# Patient Record
Sex: FEMALE | Race: WHITE | NOT HISPANIC OR LATINO | Employment: OTHER | URBAN - METROPOLITAN AREA
[De-identification: names, ages, dates, MRNs, and addresses within clinical notes are randomized per-mention and may not be internally consistent; named-entity substitution may affect disease eponyms.]

---

## 2018-05-14 ENCOUNTER — HOSPITAL ENCOUNTER (OUTPATIENT)
Dept: RADIOLOGY | Facility: MEDICAL CENTER | Age: 58
End: 2018-05-14

## 2018-05-14 ENCOUNTER — HOSPITAL ENCOUNTER (INPATIENT)
Facility: MEDICAL CENTER | Age: 58
LOS: 3 days | DRG: 308 | End: 2018-05-17
Attending: EMERGENCY MEDICINE | Admitting: HOSPITALIST
Payer: MEDICARE

## 2018-05-14 DIAGNOSIS — J96.01 ACUTE RESPIRATORY FAILURE WITH HYPOXIA (HCC): ICD-10-CM

## 2018-05-14 DIAGNOSIS — B36.9 FUNGAL RASH OF TORSO: ICD-10-CM

## 2018-05-14 DIAGNOSIS — E11.8 TYPE 2 DIABETES MELLITUS WITH COMPLICATION, WITHOUT LONG-TERM CURRENT USE OF INSULIN (HCC): ICD-10-CM

## 2018-05-14 DIAGNOSIS — I27.20 MODERATE TO SEVERE PULMONARY HYPERTENSION (HCC): ICD-10-CM

## 2018-05-14 DIAGNOSIS — I51.7 CARDIOMEGALY: ICD-10-CM

## 2018-05-14 DIAGNOSIS — J44.9 CHRONIC OBSTRUCTIVE PULMONARY DISEASE, UNSPECIFIED COPD TYPE (HCC): ICD-10-CM

## 2018-05-14 DIAGNOSIS — I48.91 ATRIAL FIBRILLATION WITH RAPID VENTRICULAR RESPONSE (HCC): ICD-10-CM

## 2018-05-14 DIAGNOSIS — T78.40XA ACUTE ALLERGIC REACTION, INITIAL ENCOUNTER: ICD-10-CM

## 2018-05-14 DIAGNOSIS — I10 ESSENTIAL HYPERTENSION: ICD-10-CM

## 2018-05-14 DIAGNOSIS — I48.91 NEW ONSET ATRIAL FIBRILLATION (HCC): ICD-10-CM

## 2018-05-14 LAB
ALBUMIN SERPL BCP-MCNC: 3.8 G/DL (ref 3.2–4.9)
ALBUMIN/GLOB SERPL: 1.4 G/DL
ALP SERPL-CCNC: 69 U/L (ref 30–99)
ALT SERPL-CCNC: 67 U/L (ref 2–50)
ANION GAP SERPL CALC-SCNC: 7 MMOL/L (ref 0–11.9)
AST SERPL-CCNC: 33 U/L (ref 12–45)
BASOPHILS # BLD AUTO: 0.5 % (ref 0–1.8)
BASOPHILS # BLD: 0.06 K/UL (ref 0–0.12)
BILIRUB SERPL-MCNC: 0.8 MG/DL (ref 0.1–1.5)
BNP SERPL-MCNC: 131 PG/ML (ref 0–100)
BUN SERPL-MCNC: 19 MG/DL (ref 8–22)
CALCIUM SERPL-MCNC: 9.6 MG/DL (ref 8.5–10.5)
CHLORIDE SERPL-SCNC: 101 MMOL/L (ref 96–112)
CO2 SERPL-SCNC: 31 MMOL/L (ref 20–33)
CREAT SERPL-MCNC: 0.95 MG/DL (ref 0.5–1.4)
EKG IMPRESSION: NORMAL
EOSINOPHIL # BLD AUTO: 0.13 K/UL (ref 0–0.51)
EOSINOPHIL NFR BLD: 1.2 % (ref 0–6.9)
ERYTHROCYTE [DISTWIDTH] IN BLOOD BY AUTOMATED COUNT: 58.7 FL (ref 35.9–50)
GLOBULIN SER CALC-MCNC: 2.8 G/DL (ref 1.9–3.5)
GLUCOSE SERPL-MCNC: 112 MG/DL (ref 65–99)
HCT VFR BLD AUTO: 46 % (ref 37–47)
HGB BLD-MCNC: 14.9 G/DL (ref 12–16)
IMM GRANULOCYTES # BLD AUTO: 0.05 K/UL (ref 0–0.11)
IMM GRANULOCYTES NFR BLD AUTO: 0.5 % (ref 0–0.9)
LYMPHOCYTES # BLD AUTO: 3.22 K/UL (ref 1–4.8)
LYMPHOCYTES NFR BLD: 29.2 % (ref 22–41)
MCH RBC QN AUTO: 32.6 PG (ref 27–33)
MCHC RBC AUTO-ENTMCNC: 32.4 G/DL (ref 33.6–35)
MCV RBC AUTO: 100.7 FL (ref 81.4–97.8)
MONOCYTES # BLD AUTO: 0.71 K/UL (ref 0–0.85)
MONOCYTES NFR BLD AUTO: 6.4 % (ref 0–13.4)
NEUTROPHILS # BLD AUTO: 6.87 K/UL (ref 2–7.15)
NEUTROPHILS NFR BLD: 62.2 % (ref 44–72)
NRBC # BLD AUTO: 0.07 K/UL
NRBC BLD-RTO: 0.6 /100 WBC
PLATELET # BLD AUTO: 259 K/UL (ref 164–446)
PMV BLD AUTO: 10.2 FL (ref 9–12.9)
POTASSIUM SERPL-SCNC: 4.3 MMOL/L (ref 3.6–5.5)
PROT SERPL-MCNC: 6.6 G/DL (ref 6–8.2)
RBC # BLD AUTO: 4.57 M/UL (ref 4.2–5.4)
SODIUM SERPL-SCNC: 139 MMOL/L (ref 135–145)
TROPONIN I SERPL-MCNC: 0.04 NG/ML (ref 0–0.04)
TSH SERPL DL<=0.005 MIU/L-ACNC: 3.12 UIU/ML (ref 0.38–5.33)
WBC # BLD AUTO: 11 K/UL (ref 4.8–10.8)

## 2018-05-14 PROCEDURE — 36415 COLL VENOUS BLD VENIPUNCTURE: CPT

## 2018-05-14 PROCEDURE — 93306 TTE W/DOPPLER COMPLETE: CPT | Mod: 26 | Performed by: INTERNAL MEDICINE

## 2018-05-14 PROCEDURE — 80053 COMPREHEN METABOLIC PANEL: CPT

## 2018-05-14 PROCEDURE — 93005 ELECTROCARDIOGRAM TRACING: CPT

## 2018-05-14 PROCEDURE — 770006 HCHG ROOM/CARE - MED/SURG/GYN SEMI*

## 2018-05-14 PROCEDURE — 770020 HCHG ROOM/CARE - TELE (206)

## 2018-05-14 PROCEDURE — 83880 ASSAY OF NATRIURETIC PEPTIDE: CPT

## 2018-05-14 PROCEDURE — 84443 ASSAY THYROID STIM HORMONE: CPT

## 2018-05-14 PROCEDURE — 700101 HCHG RX REV CODE 250: Performed by: EMERGENCY MEDICINE

## 2018-05-14 PROCEDURE — 84484 ASSAY OF TROPONIN QUANT: CPT

## 2018-05-14 PROCEDURE — 96374 THER/PROPH/DIAG INJ IV PUSH: CPT

## 2018-05-14 PROCEDURE — 85025 COMPLETE CBC W/AUTO DIFF WBC: CPT

## 2018-05-14 PROCEDURE — 99223 1ST HOSP IP/OBS HIGH 75: CPT | Performed by: HOSPITALIST

## 2018-05-14 PROCEDURE — 99285 EMERGENCY DEPT VISIT HI MDM: CPT

## 2018-05-14 RX ORDER — POLYETHYLENE GLYCOL 3350 17 G/17G
1 POWDER, FOR SOLUTION ORAL
Status: DISCONTINUED | OUTPATIENT
Start: 2018-05-14 | End: 2018-05-17 | Stop reason: HOSPADM

## 2018-05-14 RX ORDER — DULOXETIN HYDROCHLORIDE 30 MG/1
30 CAPSULE, DELAYED RELEASE ORAL DAILY
Status: ON HOLD | COMMUNITY

## 2018-05-14 RX ORDER — LOSARTAN POTASSIUM AND HYDROCHLOROTHIAZIDE 25; 100 MG/1; MG/1
1 TABLET ORAL DAILY
Status: ON HOLD | COMMUNITY
End: 2018-05-17

## 2018-05-14 RX ORDER — NORGESTIMATE AND ETHINYL ESTRADIOL 0.25-0.035
1 KIT ORAL DAILY
COMMUNITY
End: 2018-05-14

## 2018-05-14 RX ORDER — POTASSIUM CHLORIDE 20 MEQ/1
20 TABLET, EXTENDED RELEASE ORAL DAILY
Status: DISCONTINUED | OUTPATIENT
Start: 2018-05-15 | End: 2018-05-17 | Stop reason: HOSPADM

## 2018-05-14 RX ORDER — DEXTROSE MONOHYDRATE 25 G/50ML
25 INJECTION, SOLUTION INTRAVENOUS
Status: DISCONTINUED | OUTPATIENT
Start: 2018-05-14 | End: 2018-05-17 | Stop reason: HOSPADM

## 2018-05-14 RX ORDER — FUROSEMIDE 10 MG/ML
40 INJECTION INTRAMUSCULAR; INTRAVENOUS
Status: DISCONTINUED | OUTPATIENT
Start: 2018-05-15 | End: 2018-05-15

## 2018-05-14 RX ORDER — BISACODYL 10 MG
10 SUPPOSITORY, RECTAL RECTAL
Status: DISCONTINUED | OUTPATIENT
Start: 2018-05-14 | End: 2018-05-17 | Stop reason: HOSPADM

## 2018-05-14 RX ORDER — TOPIRAMATE 25 MG/1
25 TABLET ORAL 2 TIMES DAILY
Status: ON HOLD | COMMUNITY

## 2018-05-14 RX ORDER — AMOXICILLIN 250 MG
2 CAPSULE ORAL 2 TIMES DAILY
Status: DISCONTINUED | OUTPATIENT
Start: 2018-05-14 | End: 2018-05-17 | Stop reason: HOSPADM

## 2018-05-14 RX ORDER — DILTIAZEM HYDROCHLORIDE 5 MG/ML
20 INJECTION INTRAVENOUS ONCE
Status: DISCONTINUED | OUTPATIENT
Start: 2018-05-14 | End: 2018-05-14

## 2018-05-14 RX ORDER — CYCLOBENZAPRINE HCL 10 MG
10 TABLET ORAL 3 TIMES DAILY PRN
Status: ON HOLD | COMMUNITY

## 2018-05-14 RX ORDER — METFORMIN HYDROCHLORIDE 500 MG/1
500 TABLET, EXTENDED RELEASE ORAL DAILY
Status: ON HOLD | COMMUNITY
End: 2018-05-17

## 2018-05-14 RX ORDER — METOPROLOL TARTRATE 1 MG/ML
5 INJECTION, SOLUTION INTRAVENOUS ONCE
Status: COMPLETED | OUTPATIENT
Start: 2018-05-14 | End: 2018-05-14

## 2018-05-14 RX ADMIN — METOPROLOL TARTRATE 5 MG: 1 INJECTION, SOLUTION INTRAVENOUS at 22:17

## 2018-05-14 ASSESSMENT — ENCOUNTER SYMPTOMS
SHORTNESS OF BREATH: 1
ABDOMINAL PAIN: 0
COUGH: 1
FEVER: 0

## 2018-05-14 ASSESSMENT — PAIN SCALES - GENERAL: PAINLEVEL_OUTOF10: 0

## 2018-05-15 ENCOUNTER — APPOINTMENT (OUTPATIENT)
Dept: RADIOLOGY | Facility: MEDICAL CENTER | Age: 58
DRG: 308 | End: 2018-05-15
Attending: EMERGENCY MEDICINE
Payer: MEDICARE

## 2018-05-15 PROBLEM — J96.01 ACUTE RESPIRATORY FAILURE WITH HYPOXIA (HCC): Status: ACTIVE | Noted: 2018-05-15

## 2018-05-15 PROBLEM — T78.40XA ACUTE ALLERGIC REACTION: Status: ACTIVE | Noted: 2018-05-15

## 2018-05-15 PROBLEM — I10 HTN (HYPERTENSION): Status: ACTIVE | Noted: 2018-05-15

## 2018-05-15 PROBLEM — B36.9 FUNGAL RASH OF TORSO: Status: ACTIVE | Noted: 2018-05-15

## 2018-05-15 PROBLEM — I48.91 NEW ONSET ATRIAL FIBRILLATION (HCC): Status: ACTIVE | Noted: 2018-05-15

## 2018-05-15 PROBLEM — E11.9 T2DM (TYPE 2 DIABETES MELLITUS) (HCC): Status: ACTIVE | Noted: 2018-05-15

## 2018-05-15 PROBLEM — I27.20 MODERATE TO SEVERE PULMONARY HYPERTENSION (HCC): Status: ACTIVE | Noted: 2018-05-15

## 2018-05-15 PROBLEM — I51.7 CARDIOMEGALY: Status: ACTIVE | Noted: 2018-05-15

## 2018-05-15 LAB
ANION GAP SERPL CALC-SCNC: 6 MMOL/L (ref 0–11.9)
APTT PPP: 27.2 SEC (ref 24.7–36)
APTT PPP: 50.7 SEC (ref 24.7–36)
APTT PPP: 74.9 SEC (ref 24.7–36)
APTT PPP: 83.4 SEC (ref 24.7–36)
BUN SERPL-MCNC: 19 MG/DL (ref 8–22)
CALCIUM SERPL-MCNC: 8.8 MG/DL (ref 8.5–10.5)
CHLORIDE SERPL-SCNC: 100 MMOL/L (ref 96–112)
CHOLEST SERPL-MCNC: 132 MG/DL (ref 100–199)
CO2 SERPL-SCNC: 34 MMOL/L (ref 20–33)
CREAT SERPL-MCNC: 1.1 MG/DL (ref 0.5–1.4)
ERYTHROCYTE [DISTWIDTH] IN BLOOD BY AUTOMATED COUNT: 62 FL (ref 35.9–50)
EST. AVERAGE GLUCOSE BLD GHB EST-MCNC: 146 MG/DL
GLUCOSE BLD-MCNC: 123 MG/DL (ref 65–99)
GLUCOSE BLD-MCNC: 124 MG/DL (ref 65–99)
GLUCOSE BLD-MCNC: 135 MG/DL (ref 65–99)
GLUCOSE BLD-MCNC: 137 MG/DL (ref 65–99)
GLUCOSE SERPL-MCNC: 114 MG/DL (ref 65–99)
HBA1C MFR BLD: 6.7 % (ref 0–5.6)
HCT VFR BLD AUTO: 44.9 % (ref 37–47)
HDLC SERPL-MCNC: 32 MG/DL
HGB BLD-MCNC: 14.1 G/DL (ref 12–16)
LDLC SERPL CALC-MCNC: 49 MG/DL
LV EJECT FRACT  99904: 55
LV EJECT FRACT MOD 2C 99903: 47.77
LV EJECT FRACT MOD 4C 99902: 48.23
LV EJECT FRACT MOD BP 99901: 44.72
MCH RBC QN AUTO: 32.9 PG (ref 27–33)
MCHC RBC AUTO-ENTMCNC: 31.4 G/DL (ref 33.6–35)
MCV RBC AUTO: 104.7 FL (ref 81.4–97.8)
PLATELET # BLD AUTO: 212 K/UL (ref 164–446)
PMV BLD AUTO: 10 FL (ref 9–12.9)
POTASSIUM SERPL-SCNC: 4.2 MMOL/L (ref 3.6–5.5)
RBC # BLD AUTO: 4.29 M/UL (ref 4.2–5.4)
SODIUM SERPL-SCNC: 140 MMOL/L (ref 135–145)
TRIGL SERPL-MCNC: 255 MG/DL (ref 0–149)
WBC # BLD AUTO: 6.9 K/UL (ref 4.8–10.8)

## 2018-05-15 PROCEDURE — 83036 HEMOGLOBIN GLYCOSYLATED A1C: CPT

## 2018-05-15 PROCEDURE — 700111 HCHG RX REV CODE 636 W/ 250 OVERRIDE (IP): Performed by: HOSPITALIST

## 2018-05-15 PROCEDURE — 82962 GLUCOSE BLOOD TEST: CPT

## 2018-05-15 PROCEDURE — 99233 SBSQ HOSP IP/OBS HIGH 50: CPT | Performed by: HOSPITALIST

## 2018-05-15 PROCEDURE — 700117 HCHG RX CONTRAST REV CODE 255: Performed by: HOSPITALIST

## 2018-05-15 PROCEDURE — 93306 TTE W/DOPPLER COMPLETE: CPT

## 2018-05-15 PROCEDURE — 700102 HCHG RX REV CODE 250 W/ 637 OVERRIDE(OP): Performed by: HOSPITALIST

## 2018-05-15 PROCEDURE — 80061 LIPID PANEL: CPT

## 2018-05-15 PROCEDURE — 71275 CT ANGIOGRAPHY CHEST: CPT

## 2018-05-15 PROCEDURE — A9270 NON-COVERED ITEM OR SERVICE: HCPCS | Performed by: HOSPITALIST

## 2018-05-15 PROCEDURE — 770020 HCHG ROOM/CARE - TELE (206)

## 2018-05-15 PROCEDURE — 302146: Performed by: HOSPITALIST

## 2018-05-15 PROCEDURE — 36415 COLL VENOUS BLD VENIPUNCTURE: CPT

## 2018-05-15 PROCEDURE — 85027 COMPLETE CBC AUTOMATED: CPT

## 2018-05-15 PROCEDURE — 85730 THROMBOPLASTIN TIME PARTIAL: CPT

## 2018-05-15 PROCEDURE — 700117 HCHG RX CONTRAST REV CODE 255: Performed by: EMERGENCY MEDICINE

## 2018-05-15 PROCEDURE — 80048 BASIC METABOLIC PNL TOTAL CA: CPT

## 2018-05-15 RX ORDER — ASCORBIC ACID 500 MG
500 TABLET ORAL DAILY
Status: ON HOLD | COMMUNITY

## 2018-05-15 RX ORDER — NYSTATIN 100000 [USP'U]/G
POWDER TOPICAL 2 TIMES DAILY
Status: DISCONTINUED | OUTPATIENT
Start: 2018-05-15 | End: 2018-05-17 | Stop reason: HOSPADM

## 2018-05-15 RX ORDER — HEPARIN SODIUM 1000 [USP'U]/ML
9000 INJECTION, SOLUTION INTRAVENOUS; SUBCUTANEOUS ONCE
Status: COMPLETED | OUTPATIENT
Start: 2018-05-15 | End: 2018-05-15

## 2018-05-15 RX ORDER — HEPARIN SODIUM 1000 [USP'U]/ML
5000 INJECTION, SOLUTION INTRAVENOUS; SUBCUTANEOUS PRN
Status: DISCONTINUED | OUTPATIENT
Start: 2018-05-14 | End: 2018-05-17

## 2018-05-15 RX ORDER — DIGOXIN 125 MCG
250 TABLET ORAL EVERY 6 HOURS
Status: COMPLETED | OUTPATIENT
Start: 2018-05-15 | End: 2018-05-16

## 2018-05-15 RX ORDER — DIGOXIN 125 MCG
125 TABLET ORAL DAILY
Status: DISCONTINUED | OUTPATIENT
Start: 2018-05-16 | End: 2018-05-17 | Stop reason: HOSPADM

## 2018-05-15 RX ORDER — ATORVASTATIN CALCIUM 10 MG/1
10 TABLET, FILM COATED ORAL
Status: DISCONTINUED | OUTPATIENT
Start: 2018-05-15 | End: 2018-05-17 | Stop reason: HOSPADM

## 2018-05-15 RX ORDER — POTASSIUM CHLORIDE 20 MEQ/1
20 TABLET, EXTENDED RELEASE ORAL DAILY
Status: DISCONTINUED | OUTPATIENT
Start: 2018-05-16 | End: 2018-05-15

## 2018-05-15 RX ORDER — FUROSEMIDE 40 MG/1
40 TABLET ORAL
Status: DISCONTINUED | OUTPATIENT
Start: 2018-05-16 | End: 2018-05-17 | Stop reason: HOSPADM

## 2018-05-15 RX ADMIN — DILTIAZEM HYDROCHLORIDE 60 MG: 30 TABLET, FILM COATED ORAL at 15:02

## 2018-05-15 RX ADMIN — DIGOXIN 250 MCG: 125 TABLET ORAL at 15:03

## 2018-05-15 RX ADMIN — IOHEXOL 95 ML: 350 INJECTION, SOLUTION INTRAVENOUS at 09:21

## 2018-05-15 RX ADMIN — STANDARDIZED SENNA CONCENTRATE AND DOCUSATE SODIUM 2 TABLET: 8.6; 5 TABLET, FILM COATED ORAL at 17:02

## 2018-05-15 RX ADMIN — NYSTATIN: 100000 POWDER TOPICAL at 20:31

## 2018-05-15 RX ADMIN — METOPROLOL TARTRATE 25 MG: 25 TABLET ORAL at 06:19

## 2018-05-15 RX ADMIN — MAGNESIUM GLUCONATE 500 MG ORAL TABLET 400 MG: 500 TABLET ORAL at 17:03

## 2018-05-15 RX ADMIN — HEPARIN SODIUM 9000 UNITS: 1000 INJECTION, SOLUTION INTRAVENOUS; SUBCUTANEOUS at 02:57

## 2018-05-15 RX ADMIN — FUROSEMIDE 40 MG: 10 INJECTION, SOLUTION INTRAMUSCULAR; INTRAVENOUS at 06:17

## 2018-05-15 RX ADMIN — HEPARIN SODIUM 5000 UNITS: 1000 INJECTION, SOLUTION INTRAVENOUS; SUBCUTANEOUS at 16:26

## 2018-05-15 RX ADMIN — HEPARIN SODIUM 1950 UNITS/HR: 5000 INJECTION, SOLUTION INTRAVENOUS at 15:57

## 2018-05-15 RX ADMIN — HEPARIN SODIUM 1950 UNITS/HR: 5000 INJECTION, SOLUTION INTRAVENOUS at 02:58

## 2018-05-15 RX ADMIN — ATORVASTATIN CALCIUM 10 MG: 10 TABLET, FILM COATED ORAL at 17:02

## 2018-05-15 RX ADMIN — IOHEXOL 80 ML: 350 INJECTION, SOLUTION INTRAVENOUS at 01:56

## 2018-05-15 RX ADMIN — DIGOXIN 250 MCG: 125 TABLET ORAL at 20:31

## 2018-05-15 RX ADMIN — MAGNESIUM GLUCONATE 500 MG ORAL TABLET 400 MG: 500 TABLET ORAL at 06:18

## 2018-05-15 RX ADMIN — POTASSIUM CHLORIDE 20 MEQ: 1500 TABLET, EXTENDED RELEASE ORAL at 06:18

## 2018-05-15 RX ADMIN — ASPIRIN 81 MG: 81 TABLET, COATED ORAL at 06:17

## 2018-05-15 RX ADMIN — DILTIAZEM HYDROCHLORIDE 90 MG: 30 TABLET, FILM COATED ORAL at 20:31

## 2018-05-15 RX ADMIN — MAGNESIUM GLUCONATE 500 MG ORAL TABLET 400 MG: 500 TABLET ORAL at 00:57

## 2018-05-15 ASSESSMENT — COPD QUESTIONNAIRES
COPD SCREENING SCORE: 6
COPD SCREENING SCORE: 4
DO YOU EVER COUGH UP ANY MUCUS OR PHLEGM?: NO/ONLY WITH OCCASIONAL COLDS OR INFECTIONS
IN THE PAST 12 MONTHS DO YOU DO LESS THAN YOU USED TO BECAUSE OF YOUR BREATHING PROBLEMS: AGREE
DURING THE PAST 4 WEEKS HOW MUCH DID YOU FEEL SHORT OF BREATH: NONE/LITTLE OF THE TIME
DURING THE PAST 4 WEEKS HOW MUCH DID YOU FEEL SHORT OF BREATH: MOST  OR ALL OF THE TIME
HAVE YOU SMOKED AT LEAST 100 CIGARETTES IN YOUR ENTIRE LIFE: YES
DO YOU EVER COUGH UP ANY MUCUS OR PHLEGM?: NO/ONLY WITH OCCASIONAL COLDS OR INFECTIONS
HAVE YOU SMOKED AT LEAST 100 CIGARETTES IN YOUR ENTIRE LIFE: YES

## 2018-05-15 ASSESSMENT — PAIN SCALES - GENERAL
PAINLEVEL_OUTOF10: 0
PAINLEVEL_OUTOF10: 5
PAINLEVEL_OUTOF10: 0

## 2018-05-15 ASSESSMENT — LIFESTYLE VARIABLES
EVER_SMOKED: YES
EVER_SMOKED: YES
ALCOHOL_USE: NO
SUBSTANCE_ABUSE: 0

## 2018-05-15 ASSESSMENT — ENCOUNTER SYMPTOMS
FEVER: 0
DIAPHORESIS: 0
BACK PAIN: 0
ORTHOPNEA: 1
ABDOMINAL PAIN: 0
HEADACHES: 0
SENSORY CHANGE: 0
NECK PAIN: 0
COUGH: 0
EYE PAIN: 0
WEAKNESS: 0
VOMITING: 0
CLAUDICATION: 0
DIARRHEA: 0
SPEECH CHANGE: 0
SORE THROAT: 0
DEPRESSION: 0
EYE DISCHARGE: 0
FOCAL WEAKNESS: 0
CONSTIPATION: 0
PALPITATIONS: 0
WHEEZING: 0
HEMOPTYSIS: 0
MYALGIAS: 0
BRUISES/BLEEDS EASILY: 0
SPUTUM PRODUCTION: 0
CHILLS: 0
SHORTNESS OF BREATH: 1
NAUSEA: 0
LOSS OF CONSCIOUSNESS: 0
DIZZINESS: 0
TINGLING: 0
PHOTOPHOBIA: 0

## 2018-05-15 ASSESSMENT — PATIENT HEALTH QUESTIONNAIRE - PHQ9
2. FEELING DOWN, DEPRESSED, IRRITABLE, OR HOPELESS: NOT AT ALL
SUM OF ALL RESPONSES TO PHQ9 QUESTIONS 1 AND 2: 0
1. LITTLE INTEREST OR PLEASURE IN DOING THINGS: NOT AT ALL

## 2018-05-15 NOTE — ASSESSMENT & PLAN NOTE
As noted above, Isuspect the patient has underlying lung disease with possible pickwickian syndrome/obesity hypoventilation and most likely obstructive sleep apnea. She apparently had been on oxygen 2 years ago but discontinued use because of financial difficulties. Fortunately she is not a smoker. She will be started on respiratory therapy protocol for new diagnosed atrial fibrillation. She would benefit from an outpatient PFT once medically stabilized. Chest x-ray at the outlying facility showed no evidence of infiltrates or consolidations.   A CT PE with no PE seen, ascending aorta 4.1cm.

## 2018-05-15 NOTE — ASSESSMENT & PLAN NOTE
"Patient states that she was diagnosed with a \"enlarged heart\" several years ago but has not followed up with any specialist. Given her clinical symptoms of volume overload including crackles heard on the lungs and lower extremity edema.  concerning for pickwickian syndrome/obesity hypoventilation in addition to obstructive sleep apnea. Her current BNP is mildly elevated and she has noted cardiomegaly on chest x-ray.  echocardiogram  EF 55%, RVP elevated 55-60.  control her heart rate, and start her on Lasix with strict intake and output measuring.   dc'd lasix IV 40 daily since lungs CTA b/l.  Continue lasix 40mg po daily with K.  "

## 2018-05-15 NOTE — PROGRESS NOTES
Patient alert during report. Just got up to bedside commode. Patient is to go to CT at 0800. Apparently she went down last night but wasn't able to get CT due to Iv being lost. Appears comfortable, clean and covered. Will continue to monitor.

## 2018-05-15 NOTE — PROGRESS NOTES
Unable to place an IV on upper arms. Did obtain IV access in mid forearm. Unable to obtain any higher. CT notified. CT to call nurse back if this IV is okay or not.

## 2018-05-15 NOTE — ASSESSMENT & PLAN NOTE
Patient denies any history of known cardiac dysrhythmias. However, she is in poor medical condition at baseline and does not apparently seek frequent medical help therefore she may have had undiagnosed disease for quite some time. Her heart rates have not improved with initiation of beta blockade.   5/15:  dc oral metoprolol for better rate control with cardizem po q 6 hours since HR still in 130 to 160 range afib on monitor. Start digoxin loading since would like to avoid amiodarone given her current hypoxia and acute respiratory failure on 4.5 LPM NC.   Echocardiogram EF 55%, RVP 55-60  heparin drip for anticoagulation in light of her super morbid obesity. We will further discuss the need for oral anticoagulation therapy.  Her chads score is 3. She will need outpatient cardiology follow-up unless we are unable to control her heart rate in the inpatient setting.  5/16:  Improved rate control with digoxin loading and cardizem 360mg po daily.  Check EKG to ensure not aflutter.  Remains irreg irreg on monitor suggestive of afib.  Diuresis with lasix.  CXR in a.m.  Checking on xarelto pricing and home O2.

## 2018-05-15 NOTE — PROGRESS NOTES
Received call from Selin in CT. Per CT tech forearm IV ale. CT to call nurse when ready for patient to go for CTa.

## 2018-05-15 NOTE — PROGRESS NOTES
Received call from Selin in CT stating patient will be placed for transport at 0800. Will update day shift.

## 2018-05-15 NOTE — ASSESSMENT & PLAN NOTE
Patient has not been on any medication for blood pressure.  Tolerating cardizem po for rate control.

## 2018-05-15 NOTE — PROGRESS NOTES
Patients HR increased up to 160. Went to check on her and she was independently out of bed and had urinated on the floor. She stated she couldn't hold it. Oxygen was off and she was saturating at 68% on RA. Placed on oxygen, had her sit down on toilet while bed was clean and bed alarm placed on bed. Patient does call appropriately but clearly needs oxygen with ambulating. Reminded of importance of oxygen and calling prior to getting up. Patient verbalized understanding. Bed alarm on and functioning. No injuries, HR is decreasing. Sitting at bedside eating breakfast.

## 2018-05-15 NOTE — PROGRESS NOTES
2 RN skin check completed. Patient has some redness under abdominal folds, and pink blanching sacrum. Generalized edema present. No open skin issues.

## 2018-05-15 NOTE — ED TRIAGE NOTES
"Chief Complaint   Patient presents with   • Shortness of Breath     PT RA O2 is 71 %. Satting 98% on 10L oxymask.   • Facial Swelling     PT went to [facility] for suspected allergic rxn d/t facial swelling.   • Hand Swelling       BP (!) 98/64   Pulse (!) 114   Temp 36.5 °C (97.7 °F)   Resp 20   Ht 1.702 m (5' 7\")   Wt (!) 204 kg (449 lb 11.8 oz)   SpO2 98%   BMI 70.44 kg/m²       Pt BIB [facility]. Pt presented to [facility] urgent care for suspected allergic rxn. Pt had facial and hand swelling, SOB. [facility] urgent care sent pt to [facility] ED where they diagnosed new onset a-fib, hypoxia, new onset diabetes and CHF. Pt received lasix (unknown dose), floyd was placed for transport.    PT denies SOB, reports swelling has decreased at this time.  "

## 2018-05-15 NOTE — CARE PLAN
Problem: Safety  Goal: Will remain free from injury  Outcome: PROGRESSING AS EXPECTED  Impulsive.     Problem: Infection  Goal: Will remain free from infection  Outcome: PROGRESSING AS EXPECTED

## 2018-05-15 NOTE — ASSESSMENT & PLAN NOTE
Patient states that she was recently diagnosed with diabetes mellitus however she is unsure of the name of her medications. continue insulin sliding scale and Accu-Cheks.   HbA1c 6.7%.  Allergic to metformin with rash to face.  5/16:  Started glyburide 5mg daily.

## 2018-05-15 NOTE — PROGRESS NOTES
Picked up patient from ED. Patient A+Ox4, 5 L O2 via facemask, Afib on monitor rate 110s-130s. Patient has floyd, per ED RN unsure if there are orders to keep floyd. Will contact hospitalist in regards to catheter.  Plan of care discussed. PO rate control orders in place. Echo pending. Patient able to ambulate from gurney to bariatric bed in the room . Patient oriented to surroundings and unit routine. Call bell and personal belongings within reach. Questions answered.

## 2018-05-15 NOTE — ED PROVIDER NOTES
ED Provider Note    Scribed for Gaetano Sahni M.D. by Fidencio Larry. 5/14/2018  9:52 PM    Means of arrival: EMS  History obtained by: Patient  Limitations: None      CHIEF COMPLAINT  Chief Complaint   Patient presents with   • Shortness of Breath     PT RA O2 is 71 %. Satting 98% on 10L oxymask.   • Facial Swelling     PT went to [facility] for suspected allergic rxn d/t facial swelling.   • Hand Swelling       HPI  Miroslava Matta is a 57 y.o. female who presents with shortness of breath onset two weeks ago worsening in severity the past few days. The patient visited Urgent Care as she thought this might be an allergic reaction to metformin. She reports associated facial, leg, and hand swelling that has progressively worsened over the past few weeks. Urgent Care sent the patient to the [place] ED where she was diagnosed with Atrial Fibrillation, Hypoxia, Diabetes, and CHF. The patient states walking exacerbates her shortness of breath, she also reports orthopnea. Upon exam, she reports an associated cough. She denies chest pain, fever, and abdominal pain.  Patient was transferred from outside hospital for concern of possible pulmonary embolus however patient's body habitus was too large further CT scanner     The patient states a couple years ago she was told she had an enlarged heart. She confirms it has improved since the diagnosis.     REVIEW OF SYSTEMS  Review of Systems   Constitutional: Negative for fever.   Respiratory: Positive for cough and shortness of breath.    Cardiovascular: Negative for chest pain.   Gastrointestinal: Negative for abdominal pain.   All other systems reviewed and are negative.    See HPI for further details. C.    PAST MEDICAL HISTORY       SOCIAL HISTORY  Social History     Social History Main Topics   • Smoking status: Not on file   • Smokeless tobacco: Not on file   • Alcohol use Not on file   • Drug use: Unknown   • Sexual activity: Not on file       SURGICAL  "HISTORY  patient denies any surgical history    CURRENT MEDICATIONS  Home Medications    **Home medications have not yet been reviewed for this encounter**         ALLERGIES  Allergies   Allergen Reactions   • Codeine Nausea       PHYSICAL EXAM  BP (!) 98/64   Pulse (!) 114   Temp 36.5 °C (97.7 °F)   Resp 20   Ht 1.702 m (5' 7\")   Wt (!) 204 kg (449 lb 11.8 oz)   SpO2 98%   BMI 70.44 kg/m²   Constitutional: Well developed, Well nourished, No acute distress, Non-toxic appearance.   HENT: Normocephalic, Atraumatic,  Eyes: PERRL, EOM intact  Neck: Supple, no meningismus  Lymphatic: No lymphadenopathy noted.   Cardiovascular: Tachycardic, irregular rhythm  Lungs: Bilateral crackles.   Abdomen: Bowel sounds normal, Soft, No tenderness  Skin: Warm, Dry, no rash  Back: No tenderness, No CVA tenderness.   Extremities: No edema to lower extremities  Neurologic: Alert and oriented, appropriate, follows commands, moving all extremities, normal speech   Psychiatric: Affect normal        DIAGNOSTIC STUDIES / PROCEDURES    EKG  12 Lead EKG interpreted by me to show:  Atrial Fibrillation with IVR  Rate 137  Axis: Normal  Intervals: Normal  T wave inversion in lead III  Normal ST segments  My impression of this EKG: Does not indicate ischemia or arrythmia at this time.      LABS  Results for orders placed or performed during the hospital encounter of 05/14/18   CBC WITH DIFFERENTIAL   Result Value Ref Range    WBC 11.0 (H) 4.8 - 10.8 K/uL    RBC 4.57 4.20 - 5.40 M/uL    Hemoglobin 14.9 12.0 - 16.0 g/dL    Hematocrit 46.0 37.0 - 47.0 %    .7 (H) 81.4 - 97.8 fL    MCH 32.6 27.0 - 33.0 pg    MCHC 32.4 (L) 33.6 - 35.0 g/dL    RDW 58.7 (H) 35.9 - 50.0 fL    Platelet Count 259 164 - 446 K/uL    MPV 10.2 9.0 - 12.9 fL    Neutrophils-Polys 62.20 44.00 - 72.00 %    Lymphocytes 29.20 22.00 - 41.00 %    Monocytes 6.40 0.00 - 13.40 %    Eosinophils 1.20 0.00 - 6.90 %    Basophils 0.50 0.00 - 1.80 %    Immature Granulocytes 0.50 " 0.00 - 0.90 %    Nucleated RBC 0.60 /100 WBC    Neutrophils (Absolute) 6.87 2.00 - 7.15 K/uL    Lymphs (Absolute) 3.22 1.00 - 4.80 K/uL    Monos (Absolute) 0.71 0.00 - 0.85 K/uL    Eos (Absolute) 0.13 0.00 - 0.51 K/uL    Baso (Absolute) 0.06 0.00 - 0.12 K/uL    Immature Granulocytes (abs) 0.05 0.00 - 0.11 K/uL    NRBC (Absolute) 0.07 K/uL   CMP   Result Value Ref Range    Sodium 139 135 - 145 mmol/L    Potassium 4.3 3.6 - 5.5 mmol/L    Chloride 101 96 - 112 mmol/L    Co2 31 20 - 33 mmol/L    Anion Gap 7.0 0.0 - 11.9    Glucose 112 (H) 65 - 99 mg/dL    Bun 19 8 - 22 mg/dL    Creatinine 0.95 0.50 - 1.40 mg/dL    Calcium 9.6 8.5 - 10.5 mg/dL    AST(SGOT) 33 12 - 45 U/L    ALT(SGPT) 67 (H) 2 - 50 U/L    Alkaline Phosphatase 69 30 - 99 U/L    Total Bilirubin 0.8 0.1 - 1.5 mg/dL    Albumin 3.8 3.2 - 4.9 g/dL    Total Protein 6.6 6.0 - 8.2 g/dL    Globulin 2.8 1.9 - 3.5 g/dL    A-G Ratio 1.4 g/dL   TROPONIN   Result Value Ref Range    Troponin I 0.04 0.00 - 0.04 ng/mL   BNP   Result Value Ref Range    B Natriuretic Peptide 131 (H) 0 - 100 pg/mL   ESTIMATED GFR   Result Value Ref Range    GFR If African American >60 >60 mL/min/1.73 m 2    GFR If Non African American >60 >60 mL/min/1.73 m 2   EKG (NOW)   Result Value Ref Range    Report       [facility] Emergency Dept.    Test Date:  2018  Pt Name:    SHAQUILLE CANDELARIA                 Department: ER  MRN:        [id number]                      Room:       [facility]  Gender:     Female                       Technician: [id number]  :        1960                   Requested By:ER TRIAGE PROTOCOL  Order #:    [id number]                    Reading MD:    Measurements  Intervals                                Axis  Rate:       137                          P:  WV:                                      QRS:        93  QRSD:       80                           T:          -14  QT:         316  QTc:        477    Interpretive Statements  ATRIAL FIBRILLATION, V-RATE  113-165  BORDERLINE RIGHT AXIS DEVIATION  EARLY PRECORDIAL R/S TRANSITION  BORDERLINE T ABNORMALITIES, DIFFUSE LEADS  BORDERLINE PROLONGED QT INTERVAL  No previous ECG available for comparison           RADIOLOGY  OUTSIDE IMAGES-DX CHEST   Final Result            COURSE & MEDICAL DECISION MAKING  Pertinent Labs & Imaging studies reviewed. (See chart for details)    Review from [facility]: Chest x-ray showed CHF. The patient received Lasix for her IVR which brought her heart rate down to the low 100's. There was a suspected PE but they were unable to get a CT angiogram secondary to patient's angiogram.     9:58 PM Patient seen and examined at bedside. The patient presents with history consistent with A. fib with RVR causing congestive heart failure.  I believe it is unlikely that this is a compensatory response to pulmonary embolus or sepsis but will check a CT scan given the prior physicians concerns.  Checking urinalysis TSH.  Patient without any fever, blood cultures are very low utility.  Ordered for CT-CTA Chest Pulmonary Arterty with Recons, Troponin, BNP, CBC with Differential, CMP, EKG, POC UA, TSH to evaluate. Patient will be treated with Metoprolol Tartrate for her symptoms.     Patient will be given IV metoprolol for her atrial fibrillation with rapid ventricular response.    Following IV metoprolol patient's heart rate has corrected to around a level of 100, she remains in A. fib.  She reports her symptoms are improved.  Her blood pressure remains adequate.    10:24 PM I discussed the patient's case and the above findings with Dr. Davis ([facility]) who agrees to see the patient.    I would consider my care for Ms. Matta critical care.  This included management of atrial fibrillation with RVR, blood pressure management.  Around 34 minutes were spent with this patient.    DISPOSITION:  Patient will be admitted to Dr. Davis in guarded condition.      FINAL IMPRESSION  1.  Atrial fibrillation with RVR,  acute heart failure, shortness of breath      I, Fidencio Larry (Scribe), am scribing for, and in the presence of, Gaetano Sahni M.D..    Electronically signed by: Fidencio Larry (Scribe), 5/14/2018    IGaetano M.D. personally performed the services described in this documentation, as scribed by Fidencio Larry in my presence, and it is both accurate and complete.    The note accurately reflects work and decisions made by me.  Gaetano Sanhi  5/14/2018  11:17 PM

## 2018-05-15 NOTE — DIETARY
NUTRITION SERVICES: BMI - Pt with BMI >40 (=67.23). Weight loss counseling not appropriate in acute care setting. RECOMMEND - Referral to outpatient nutrition services for weight management after D/C.

## 2018-05-15 NOTE — PROGRESS NOTES
No return page from Dr. Rondon. Notified Hospitalist RN of patients tachycardia. Message send to Dr. Rondon.

## 2018-05-15 NOTE — PROGRESS NOTES
Patient back from CT . Per CT and patient IV blew during contrast injections so new IV is needed. Will place new IV and inform CT.

## 2018-05-15 NOTE — H&P
Hospital Medicine History and Physical    Date of Service  5/14/2018    Chief Complaint  Chief Complaint   Patient presents with   • Shortness of Breath     PT RA O2 is 71 %. Satting 98% on 10L oxymask.   • Facial Swelling     PT went to [place] for suspected allergic rxn d/t facial swelling.   • Hand Swelling       History of Presenting Illness  57 y.o. female who presented on 5/14/2018 entrance or from an outside hospital for new onset atrial fibrillation with rapid ventricular response and acute hypoxic respiratory failure. The patient presented to the outside hospital today with 2 weeks of worsening lower extremity swelling, facial swelling, shortness of breath, dyspnea on exertion, and orthopnea. She states that she was recently diagnosed with diabetes mellitus approximately one month ago and thought that her symptoms were related to her new prescription medications. Upon assessment at the outside hospital, the patient was noted to be hypoxic at 70% on room air requiring up to 8 L nonrebreather mask initially and to have new onset A. fib with rapid ventricular response. Patient reports that she had been on oxygen 2 years ago because she had pneumonia but then stopped using it because she ran out of money to pay for her tanks. She also states that she was diagnosed with a enlarged heart around that time but had never had further follow-up with a cardiologist. She denies any current chest pain, fevers, chills, nausea, vomiting, headache, abdominal pain, diarrhea or dysuria. Studies completed at the outside hospital showed a chest x-ray which was consistent with volume overload and CHF with cardiomegaly. , troponin 0.02, TSH 1.09, daily after meals 11.8, hematocrit 45, hemoglobin 14.5, platelets 253, sodium 145, potassium 4.4, chloride 101, CO2 28, BUN 19, creatinine 0.9, blood glucose 105. She is transferred to our hospital for higher level of care and possible specialist consultation if needed.         Primary Care Physician  No primary care provider on file.    Consultants  None    Code Status  Full    Review of Systems  Review of Systems   Constitutional: Negative for chills and fever.   HENT: Negative for congestion and sore throat.    Eyes: Negative for photophobia.   Respiratory: Positive for shortness of breath. Negative for cough and wheezing.    Cardiovascular: Positive for orthopnea and leg swelling. Negative for chest pain and palpitations.   Gastrointestinal: Negative for abdominal pain, diarrhea, nausea and vomiting.   Genitourinary: Negative for dysuria.   Musculoskeletal: Negative for myalgias.   Skin: Negative.    Neurological: Negative for dizziness, tingling, focal weakness and headaches.   Psychiatric/Behavioral: Negative for depression and suicidal ideas.        Past Medical History  Diabetes mellitus, hypertension    Surgical History  Bilateral knee surgeries    Medications  No current facility-administered medications on file prior to encounter.      No current outpatient prescriptions on file prior to encounter.       Family History  Father  from a heart attack at 77, mother  from a heart attack at 82    Social History  Patient denies history of tobacco, alcohol, or illicit drug use    Allergies  Allergies   Allergen Reactions   • Codeine Nausea        Physical Exam  Laboratory   Hemodynamics  Temp (24hrs), Av.5 °C (97.7 °F), Min:36.5 °C (97.7 °F), Max:36.5 °C (97.7 °F)   Temperature: 36.5 °C (97.7 °F)  Pulse  Av.9  Min: 80  Max: 125 Heart Rate (Monitored): (!) 108  Blood Pressure: (!) 98/64, NIBP: 107/80      Respiratory      Respiration: 18, Pulse Oximetry: 97 %             Physical Exam   Constitutional: She is oriented to person, place, and time. No distress.   Morbidly obese   HENT:   Head: Normocephalic and atraumatic.   Right Ear: External ear normal.   Left Ear: External ear normal.   Eyes: EOM are normal. Right eye exhibits no discharge. Left eye exhibits no  discharge.   Neck: Neck supple. No JVD present.   Cardiovascular: Normal heart sounds.    Irregularly irregular, very distant heart sounds   Pulmonary/Chest: Effort normal. No respiratory distress. She exhibits no tenderness.   Increased work of breathing, crackles heard at the bases, no wheezes heard. Breath sounds are quite distant secondary to habitus.   Abdominal: Soft. Bowel sounds are normal. She exhibits no distension. There is no tenderness.   Musculoskeletal: Normal range of motion. She exhibits no edema.   Neurological: She is alert and oriented to person, place, and time. No cranial nerve deficit.   Skin: Skin is warm and dry. She is not diaphoretic. No erythema.   Psychiatric: She has a normal mood and affect. Her behavior is normal.   Nursing note and vitals reviewed.    Capillary refill less than 3 seconds, distal pulses intact    Recent Labs      05/14/18 2120   WBC  11.0*   RBC  4.57   HEMOGLOBIN  14.9   HEMATOCRIT  46.0   MCV  100.7*   MCH  32.6   MCHC  32.4*   RDW  58.7*   PLATELETCT  259   MPV  10.2     Recent Labs      05/14/18 2120   SODIUM  139   POTASSIUM  4.3   CHLORIDE  101   CO2  31   GLUCOSE  112*   BUN  19   CREATININE  0.95   CALCIUM  9.6     Recent Labs      05/14/18 2120   ALTSGPT  67*   ASTSGOT  33   ALKPHOSPHAT  69   TBILIRUBIN  0.8   GLUCOSE  112*         Recent Labs      05/14/18 2120   BNPBTYPENAT  131*         Lab Results   Component Value Date    TROPONINI 0.04 05/14/2018       Imaging  No results found.   Assessment/Plan     Anticipate that patient will need greater than 2 midnights for management of the discussed medical issues.    * New onset atrial fibrillation (HCC)   Assessment & Plan    Patient denies any history of known cardiac dysrhythmias. However, she is in poor medical condition at baseline and does not apparently seek frequent medical help therefore she may have had undiagnosed disease for quite some time. Her heart rates have early begun to improve with  "initiation of beta blockade. I will start her on oral metoprolol, obtain an echocardiogram, and place her on a heparin drip for anticoagulation in light of her super morbid obesity. We will further discuss the need for oral anticoagulation therapy with this patient in the morning. Her chads score is 3. She will need outpatient cardiology follow-up unless we are unable to control her heart rate in the inpatient setting.        Cardiomegaly   Assessment & Plan    Patient states that she was diagnosed with a \"enlarged heart\" several years ago but has not followed up with any specialist. Given her clinical symptoms of volume overload including crackles heard on the lungs and lower extremity edema, I suspect underlying undiagnosed congestive heart failure. My suspicion would be for diastolic dysfunction given her habitus which be concerning for pickwickian syndrome/obesity hypoventilation in addition to obstructive sleep apnea. Her current BNP is mildly elevated and she has noted cardiomegaly on chest x-ray. I will check an echocardiogram as noted above, control her heart rate, and start her on Lasix with strict intake and output measuring. She has been placed on a beta blocker, statin and aspirin. If her blood pressure allows, she will also benefit from an ACE inhibitor.        Acute respiratory failure with hypoxia (HCC)   Assessment & Plan    As noted above, I suspect the patient has underlying lung disease with possible pickwickian syndrome/obesity hypoventilation and most likely obstructive sleep apnea. She apparently had been on oxygen 2 years ago but discontinued use because of financial difficulties. Fortunately she is not a smoker. She will be started on respiratory therapy protocol and treated for her suspected congestive heart failure as well as new diagnosed atrial fibrillation. She would benefit from an outpatient PFT once medically stabilized. Chest x-ray at the outlying facility showed no evidence of " infiltrates or consolidations and I'm holding off on antibiotic therapy for now. A CT PE study has been ordered and is pending.        T2DM (type 2 diabetes mellitus) (HCC)   Assessment & Plan    Patient states that she was recently diagnosed with diabetes mellitus however she is unsure of the name of her medications. I will start her on insulin sliding scale and Accu-Cheks. I will check an HbA1c.        HTN (hypertension)   Assessment & Plan    Patient has not been on any medication for blood pressure, I have started her on a beta blocker and we will plan to add ACE inhibitor if pressure tolerates.          Prophylaxis: Patient will be on heparin, no additional need for for DVT prophylaxis, no PPI indicated, bowel protocol as needed

## 2018-05-15 NOTE — PROGRESS NOTES
Transport picked up patient to go to CT. Consent for contrast signed. No s/s of distress and patient denies and discomfort or SOB.

## 2018-05-15 NOTE — PROGRESS NOTES
Page to Dr. Rondon to discuss tachycardia sustaining above 120 and episodes of up to 160 with movement. Waiting on return page.

## 2018-05-16 PROBLEM — J44.9 COPD (CHRONIC OBSTRUCTIVE PULMONARY DISEASE) (HCC): Status: ACTIVE | Noted: 2018-05-16

## 2018-05-16 LAB
APTT PPP: 74.5 SEC (ref 24.7–36)
GLUCOSE BLD-MCNC: 106 MG/DL (ref 65–99)
GLUCOSE BLD-MCNC: 114 MG/DL (ref 65–99)
GLUCOSE BLD-MCNC: 116 MG/DL (ref 65–99)
GLUCOSE BLD-MCNC: 136 MG/DL (ref 65–99)

## 2018-05-16 PROCEDURE — 93005 ELECTROCARDIOGRAM TRACING: CPT | Performed by: HOSPITALIST

## 2018-05-16 PROCEDURE — A9270 NON-COVERED ITEM OR SERVICE: HCPCS | Performed by: HOSPITALIST

## 2018-05-16 PROCEDURE — 99233 SBSQ HOSP IP/OBS HIGH 50: CPT | Performed by: HOSPITALIST

## 2018-05-16 PROCEDURE — 82962 GLUCOSE BLOOD TEST: CPT | Mod: 91

## 2018-05-16 PROCEDURE — 700102 HCHG RX REV CODE 250 W/ 637 OVERRIDE(OP): Performed by: HOSPITALIST

## 2018-05-16 PROCEDURE — 770020 HCHG ROOM/CARE - TELE (206)

## 2018-05-16 PROCEDURE — 93010 ELECTROCARDIOGRAM REPORT: CPT | Performed by: INTERNAL MEDICINE

## 2018-05-16 PROCEDURE — 85730 THROMBOPLASTIN TIME PARTIAL: CPT

## 2018-05-16 PROCEDURE — 700111 HCHG RX REV CODE 636 W/ 250 OVERRIDE (IP): Performed by: HOSPITALIST

## 2018-05-16 PROCEDURE — 36415 COLL VENOUS BLD VENIPUNCTURE: CPT

## 2018-05-16 RX ORDER — TOPIRAMATE 25 MG/1
25 TABLET ORAL 2 TIMES DAILY
Status: DISCONTINUED | OUTPATIENT
Start: 2018-05-16 | End: 2018-05-17 | Stop reason: HOSPADM

## 2018-05-16 RX ORDER — ASCORBIC ACID 500 MG
500 TABLET ORAL DAILY
Status: DISCONTINUED | OUTPATIENT
Start: 2018-05-16 | End: 2018-05-17 | Stop reason: HOSPADM

## 2018-05-16 RX ORDER — DILTIAZEM HYDROCHLORIDE 360 MG/1
360 CAPSULE, EXTENDED RELEASE ORAL DAILY
Status: DISCONTINUED | OUTPATIENT
Start: 2018-05-16 | End: 2018-05-17 | Stop reason: HOSPADM

## 2018-05-16 RX ORDER — DULOXETIN HYDROCHLORIDE 30 MG/1
30 CAPSULE, DELAYED RELEASE ORAL DAILY
Status: DISCONTINUED | OUTPATIENT
Start: 2018-05-16 | End: 2018-05-17 | Stop reason: HOSPADM

## 2018-05-16 RX ORDER — GLYBURIDE 5 MG/1
5 TABLET ORAL
Status: DISCONTINUED | OUTPATIENT
Start: 2018-05-17 | End: 2018-05-17 | Stop reason: HOSPADM

## 2018-05-16 RX ADMIN — HEPARIN SODIUM 2150 UNITS/HR: 5000 INJECTION, SOLUTION INTRAVENOUS at 03:37

## 2018-05-16 RX ADMIN — OXYCODONE HYDROCHLORIDE AND ACETAMINOPHEN 500 MG: 500 TABLET ORAL at 17:52

## 2018-05-16 RX ADMIN — DILTIAZEM HYDROCHLORIDE 90 MG: 30 TABLET, FILM COATED ORAL at 09:00

## 2018-05-16 RX ADMIN — MAGNESIUM GLUCONATE 500 MG ORAL TABLET 400 MG: 500 TABLET ORAL at 17:53

## 2018-05-16 RX ADMIN — ATORVASTATIN CALCIUM 10 MG: 10 TABLET, FILM COATED ORAL at 17:52

## 2018-05-16 RX ADMIN — VITAMIN D, TAB 1000IU (100/BT) 2000 UNITS: 25 TAB at 16:45

## 2018-05-16 RX ADMIN — DILTIAZEM HYDROCHLORIDE 360 MG: 360 CAPSULE, EXTENDED RELEASE ORAL at 12:12

## 2018-05-16 RX ADMIN — TOPIRAMATE 25 MG: 25 TABLET, FILM COATED ORAL at 17:53

## 2018-05-16 RX ADMIN — DULOXETINE HYDROCHLORIDE 30 MG: 30 CAPSULE, DELAYED RELEASE ORAL at 17:52

## 2018-05-16 RX ADMIN — STANDARDIZED SENNA CONCENTRATE AND DOCUSATE SODIUM 2 TABLET: 8.6; 5 TABLET, FILM COATED ORAL at 17:55

## 2018-05-16 RX ADMIN — HEPARIN SODIUM 2150 UNITS/HR: 5000 INJECTION, SOLUTION INTRAVENOUS at 15:02

## 2018-05-16 RX ADMIN — DILTIAZEM HYDROCHLORIDE 90 MG: 30 TABLET, FILM COATED ORAL at 03:11

## 2018-05-16 RX ADMIN — POTASSIUM CHLORIDE 20 MEQ: 1500 TABLET, EXTENDED RELEASE ORAL at 05:28

## 2018-05-16 RX ADMIN — MAGNESIUM GLUCONATE 500 MG ORAL TABLET 400 MG: 500 TABLET ORAL at 05:28

## 2018-05-16 RX ADMIN — NYSTATIN: 100000 POWDER TOPICAL at 05:29

## 2018-05-16 RX ADMIN — DIGOXIN 125 MCG: 125 TABLET ORAL at 17:52

## 2018-05-16 RX ADMIN — NYSTATIN: 100000 POWDER TOPICAL at 17:52

## 2018-05-16 RX ADMIN — DIGOXIN 250 MCG: 125 TABLET ORAL at 03:11

## 2018-05-16 RX ADMIN — FUROSEMIDE 40 MG: 40 TABLET ORAL at 05:28

## 2018-05-16 ASSESSMENT — ENCOUNTER SYMPTOMS
HEMOPTYSIS: 0
DIAPHORESIS: 0
SPEECH CHANGE: 0
NECK PAIN: 0
VOMITING: 0
BACK PAIN: 0
SORE THROAT: 0
SENSORY CHANGE: 0
DIZZINESS: 0
HEADACHES: 0
LOSS OF CONSCIOUSNESS: 0
DEPRESSION: 0
PALPITATIONS: 0
FOCAL WEAKNESS: 0
NAUSEA: 0
EYE DISCHARGE: 0
FEVER: 0
COUGH: 0
MYALGIAS: 0
EYE PAIN: 0
CLAUDICATION: 0
SHORTNESS OF BREATH: 1
WHEEZING: 0
CHILLS: 0
BRUISES/BLEEDS EASILY: 0
SPUTUM PRODUCTION: 0
CONSTIPATION: 0
ABDOMINAL PAIN: 0
DIARRHEA: 0
WEAKNESS: 0

## 2018-05-16 ASSESSMENT — PAIN SCALES - GENERAL
PAINLEVEL_OUTOF10: 0

## 2018-05-16 ASSESSMENT — LIFESTYLE VARIABLES: SUBSTANCE_ABUSE: 0

## 2018-05-16 NOTE — PROGRESS NOTES
Monitor Summary:    A fib 109-132  Episodes up to 160 with ambulation  Patient asymptomatic    12 hr cc

## 2018-05-16 NOTE — PROGRESS NOTES
Patient curious why she isn't getting her cymbalta and topiramate and mental health medications while here. Will consult with MD.

## 2018-05-16 NOTE — CARE PLAN
Problem: Safety  Goal: Will remain free from falls  Outcome: PROGRESSING AS EXPECTED  Bed alarm on, patient aware of calling for assistance as needed before getting OOB. Treaded socks on, room near nurses station. Call light in reach. Rounding in place to assess needs.    Problem: Knowledge Deficit  Goal: Knowledge of disease process/condition, treatment plan, diagnostic tests, and medications will improve  Outcome: PROGRESSING AS EXPECTED  Patient educated on new medications for treatment of new onset AFIB with RVR. Hep gtt running, in maintenance phase and not yet therapeutic. Digoxin and diltiazem started. Continuing telemetry monitoring.

## 2018-05-16 NOTE — PROGRESS NOTES
Patient alert and oriented, ambulatory to the restroom. Denies pain. Tolerating oral intake, educated on fluid restriction and new medications. Heparin gtt running. Nystatin applied under skin folds of breasts and abdomen. Patient calling appropriately for assistance. Bed alarm on, call light in reach.

## 2018-05-16 NOTE — ASSESSMENT & PLAN NOTE
Possible to metformin per patient started with rash to her face, then developed afib.  No solumedrol given at [facility].  Unclear if given any at [facility] prior to transfer.

## 2018-05-16 NOTE — DISCHARGE PLANNING
Anticipated Discharge Disposition: Home    Action: LSW faxed Xarelto RX to pt home pharmacy ([facility] ).     Barriers to Discharge: None    Plan: F/U with pharmacy for prior auth or copay amount.     Pt currently 5 L of O2. May need O2 to go home.

## 2018-05-16 NOTE — PROGRESS NOTES
[facility] Hospitalist Progress Note    Date of Service: 5/15/2018    Chief Complaint  57 y.o. female admitted 5/14/2018 with new onset afib with RVR transferred from [facility].  She thought she was having an allergic reaction to a new diabetic medication with increased swelling and rash for past 2 weeks.  Patient was hypoxic on presentation 70% on RA requiring 8 LPM NRB mask.      Interval Problem Update  5/15:  afib remains on monitor with HR to 130s, 160s.  dc'd metoprolol 25mg po bid, started cardizem po q 6 hours for rate control, digoxin for new onset afib with RVR.  Echo wnl, no thrombus.  Likely has underlying RUTH with morbid obesity.  Started nystatin powder to panus.  a1c 6.7% newly diagnosed diabetes per patient.    Consultants/Specialty  Dr. Zepeda:  To consult only if unable to control rate per admitting MD.    Disposition  Lives at home with 2 adult children, ambulates.  No home O2.        Review of Systems   Constitutional: Negative for chills, diaphoresis, fever and malaise/fatigue.   HENT: Negative for congestion and sore throat.    Eyes: Negative for pain and discharge.   Respiratory: Positive for shortness of breath. Negative for cough, hemoptysis, sputum production and wheezing.    Cardiovascular: Positive for leg swelling. Negative for chest pain, palpitations and claudication.   Gastrointestinal: Negative for abdominal pain, constipation, diarrhea, melena, nausea and vomiting.   Genitourinary: Negative for dysuria, frequency and urgency.   Musculoskeletal: Negative for back pain, joint pain, myalgias and neck pain.   Skin: Positive for rash. Negative for itching.   Neurological: Negative for dizziness, sensory change, speech change, focal weakness, loss of consciousness, weakness and headaches.   Endo/Heme/Allergies: Does not bruise/bleed easily.   Psychiatric/Behavioral: Negative for depression, substance abuse and suicidal ideas.      Physical Exam  Laboratory/Imaging    Hemodynamics  Temp (24hrs), Av.3 °C (97.4 °F), Min:36.1 °C (97 °F), Max:36.5 °C (97.7 °F)   Temperature: 36.3 °C (97.4 °F)  Pulse  Av.4  Min: 74  Max: 127 Heart Rate (Monitored): (!) 108  Blood Pressure: 108/84, NIBP: 106/85      Respiratory      Respiration: 20, Pulse Oximetry: 94 %     Work Of Breathing / Effort: Mild  RUL Breath Sounds: Clear, RML Breath Sounds: Diminished;Fine Crackles, RLL Breath Sounds: Diminished;Fine Crackles, CATHY Breath Sounds: Clear;Diminished;Fine Crackles, LLL Breath Sounds: Fine Crackles;Diminished    Fluids    Intake/Output Summary (Last 24 hours) at 05/15/18 2033  Last data filed at 05/15/18 1300   Gross per 24 hour   Intake              900 ml   Output             1700 ml   Net             -800 ml       Nutrition  Orders Placed This Encounter   Procedures   • Diet Order     Standing Status:   Standing     Number of Occurrences:   1     Order Specific Question:   Diet:     Answer:   Diabetic [3]     Order Specific Question:   Electrolyte modifications:     Answer:   1.5 g Sodium [1]     Order Specific Question:   Consistency/Fluid modifications:     Answer:   1500 ml Fluid Restriction [9]     Physical Exam   Constitutional: She is oriented to person, place, and time. She appears well-developed and well-nourished. No distress.   Morbidly obese female, sitting up at bedside in NAD.   HENT:   Head: Normocephalic and atraumatic.   Nose: Nose normal.   Mouth/Throat: Oropharynx is clear and moist. No oropharyngeal exudate.   Eyes: Conjunctivae and EOM are normal. Pupils are equal, round, and reactive to light. Right eye exhibits no discharge. Left eye exhibits no discharge. No scleral icterus.   Neck: Normal range of motion. Neck supple. No JVD present. No tracheal deviation present. No thyromegaly present.   Cardiovascular: Normal heart sounds and intact distal pulses.  Exam reveals no gallop and no friction rub.    No murmur heard.  Irregularly irregular   Pulmonary/Chest:  Effort normal and breath sounds normal. No stridor. No respiratory distress. She has no wheezes. She has no rales. She exhibits no tenderness.   Abdominal: Soft. Bowel sounds are normal. She exhibits no distension and no mass. There is no tenderness. There is no rebound and no guarding.   Musculoskeletal: Normal range of motion. She exhibits no edema or tenderness.   Lymphadenopathy:     She has no cervical adenopathy.   Neurological: She is alert and oriented to person, place, and time. No cranial nerve deficit. She exhibits normal muscle tone. Coordination normal.   Skin: Skin is warm and dry. Rash (mild moist rash in groin, symmetrical ) noted. She is not diaphoretic. No erythema.   Psychiatric: She has a normal mood and affect. Her behavior is normal. Judgment and thought content normal.   Nursing note and vitals reviewed.      Recent Labs      05/14/18 2120   WBC  11.0*   RBC  4.57   HEMOGLOBIN  14.9   HEMATOCRIT  46.0   MCV  100.7*   MCH  32.6   MCHC  32.4*   RDW  58.7*   PLATELETCT  259   MPV  10.2     Recent Labs      05/14/18   2120   SODIUM  139   POTASSIUM  4.3   CHLORIDE  101   CO2  31   GLUCOSE  112*   BUN  19   CREATININE  0.95   CALCIUM  9.6     Recent Labs      05/15/18   0108  05/15/18   0845  05/15/18   1533   APTT  27.2  74.9*  50.7*     Recent Labs      05/14/18   2120   BNPBTYPENAT  131*              Assessment/Plan     * New onset atrial fibrillation (HCC)   Assessment & Plan    Patient denies any history of known cardiac dysrhythmias. However, she is in poor medical condition at baseline and does not apparently seek frequent medical help therefore she may have had undiagnosed disease for quite some time. Her heart rates have not improved with initiation of beta blockade.   5/15:  dc oral metoprolol for better rate control with cardizem po q 6 hours since HR still in 130 to 160 range afib on monitor. Start digoxin loading since would like to avoid amiodarone given her current hypoxia and  "acute respiratory failure on 4.5 LPM NC.   Echocardiogram EF 55%, RVP 55-60  heparin drip for anticoagulation in light of her super morbid obesity. We will further discuss the need for oral anticoagulation therapy.  Her chads score is 3. She will need outpatient cardiology follow-up unless we are unable to control her heart rate in the inpatient setting.        Cardiomegaly   Assessment & Plan    Patient states that she was diagnosed with a \"enlarged heart\" several years ago but has not followed up with any specialist. Given her clinical symptoms of volume overload including crackles heard on the lungs and lower extremity edema.  concerning for pickwickian syndrome/obesity hypoventilation in addition to obstructive sleep apnea. Her current BNP is mildly elevated and she has noted cardiomegaly on chest x-ray.  echocardiogram  EF 55%, RVP elevated 55-60.  control her heart rate, and start her on Lasix with strict intake and output measuring.   dc'd lasix IV 40 daily since lungs CTA b/l.  Continue lasix 40mg po daily with K.        Moderate to severe pulmonary hypertension (HCC)- (present on admission)   Assessment & Plan    2/2 underlying RUTH from morbid obesity.  No formal sleep study done.  Will need to set up as outpatient.  Change lasix 40mg IV to po daily with K since lungs CTA currently after IV lasix diuresis.  Monitor BMPs.        Fungal rash of torso- (present on admission)   Assessment & Plan    Symmetrical moist rash of groin and panus  Started nystatin powder bid.        Acute allergic reaction- (present on admission)   Assessment & Plan    Possible to one of her new diabetic meds.  Holding all oral hypoglycemics at this time.  No solumedrol given at [facility].  Unclear if given any at [facility] prior to transfer.        Acute respiratory failure with hypoxia (HCC)   Assessment & Plan    As noted above, I suspect the patient has underlying lung disease with possible pickwickian syndrome/obesity " hypoventilation and most likely obstructive sleep apnea. She apparently had been on oxygen 2 years ago but discontinued use because of financial difficulties. Fortunately she is not a smoker. She will be started on respiratory therapy protocol for new diagnosed atrial fibrillation. She would benefit from an outpatient PFT once medically stabilized. Chest x-ray at the outlying facility showed no evidence of infiltrates or consolidations.   A CT PE with no PE seen, ascending aorta 4.1cm.        T2DM (type 2 diabetes mellitus) (HCC)   Assessment & Plan    Patient states that she was recently diagnosed with diabetes mellitus however she is unsure of the name of her medications. continue insulin sliding scale and Accu-Cheks.   HbA1c 6.7%.        HTN (hypertension)   Assessment & Plan    Patient has not been on any medication for blood pressure.  Tolerating cardizem po for rate control.          Quality-Core Measures

## 2018-05-16 NOTE — ASSESSMENT & PLAN NOTE
2/2 underlying RUTH from morbid obesity.  No formal sleep study done.  Will need to set up as outpatient.  Change lasix 40mg IV to po daily with K since lungs CTA currently after IV lasix diuresis.  Monitor BMPs.

## 2018-05-16 NOTE — RESPIRATORY CARE
COPD EDUCATION by COPD CLINICAL EDUCATOR  5/16/2018 at 6:37 AM by Mini Rodriguez     Patient reviewed by COPD education team. Patient does not qualify for COPD program.

## 2018-05-16 NOTE — PROGRESS NOTES
Per night shift no events. Patient called appropriately. She was able to get up and ambulate to the bathroom. She continues to be in Afib. Denies pain. Bed in lowest position. Call light in reach. Denies needs at this time. Will continue to monitor.

## 2018-05-17 ENCOUNTER — APPOINTMENT (OUTPATIENT)
Dept: RADIOLOGY | Facility: MEDICAL CENTER | Age: 58
DRG: 308 | End: 2018-05-17
Attending: HOSPITALIST
Payer: MEDICARE

## 2018-05-17 ENCOUNTER — PATIENT OUTREACH (OUTPATIENT)
Dept: HEALTH INFORMATION MANAGEMENT | Facility: OTHER | Age: 58
End: 2018-05-17

## 2018-05-17 VITALS
TEMPERATURE: 97.9 F | BODY MASS INDEX: 45.99 KG/M2 | RESPIRATION RATE: 20 BRPM | HEIGHT: 67 IN | OXYGEN SATURATION: 93 % | HEART RATE: 61 BPM | DIASTOLIC BLOOD PRESSURE: 87 MMHG | SYSTOLIC BLOOD PRESSURE: 121 MMHG | WEIGHT: 293 LBS

## 2018-05-17 PROBLEM — E66.01 MORBID OBESITY WITH BMI OF 60.0-69.9, ADULT (HCC): Status: ACTIVE | Noted: 2018-05-17

## 2018-05-17 PROBLEM — T78.40XA ACUTE ALLERGIC REACTION: Status: RESOLVED | Noted: 2018-05-15 | Resolved: 2018-05-17

## 2018-05-17 LAB
APTT PPP: 68.6 SEC (ref 24.7–36)
EKG IMPRESSION: NORMAL
GLUCOSE BLD-MCNC: 119 MG/DL (ref 65–99)
GLUCOSE BLD-MCNC: 120 MG/DL (ref 65–99)
GLUCOSE BLD-MCNC: 124 MG/DL (ref 65–99)

## 2018-05-17 PROCEDURE — 36415 COLL VENOUS BLD VENIPUNCTURE: CPT

## 2018-05-17 PROCEDURE — 85730 THROMBOPLASTIN TIME PARTIAL: CPT

## 2018-05-17 PROCEDURE — 700111 HCHG RX REV CODE 636 W/ 250 OVERRIDE (IP): Performed by: HOSPITALIST

## 2018-05-17 PROCEDURE — A9270 NON-COVERED ITEM OR SERVICE: HCPCS | Performed by: HOSPITALIST

## 2018-05-17 PROCEDURE — 90471 IMMUNIZATION ADMIN: CPT

## 2018-05-17 PROCEDURE — 71045 X-RAY EXAM CHEST 1 VIEW: CPT

## 2018-05-17 PROCEDURE — 82962 GLUCOSE BLOOD TEST: CPT | Mod: 91

## 2018-05-17 PROCEDURE — 99239 HOSP IP/OBS DSCHRG MGMT >30: CPT | Performed by: HOSPITALIST

## 2018-05-17 PROCEDURE — 700102 HCHG RX REV CODE 250 W/ 637 OVERRIDE(OP): Performed by: HOSPITALIST

## 2018-05-17 PROCEDURE — 3E0234Z INTRODUCTION OF SERUM, TOXOID AND VACCINE INTO MUSCLE, PERCUTANEOUS APPROACH: ICD-10-PCS | Performed by: HOSPITALIST

## 2018-05-17 PROCEDURE — 90732 PPSV23 VACC 2 YRS+ SUBQ/IM: CPT | Performed by: HOSPITALIST

## 2018-05-17 RX ORDER — GLYBURIDE 5 MG/1
5 TABLET ORAL
Qty: 30 TAB | Refills: 3 | Status: ON HOLD | OUTPATIENT
Start: 2018-05-18

## 2018-05-17 RX ORDER — DIGOXIN 125 MCG
125 TABLET ORAL DAILY
Qty: 30 TAB | Refills: 3 | Status: ON HOLD | OUTPATIENT
Start: 2018-05-17

## 2018-05-17 RX ORDER — DILTIAZEM HYDROCHLORIDE 360 MG/1
360 CAPSULE, EXTENDED RELEASE ORAL DAILY
Qty: 30 CAP | Refills: 3 | Status: ON HOLD | OUTPATIENT
Start: 2018-05-18

## 2018-05-17 RX ORDER — FUROSEMIDE 40 MG/1
40 TABLET ORAL DAILY
Qty: 30 TAB | Refills: 3 | Status: ON HOLD | OUTPATIENT
Start: 2018-05-18 | End: 2024-03-07

## 2018-05-17 RX ORDER — POTASSIUM CHLORIDE 20 MEQ/1
20 TABLET, EXTENDED RELEASE ORAL DAILY
Qty: 30 TAB | Refills: 3 | Status: ON HOLD | OUTPATIENT
Start: 2018-05-18

## 2018-05-17 RX ORDER — NYSTATIN 100000 [USP'U]/G
POWDER TOPICAL
Qty: 15 G | Refills: 3 | Status: SHIPPED | OUTPATIENT
Start: 2018-05-17 | End: 2024-03-16

## 2018-05-17 RX ADMIN — DILTIAZEM HYDROCHLORIDE 360 MG: 360 CAPSULE, EXTENDED RELEASE ORAL at 05:49

## 2018-05-17 RX ADMIN — RIVAROXABAN 20 MG: 20 TABLET, FILM COATED ORAL at 16:33

## 2018-05-17 RX ADMIN — OXYCODONE HYDROCHLORIDE AND ACETAMINOPHEN 500 MG: 500 TABLET ORAL at 05:50

## 2018-05-17 RX ADMIN — MAGNESIUM GLUCONATE 500 MG ORAL TABLET 400 MG: 500 TABLET ORAL at 05:49

## 2018-05-17 RX ADMIN — STANDARDIZED SENNA CONCENTRATE AND DOCUSATE SODIUM 2 TABLET: 8.6; 5 TABLET, FILM COATED ORAL at 05:49

## 2018-05-17 RX ADMIN — FUROSEMIDE 40 MG: 40 TABLET ORAL at 05:50

## 2018-05-17 RX ADMIN — PNEUMOCOCCAL VACCINE POLYVALENT 25 MCG
25; 25; 25; 25; 25; 25; 25; 25; 25; 25; 25; 25; 25; 25; 25; 25; 25; 25; 25; 25; 25; 25; 25 INJECTION, SOLUTION INTRAMUSCULAR; SUBCUTANEOUS at 09:13

## 2018-05-17 RX ADMIN — TOPIRAMATE 25 MG: 25 TABLET, FILM COATED ORAL at 05:50

## 2018-05-17 RX ADMIN — POTASSIUM CHLORIDE 20 MEQ: 1500 TABLET, EXTENDED RELEASE ORAL at 05:50

## 2018-05-17 RX ADMIN — GLYBURIDE 5 MG: 5 TABLET ORAL at 09:12

## 2018-05-17 RX ADMIN — NYSTATIN: 100000 POWDER TOPICAL at 05:49

## 2018-05-17 RX ADMIN — DULOXETINE HYDROCHLORIDE 30 MG: 30 CAPSULE, DELAYED RELEASE ORAL at 05:50

## 2018-05-17 RX ADMIN — HEPARIN SODIUM 2150 UNITS/HR: 5000 INJECTION, SOLUTION INTRAVENOUS at 02:46

## 2018-05-17 RX ADMIN — VITAMIN D, TAB 1000IU (100/BT) 2000 UNITS: 25 TAB at 05:50

## 2018-05-17 ASSESSMENT — PATIENT HEALTH QUESTIONNAIRE - PHQ9
1. LITTLE INTEREST OR PLEASURE IN DOING THINGS: NOT AT ALL
2. FEELING DOWN, DEPRESSED, IRRITABLE, OR HOPELESS: NOT AT ALL
SUM OF ALL RESPONSES TO PHQ9 QUESTIONS 1 AND 2: 0

## 2018-05-17 ASSESSMENT — PAIN SCALES - GENERAL
PAINLEVEL_OUTOF10: 0
PAINLEVEL_OUTOF10: 0

## 2018-05-17 NOTE — DISCHARGE INSTRUCTIONS
Discharge Instructions    Discharged to home by car with relative. Discharged via wheelchair, hospital escort: Yes.  Special equipment needed: Oxygen    Be sure to schedule a follow-up appointment with your primary care doctor or any specialists as instructed.     Discharge Plan:   Diet Plan: Discussed  Activity Level: Discussed  Confirmed Follow up Appointment: No (Comments) (declined PCP followup with cardiology being scheduled)  Confirmed Symptoms Management: Discussed  Medication Reconciliation Updated: Yes  Pneumococcal Vaccine Administered/Refused: Given (See MAR)  Influenza Vaccine Indication: Patient Refuses    I understand that a diet low in cholesterol, fat, and sodium is recommended for good health. Unless I have been given specific instructions below for another diet, I accept this instruction as my diet prescription.   Other diet: Diabetic    Special Instructions:   HF Patient Discharge Instructions  · Monitor your weight daily, and maintain a weight chart, to track your weight changes.   · Activity as tolerated, unless your Doctor has ordered otherwise. Other activity order: resumes regular activity.  · Follow a low fat, low cholesterol, low salt diet unless instructed otherwise by your Doctor. Read the labels on the back of food products and track your intake of fat, cholesterol and salt.   · Fluid Restriction No. If a Fluid Restriction has been ordered by your Doctor, measure fluids with a measuring cup to ensure that you are not exceeding the restriction.   · No smoking.  · Oxygen Yes. If your Doctor has ordered that you wear Oxygen at home, it is important to wear it as ordered.  · Did you receive an explanation from staff on the importance of taking each of your medications and why it is necessary to keep taking them unless your doctor says to stop? Yes  · Were all of your questions answered about how to manage your heart failure and what to do if you have increased signs and symptoms after you go  home? Yes  · Do you feel like your heart failure care team involved you in the care treatment plan and allowed you to make decisions regarding your care while in the hospital and addressed any discharge needs you might have? Yes    See the educational handout provided at discharge for more information on monitoring your daily weight, activity and diet. This also explains more about Heart Failure, symptoms of a flare-up and some of the tests that you have undergone.     Warning Signs of a Flare-Up include:  · Swelling in the ankles or lower legs.  · Shortness of breath, while at rest, or while doing normal activities.   · Shortness of breath at night when in bed, or coughing in bed.   · Requiring more pillows to sleep at night, or needing to sit up at night to sleep.  · Feeling weak, dizzy or fatigued.     When to call your Doctor:  · Call [facility] seven days a week from 8:00 a.m. to 8:00 p.m. for medical questions [phone].  · Call your Primary Care Physician or Cardiologist if:   1. You experience any pain radiating to your jaw or neck.  2. You have any difficulty breathing.  3. You experience weight gain of 3 lbs in a day or 5 lbs in a week.   4. You feel any palpitations or irregular heartbeats.  5. You become dizzy or lose consciousness.   If you have had an angiogram or had a pacemaker or AICD placed, and experience:  1. Bleeding, drainage or swelling at the surgical / puncture site.  2. Fever greater than 100.0 F  3. Shock from internal defibrillator.  4. Cool and / or numb extremities.      · Is patient discharged on Warfarin / Coumadin?   No     Depression / Suicide Risk    As you are discharged from this [facility], it is important to learn how to keep safe from harming yourself.    Recognize the warning signs:  · Abrupt changes in personality, positive or negative- including increase in energy   · Giving away possessions  · Change in eating patterns- significant weight  changes-  positive or negative  · Change in sleeping patterns- unable to sleep or sleeping all the time   · Unwillingness or inability to communicate  · Depression  · Unusual sadness, discouragement and loneliness  · Talk of wanting to die  · Neglect of personal appearance   · Rebelliousness- reckless behavior  · Withdrawal from people/activities they love  · Confusion- inability to concentrate     If you or a loved one observes any of these behaviors or has concerns about self-harm, here's what you can do:  · Talk about it- your feelings and reasons for harming yourself  · Remove any means that you might use to hurt yourself (examples: pills, rope, extension cords, firearm)  · Get professional help from the community (Mental Health, Substance Abuse, psychological counseling)  · Do not be alone:Call your Safe Contact- someone whom you trust who will be there for you.  · Call your local CRISIS HOTLINE 487-6392 or 990-540-5929  · Call your local Children's Mobile Crisis Response Team [place] (316) 515-1102 or www.Newsreps  · Call the toll free National Suicide Prevention Hotlines   · National Suicide Prevention Lifeline 335-733-SBWE (8479)  · National Hope Line Network 800-SUICIDE (064-5003)        Atrial Fibrillation  Introduction  Atrial fibrillation is a type of heartbeat that is irregular or fast (rapid). If you have this condition, your heart keeps quivering in a weird (chaotic) way. This condition can make it so your heart cannot pump blood normally. Having this condition gives a person more risk for stroke, heart failure, and other heart problems. There are different types of atrial fibrillation. Talk with your doctor to learn about the type that you have.  Follow these instructions at home:  · Take over-the-counter and prescription medicines only as told by your doctor.  · If your doctor prescribed a blood-thinning medicine, take it exactly as told. Taking too much of it can cause bleeding. If  you do not take enough of it, you will not have the protection that you need against stroke and other problems.  · Do not use any tobacco products. These include cigarettes, chewing tobacco, and e-cigarettes. If you need help quitting, ask your doctor.  · If you have apnea (obstructive sleep apnea), manage it as told by your doctor.  · Do not drink alcohol.  · Do not drink beverages that have caffeine. These include coffee, soda, and tea.  · Maintain a healthy weight. Do not use diet pills unless your doctor says they are safe for you. Diet pills may make heart problems worse.  · Follow diet instructions as told by your doctor.  · Exercise regularly as told by your doctor.  · Keep all follow-up visits as told by your doctor. This is important.  Contact a doctor if:  · You notice a change in the speed, rhythm, or strength of your heartbeat.  · You are taking a blood-thinning medicine and you notice more bruising.  · You get tired more easily when you move or exercise.  Get help right away if:  · You have pain in your chest or your belly (abdomen).  · You have sweating or weakness.  · You feel sick to your stomach (nauseous).  · You notice blood in your throw up (vomit), poop (stool), or pee (urine).  · You are short of breath.  · You suddenly have swollen feet and ankles.  · You feel dizzy.  · Your suddenly get weak or numb in your face, arms, or legs, especially if it happens on one side of your body.  · You have trouble talking, trouble understanding, or both.  · Your face or your eyelid droops on one side.  These symptoms may be an emergency. Do not wait to see if the symptoms will go away. Get medical help right away. Call your local emergency services (911 in the [place] Do not drive yourself to the hospital.   This information is not intended to replace advice given to you by your health care provider. Make sure you discuss any questions you have with your health care provider.  Document Released: 09/26/2009  Document Revised: 05/25/2017 Document Reviewed: 04/13/2016  © 2017 [employer]      Rivaroxaban oral tablets  What is this medicine?  RIVAROXABAN (ri va ISHAAN a ban) is an anticoagulant (blood thinner). It is used to treat blood clots in the lungs or in the veins. It is also used after knee or hip surgeries to prevent blood clots. It is also used to lower the chance of stroke in people with a medical condition called atrial fibrillation.  This medicine may be used for other purposes; ask your health care provider or pharmacist if you have questions.  COMMON BRAND NAME(S): Xarelto, Xarelto Starter Pack  What should I tell my health care provider before I take this medicine?  They need to know if you have any of these conditions:  -bleeding disorders  -bleeding in the brain  -blood in your stools (black or tarry stools) or if you have blood in your vomit  -history of stomach bleeding  -kidney disease  -liver disease  -low blood counts, like low white cell, platelet, or red cell counts  -recent or planned spinal or epidural procedure  -take medicines that treat or prevent blood clots  -an unusual or allergic reaction to rivaroxaban, other medicines, foods, dyes, or preservatives  -pregnant or trying to get pregnant  -breast-feeding  How should I use this medicine?  Take this medicine by mouth with a glass of water. Follow the directions on the prescription label. Take your medicine at regular intervals. Do not take it more often than directed. Do not stop taking except on your doctor's advice. Stopping this medicine may increase your risk of a blood clot. Be sure to refill your prescription before you run out of medicine.  If you are taking this medicine after hip or knee replacement surgery, take it with or without food. If you are taking this medicine for atrial fibrillation, take it with your evening meal. If you are taking this medicine to treat blood clots, take it with food at the same time each day. If you are unable  to swallow your tablet, you may crush the tablet and mix it in applesauce. Then, immediately eat the applesauce. You should eat more food right after you eat the applesauce containing the crushed tablet.  Talk to your pediatrician regarding the use of this medicine in children. Special care may be needed.  Overdosage: If you think you have taken too much of this medicine contact a poison control center or emergency room at once.  NOTE: This medicine is only for you. Do not share this medicine with others.  What if I miss a dose?  If you take your medicine once a day and miss a dose, take the missed dose as soon as you remember. If you take your medicine twice a day and miss a dose, take the missed dose immediately. In this instance, 2 tablets may be taken at the same time. The next day you should take 1 tablet twice a day as directed.  What may interact with this medicine?  Do not take this medicine with any of the following medications:  -defibrotide  This medicine may also interact with the following medications:  -aspirin and aspirin-like medicines  -certain antibiotics like erythromycin, azithromycin, and clarithromycin  -certain medicines for fungal infections like ketoconazole and itraconazole  -certain medicines for irregular heart beat like amiodarone, quinidine, dronedarone  -certain medicines for seizures like carbamazepine, phenytoin  -certain medicines that treat or prevent blood clots like warfarin, enoxaparin, and dalteparin  -conivaptan  -diltiazem  -felodipine  -indinavir  -lopinavir; ritonavir  -NSAIDS, medicines for pain and inflammation, like ibuprofen or naproxen  -ranolazine  -rifampin  -ritonavir  -SNRIs, medicines for depression, like desvenlafaxine, duloxetine, levomilnacipran, venlafaxine  -SSRIs, medicines for depression, like citalopram, escitalopram, fluoxetine, fluvoxamine, paroxetine, sertraline  -[place]'s wort  -verapamil  This list may not describe all possible interactions. Give  your health care provider a list of all the medicines, herbs, non-prescription drugs, or dietary supplements you use. Also tell them if you smoke, drink alcohol, or use illegal drugs. Some items may interact with your medicine.  What should I watch for while using this medicine?  Visit your doctor or health care professional for regular checks on your progress.  Notify your doctor or health care professional and seek emergency treatment if you develop breathing problems; changes in vision; chest pain; severe, sudden headache; pain, swelling, warmth in the leg; trouble speaking; sudden numbness or weakness of the face, arm or leg. These can be signs that your condition has gotten worse.  If you are going to have surgery or other procedure, tell your doctor that you are taking this medicine.  What side effects may I notice from receiving this medicine?  Side effects that you should report to your doctor or health care professional as soon as possible:  -allergic reactions like skin rash, itching or hives, swelling of the face, lips, or tongue  -back pain  -redness, blistering, peeling or loosening of the skin, including inside the mouth  -signs and symptoms of bleeding such as bloody or black, tarry stools; red or dark-brown urine; spitting up blood or brown material that looks like coffee grounds; red spots on the skin; unusual bruising or bleeding from the eye, gums, or nose  Side effects that usually do not require medical attention (report to your doctor or health care professional if they continue or are bothersome):  -dizziness  -muscle pain  This list may not describe all possible side effects. Call your doctor for medical advice about side effects. You may report side effects to FDA at 3-671-FDA-6143.  Where should I keep my medicine?  Keep out of the reach of children.  Store at room temperature between 15 and 30 degrees C (59 and 86 degrees F). Throw away any unused medicine after the expiration date.  NOTE:  This sheet is a summary. It may not cover all possible information. If you have questions about this medicine, talk to your doctor, pharmacist, or health care provider.  © 2018 [employer] (2017-09-06 16:29:33)

## 2018-05-17 NOTE — DISCHARGE PLANNING
Spoke with Kenn at [facility], referral has been accepted and the [place] office will be delivering a tank to patient bedside shortly. Danika ADKINS notified.

## 2018-05-17 NOTE — FACE TO FACE
Face to Face Note  -  Durable Medical Equipment    Elidia Rondon M.D. - NPI: [id number]  I certify that this patient is under my care and that they had a durable medical equipment(DME)face to face encounter by myself that meets the physician DME face-to-face encounter requirements with this patient on:    Date of encounter:   Patient:                    MRN:                       YOB: 2017  Miroslava Matta  [id number]  1960     The encounter with the patient was in whole, or in part, for the following medical condition, which is the primary reason for durable medical equipment:  COPD and Other - moderate to severe pulmonary hypertension    I certify that, based on my findings, the following durable medical equipment is medically necessary:  Oxygen.    HOME O2 Saturation Measurements:(Values must be present for Home Oxygen orders)  Room air sat at rest: 70  Room air sat with amb: 66  With liters of O2: 5, O2 sat at rest with O2: 94  With Liters of O2: 5, O2 sat with amb with O2 : 88  Is the patient mobile?: Yes    My Clinical findings support the need for the above equipment due to:  Hypoxia    Supporting Symptoms: severe hypoxia at rest and with ambulation, extensive smoking history with COPD.

## 2018-05-17 NOTE — PROGRESS NOTES
[facility] Hospitalist Progress Note    Date of Service: 5/16/2018    Chief Complaint  57 y.o. female admitted 5/14/2018 with new onset afib with RVR transferred from [facility].  She thought she was having an allergic reaction to a new diabetic medication with increased swelling and rash for past 2 weeks.  Patient was hypoxic on presentation 70% on RA requiring 8 LPM NRB mask.      Interval Problem Update  5/15:  afib remains on monitor with HR to 130s, 160s.  dc'd metoprolol 25mg po bid, started cardizem po q 6 hours for rate control, digoxin for new onset afib with RVR.  Echo wnl, no thrombus.  Likely has underlying RUTH with morbid obesity.  Started nystatin powder to panus.  a1c 6.7% newly diagnosed diabetes per patient.  5/16:  Started cardizem 360mg po daily, repeat EKG to check for flutter.  Remains irregular on monitor rate 70s.  No longer SOB, but remaining on 5 LPM NC.  States previously was on home O2, but could not afford it.  Asked nursing to place RA rest and ambulation in flow sheet so can order home O2.  TSH wnl.  Restarted home meds, states had allergic reaction with rash, face swelling with metformin, start glyburide.  Checking on xarelto pricing.    Consultants/Specialty  Dr. Zepeda:  To consult only if unable to control rate per admitting MD.    Disposition  Lives at home with 2 adult children, ambulates.  No home O2.  Will need O2 at dc.  Otherwise ambulates well.        Review of Systems   Constitutional: Negative for chills, diaphoresis, fever and malaise/fatigue.   HENT: Negative for congestion and sore throat.    Eyes: Negative for pain and discharge.   Respiratory: Positive for shortness of breath. Negative for cough, hemoptysis, sputum production and wheezing.    Cardiovascular: Positive for leg swelling. Negative for chest pain, palpitations and claudication.   Gastrointestinal: Negative for abdominal pain, constipation, diarrhea, melena, nausea and vomiting.    Genitourinary: Negative for dysuria, frequency and urgency.   Musculoskeletal: Negative for back pain, joint pain, myalgias and neck pain.   Skin: Positive for rash. Negative for itching.   Neurological: Negative for dizziness, sensory change, speech change, focal weakness, loss of consciousness, weakness and headaches.   Endo/Heme/Allergies: Does not bruise/bleed easily.   Psychiatric/Behavioral: Negative for depression, substance abuse and suicidal ideas.      Physical Exam  Laboratory/Imaging   Hemodynamics  Temp (24hrs), Av.4 °C (97.6 °F), Min:36.2 °C (97.2 °F), Max:36.6 °C (97.9 °F)   Temperature: 36.2 °C (97.2 °F)  Pulse  Av.1  Min: 74  Max: 127   Blood Pressure: 134/71      Respiratory      Respiration: 20, Pulse Oximetry: 93 %     Work Of Breathing / Effort: Mild  RUL Breath Sounds: Diminished, RML Breath Sounds: Diminished, RLL Breath Sounds: Diminished, CATHY Breath Sounds: Diminished, LLL Breath Sounds: Diminished    Fluids    Intake/Output Summary (Last 24 hours) at 18 1811  Last data filed at 18 1400   Gross per 24 hour   Intake              200 ml   Output             1950 ml   Net            -1750 ml       Nutrition  Orders Placed This Encounter   Procedures   • Diet Order     Standing Status:   Standing     Number of Occurrences:   1     Order Specific Question:   Diet:     Answer:   Diabetic [3]     Order Specific Question:   Electrolyte modifications:     Answer:   1.5 g Sodium [1]     Order Specific Question:   Consistency/Fluid modifications:     Answer:   1500 ml Fluid Restriction [9]     Physical Exam   Constitutional: She is oriented to person, place, and time. She appears well-developed and well-nourished. No distress.   Morbidly obese female, sitting up at bedside in NAD.   HENT:   Head: Normocephalic and atraumatic.   Nose: Nose normal.   Mouth/Throat: Oropharynx is clear and moist. No oropharyngeal exudate.   Eyes: Conjunctivae and EOM are normal. Pupils are equal,  round, and reactive to light. Right eye exhibits no discharge. Left eye exhibits no discharge. No scleral icterus.   Neck: Normal range of motion. Neck supple. No JVD present. No tracheal deviation present. No thyromegaly present.   Cardiovascular: Normal heart sounds and intact distal pulses.  Exam reveals no gallop and no friction rub.    No murmur heard.  Irregularly irregular   Pulmonary/Chest: Effort normal and breath sounds normal. No stridor. No respiratory distress. She has no wheezes. She has no rales. She exhibits no tenderness.   Abdominal: Soft. Bowel sounds are normal. She exhibits no distension and no mass. There is no tenderness. There is no rebound and no guarding.   Musculoskeletal: Normal range of motion. She exhibits no edema or tenderness.   Lymphadenopathy:     She has no cervical adenopathy.   Neurological: She is alert and oriented to person, place, and time. No cranial nerve deficit. She exhibits normal muscle tone. Coordination normal.   Skin: Skin is warm and dry. Rash (mild moist rash in groin, symmetrical ) noted. She is not diaphoretic. No erythema.   Psychiatric: She has a normal mood and affect. Her behavior is normal. Judgment and thought content normal.   Nursing note and vitals reviewed.      Recent Labs      05/14/18   2120  05/15/18   2311   WBC  11.0*  6.9   RBC  4.57  4.29   HEMOGLOBIN  14.9  14.1   HEMATOCRIT  46.0  44.9   MCV  100.7*  104.7*   MCH  32.6  32.9   MCHC  32.4*  31.4*   RDW  58.7*  62.0*   PLATELETCT  259  212   MPV  10.2  10.0     Recent Labs      05/14/18   2120  05/15/18   2311   SODIUM  139  140   POTASSIUM  4.3  4.2   CHLORIDE  101  100   CO2  31  34*   GLUCOSE  112*  114*   BUN  19  19   CREATININE  0.95  1.10   CALCIUM  9.6  8.8     Recent Labs      05/15/18   1533  05/15/18   2311  05/16/18   0510   APTT  50.7*  83.4*  74.5*     Recent Labs      05/14/18 2120   BNPBTYPENAT  131*     Recent Labs      05/15/18   2311   TRIGLYCERIDE  255*   HDL  32*   LDL   "49          Assessment/Plan     * New onset atrial fibrillation (HCC)- (present on admission)   Assessment & Plan    Patient denies any history of known cardiac dysrhythmias. However, she is in poor medical condition at baseline and does not apparently seek frequent medical help therefore she may have had undiagnosed disease for quite some time. Her heart rates have not improved with initiation of beta blockade.   5/15:  dc oral metoprolol for better rate control with cardizem po q 6 hours since HR still in 130 to 160 range afib on monitor. Start digoxin loading since would like to avoid amiodarone given her current hypoxia and acute respiratory failure on 4.5 LPM NC.   Echocardiogram EF 55%, RVP 55-60  heparin drip for anticoagulation in light of her super morbid obesity. We will further discuss the need for oral anticoagulation therapy.  Her chads score is 3. She will need outpatient cardiology follow-up unless we are unable to control her heart rate in the inpatient setting.  5/16:  Improved rate control with digoxin loading and cardizem 360mg po daily.  Check EKG to ensure not aflutter.  Remains irreg irreg on monitor suggestive of afib.  Diuresis with lasix.  CXR in a.m.  Checking on xarelto pricing and home O2.        Cardiomegaly- (present on admission)   Assessment & Plan    Patient states that she was diagnosed with a \"enlarged heart\" several years ago but has not followed up with any specialist. Given her clinical symptoms of volume overload including crackles heard on the lungs and lower extremity edema.  concerning for pickwickian syndrome/obesity hypoventilation in addition to obstructive sleep apnea. Her current BNP is mildly elevated and she has noted cardiomegaly on chest x-ray.  echocardiogram  EF 55%, RVP elevated 55-60.  control her heart rate, and start her on Lasix with strict intake and output measuring.   dc'd lasix IV 40 daily since lungs CTA b/l.  Continue lasix 40mg po daily with K.      "   COPD (chronic obstructive pulmonary disease) ([facility])- (present on admission)   Assessment & Plan    Previously smoked from 16 to 30 years of age.  No acute exacerbation.        Moderate to severe pulmonary hypertension (HCC)- (present on admission)   Assessment & Plan    2/2 underlying RUTH from morbid obesity.  No formal sleep study done.  Will need to set up as outpatient.  Change lasix 40mg IV to po daily with K since lungs CTA currently after IV lasix diuresis.  Monitor BMPs.        Fungal rash of torso- (present on admission)   Assessment & Plan    Symmetrical moist rash of groin and panus  Started nystatin powder bid.        Acute allergic reaction- (present on admission)   Assessment & Plan    Possible to metformin per patient started with rash to her face, then developed afib.  No solumedrol given at [facility].  Unclear if given any at [facility] prior to transfer.        Acute respiratory failure with hypoxia ([facility])- (present on admission)   Assessment & Plan    As noted above, Isuspect the patient has underlying lung disease with possible pickwickian syndrome/obesity hypoventilation and most likely obstructive sleep apnea. She apparently had been on oxygen 2 years ago but discontinued use because of financial difficulties. Fortunately she is not a smoker. She will be started on respiratory therapy protocol for new diagnosed atrial fibrillation. She would benefit from an outpatient PFT once medically stabilized. Chest x-ray at the outlying facility showed no evidence of infiltrates or consolidations.   A CT PE with no PE seen, ascending aorta 4.1cm.        T2DM (type 2 diabetes mellitus) ([facility])- (present on admission)   Assessment & Plan    Patient states that she was recently diagnosed with diabetes mellitus however she is unsure of the name of her medications. continue insulin sliding scale and Accu-Cheks.   HbA1c 6.7%.  Allergic to metformin with rash to face.  5/16:  Started glyburide 5mg daily.        HTN  (hypertension)- (present on admission)   Assessment & Plan    Patient has not been on any medication for blood pressure.  Tolerating cardizem po for rate control.          Quality-Core Measures

## 2018-05-17 NOTE — CARE PLAN
Problem: Safety  Goal: Will remain free from falls    Intervention: Implement fall precautions  Bed alarm on and fall precautions in place.      Problem: Infection  Goal: Will remain free from infection    Intervention: Implement standard precautions and perform hand washing before and after patient contact  Hand hygiene performed before and after patient contact.

## 2018-05-17 NOTE — DISCHARGE PLANNING
Spoke with Reva at [facility] [place], referral was just received and  is on her lunch, should be worked on in about 20 minutes.

## 2018-05-17 NOTE — DISCHARGE PLANNING
Anticipated Discharge Disposition: Home O2, Xarelto     Action: LSW spoke with [facility] . They have approved O2 and will be delivering with in a couple of hours.     Barriers to Discharge: None    Plan: Home O2 to be delivered bedside.

## 2018-05-17 NOTE — DISCHARGE PLANNING
Faxed O2 DME referral to [facility] per Danika ADKINS. Fax # [phone]. Phone number [phone]

## 2018-05-17 NOTE — DISCHARGE PLANNING
Anticipated Discharge Disposition: Home with Xarelto and Home O2    Action: LSW confirmed pt Xarelto RX is 3.70$    Barriers to Discharge: None    Plan: f/u with [facility] O2 for acceptance.     LSW called for follow up on O2. [facility] office in [place] closed and referral will be resent by CHIP Cloud to the [place] office at [phone].

## 2018-05-17 NOTE — DISCHARGE SUMMARY
CHIEF COMPLAINT ON ADMISSION  Chief Complaint   Patient presents with   • Shortness of Breath     PT RA O2 is 71 %. Satting 98% on 10L oxymask.   • Facial Swelling     PT went to [facility] for suspected allergic rxn d/t facial swelling.   • Hand Swelling       CODE STATUS  Full Code    HPI & HOSPITAL COURSE  This is a 57 y.o. female admitted 5/14/2018 with morbid obesity, new onset afib with RVR transferred from [facility].  She thought she was having an allergic reaction to a new diabetic medication, glucophage with increased swelling and rash for past 2 weeks.  Patient was hypoxic on presentation 70% on RA requiring 8 LPM NRB mask.  She states she had previously needed Oxygen 2 years ago, but became homeless and could no longer afford the copay.  She admits to gaining weight over last 2 years, but is adamant that she does not have sleep apnea and had been tested for this in the past.  Explained results of echocardiogram EF 55% but RVP 60 indicating moderate to severe pulmonary hypertension.  She does require lasix diuresis and oxygen due to this.  She also smoker from 16 to 30 years and likely has underlying COPD.  CTA chest negative for PE.  Her afib was rate controlled with cardizem and digoxin, pulmonary edema improved with lasix.  She was placed on xarelto and home O2 arranged.  She was placed on glyburide for her diabetes, a1c 6.7% instead of metformin due to allergic rash to face and SOB with metformin.  TSH wnl.  She has a home currently and 2 adult children and friend to provide ride home to [place].  She is ambulating well in room with O2.  Diet and exercise were discussed as a long term goal.    The patient met 2-midnight criteria for an inpatient stay at the time of discharge.    Therefore, she is discharged in good and stable condition with close outpatient follow-up.    SPECIFIC OUTPATIENT FOLLOW-UP  Follow up with PCP in 1 week for outpatient sleep study.  Follow up ascending  aorta 4.1cm seen on CTA chest.    Follow up cardiology in 2 months for afib management.    DISCHARGE PROBLEM LIST  Principal Problem:    New onset atrial fibrillation (HCC) POA: Yes  Active Problems:    Cardiomegaly POA: Yes    HTN (hypertension) POA: Yes    T2DM (type 2 diabetes mellitus) (HCC) POA: Yes    Acute respiratory failure with hypoxia (HCC) POA: Yes    Fungal rash of torso POA: Yes    Moderate to severe pulmonary hypertension (HCC) POA: Yes    COPD (chronic obstructive pulmonary disease) (HCC) POA: Yes    Morbid obesity with BMI of 60.0-69.9, adult (HCC) POA: Yes  Resolved Problems:    Acute allergic reaction POA: Yes      FOLLOW UP  No future appointments.  No follow-up provider specified.    MEDICATIONS ON DISCHARGE   Sigrid Miroslava Tameka   Home Medication Instructions SUHSA:[id number]    Printed on:05/17/18 9195   Medication Information                      ascorbic acid (ASCORBIC ACID) 500 MG Tab  Take 500 mg by mouth every day.             cyclobenzaprine (FLEXERIL) 10 MG Tab  Take 10 mg by mouth 3 times a day as needed for Mild Pain or Muscle Spasms.             digoxin (LANOXIN) 125 MCG Tab  Take 1 Tab by mouth every day at 6 PM.             DILTIAZem CD (CARDIZEM CD) 360 MG CAPSULE SR 24 HR  Take 1 Cap by mouth every day.             DULoxetine (CYMBALTA) 30 MG Cap DR Particles  Take 30 mg by mouth every day.             furosemide (LASIX) 40 MG Tab  Take 1 Tab by mouth every day.             glyBURIDE (DIABETA) 5 MG Tab  Take 1 Tab by mouth every morning with breakfast.             magnesium oxide (MAG-OX) 400 (241.3 Mg) MG Tab tablet  Take 1 Tab by mouth 2 Times a Day.             nystatin (MYCOSTATIN) powder  Apply to moist skin areas daily.             potassium chloride SA (KDUR) 20 MEQ Tab CR  Take 1 Tab by mouth every day.             rivaroxaban (XARELTO) 20 MG Tab tablet  Take 1 Tab by mouth with dinner.             topiramate (TOPAMAX) 25 MG Tab  Take 25 mg by mouth 2 times a day.              vitamin D (CHOLECALCIFEROL) 1000 UNIT Tab  Take 1,000 Units by mouth every day.                 DIET  Orders Placed This Encounter   Procedures   • Diet Order     Standing Status:   Standing     Number of Occurrences:   1     Order Specific Question:   Diet:     Answer:   Diabetic [3]     Order Specific Question:   Electrolyte modifications:     Answer:   1.5 g Sodium [1]     Order Specific Question:   Consistency/Fluid modifications:     Answer:   1500 ml Fluid Restriction [9]       ACTIVITY  As tolerated.  Weight bearing as tolerated      CONSULTATIONS  none    PROCEDURES  Transthoracic  Echo Report      Echocardiography Laboratory    CONCLUSIONS  Technically difficult study.   Normal left ventricular chamber size.  Mild concentric left ventricular hypertrophy.  Grossly normal left ventricular systolic function.  Left ventricular ejection fraction is visually estimated to be 55-60%.  The valves were not well visualized.  Mild mitral regurgitation.  Moderate aortic insufficiency.  Calcified aortic valve leaflets.  No aortic stenosis.  Moderate tricuspid regurgitation.  Vena cava is not well visualized.  Right ventricular systolic pressure is estimated to be 55-60 mmHg.  Moderately dilated right ventricle.  Difficult to comment on right ventricular systolic function.      SHAQUILLE CANDELARIA  Exam Date:         05/15/2018     CTA chest:     No proximal pulmonary artery emboli. Peripheral branches are obscured by artifact.  Cardiomegaly. Dilatation ascending aorta measuring 4.1 cm in diameter.  Dependent atelectasis both lungs. Mild edema not excluded. No pleural effusion.  Hepatic steatosis.     Reading Provider Reading Date   Raimundo Church M.D. May 15, 2018       LABORATORY  Lab Results   Component Value Date/Time    SODIUM 140 05/15/2018 11:11 PM    POTASSIUM 4.2 05/15/2018 11:11 PM    CHLORIDE 100 05/15/2018 11:11 PM    CO2 34 (H) 05/15/2018 11:11 PM    GLUCOSE 114 (H) 05/15/2018 11:11 PM    BUN 19 05/15/2018 11:11 PM     CREATININE 1.10 05/15/2018 11:11 PM        Lab Results   Component Value Date/Time    WBC 6.9 05/15/2018 11:11 PM    HEMOGLOBIN 14.1 05/15/2018 11:11 PM    HEMATOCRIT 44.9 05/15/2018 11:11 PM    PLATELETCT 212 05/15/2018 11:11 PM        Total time of the discharge process exceeds 45 minutes

## 2018-05-17 NOTE — PROGRESS NOTES
Bedside report received, assumed care of patient. Assessment performed. Pt has no complaints of pain. Discussed plan of care with pt.  Heparin drip verified with day RN. Bed alarm on and call light within reach, pt educated to call for assistance.

## 2018-05-17 NOTE — DISCHARGE PLANNING
Anticipated Discharge Disposition: Home with O2 and Xarelto    Action: WILFREDW sent O2 order to [facility] in [place] .    Barriers to Discharge:None    Plan: f/u for home DME acceptance for O2.

## 2018-06-21 ENCOUNTER — OFFICE VISIT (OUTPATIENT)
Dept: CARDIOLOGY | Facility: CLINIC | Age: 58
End: 2018-06-21
Payer: MEDICARE

## 2018-06-21 VITALS
BODY MASS INDEX: 45.99 KG/M2 | OXYGEN SATURATION: 93 % | HEIGHT: 67 IN | SYSTOLIC BLOOD PRESSURE: 133 MMHG | HEART RATE: 86 BPM | DIASTOLIC BLOOD PRESSURE: 82 MMHG | WEIGHT: 293 LBS

## 2018-06-21 DIAGNOSIS — E66.01 MORBID OBESITY WITH BMI OF 60.0-69.9, ADULT (HCC): ICD-10-CM

## 2018-06-21 DIAGNOSIS — I48.91 NEW ONSET ATRIAL FIBRILLATION (HCC): ICD-10-CM

## 2018-06-21 DIAGNOSIS — I51.7 CARDIOMEGALY: ICD-10-CM

## 2018-06-21 DIAGNOSIS — I27.20 MODERATE TO SEVERE PULMONARY HYPERTENSION (HCC): ICD-10-CM

## 2018-06-21 PROCEDURE — 99204 OFFICE O/P NEW MOD 45 MIN: CPT | Performed by: INTERNAL MEDICINE

## 2018-06-21 ASSESSMENT — ENCOUNTER SYMPTOMS
MYALGIAS: 0
EYE DISCHARGE: 0
SHORTNESS OF BREATH: 1
BRUISES/BLEEDS EASILY: 0
PND: 0
HEADACHES: 0
PALPITATIONS: 0
DIZZINESS: 0
NERVOUS/ANXIOUS: 1
FEVER: 0
NAUSEA: 0
CHILLS: 0
COUGH: 0
DEPRESSION: 1
BLURRED VISION: 0
BACK PAIN: 1
HEARTBURN: 0

## 2018-06-21 NOTE — PROGRESS NOTES
Chief Complaint   Patient presents with   • Shortness of Breath       Subjective:   Miroslava Matta is a 57 y.o. female who presents today after recent discharge from hospital on May 17 for allergic reaction probably to metformin, A. fib with rapid ventricular response, congestive heart failure and moderately severe pulmonary hypertension.    This chronically morbidly obese woman is seen in follow-up after above hospitalization.  Records reviewed in detail.  Left ventricular systolic function normal.  Enlarged right heart chambers with a PA pressure of 60.  Chronic hypoxemia.  Evaluation of 14 years ago when she was obese suggested no sleep apnea.  She has been through  Tough times particularly recently and has been homeless at times.  Currently her 2 sons are living with her.    Past Medical History:   Diagnosis Date   • Arthritis    • Back pain    • COPD (chronic obstructive pulmonary disease) ([facility]) 5/16/2018   • Diabetes ([facility])    • Fall    • Fungal rash of torso 5/15/2018   • Hypertension    • Morbid obesity with BMI of 60.0-69.9, adult ([facility]) 5/17/2018   • New onset atrial fibrillation ([facility]) 5/15/2018   • Psychiatric problem    • Urinary incontinence      History reviewed. No pertinent surgical history.  History reviewed. No pertinent family history.  Social History     Social History   • Marital status:      Spouse name: N/A   • Number of children: N/A   • Years of education: N/A     Occupational History   • Not on file.     Social History Main Topics   • Smoking status: Former Smoker   • Smokeless tobacco: Never Used   • Alcohol use Yes   • Drug use: No   • Sexual activity: Not on file     Other Topics Concern   • Not on file     Social History Narrative   • No narrative on file     Allergies   Allergen Reactions   • Metformin Shortness of Breath and Rash     To face   • Codeine Nausea     Outpatient Encounter Prescriptions as of 6/21/2018   Medication Sig Dispense Refill   • rivaroxaban (XARELTO)  20 MG Tab tablet Take 1 Tab by mouth with dinner. 30 Tab 3   • glyBURIDE (DIABETA) 5 MG Tab Take 1 Tab by mouth every morning with breakfast. 30 Tab 3   • DILTIAZem CD (CARDIZEM CD) 360 MG CAPSULE SR 24 HR Take 1 Cap by mouth every day. 30 Cap 3   • digoxin (LANOXIN) 125 MCG Tab Take 1 Tab by mouth every day at 6 PM. 30 Tab 3   • furosemide (LASIX) 40 MG Tab Take 1 Tab by mouth every day. 30 Tab 3   • potassium chloride SA (KDUR) 20 MEQ Tab CR Take 1 Tab by mouth every day. 30 Tab 3   • magnesium oxide (MAG-OX) 400 (241.3 Mg) MG Tab tablet Take 1 Tab by mouth 2 Times a Day. 60 Tab 3   • vitamin D (CHOLECALCIFEROL) 1000 UNIT Tab Take 1,000 Units by mouth every day.     • ascorbic acid (ASCORBIC ACID) 500 MG Tab Take 500 mg by mouth every day.     • cyclobenzaprine (FLEXERIL) 10 MG Tab Take 10 mg by mouth 3 times a day as needed for Mild Pain or Muscle Spasms.     • topiramate (TOPAMAX) 25 MG Tab Take 25 mg by mouth 2 times a day.     • DULoxetine (CYMBALTA) 30 MG Cap DR Particles Take 30 mg by mouth every day.     • nystatin (MYCOSTATIN) powder Apply to moist skin areas daily. 15 g 3     No facility-administered encounter medications on file as of 6/21/2018.      Review of Systems   Constitutional: Negative for chills, fever and malaise/fatigue.   Eyes: Negative for blurred vision and discharge.   Respiratory: Positive for shortness of breath. Negative for cough.    Cardiovascular: Negative for chest pain, palpitations, leg swelling and PND.   Gastrointestinal: Negative for heartburn and nausea.   Genitourinary: Negative for dysuria and urgency.   Musculoskeletal: Positive for back pain and joint pain. Negative for myalgias.   Skin: Negative for itching and rash.   Neurological: Negative for dizziness and headaches.   Endo/Heme/Allergies: Negative for environmental allergies. Does not bruise/bleed easily.   Psychiatric/Behavioral: Positive for depression. The patient is nervous/anxious.         Objective:   /82  "  Pulse 86   Ht 1.702 m (5' 7\")   Wt (!) 187.3 kg (413 lb)   SpO2 93%   BMI 64.68 kg/m²     Physical Exam   Constitutional: She is oriented to person, place, and time.   Morbidly obese lady wearing oxygen appearing older than stated age.  Mild tachypnea   Neck: No JVD present.   Cardiovascular: Normal rate.  An irregularly irregular rhythm present. Exam reveals no gallop and no friction rub.    No murmur heard.  Pulmonary/Chest: Breath sounds normal.   Abdominal: Soft.   Musculoskeletal: She exhibits no edema.   Neurological: She is alert and oriented to person, place, and time.   Skin: Skin is warm and dry.       Assessment:     1. Cardiomegaly     2. Moderate to severe pulmonary hypertension (HCC)     3. Morbid obesity with BMI of 60.0-69.9, adult (HCC)     4. New onset atrial fibrillation (HCC)         Medical Decision Making:  Today's Assessment / Status / Plan:   Rate is well controlled.  Tolerating Xarelto  No evidence of right heart failure today  Continue current meds.  Likelihood of successful cardioversion quite low  Therefore rate control, diuresis, oxygen 24/7    Consider repeat exam for sleep apnea  Weight loss discussed  Return in 3 months  "

## 2018-06-21 NOTE — LETTER
[facility] [address] [place] [address]  Phone: [phone]  Fax: [phone]              Miroslava Matta  1960    Encounter Date: 6/21/2018    Jordan Gutierrez M.D.          PROGRESS NOTE:  Chief Complaint   Patient presents with   • Shortness of Breath       Subjective:   Miroslava Matta is a 57 y.o. female who presents today after recent discharge from hospital on May 17 for allergic reaction probably to metformin, A. fib with rapid ventricular response, congestive heart failure and moderately severe pulmonary hypertension.    This chronically morbidly obese woman is seen in follow-up after above hospitalization.  Records reviewed in detail.  Left ventricular systolic function normal.  Enlarged right heart chambers with a PA pressure of 60.  Chronic hypoxemia.  Evaluation of 14 years ago when she was obese suggested no sleep apnea.  She has been through  Tough times particularly recently and has been homeless at times.  Currently her 2 sons are living with her.    Past Medical History:   Diagnosis Date   • Arthritis    • Back pain    • COPD (chronic obstructive pulmonary disease) ([facility]) 5/16/2018   • Diabetes ([facility])    • Fall    • Fungal rash of torso 5/15/2018   • Hypertension    • Morbid obesity with BMI of 60.0-69.9, adult ([facility]) 5/17/2018   • New onset atrial fibrillation ([facility]) 5/15/2018   • Psychiatric problem    • Urinary incontinence      History reviewed. No pertinent surgical history.  History reviewed. No pertinent family history.  Social History     Social History   • Marital status:      Spouse name: N/A   • Number of children: N/A   • Years of education: N/A     Occupational History   • Not on file.     Social History Main Topics   • Smoking status: Former Smoker   • Smokeless tobacco: Never Used   • Alcohol use Yes   • Drug use: No   • Sexual activity: Not on file     Other Topics Concern   • Not on file        Social History Narrative   • No narrative on file     Allergies   Allergen Reactions   • Metformin Shortness of Breath and Rash     To face   • Codeine Nausea     Outpatient Encounter Prescriptions as of 6/21/2018   Medication Sig Dispense Refill   • rivaroxaban (XARELTO) 20 MG Tab tablet Take 1 Tab by mouth with dinner. 30 Tab 3   • glyBURIDE (DIABETA) 5 MG Tab Take 1 Tab by mouth every morning with breakfast. 30 Tab 3   • DILTIAZem CD (CARDIZEM CD) 360 MG CAPSULE SR 24 HR Take 1 Cap by mouth every day. 30 Cap 3   • digoxin (LANOXIN) 125 MCG Tab Take 1 Tab by mouth every day at 6 PM. 30 Tab 3   • furosemide (LASIX) 40 MG Tab Take 1 Tab by mouth every day. 30 Tab 3   • potassium chloride SA (KDUR) 20 MEQ Tab CR Take 1 Tab by mouth every day. 30 Tab 3   • magnesium oxide (MAG-OX) 400 (241.3 Mg) MG Tab tablet Take 1 Tab by mouth 2 Times a Day. 60 Tab 3   • vitamin D (CHOLECALCIFEROL) 1000 UNIT Tab Take 1,000 Units by mouth every day.     • ascorbic acid (ASCORBIC ACID) 500 MG Tab Take 500 mg by mouth every day.     • cyclobenzaprine (FLEXERIL) 10 MG Tab Take 10 mg by mouth 3 times a day as needed for Mild Pain or Muscle Spasms.     • topiramate (TOPAMAX) 25 MG Tab Take 25 mg by mouth 2 times a day.     • DULoxetine (CYMBALTA) 30 MG Cap DR Particles Take 30 mg by mouth every day.     • nystatin (MYCOSTATIN) powder Apply to moist skin areas daily. 15 g 3     No facility-administered encounter medications on file as of 6/21/2018.      Review of Systems   Constitutional: Negative for chills, fever and malaise/fatigue.   Eyes: Negative for blurred vision and discharge.   Respiratory: Positive for shortness of breath. Negative for cough.    Cardiovascular: Negative for chest pain, palpitations, leg swelling and PND.   Gastrointestinal: Negative for heartburn and nausea.   Genitourinary: Negative for dysuria and urgency.   Musculoskeletal: Positive for back pain and joint pain. Negative for myalgias.   Skin: Negative for  "itching and rash.   Neurological: Negative for dizziness and headaches.   Endo/Heme/Allergies: Negative for environmental allergies. Does not bruise/bleed easily.   Psychiatric/Behavioral: Positive for depression. The patient is nervous/anxious.         Objective:   /82   Pulse 86   Ht 1.702 m (5' 7\")   Wt (!) 187.3 kg (413 lb)   SpO2 93%   BMI 64.68 kg/m²      Physical Exam   Constitutional: She is oriented to person, place, and time.   Morbidly obese lady wearing oxygen appearing older than stated age.  Mild tachypnea   Neck: No JVD present.   Cardiovascular: Normal rate.  An irregularly irregular rhythm present. Exam reveals no gallop and no friction rub.    No murmur heard.  Pulmonary/Chest: Breath sounds normal.   Abdominal: Soft.   Musculoskeletal: She exhibits no edema.   Neurological: She is alert and oriented to person, place, and time.   Skin: Skin is warm and dry.       Assessment:     1. Cardiomegaly     2. Moderate to severe pulmonary hypertension (HCC)     3. Morbid obesity with BMI of 60.0-69.9, adult (HCC)     4. New onset atrial fibrillation (HCC)         Medical Decision Making:  Today's Assessment / Status / Plan:   Rate is well controlled.  Tolerating Xarelto  No evidence of right heart failure today  Continue current meds.  Likelihood of successful cardioversion quite low  Therefore rate control, diuresis, oxygen 24/7    Consider repeat exam for sleep apnea  Weight loss discussed  Return in 3 months      ARIAN Garrido.TaranC.  [address] [place] [address]  VIA Facsimile: [phone]                 "

## 2018-09-06 ENCOUNTER — OFFICE VISIT (OUTPATIENT)
Dept: CARDIOLOGY | Facility: CLINIC | Age: 58
End: 2018-09-06
Payer: MEDICARE

## 2018-09-06 VITALS
HEIGHT: 67 IN | HEART RATE: 96 BPM | DIASTOLIC BLOOD PRESSURE: 100 MMHG | WEIGHT: 293 LBS | SYSTOLIC BLOOD PRESSURE: 139 MMHG | OXYGEN SATURATION: 97 % | BODY MASS INDEX: 45.99 KG/M2

## 2018-09-06 DIAGNOSIS — I48.19 PERSISTENT ATRIAL FIBRILLATION (HCC): ICD-10-CM

## 2018-09-06 DIAGNOSIS — E66.01 MORBID OBESITY WITH BMI OF 60.0-69.9, ADULT (HCC): ICD-10-CM

## 2018-09-06 DIAGNOSIS — I27.20 MODERATE TO SEVERE PULMONARY HYPERTENSION (HCC): ICD-10-CM

## 2018-09-06 PROCEDURE — 99214 OFFICE O/P EST MOD 30 MIN: CPT | Performed by: INTERNAL MEDICINE

## 2018-09-06 NOTE — PROGRESS NOTES
Chief Complaint   Patient presents with   • Cardiomegaly       Subjective:   Miroslava Matta is a 58 y.o. female who presents today for follow-up of her persistent atrial fibrillation, pulmonary hypertension with right-sided enlargement and hypertension.  She did have mild left ventricular hypertrophy on her echocardiogram.    She has had no anginal type chest discomfort.  She has dyspnea on exertion at about a half a block.  She denies any PND orthopnea.  She is noted no edema, palpitations or lightheadedness.    Past Medical History:   Diagnosis Date   • Arthritis    • Back pain    • COPD (chronic obstructive pulmonary disease) ([facility]) 5/16/2018   • Diabetes ([facility])    • Fall    • Fungal rash of torso 5/15/2018   • Hypertension    • Morbid obesity with BMI of 60.0-69.9, adult ([facility]) 5/17/2018   • New onset atrial fibrillation ([facility]) 5/15/2018   • Psychiatric problem    • Urinary incontinence      History reviewed. No pertinent surgical history.  History reviewed. No pertinent family history.  Social History     Social History   • Marital status:      Spouse name: N/A   • Number of children: N/A   • Years of education: N/A     Occupational History   • Not on file.     Social History Main Topics   • Smoking status: Former Smoker   • Smokeless tobacco: Never Used   • Alcohol use Yes   • Drug use: No   • Sexual activity: Not on file     Other Topics Concern   • Not on file     Social History Narrative   • No narrative on file     Allergies   Allergen Reactions   • Metformin Shortness of Breath and Rash     To face   • Codeine Nausea     Outpatient Encounter Prescriptions as of 9/6/2018   Medication Sig Dispense Refill   • rivaroxaban (XARELTO) 20 MG Tab tablet Take 1 Tab by mouth with dinner. 30 Tab 3   • glyBURIDE (DIABETA) 5 MG Tab Take 1 Tab by mouth every morning with breakfast. 30 Tab 3   • DILTIAZem CD (CARDIZEM CD) 360 MG CAPSULE SR 24 HR Take 1 Cap by mouth every day. 30 Cap 3   • digoxin (LANOXIN) 125  "MCG Tab Take 1 Tab by mouth every day at 6 PM. 30 Tab 3   • furosemide (LASIX) 40 MG Tab Take 1 Tab by mouth every day. 30 Tab 3   • potassium chloride SA (KDUR) 20 MEQ Tab CR Take 1 Tab by mouth every day. 30 Tab 3   • magnesium oxide (MAG-OX) 400 (241.3 Mg) MG Tab tablet Take 1 Tab by mouth 2 Times a Day. 60 Tab 3   • nystatin (MYCOSTATIN) powder Apply to moist skin areas daily. 15 g 3   • vitamin D (CHOLECALCIFEROL) 1000 UNIT Tab Take 1,000 Units by mouth every day.     • ascorbic acid (ASCORBIC ACID) 500 MG Tab Take 500 mg by mouth every day.     • cyclobenzaprine (FLEXERIL) 10 MG Tab Take 10 mg by mouth 3 times a day as needed for Mild Pain or Muscle Spasms.     • topiramate (TOPAMAX) 25 MG Tab Take 25 mg by mouth 2 times a day.     • DULoxetine (CYMBALTA) 30 MG Cap DR Particles Take 30 mg by mouth every day.       No facility-administered encounter medications on file as of 9/6/2018.      ROS     Objective:   /100   Pulse 96   Ht 1.702 m (5' 7\")   Wt (!) 190.1 kg (419 lb)   SpO2 97%   BMI 65.62 kg/m²     Physical Exam   Constitutional: She appears well-developed and well-nourished.   Neck: No JVD present.   Cardiovascular: An irregularly irregular rhythm present. Tachycardia present.    No murmur heard.  Pulmonary/Chest: Effort normal and breath sounds normal. She has no rales.   Abdominal: Soft. There is no tenderness.   Musculoskeletal: She exhibits edema (1+).     Lab Results   Component Value Date/Time    CHOLSTRLTOT 132 05/15/2018 11:11 PM    LDL 49 05/15/2018 11:11 PM    HDL 32 (A) 05/15/2018 11:11 PM    TRIGLYCERIDE 255 (H) 05/15/2018 11:11 PM       Lab Results   Component Value Date/Time    SODIUM 140 05/15/2018 11:11 PM    POTASSIUM 4.2 05/15/2018 11:11 PM    CHLORIDE 100 05/15/2018 11:11 PM    CO2 34 (H) 05/15/2018 11:11 PM    GLUCOSE 114 (H) 05/15/2018 11:11 PM    BUN 19 05/15/2018 11:11 PM    CREATININE 1.10 05/15/2018 11:11 PM     Lab Results   Component Value Date/Time    ALKPHOSPHAT " 69 05/14/2018 09:20 PM    ASTSGOT 33 05/14/2018 09:20 PM    ALTSGPT 67 (H) 05/14/2018 09:20 PM    TBILIRUBIN 0.8 05/14/2018 09:20 PM      Lab Results   Component Value Date/Time    BNPBTYPENAT 131 (H) 05/14/2018 09:20 PM          Transthoracic  Echo Report    CONCLUSIONS  Technically difficult study.   Normal left ventricular chamber size.  Mild concentric left ventricular hypertrophy.  Grossly normal left ventricular systolic function.  Left ventricular ejection fraction is visually estimated to be 55-60%.  The valves were not well visualized.  Mild mitral regurgitation.  Moderate aortic insufficiency.  Calcified aortic valve leaflets.  No aortic stenosis.  Moderate tricuspid regurgitation.  Vena cava is not well visualized.  Right ventricular systolic pressure is estimated to be 55-60 mmHg.  Moderately dilated right ventricle.  Difficult to comment on right ventricular systolic function.      SHAQUILLE CANDELARIA  Exam Date:         05/15/2018      Chest CTA: No evidence of pulmonary embolism.  Additional Comments: Cardiomegaly. Minimal pericardial fluid.  Calcified plaque aorta. The left atrium, left ventricle, and aorta are not opacified due to timing bolus..  The ascending aorta is dilated measuring 4.1 cm in diameter.  Calcifications are located within the aortic valve.  Coronary artery calcifications are demonstrated.    Assessment:     1. Persistent atrial fibrillation (HCC)     2. Moderate to severe pulmonary hypertension (HCC)     3. Morbid obesity with BMI of 60.0-69.9, adult (HCC)         Medical Decision Making:  Today's Assessment / Status / Plan:     Ms. Candelaria has significant pulmonary hypertension and right-sided enlargement.  I feel she should be further evaluated to rule out an ASD with a transesophageal echocardiogram.  In addition, I would like to check her overnight pulse oximetry to rule out nocturnal hypoxemia on oxygen therapy.  If she is hypoxemic, she may need a sleep study.  She will follow-up  in a couple of months.

## 2018-09-06 NOTE — LETTER
[facility] [address] [place] [address]  Phone: [phone]  Fax: [phone]              Miroslava Matta  1960    Encounter Date: 9/6/2018    Tyson Lawson M.D.          PROGRESS NOTE:  Chief Complaint   Patient presents with   • Cardiomegaly       Subjective:   Miroslava Matta is a 58 y.o. female who presents today for follow-up of her persistent atrial fibrillation, pulmonary hypertension with right-sided enlargement and hypertension.  She did have mild left ventricular hypertrophy on her echocardiogram.    She has had no anginal type chest discomfort.  She has dyspnea on exertion at about a half a block.  She denies any PND orthopnea.  She is noted no edema, palpitations or lightheadedness.    Past Medical History:   Diagnosis Date   • Arthritis    • Back pain    • COPD (chronic obstructive pulmonary disease) ([facility]) 5/16/2018   • Diabetes ([facility])    • Fall    • Fungal rash of torso 5/15/2018   • Hypertension    • Morbid obesity with BMI of 60.0-69.9, adult ([facility]) 5/17/2018   • New onset atrial fibrillation ([facility]) 5/15/2018   • Psychiatric problem    • Urinary incontinence      History reviewed. No pertinent surgical history.  History reviewed. No pertinent family history.  Social History     Social History   • Marital status:      Spouse name: N/A   • Number of children: N/A   • Years of education: N/A     Occupational History   • Not on file.     Social History Main Topics   • Smoking status: Former Smoker   • Smokeless tobacco: Never Used   • Alcohol use Yes   • Drug use: No   • Sexual activity: Not on file     Other Topics Concern   • Not on file     Social History Narrative   • No narrative on file     Allergies   Allergen Reactions   • Metformin Shortness of Breath and Rash     To face   • Codeine Nausea     Outpatient Encounter Prescriptions as of 9/6/2018   Medication Sig Dispense Refill   • rivaroxaban  "(XARELTO) 20 MG Tab tablet Take 1 Tab by mouth with dinner. 30 Tab 3   • glyBURIDE (DIABETA) 5 MG Tab Take 1 Tab by mouth every morning with breakfast. 30 Tab 3   • DILTIAZem CD (CARDIZEM CD) 360 MG CAPSULE SR 24 HR Take 1 Cap by mouth every day. 30 Cap 3   • digoxin (LANOXIN) 125 MCG Tab Take 1 Tab by mouth every day at 6 PM. 30 Tab 3   • furosemide (LASIX) 40 MG Tab Take 1 Tab by mouth every day. 30 Tab 3   • potassium chloride SA (KDUR) 20 MEQ Tab CR Take 1 Tab by mouth every day. 30 Tab 3   • magnesium oxide (MAG-OX) 400 (241.3 Mg) MG Tab tablet Take 1 Tab by mouth 2 Times a Day. 60 Tab 3   • nystatin (MYCOSTATIN) powder Apply to moist skin areas daily. 15 g 3   • vitamin D (CHOLECALCIFEROL) 1000 UNIT Tab Take 1,000 Units by mouth every day.     • ascorbic acid (ASCORBIC ACID) 500 MG Tab Take 500 mg by mouth every day.     • cyclobenzaprine (FLEXERIL) 10 MG Tab Take 10 mg by mouth 3 times a day as needed for Mild Pain or Muscle Spasms.     • topiramate (TOPAMAX) 25 MG Tab Take 25 mg by mouth 2 times a day.     • DULoxetine (CYMBALTA) 30 MG Cap DR Particles Take 30 mg by mouth every day.       No facility-administered encounter medications on file as of 9/6/2018.      ROS     Objective:   /100   Pulse 96   Ht 1.702 m (5' 7\")   Wt (!) 190.1 kg (419 lb)   SpO2 97%   BMI 65.62 kg/m²      Physical Exam   Constitutional: She appears well-developed and well-nourished.   Neck: No JVD present.   Cardiovascular: An irregularly irregular rhythm present. Tachycardia present.    No murmur heard.  Pulmonary/Chest: Effort normal and breath sounds normal. She has no rales.   Abdominal: Soft. There is no tenderness.   Musculoskeletal: She exhibits edema (1+).     Lab Results   Component Value Date/Time    CHOLSTRLTOT 132 05/15/2018 11:11 PM    LDL 49 05/15/2018 11:11 PM    HDL 32 (A) 05/15/2018 11:11 PM    TRIGLYCERIDE 255 (H) 05/15/2018 11:11 PM       Lab Results   Component Value Date/Time    SODIUM 140 05/15/2018 " 11:11 PM    POTASSIUM 4.2 05/15/2018 11:11 PM    CHLORIDE 100 05/15/2018 11:11 PM    CO2 34 (H) 05/15/2018 11:11 PM    GLUCOSE 114 (H) 05/15/2018 11:11 PM    BUN 19 05/15/2018 11:11 PM    CREATININE 1.10 05/15/2018 11:11 PM     Lab Results   Component Value Date/Time    ALKPHOSPHAT 69 05/14/2018 09:20 PM    ASTSGOT 33 05/14/2018 09:20 PM    ALTSGPT 67 (H) 05/14/2018 09:20 PM    TBILIRUBIN 0.8 05/14/2018 09:20 PM      Lab Results   Component Value Date/Time    BNPBTYPENAT 131 (H) 05/14/2018 09:20 PM          Transthoracic  Echo Report    CONCLUSIONS  Technically difficult study.   Normal left ventricular chamber size.  Mild concentric left ventricular hypertrophy.  Grossly normal left ventricular systolic function.  Left ventricular ejection fraction is visually estimated to be 55-60%.  The valves were not well visualized.  Mild mitral regurgitation.  Moderate aortic insufficiency.  Calcified aortic valve leaflets.  No aortic stenosis.  Moderate tricuspid regurgitation.  Vena cava is not well visualized.  Right ventricular systolic pressure is estimated to be 55-60 mmHg.  Moderately dilated right ventricle.  Difficult to comment on right ventricular systolic function.      SHAQUILLE CANDELARIA  Exam Date:         05/15/2018      Chest CTA: No evidence of pulmonary embolism.  Additional Comments: Cardiomegaly. Minimal pericardial fluid.  Calcified plaque aorta. The left atrium, left ventricle, and aorta are not opacified due to timing bolus..  The ascending aorta is dilated measuring 4.1 cm in diameter.  Calcifications are located within the aortic valve.  Coronary artery calcifications are demonstrated.    Assessment:     1. Persistent atrial fibrillation (HCC)     2. Moderate to severe pulmonary hypertension (HCC)     3. Morbid obesity with BMI of 60.0-69.9, adult (HCC)         Medical Decision Making:  Today's Assessment / Status / Plan:     Ms. Candelaria has significant pulmonary hypertension and right-sided enlargement.  I  feel she should be further evaluated to rule out an ASD with a transesophageal echocardiogram.  In addition, I would like to check her overnight pulse oximetry to rule out nocturnal hypoxemia on oxygen therapy.  If she is hypoxemic, she may need a sleep study.  She will follow-up in a couple of months.        Michael Melvin P.A.-C.  [address] [place] [address]  VIA Facsimile: [phone]

## 2018-09-07 ENCOUNTER — TELEPHONE (OUTPATIENT)
Dept: CARDIOLOGY | Facility: MEDICAL CENTER | Age: 58
End: 2018-09-07

## 2018-09-07 NOTE — TELEPHONE ENCOUNTER
Patient is scheduled on 9-14-18 for a ANTONIO with Dr. ROCK Ibrahim. No meds to to stop. Patient was told to check in at 11:30 for a 1:30 procedure. H&P was done on 9-6-18 by Dr. Lawson.

## 2018-09-07 NOTE — TELEPHONE ENCOUNTER
----- Message from Ghassan Aguirre sent at 9/6/2018  2:13 PM PDT -----      ----- Message -----  From: Joey Lopez, Med Ass't  Sent: 9/6/2018  11:24 AM  To: Bhavana Nair, Med Ass't    Per Dr. DOMINGUEZ would like this patient to have A ANTONIO, Ins: Medicare/Medical, Dx: [id number]

## 2018-09-11 ENCOUNTER — TELEPHONE (OUTPATIENT)
Dept: CARDIOLOGY | Facility: MEDICAL CENTER | Age: 58
End: 2018-09-11

## 2018-09-11 NOTE — TELEPHONE ENCOUNTER
Per Dr. DOMINGUEZ wanted this patient to have an OPO patient lives in [facility].   OPO sent to [facility] in [place].     Fax: [phone]  Phone #: [phone]

## 2018-09-14 ENCOUNTER — HOSPITAL ENCOUNTER (OUTPATIENT)
Facility: MEDICAL CENTER | Age: 58
End: 2018-09-14
Attending: INTERNAL MEDICINE | Admitting: INTERNAL MEDICINE
Payer: MEDICARE

## 2018-09-14 VITALS
OXYGEN SATURATION: 94 % | RESPIRATION RATE: 16 BRPM | HEART RATE: 82 BPM | BODY MASS INDEX: 45.99 KG/M2 | SYSTOLIC BLOOD PRESSURE: 115 MMHG | DIASTOLIC BLOOD PRESSURE: 61 MMHG | TEMPERATURE: 98 F | HEIGHT: 67 IN | WEIGHT: 293 LBS

## 2018-09-14 LAB
ANION GAP SERPL CALC-SCNC: 11 MMOL/L (ref 0–11.9)
BUN SERPL-MCNC: 24 MG/DL (ref 8–22)
CALCIUM SERPL-MCNC: 9.7 MG/DL (ref 8.5–10.5)
CHLORIDE SERPL-SCNC: 99 MMOL/L (ref 96–112)
CO2 SERPL-SCNC: 33 MMOL/L (ref 20–33)
CREAT SERPL-MCNC: 1.04 MG/DL (ref 0.5–1.4)
GLUCOSE SERPL-MCNC: 138 MG/DL (ref 65–99)
HCG SERPL QL: NEGATIVE
LV EJECT FRACT  99904: 60
POTASSIUM SERPL-SCNC: 3.8 MMOL/L (ref 3.6–5.5)
SODIUM SERPL-SCNC: 143 MMOL/L (ref 135–145)

## 2018-09-14 PROCEDURE — 80048 BASIC METABOLIC PNL TOTAL CA: CPT

## 2018-09-14 PROCEDURE — 93312 ECHO TRANSESOPHAGEAL: CPT

## 2018-09-14 PROCEDURE — 84703 CHORIONIC GONADOTROPIN ASSAY: CPT

## 2018-09-14 PROCEDURE — 700111 HCHG RX REV CODE 636 W/ 250 OVERRIDE (IP)

## 2018-09-14 PROCEDURE — 93325 DOPPLER ECHO COLOR FLOW MAPG: CPT

## 2018-09-14 PROCEDURE — 160002 HCHG RECOVERY MINUTES (STAT)

## 2018-09-14 ASSESSMENT — PAIN SCALES - GENERAL
PAINLEVEL_OUTOF10: 0

## 2018-09-14 NOTE — OR NURSING
1452: Patient from cath lab to PPU via gurney s/p ANTONIO.. Patient is awake. VSS.  No c/o pain or nausea at this time. Will monitor closely. Vital signs per MD order.   1500: Patient on 3 LNC at home. Patient tolerating  PO intake well. Patient's respiration spontaneous and non-labored.   1530: DC instructions given. Questions answered. No c/o pain or nausea. Patient wide awake. VSS. Patient met criteria for discharge.

## 2018-09-14 NOTE — DISCHARGE INSTRUCTIONS
ACTIVITY: Rest and take it easy for the first 24 hours.  A responsible adult is recommended to remain with you during that time.  It is normal to feel sleepy.  We encourage you to not do anything that requires balance, judgment or coordination.    MILD FLU-LIKE SYMPTOMS ARE NORMAL. YOU MAY EXPERIENCE GENERALIZED MUSCLE ACHES, THROAT IRRITATION, HEADACHE AND/OR SOME NAUSEA.    FOR 24 HOURS DO NOT:  Drive, operate machinery or run household appliances.  Drink beer or alcoholic beverages.   Make important decisions or sign legal documents.      DIET: To avoid nausea, slowly advance diet as tolerated, avoiding spicy or greasy foods for the first day.  Add more substantial food to your diet according to your physician's instructions.  Babies can be fed formula or breast milk as soon as they are hungry.  INCREASE FLUIDS AND FIBER TO AVOID CONSTIPATION.      FOLLOW-UP APPOINTMENT:  A follow-up appointment should be arranged with your doctor; call to schedule.    You should CALL YOUR PHYSICIAN if you develop:  Fever greater than 101 degrees F.  Pain not relieved by medication, or persistent nausea or vomiting.  Excessive bleeding (blood soaking through dressing) or unexpected drainage from the wound.  Extreme redness or swelling around the incision site, drainage of pus or foul smelling drainage.  Inability to urinate or empty your bladder within 8 hours.  Problems with breathing or chest pain.    You should call 911 if you develop problems with breathing or chest pain.  If you are unable to contact your doctor or surgical center, you should go to the nearest emergency room or urgent care center.      Physician's telephone #: [phone]    If any questions arise, call your doctor.  If your doctor is not available, please feel free to call the Surgical Center at {Surgical Dept Numbers:[id number]}.  The Center is open Monday through Friday from 7AM to 7PM.  You can also call the [employer] HOTLINE open 24 hours/day, 7 days/week and  speak to a nurse at [phone], or toll free at [phone].    A registered nurse may call you a few days after your surgery to see how you are doing after your procedure.    MEDICATIONS: Resume taking daily medication.  Take prescribed pain medication with food.  If no medication is prescribed, you may take non-aspirin pain medication if needed.  PAIN MEDICATION CAN BE VERY CONSTIPATING.  Take a stool softener or laxative such as senokot, pericolace, or milk of magnesia if needed.      If your physician has prescribed pain medication that includes Acetaminophen (Tylenol), do not take additional Acetaminophen (Tylenol) while taking the prescribed medication.    Depression / Suicide Risk    As you are discharged from this [facility] facility, it is important to learn how to keep safe from harming yourself.    Recognize the warning signs:  · Abrupt changes in personality, positive or negative- including increase in energy   · Giving away possessions  · Change in eating patterns- significant weight changes-  positive or negative  · Change in sleeping patterns- unable to sleep or sleeping all the time   · Unwillingness or inability to communicate  · Depression  · Unusual sadness, discouragement and loneliness  · Talk of wanting to die  · Neglect of personal appearance   · Rebelliousness- reckless behavior  · Withdrawal from people/activities they love  · Confusion- inability to concentrate     If you or a loved one observes any of these behaviors or has concerns about self-harm, here's what you can do:  · Talk about it- your feelings and reasons for harming yourself  · Remove any means that you might use to hurt yourself (examples: pills, rope, extension cords, firearm)  · Get professional help from the community (Mental Health, Substance Abuse, psychological counseling)  · Do not be alone:Call your Safe Contact- someone whom you trust who will be there for you.  · Call your local CRISIS HOTLINE 823-6704 or  396.784.5697  · Call your local Children's Mobile Crisis Response Team [place] (192) 696-1256 or www.[employer]  · Call the toll free National Suicide Prevention Hotlines   · National Suicide Prevention Lifeline 360-676-CDDH (4066)  · [facility] Network 800-SUICIDE (370-0425)                      Transesophageal Echocardiogram    Transesophageal echocardiography (ANTONIO) is a picture test of your heart using sound waves. The pictures taken can give very detailed pictures of your heart. This can help your doctor see if there are problems with your heart. ANTONIO can check:  · If your heart has blood clots in it.  · How well your heart valves are working.  · If you have an infection on the inside of your heart.  · Some of the major arteries of your heart.  · If your heart valve is working after a repair.  · Your heart before a procedure that uses a shock to your heart to get the rhythm back to normal.  What happens before the procedure?  · Do not eat or drink for 6 hours before the procedure or as told by your doctor.  · Make plans to have someone drive you home after the procedure. Do not drive yourself home.  · An IV tube will be put in your arm.  What happens during the procedure?  · You will be given a medicine to help you relax (sedative). It will be given through the IV tube.  · A numbing medicine will be sprayed or gargled in the back of your throat to help numb it.  · The tip of the probe is placed into the back of your mouth. You will be asked to swallow. This helps to pass the probe into your esophagus.  · Once the tip of the probe is in the right place, your doctor can take pictures of your heart.  · You may feel pressure at the back of your throat.  What happens after the procedure?  · You will be taken to a recovery area so the sedative can wear off.  · Your throat may be sore and scratchy. This will go away slowly over time.  · You will go home when you are fully awake and able to swallow  liquids.  · You should have someone stay with you for the next 24 hours.  · Do not drive or operate machinery for the next 24 hours.  This information is not intended to replace advice given to you by your health care provider. Make sure you discuss any questions you have with your health care provider.  Document Released: 10/15/2010 Document Revised: 05/25/2017 Document Reviewed: 06/19/2014  Elsevier Interactive Patient Education © 2017 [employer]

## 2018-09-25 ENCOUNTER — TELEPHONE (OUTPATIENT)
Dept: CARDIOLOGY | Facility: MEDICAL CENTER | Age: 58
End: 2018-09-25

## 2018-09-25 NOTE — TELEPHONE ENCOUNTER
ANTONIO results   Received: Today   Message Contents   Xochilt Meza R.N.             ALBERTO/Maxine       Patient is calling for ANTONIO results. She can be reached at [phone].      Discussed with LA.    Returned patient call. Pt informed of results. Heavily emphasized the fact that study was ended prematurely secondary to inability to maintain patients airway. Discussed this multiple times and in multiple ways with patient so that she would understand severity. We discuss a sleep study, she states her PCP has discussed with her also and she will follow up with him. Encouraged her to please let us know if we could help in any way. Pt appreciates call back.

## 2018-09-27 DIAGNOSIS — I51.7 CARDIOMEGALY: ICD-10-CM

## 2018-09-27 DIAGNOSIS — E66.01 MORBID OBESITY WITH BODY MASS INDEX OF 60.0-69.9 IN ADULT (HCC): ICD-10-CM

## 2018-09-27 DIAGNOSIS — I48.19 PERSISTENT ATRIAL FIBRILLATION (HCC): ICD-10-CM

## 2018-09-27 DIAGNOSIS — I10 ESSENTIAL HYPERTENSION, BENIGN: ICD-10-CM

## 2018-09-27 DIAGNOSIS — Z92.83 H/O FAILED CONSCIOUS SEDATION: ICD-10-CM

## 2018-09-27 DIAGNOSIS — I27.20 MODERATE TO SEVERE PULMONARY HYPERTENSION (HCC): ICD-10-CM

## 2018-09-27 NOTE — PROGRESS NOTES
RE: ANTONIO results   Received: Today   Message Contents   Tyson Lawson M.D.  Maxine Meza R.N.             TTE with bubble contrast.   Also, schedule pt for sleep study at [facility].      Orders placed. Pt called and informed of above.. Pts concern is cost. Her monthly is $1800 and has concerns. Pt notified I would ask our schedulers to see if these could be scheduled in [place] so that her secondary would assist. Pt appreciative. Pt states she has already been doing the exercises she can and cutting her dietary intake. Pt appreciative and states we have been very helpful.

## 2018-12-20 ENCOUNTER — OFFICE VISIT (OUTPATIENT)
Dept: CARDIOLOGY | Facility: CLINIC | Age: 58
End: 2018-12-20
Payer: MEDICARE

## 2018-12-20 VITALS
WEIGHT: 293 LBS | HEART RATE: 90 BPM | HEIGHT: 67 IN | DIASTOLIC BLOOD PRESSURE: 72 MMHG | OXYGEN SATURATION: 96 % | BODY MASS INDEX: 45.99 KG/M2 | SYSTOLIC BLOOD PRESSURE: 130 MMHG

## 2018-12-20 DIAGNOSIS — I48.19 PERSISTENT ATRIAL FIBRILLATION (HCC): ICD-10-CM

## 2018-12-20 DIAGNOSIS — I10 ESSENTIAL HYPERTENSION: ICD-10-CM

## 2018-12-20 DIAGNOSIS — E66.01 MORBID OBESITY WITH BMI OF 60.0-69.9, ADULT (HCC): ICD-10-CM

## 2018-12-20 DIAGNOSIS — I27.20 MODERATE TO SEVERE PULMONARY HYPERTENSION (HCC): ICD-10-CM

## 2018-12-20 PROCEDURE — 99214 OFFICE O/P EST MOD 30 MIN: CPT | Performed by: INTERNAL MEDICINE

## 2018-12-20 NOTE — PROGRESS NOTES
Chief Complaint   Patient presents with   • HTN (Controlled)       Subjective:   Miroslava Matta is a 58 y.o. female who presents today for follow-up of her right-sided enlargement and pulmonary hypertension.  Study and does have obstructive sleep apnea.  In addition, a ANTONIO was attempted but could not be performed because of hypoxemia.  Unfortunately, she lives in a small house and does not feel she has room for a CPAP machine.      Is able to do her housework and activities of daily living without getting dyspneic as long as she is on oxygen therapy.  She denies any PND, orthopnea or edema.  She has had no chest discomfort, palpitations or lightheadedness.    Past Medical History:   Diagnosis Date   • Arthritis    • Back pain    • COPD (chronic obstructive pulmonary disease) ([facility]) 5/16/2018   • Diabetes ([facility])    • Fall    • Fungal rash of torso 5/15/2018   • Hypertension    • Morbid obesity with BMI of 60.0-69.9, adult ([facility]) 5/17/2018   • New onset atrial fibrillation ([facility]) 5/15/2018   • Psychiatric problem    • Urinary incontinence      No past surgical history on file.  No family history on file.  Social History     Social History   • Marital status:      Spouse name: N/A   • Number of children: N/A   • Years of education: N/A     Occupational History   • Not on file.     Social History Main Topics   • Smoking status: Former Smoker   • Smokeless tobacco: Never Used   • Alcohol use Yes   • Drug use: No   • Sexual activity: Not on file     Other Topics Concern   • Not on file     Social History Narrative   • No narrative on file     Allergies   Allergen Reactions   • Metformin Shortness of Breath and Rash     To face   • Codeine Nausea     Outpatient Encounter Prescriptions as of 12/20/2018   Medication Sig Dispense Refill   • rivaroxaban (XARELTO) 20 MG Tab tablet Take 1 Tab by mouth with dinner. 30 Tab 3   • glyBURIDE (DIABETA) 5 MG Tab Take 1 Tab by mouth every morning with breakfast. 30 Tab 3   •  "DILTIAZem CD (CARDIZEM CD) 360 MG CAPSULE SR 24 HR Take 1 Cap by mouth every day. 30 Cap 3   • digoxin (LANOXIN) 125 MCG Tab Take 1 Tab by mouth every day at 6 PM. 30 Tab 3   • furosemide (LASIX) 40 MG Tab Take 1 Tab by mouth every day. 30 Tab 3   • potassium chloride SA (KDUR) 20 MEQ Tab CR Take 1 Tab by mouth every day. 30 Tab 3   • magnesium oxide (MAG-OX) 400 (241.3 Mg) MG Tab tablet Take 1 Tab by mouth 2 Times a Day. 60 Tab 3   • nystatin (MYCOSTATIN) powder Apply to moist skin areas daily. 15 g 3   • vitamin D (CHOLECALCIFEROL) 1000 UNIT Tab Take 1,000 Units by mouth every day.     • ascorbic acid (ASCORBIC ACID) 500 MG Tab Take 500 mg by mouth every day.     • cyclobenzaprine (FLEXERIL) 10 MG Tab Take 10 mg by mouth 3 times a day as needed for Mild Pain or Muscle Spasms.     • topiramate (TOPAMAX) 25 MG Tab Take 25 mg by mouth 2 times a day.     • DULoxetine (CYMBALTA) 30 MG Cap DR Particles Take 30 mg by mouth every day.       No facility-administered encounter medications on file as of 12/20/2018.      ROS     Objective:   /72 (BP Location: Left arm, Patient Position: Sitting, BP Cuff Size: Large adult)   Pulse 90   Ht 1.702 m (5' 7\")   Wt (!) 187.8 kg (414 lb)   SpO2 96%   BMI 64.84 kg/m²     Physical Exam   Constitutional: She appears well-developed and well-nourished.   Neck: No JVD present.   Cardiovascular: Normal rate and regular rhythm.    No murmur heard.  Pulmonary/Chest: Effort normal and breath sounds normal. She has no rales.   Abdominal: Soft. There is no tenderness.   Musculoskeletal: She exhibits no edema.     Lab Results   Component Value Date/Time    CHOLSTRLTOT 132 05/15/2018 11:11 PM    LDL 49 05/15/2018 11:11 PM    HDL 32 (A) 05/15/2018 11:11 PM    TRIGLYCERIDE 255 (H) 05/15/2018 11:11 PM       Lab Results   Component Value Date/Time    SODIUM 143 09/14/2018 02:00 PM    POTASSIUM 3.8 09/14/2018 02:00 PM    CHLORIDE 99 09/14/2018 02:00 PM    CO2 33 09/14/2018 02:00 PM    " GLUCOSE 138 (H) 09/14/2018 02:00 PM    BUN 24 (H) 09/14/2018 02:00 PM    CREATININE 1.04 09/14/2018 02:00 PM     Lab Results   Component Value Date/Time    ALKPHOSPHAT 69 05/14/2018 09:20 PM    ASTSGOT 33 05/14/2018 09:20 PM    ALTSGPT 67 (H) 05/14/2018 09:20 PM    TBILIRUBIN 0.8 05/14/2018 09:20 PM      Lab Results   Component Value Date/Time    BNPBTYPENAT 131 (H) 05/14/2018 09:20 PM      Echocardiography Laboratory    CONCLUSIONS  Technically difficult study.   Normal left ventricular chamber size.  Mild concentric left ventricular hypertrophy.  Grossly normal left ventricular systolic function.  Left ventricular ejection fraction is visually estimated to be 55-60%.  The valves were not well visualized.  Mild mitral regurgitation.  Moderate aortic insufficiency.  Calcified aortic valve leaflets.  No aortic stenosis.  Moderate tricuspid regurgitation.  Vena cava is not well visualized.  Right ventricular systolic pressure is estimated to be 55-60 mmHg.  Moderately dilated right ventricle.  Difficult to comment on right ventricular systolic function.      SHAQUILLE CANDELARIA  Exam Date:         05/15/2018     Assessment:     1. Persistent atrial fibrillation (HCC)     2. Moderate to severe pulmonary hypertension (HCC)     3. Morbid obesity with BMI of 60.0-69.9, adult (HCC)     4. Essential hypertension         Medical Decision Making:  Today's Assessment / Status / Plan:     Ms. Candelaria is clinically stable.  She is on oxygen 24/7.  She is not sure that she can tolerate a CPAP mask.  She is not sure she has room enough for the equipment in her house.  She was asked to talk to her PCP and see if some arrangements could be made for a nasal mask and possibly someone figuring out how to get the machine into her house.  From a cardiac standpoint we do not have much else to offer.  We could not perform a ANTONIO but I feel the chance of a ASD is fairly small.  She will follow-up in about 6 months.

## 2023-12-18 NOTE — LETTER
[facility] [address] [place] [address]  Phone: [phone]  Fax: [phone]              Miroslava Matta  1960    Encounter Date: 12/20/2018    Tyson Lawson M.D.          PROGRESS NOTE:  Chief Complaint   Patient presents with   • HTN (Controlled)       Subjective:   Miroslava Matta is a 58 y.o. female who presents today for follow-up of her right-sided enlargement and pulmonary hypertension.  Study and does have obstructive sleep apnea.  In addition, a ANTONIO was attempted but could not be performed because of hypoxemia.  Unfortunately, she lives in a small house and does not feel she has room for a CPAP machine.      Is able to do her housework and activities of daily living without getting dyspneic as long as she is on oxygen therapy.  She denies any PND, orthopnea or edema.  She has had no chest discomfort, palpitations or lightheadedness.    Past Medical History:   Diagnosis Date   • Arthritis    • Back pain    • COPD (chronic obstructive pulmonary disease) ([facility]) 5/16/2018   • Diabetes ([facility])    • Fall    • Fungal rash of torso 5/15/2018   • Hypertension    • Morbid obesity with BMI of 60.0-69.9, adult ([facility]) 5/17/2018   • New onset atrial fibrillation ([facility]) 5/15/2018   • Psychiatric problem    • Urinary incontinence      No past surgical history on file.  No family history on file.  Social History     Social History   • Marital status:      Spouse name: N/A   • Number of children: N/A   • Years of education: N/A     Occupational History   • Not on file.     Social History Main Topics   • Smoking status: Former Smoker   • Smokeless tobacco: Never Used   • Alcohol use Yes   • Drug use: No   • Sexual activity: Not on file     Other Topics Concern   • Not on file     Social History Narrative   • No narrative on file     Allergies   Allergen Reactions   • Metformin Shortness of Breath and Rash     To face   • Codeine  Needs appt   "Nausea     Outpatient Encounter Prescriptions as of 12/20/2018   Medication Sig Dispense Refill   • rivaroxaban (XARELTO) 20 MG Tab tablet Take 1 Tab by mouth with dinner. 30 Tab 3   • glyBURIDE (DIABETA) 5 MG Tab Take 1 Tab by mouth every morning with breakfast. 30 Tab 3   • DILTIAZem CD (CARDIZEM CD) 360 MG CAPSULE SR 24 HR Take 1 Cap by mouth every day. 30 Cap 3   • digoxin (LANOXIN) 125 MCG Tab Take 1 Tab by mouth every day at 6 PM. 30 Tab 3   • furosemide (LASIX) 40 MG Tab Take 1 Tab by mouth every day. 30 Tab 3   • potassium chloride SA (KDUR) 20 MEQ Tab CR Take 1 Tab by mouth every day. 30 Tab 3   • magnesium oxide (MAG-OX) 400 (241.3 Mg) MG Tab tablet Take 1 Tab by mouth 2 Times a Day. 60 Tab 3   • nystatin (MYCOSTATIN) powder Apply to moist skin areas daily. 15 g 3   • vitamin D (CHOLECALCIFEROL) 1000 UNIT Tab Take 1,000 Units by mouth every day.     • ascorbic acid (ASCORBIC ACID) 500 MG Tab Take 500 mg by mouth every day.     • cyclobenzaprine (FLEXERIL) 10 MG Tab Take 10 mg by mouth 3 times a day as needed for Mild Pain or Muscle Spasms.     • topiramate (TOPAMAX) 25 MG Tab Take 25 mg by mouth 2 times a day.     • DULoxetine (CYMBALTA) 30 MG Cap DR Particles Take 30 mg by mouth every day.       No facility-administered encounter medications on file as of 12/20/2018.      ROS     Objective:   /72 (BP Location: Left arm, Patient Position: Sitting, BP Cuff Size: Large adult)   Pulse 90   Ht 1.702 m (5' 7\")   Wt (!) 187.8 kg (414 lb)   SpO2 96%   BMI 64.84 kg/m²      Physical Exam   Constitutional: She appears well-developed and well-nourished.   Neck: No JVD present.   Cardiovascular: Normal rate and regular rhythm.    No murmur heard.  Pulmonary/Chest: Effort normal and breath sounds normal. She has no rales.   Abdominal: Soft. There is no tenderness.   Musculoskeletal: She exhibits no edema.     Lab Results   Component Value Date/Time    CHOLSTRLTOT 132 05/15/2018 11:11 PM    LDL 49 05/15/2018 " 11:11 PM    HDL 32 (A) 05/15/2018 11:11 PM    TRIGLYCERIDE 255 (H) 05/15/2018 11:11 PM       Lab Results   Component Value Date/Time    SODIUM 143 09/14/2018 02:00 PM    POTASSIUM 3.8 09/14/2018 02:00 PM    CHLORIDE 99 09/14/2018 02:00 PM    CO2 33 09/14/2018 02:00 PM    GLUCOSE 138 (H) 09/14/2018 02:00 PM    BUN 24 (H) 09/14/2018 02:00 PM    CREATININE 1.04 09/14/2018 02:00 PM     Lab Results   Component Value Date/Time    ALKPHOSPHAT 69 05/14/2018 09:20 PM    ASTSGOT 33 05/14/2018 09:20 PM    ALTSGPT 67 (H) 05/14/2018 09:20 PM    TBILIRUBIN 0.8 05/14/2018 09:20 PM      Lab Results   Component Value Date/Time    BNPBTYPENAT 131 (H) 05/14/2018 09:20 PM      Echocardiography Laboratory    CONCLUSIONS  Technically difficult study.   Normal left ventricular chamber size.  Mild concentric left ventricular hypertrophy.  Grossly normal left ventricular systolic function.  Left ventricular ejection fraction is visually estimated to be 55-60%.  The valves were not well visualized.  Mild mitral regurgitation.  Moderate aortic insufficiency.  Calcified aortic valve leaflets.  No aortic stenosis.  Moderate tricuspid regurgitation.  Vena cava is not well visualized.  Right ventricular systolic pressure is estimated to be 55-60 mmHg.  Moderately dilated right ventricle.  Difficult to comment on right ventricular systolic function.      SHAQUILLE CANDELARIA  Exam Date:         05/15/2018     Assessment:     1. Persistent atrial fibrillation (HCC)     2. Moderate to severe pulmonary hypertension (HCC)     3. Morbid obesity with BMI of 60.0-69.9, adult (HCC)     4. Essential hypertension         Medical Decision Making:  Today's Assessment / Status / Plan:     Ms. Candelaria is clinically stable.  She is on oxygen 24/7.  She is not sure that she can tolerate a CPAP mask.  She is not sure she has room enough for the equipment in her house.  She was asked to talk to her PCP and see if some arrangements could be made for a nasal mask and  possibly someone figuring out how to get the machine into her house.  From a cardiac standpoint we do not have much else to offer.  We could not perform a ANTONIO but I feel the chance of a ASD is fairly small.  She will follow-up in about 6 months.      ARIAN Garrido.TaranC.  [address] [place] [address]  VIA Facsimile: [phone]

## 2024-02-20 PROBLEM — A41.9 SEPSIS (HCC): Status: ACTIVE | Noted: 2024-02-20

## 2024-02-21 ENCOUNTER — APPOINTMENT (OUTPATIENT)
Dept: RADIOLOGY | Facility: MEDICAL CENTER | Age: 64
DRG: 871 | End: 2024-02-21
Attending: STUDENT IN AN ORGANIZED HEALTH CARE EDUCATION/TRAINING PROGRAM
Payer: MEDICARE

## 2024-02-21 ENCOUNTER — APPOINTMENT (OUTPATIENT)
Dept: CARDIOLOGY | Facility: MEDICAL CENTER | Age: 64
DRG: 871 | End: 2024-02-21
Attending: STUDENT IN AN ORGANIZED HEALTH CARE EDUCATION/TRAINING PROGRAM
Payer: MEDICARE

## 2024-02-21 ENCOUNTER — HOSPITAL ENCOUNTER (INPATIENT)
Facility: MEDICAL CENTER | Age: 64
LOS: 15 days | DRG: 871 | End: 2024-03-07
Attending: INTERNAL MEDICINE | Admitting: INTERNAL MEDICINE
Payer: MEDICARE

## 2024-02-21 DIAGNOSIS — E66.01 MORBID OBESITY WITH BMI OF 60.0-69.9, ADULT (HCC): ICD-10-CM

## 2024-02-21 DIAGNOSIS — A41.9 SEPSIS WITH ACUTE RENAL FAILURE, DUE TO UNSPECIFIED ORGANISM, UNSPECIFIED ACUTE RENAL FAILURE TYPE, UNSPECIFIED WHETHER SEPTIC SHOCK PRESENT (HCC): ICD-10-CM

## 2024-02-21 DIAGNOSIS — B36.9 FUNGAL RASH OF TORSO: ICD-10-CM

## 2024-02-21 DIAGNOSIS — D62 ACUTE ON CHRONIC BLOOD LOSS ANEMIA: ICD-10-CM

## 2024-02-21 DIAGNOSIS — A41.9 SEVERE SEPSIS (HCC): ICD-10-CM

## 2024-02-21 DIAGNOSIS — I51.7 CARDIOMEGALY: ICD-10-CM

## 2024-02-21 DIAGNOSIS — T14.8XXA CHRONIC WOUND: ICD-10-CM

## 2024-02-21 DIAGNOSIS — E11.638 TYPE 2 DIABETES MELLITUS WITH OTHER ORAL COMPLICATION, WITHOUT LONG-TERM CURRENT USE OF INSULIN (HCC): ICD-10-CM

## 2024-02-21 DIAGNOSIS — E66.2 MORBID (SEVERE) OBESITY WITH ALVEOLAR HYPOVENTILATION (HCC): ICD-10-CM

## 2024-02-21 DIAGNOSIS — I27.20 MODERATE TO SEVERE PULMONARY HYPERTENSION (HCC): ICD-10-CM

## 2024-02-21 DIAGNOSIS — R65.20 SEVERE SEPSIS (HCC): ICD-10-CM

## 2024-02-21 DIAGNOSIS — N17.9 SEPSIS WITH ACUTE RENAL FAILURE, DUE TO UNSPECIFIED ORGANISM, UNSPECIFIED ACUTE RENAL FAILURE TYPE, UNSPECIFIED WHETHER SEPTIC SHOCK PRESENT (HCC): ICD-10-CM

## 2024-02-21 DIAGNOSIS — I48.91 ATRIAL FIBRILLATION WITH RVR (HCC): ICD-10-CM

## 2024-02-21 DIAGNOSIS — I48.19 PERSISTENT ATRIAL FIBRILLATION (HCC): ICD-10-CM

## 2024-02-21 DIAGNOSIS — J96.01 ACUTE RESPIRATORY FAILURE WITH HYPOXIA (HCC): ICD-10-CM

## 2024-02-21 DIAGNOSIS — R79.89 ELEVATED TROPONIN: ICD-10-CM

## 2024-02-21 DIAGNOSIS — I10 ESSENTIAL HYPERTENSION: ICD-10-CM

## 2024-02-21 DIAGNOSIS — G47.33 OSA (OBSTRUCTIVE SLEEP APNEA): ICD-10-CM

## 2024-02-21 DIAGNOSIS — I27.20 PULMONARY HYPERTENSION (HCC): ICD-10-CM

## 2024-02-21 DIAGNOSIS — F39 MOOD DISORDER (HCC): ICD-10-CM

## 2024-02-21 DIAGNOSIS — Z71.89 ACP (ADVANCE CARE PLANNING): ICD-10-CM

## 2024-02-21 DIAGNOSIS — R65.20 SEPSIS WITH ACUTE RENAL FAILURE, DUE TO UNSPECIFIED ORGANISM, UNSPECIFIED ACUTE RENAL FAILURE TYPE, UNSPECIFIED WHETHER SEPTIC SHOCK PRESENT (HCC): ICD-10-CM

## 2024-02-21 PROBLEM — L03.90 CELLULITIS: Status: ACTIVE | Noted: 2024-02-21

## 2024-02-21 LAB
ALBUMIN SERPL BCP-MCNC: 1.5 G/DL (ref 3.2–4.9)
ALBUMIN/GLOB SERPL: 0.6 G/DL
ALP SERPL-CCNC: 92 U/L (ref 30–99)
ALT SERPL-CCNC: 9 U/L (ref 2–50)
AMMONIA PLAS-SCNC: 16 UMOL/L (ref 11–45)
AMPHET UR QL SCN: NEGATIVE
ANION GAP SERPL CALC-SCNC: 10 MMOL/L (ref 7–16)
ANISOCYTOSIS BLD QL SMEAR: ABNORMAL
APPEARANCE UR: CLEAR
AST SERPL-CCNC: 19 U/L (ref 12–45)
BARBITURATES UR QL SCN: NEGATIVE
BASOPHILS # BLD AUTO: 0.3 % (ref 0–1.8)
BASOPHILS # BLD AUTO: 0.4 % (ref 0–1.8)
BASOPHILS # BLD: 0.03 K/UL (ref 0–0.12)
BASOPHILS # BLD: 0.06 K/UL (ref 0–0.12)
BENZODIAZ UR QL SCN: NEGATIVE
BILIRUB SERPL-MCNC: 0.4 MG/DL (ref 0.1–1.5)
BILIRUB UR QL STRIP.AUTO: NEGATIVE
BUN SERPL-MCNC: 19 MG/DL (ref 8–22)
BZE UR QL SCN: NEGATIVE
CALCIUM ALBUM COR SERPL-MCNC: 7.3 MG/DL (ref 8.5–10.5)
CALCIUM SERPL-MCNC: 5.3 MG/DL (ref 8.5–10.5)
CANNABINOIDS UR QL SCN: NEGATIVE
CHLORIDE SERPL-SCNC: 115 MMOL/L (ref 96–112)
CHOLEST SERPL-MCNC: 86 MG/DL (ref 100–199)
CK SERPL-CCNC: 23 U/L (ref 0–154)
CO2 SERPL-SCNC: 17 MMOL/L (ref 20–33)
COLOR UR: YELLOW
COMMENT 1642: NORMAL
CORTIS SERPL-MCNC: 14.6 UG/DL (ref 0–23)
CREAT SERPL-MCNC: 0.47 MG/DL (ref 0.5–1.4)
CREAT UR-MCNC: 48.12 MG/DL
CRP SERPL HS-MCNC: 8.31 MG/DL (ref 0–0.75)
DIGOXIN SERPL-MCNC: 0.3 NG/ML (ref 0.8–2)
EKG IMPRESSION: NORMAL
EOSINOPHIL # BLD AUTO: 0.06 K/UL (ref 0–0.51)
EOSINOPHIL # BLD AUTO: 0.08 K/UL (ref 0–0.51)
EOSINOPHIL NFR BLD: 0.5 % (ref 0–6.9)
EOSINOPHIL NFR BLD: 0.6 % (ref 0–6.9)
ERYTHROCYTE [DISTWIDTH] IN BLOOD BY AUTOMATED COUNT: 65.2 FL (ref 35.9–50)
ERYTHROCYTE [DISTWIDTH] IN BLOOD BY AUTOMATED COUNT: 66.4 FL (ref 35.9–50)
ERYTHROCYTE [SEDIMENTATION RATE] IN BLOOD BY WESTERGREN METHOD: 97 MM/HOUR (ref 0–25)
EST. AVERAGE GLUCOSE BLD GHB EST-MCNC: 148 MG/DL
FENTANYL UR QL: NEGATIVE
FERRITIN SERPL-MCNC: 67.4 NG/ML (ref 10–291)
GFR SERPLBLD CREATININE-BSD FMLA CKD-EPI: 107 ML/MIN/1.73 M 2
GLOBULIN SER CALC-MCNC: 2.6 G/DL (ref 1.9–3.5)
GLUCOSE BLD STRIP.AUTO-MCNC: 174 MG/DL (ref 65–99)
GLUCOSE BLD STRIP.AUTO-MCNC: 198 MG/DL (ref 65–99)
GLUCOSE BLD STRIP.AUTO-MCNC: 216 MG/DL (ref 65–99)
GLUCOSE BLD STRIP.AUTO-MCNC: 218 MG/DL (ref 65–99)
GLUCOSE SERPL-MCNC: 133 MG/DL (ref 65–99)
GLUCOSE UR STRIP.AUTO-MCNC: NEGATIVE MG/DL
HBA1C MFR BLD: 6.8 % (ref 4–5.6)
HCT VFR BLD AUTO: 21.8 % (ref 37–47)
HCT VFR BLD AUTO: 29 % (ref 37–47)
HDLC SERPL-MCNC: 16 MG/DL
HGB BLD-MCNC: 6 G/DL (ref 12–16)
HGB BLD-MCNC: 8.1 G/DL (ref 12–16)
HGB RETIC QN AUTO: 22.6 PG/CELL (ref 29–35)
HIV 1+2 AB+HIV1 P24 AG SERPL QL IA: NORMAL
IMM GRANULOCYTES # BLD AUTO: 0.09 K/UL (ref 0–0.11)
IMM GRANULOCYTES # BLD AUTO: 0.09 K/UL (ref 0–0.11)
IMM GRANULOCYTES NFR BLD AUTO: 0.6 % (ref 0–0.9)
IMM GRANULOCYTES NFR BLD AUTO: 0.8 % (ref 0–0.9)
IMM RETICS NFR: 38 % (ref 2.6–16.1)
IRON SATN MFR SERPL: 9 % (ref 15–55)
IRON SERPL-MCNC: 19 UG/DL (ref 40–170)
KETONES UR STRIP.AUTO-MCNC: NEGATIVE MG/DL
LACTATE SERPL-SCNC: 1.1 MMOL/L (ref 0.5–2)
LACTATE SERPL-SCNC: 1.2 MMOL/L (ref 0.5–2)
LACTATE SERPL-SCNC: 2 MMOL/L (ref 0.5–2)
LDH SERPL L TO P-CCNC: 356 U/L (ref 107–266)
LDLC SERPL CALC-MCNC: 46 MG/DL
LEUKOCYTE ESTERASE UR QL STRIP.AUTO: NEGATIVE
LV EJECT FRACT MOD 2C 99903: 44.25
LV EJECT FRACT MOD 4C 99902: 54.14
LV EJECT FRACT MOD BP 99901: 47.44
LYMPHOCYTES # BLD AUTO: 1.28 K/UL (ref 1–4.8)
LYMPHOCYTES # BLD AUTO: 1.9 K/UL (ref 1–4.8)
LYMPHOCYTES NFR BLD: 11.8 % (ref 22–41)
LYMPHOCYTES NFR BLD: 12.9 % (ref 22–41)
MAGNESIUM SERPL-MCNC: 1.1 MG/DL (ref 1.5–2.5)
MCH RBC QN AUTO: 21.8 PG (ref 27–33)
MCH RBC QN AUTO: 21.9 PG (ref 27–33)
MCHC RBC AUTO-ENTMCNC: 27.5 G/DL (ref 32.2–35.5)
MCHC RBC AUTO-ENTMCNC: 27.9 G/DL (ref 32.2–35.5)
MCV RBC AUTO: 78.4 FL (ref 81.4–97.8)
MCV RBC AUTO: 79.3 FL (ref 81.4–97.8)
METHADONE UR QL SCN: NEGATIVE
MICRO URNS: NORMAL
MICROCYTES BLD QL SMEAR: ABNORMAL
MONOCYTES # BLD AUTO: 0.53 K/UL (ref 0–0.85)
MONOCYTES # BLD AUTO: 0.81 K/UL (ref 0–0.85)
MONOCYTES NFR BLD AUTO: 4.9 % (ref 0–13.4)
MONOCYTES NFR BLD AUTO: 5.5 % (ref 0–13.4)
MORPHOLOGY BLD-IMP: NORMAL
NEUTROPHILS # BLD AUTO: 11.78 K/UL (ref 1.82–7.42)
NEUTROPHILS # BLD AUTO: 8.88 K/UL (ref 1.82–7.42)
NEUTROPHILS NFR BLD: 80.1 % (ref 44–72)
NEUTROPHILS NFR BLD: 81.6 % (ref 44–72)
NITRITE UR QL STRIP.AUTO: NEGATIVE
NRBC # BLD AUTO: 0 K/UL
NRBC # BLD AUTO: 0.02 K/UL
NRBC BLD-RTO: 0 /100 WBC (ref 0–0.2)
NRBC BLD-RTO: 0.1 /100 WBC (ref 0–0.2)
NT-PROBNP SERPL IA-MCNC: 1131 PG/ML (ref 0–125)
OPIATES UR QL SCN: NEGATIVE
OXYCODONE UR QL SCN: POSITIVE
PCP UR QL SCN: NEGATIVE
PH UR STRIP.AUTO: 5 [PH] (ref 5–8)
PHOSPHATE SERPL-MCNC: 2.7 MG/DL (ref 2.5–4.5)
PLATELET # BLD AUTO: 392 K/UL (ref 164–446)
PLATELET # BLD AUTO: 539 K/UL (ref 164–446)
PLATELET BLD QL SMEAR: NORMAL
PMV BLD AUTO: 9.1 FL (ref 9–12.9)
PMV BLD AUTO: 9.2 FL (ref 9–12.9)
POTASSIUM SERPL-SCNC: 3.6 MMOL/L (ref 3.6–5.5)
PROCALCITONIN SERPL-MCNC: 0.17 NG/ML
PROPOXYPH UR QL SCN: NEGATIVE
PROT SERPL-MCNC: 4.1 G/DL (ref 6–8.2)
PROT UR QL STRIP: NEGATIVE MG/DL
PROT UR-MCNC: 12 MG/DL (ref 0–15)
PROT/CREAT UR: 249 MG/G (ref 10–107)
RBC # BLD AUTO: 2.75 M/UL (ref 4.2–5.4)
RBC # BLD AUTO: 3.7 M/UL (ref 4.2–5.4)
RBC BLD AUTO: PRESENT
RBC UR QL AUTO: NEGATIVE
RETICS # AUTO: 0.08 M/UL (ref 0.04–0.12)
RETICS/RBC NFR: 3.1 % (ref 0.8–2.6)
SCCMEC + MECA PNL NOSE NAA+PROBE: NEGATIVE
SODIUM SERPL-SCNC: 142 MMOL/L (ref 135–145)
SODIUM UR-SCNC: 51 MMOL/L
SP GR UR STRIP.AUTO: 1.01
T PALLIDUM AB SER QL IA: NORMAL
T3FREE SERPL-MCNC: 1.79 PG/ML (ref 2–4.4)
T4 FREE SERPL-MCNC: 1.06 NG/DL (ref 0.93–1.7)
TIBC SERPL-MCNC: 204 UG/DL (ref 250–450)
TRIGL SERPL-MCNC: 120 MG/DL (ref 0–149)
TROPONIN T SERPL-MCNC: 24 NG/L (ref 6–19)
TROPONIN T SERPL-MCNC: 26 NG/L (ref 6–19)
TROPONIN T SERPL-MCNC: 27 NG/L (ref 6–19)
TSH SERPL DL<=0.005 MIU/L-ACNC: 1.54 UIU/ML (ref 0.38–5.33)
UIBC SERPL-MCNC: 185 UG/DL (ref 110–370)
UROBILINOGEN UR STRIP.AUTO-MCNC: 0.2 MG/DL
VIT B12 SERPL-MCNC: 367 PG/ML (ref 211–911)
WBC # BLD AUTO: 10.9 K/UL (ref 4.8–10.8)
WBC # BLD AUTO: 14.7 K/UL (ref 4.8–10.8)

## 2024-02-21 PROCEDURE — 84443 ASSAY THYROID STIM HORMONE: CPT

## 2024-02-21 PROCEDURE — 302043 TAPE, HYPAFIX: Performed by: HOSPITALIST

## 2024-02-21 PROCEDURE — 84155 ASSAY OF PROTEIN SERUM: CPT

## 2024-02-21 PROCEDURE — 83605 ASSAY OF LACTIC ACID: CPT

## 2024-02-21 PROCEDURE — 82728 ASSAY OF FERRITIN: CPT

## 2024-02-21 PROCEDURE — 87150 DNA/RNA AMPLIFIED PROBE: CPT

## 2024-02-21 PROCEDURE — 94640 AIRWAY INHALATION TREATMENT: CPT

## 2024-02-21 PROCEDURE — 82962 GLUCOSE BLOOD TEST: CPT | Mod: 91

## 2024-02-21 PROCEDURE — 82550 ASSAY OF CK (CPK): CPT

## 2024-02-21 PROCEDURE — 86780 TREPONEMA PALLIDUM: CPT

## 2024-02-21 PROCEDURE — A9270 NON-COVERED ITEM OR SERVICE: HCPCS | Performed by: STUDENT IN AN ORGANIZED HEALTH CARE EDUCATION/TRAINING PROGRAM

## 2024-02-21 PROCEDURE — 86335 IMMUNFIX E-PHORSIS/URINE/CSF: CPT

## 2024-02-21 PROCEDURE — 84481 FREE ASSAY (FT-3): CPT

## 2024-02-21 PROCEDURE — 93306 TTE W/DOPPLER COMPLETE: CPT | Mod: 26 | Performed by: STUDENT IN AN ORGANIZED HEALTH CARE EDUCATION/TRAINING PROGRAM

## 2024-02-21 PROCEDURE — 84165 PROTEIN E-PHORESIS SERUM: CPT

## 2024-02-21 PROCEDURE — 84145 PROCALCITONIN (PCT): CPT

## 2024-02-21 PROCEDURE — 770000 HCHG ROOM/CARE - INTERMEDIATE ICU *

## 2024-02-21 PROCEDURE — 700111 HCHG RX REV CODE 636 W/ 250 OVERRIDE (IP): Mod: JZ | Performed by: STUDENT IN AN ORGANIZED HEALTH CARE EDUCATION/TRAINING PROGRAM

## 2024-02-21 PROCEDURE — 92610 EVALUATE SWALLOWING FUNCTION: CPT

## 2024-02-21 PROCEDURE — 700102 HCHG RX REV CODE 250 W/ 637 OVERRIDE(OP): Performed by: STUDENT IN AN ORGANIZED HEALTH CARE EDUCATION/TRAINING PROGRAM

## 2024-02-21 PROCEDURE — 84100 ASSAY OF PHOSPHORUS: CPT

## 2024-02-21 PROCEDURE — 87641 MR-STAPH DNA AMP PROBE: CPT

## 2024-02-21 PROCEDURE — 85025 COMPLETE CBC W/AUTO DIFF WBC: CPT

## 2024-02-21 PROCEDURE — 84425 ASSAY OF VITAMIN B-1: CPT

## 2024-02-21 PROCEDURE — G0475 HIV COMBINATION ASSAY: HCPCS

## 2024-02-21 PROCEDURE — 80061 LIPID PANEL: CPT

## 2024-02-21 PROCEDURE — 93010 ELECTROCARDIOGRAM REPORT: CPT | Performed by: STUDENT IN AN ORGANIZED HEALTH CARE EDUCATION/TRAINING PROGRAM

## 2024-02-21 PROCEDURE — 93922 UPR/L XTREMITY ART 2 LEVELS: CPT

## 2024-02-21 PROCEDURE — 83521 IG LIGHT CHAINS FREE EACH: CPT | Mod: 91

## 2024-02-21 PROCEDURE — 83036 HEMOGLOBIN GLYCOSYLATED A1C: CPT

## 2024-02-21 PROCEDURE — 83735 ASSAY OF MAGNESIUM: CPT

## 2024-02-21 PROCEDURE — 85652 RBC SED RATE AUTOMATED: CPT

## 2024-02-21 PROCEDURE — 85046 RETICYTE/HGB CONCENTRATE: CPT

## 2024-02-21 PROCEDURE — 86140 C-REACTIVE PROTEIN: CPT

## 2024-02-21 PROCEDURE — 80162 ASSAY OF DIGOXIN TOTAL: CPT

## 2024-02-21 PROCEDURE — 80307 DRUG TEST PRSMV CHEM ANLYZR: CPT

## 2024-02-21 PROCEDURE — 83880 ASSAY OF NATRIURETIC PEPTIDE: CPT

## 2024-02-21 PROCEDURE — 84156 ASSAY OF PROTEIN URINE: CPT

## 2024-02-21 PROCEDURE — 82570 ASSAY OF URINE CREATININE: CPT

## 2024-02-21 PROCEDURE — 84484 ASSAY OF TROPONIN QUANT: CPT | Mod: 91

## 2024-02-21 PROCEDURE — 83550 IRON BINDING TEST: CPT

## 2024-02-21 PROCEDURE — 87086 URINE CULTURE/COLONY COUNT: CPT

## 2024-02-21 PROCEDURE — 83540 ASSAY OF IRON: CPT

## 2024-02-21 PROCEDURE — 700105 HCHG RX REV CODE 258: Performed by: STUDENT IN AN ORGANIZED HEALTH CARE EDUCATION/TRAINING PROGRAM

## 2024-02-21 PROCEDURE — 81003 URINALYSIS AUTO W/O SCOPE: CPT

## 2024-02-21 PROCEDURE — 80053 COMPREHEN METABOLIC PANEL: CPT

## 2024-02-21 PROCEDURE — 94660 CPAP INITIATION&MGMT: CPT

## 2024-02-21 PROCEDURE — 93005 ELECTROCARDIOGRAM TRACING: CPT | Performed by: STUDENT IN AN ORGANIZED HEALTH CARE EDUCATION/TRAINING PROGRAM

## 2024-02-21 PROCEDURE — 82607 VITAMIN B-12: CPT

## 2024-02-21 PROCEDURE — 87077 CULTURE AEROBIC IDENTIFY: CPT | Mod: 91

## 2024-02-21 PROCEDURE — 83615 LACTATE (LD) (LDH) ENZYME: CPT

## 2024-02-21 PROCEDURE — 84300 ASSAY OF URINE SODIUM: CPT

## 2024-02-21 PROCEDURE — 82140 ASSAY OF AMMONIA: CPT

## 2024-02-21 PROCEDURE — 71045 X-RAY EXAM CHEST 1 VIEW: CPT

## 2024-02-21 PROCEDURE — 84439 ASSAY OF FREE THYROXINE: CPT

## 2024-02-21 PROCEDURE — 700101 HCHG RX REV CODE 250: Performed by: STUDENT IN AN ORGANIZED HEALTH CARE EDUCATION/TRAINING PROGRAM

## 2024-02-21 PROCEDURE — 82533 TOTAL CORTISOL: CPT

## 2024-02-21 PROCEDURE — 93306 TTE W/DOPPLER COMPLETE: CPT

## 2024-02-21 PROCEDURE — 87040 BLOOD CULTURE FOR BACTERIA: CPT

## 2024-02-21 PROCEDURE — 86334 IMMUNOFIX E-PHORESIS SERUM: CPT

## 2024-02-21 PROCEDURE — 99291 CRITICAL CARE FIRST HOUR: CPT | Performed by: STUDENT IN AN ORGANIZED HEALTH CARE EDUCATION/TRAINING PROGRAM

## 2024-02-21 RX ORDER — PROCHLORPERAZINE EDISYLATE 5 MG/ML
5-10 INJECTION INTRAMUSCULAR; INTRAVENOUS EVERY 4 HOURS PRN
Status: DISCONTINUED | OUTPATIENT
Start: 2024-02-21 | End: 2024-03-07 | Stop reason: HOSPADM

## 2024-02-21 RX ORDER — DOXYCYCLINE 100 MG/1
100 TABLET ORAL EVERY 12 HOURS
Status: DISCONTINUED | OUTPATIENT
Start: 2024-02-21 | End: 2024-02-21

## 2024-02-21 RX ORDER — CHOLECALCIFEROL (VITAMIN D3) 125 MCG
1000 CAPSULE ORAL DAILY
Status: DISCONTINUED | OUTPATIENT
Start: 2024-02-21 | End: 2024-03-07 | Stop reason: HOSPADM

## 2024-02-21 RX ORDER — SODIUM HYPOCHLORITE 1.25 MG/ML
SOLUTION TOPICAL 2 TIMES DAILY
Status: DISCONTINUED | OUTPATIENT
Start: 2024-02-21 | End: 2024-03-07 | Stop reason: HOSPADM

## 2024-02-21 RX ORDER — IPRATROPIUM BROMIDE AND ALBUTEROL SULFATE 2.5; .5 MG/3ML; MG/3ML
3 SOLUTION RESPIRATORY (INHALATION)
Status: DISCONTINUED | OUTPATIENT
Start: 2024-02-21 | End: 2024-02-21

## 2024-02-21 RX ORDER — DULOXETIN HYDROCHLORIDE 30 MG/1
30 CAPSULE, DELAYED RELEASE ORAL DAILY
Status: DISCONTINUED | OUTPATIENT
Start: 2024-02-21 | End: 2024-03-07 | Stop reason: HOSPADM

## 2024-02-21 RX ORDER — POTASSIUM CHLORIDE 20 MEQ/1
60 TABLET, EXTENDED RELEASE ORAL ONCE
Status: COMPLETED | OUTPATIENT
Start: 2024-02-21 | End: 2024-02-21

## 2024-02-21 RX ORDER — OXYCODONE HYDROCHLORIDE 5 MG/1
5 TABLET ORAL
Status: DISCONTINUED | OUTPATIENT
Start: 2024-02-21 | End: 2024-02-25

## 2024-02-21 RX ORDER — DIGOXIN 125 MCG
125 TABLET ORAL DAILY
Status: DISCONTINUED | OUTPATIENT
Start: 2024-02-21 | End: 2024-03-07 | Stop reason: HOSPADM

## 2024-02-21 RX ORDER — ONDANSETRON 2 MG/ML
4 INJECTION INTRAMUSCULAR; INTRAVENOUS EVERY 4 HOURS PRN
Status: DISCONTINUED | OUTPATIENT
Start: 2024-02-21 | End: 2024-03-07 | Stop reason: HOSPADM

## 2024-02-21 RX ORDER — FOLIC ACID 1 MG/1
1 TABLET ORAL 2 TIMES DAILY
Status: DISCONTINUED | OUTPATIENT
Start: 2024-02-21 | End: 2024-03-07 | Stop reason: HOSPADM

## 2024-02-21 RX ORDER — IPRATROPIUM BROMIDE AND ALBUTEROL SULFATE 2.5; .5 MG/3ML; MG/3ML
3 SOLUTION RESPIRATORY (INHALATION)
Status: DISCONTINUED | OUTPATIENT
Start: 2024-02-21 | End: 2024-03-07 | Stop reason: HOSPADM

## 2024-02-21 RX ORDER — ASCORBIC ACID 500 MG
500 TABLET ORAL DAILY
Status: DISCONTINUED | OUTPATIENT
Start: 2024-02-21 | End: 2024-03-07 | Stop reason: HOSPADM

## 2024-02-21 RX ORDER — NITROGLYCERIN 0.4 MG/1
0.4 TABLET SUBLINGUAL
Status: DISCONTINUED | OUTPATIENT
Start: 2024-02-21 | End: 2024-03-07 | Stop reason: HOSPADM

## 2024-02-21 RX ORDER — ACETAMINOPHEN 325 MG/1
650 TABLET ORAL EVERY 6 HOURS PRN
Status: DISCONTINUED | OUTPATIENT
Start: 2024-02-26 | End: 2024-03-07 | Stop reason: HOSPADM

## 2024-02-21 RX ORDER — DILTIAZEM HYDROCHLORIDE 60 MG/1
60 TABLET, FILM COATED ORAL EVERY 6 HOURS
Status: DISCONTINUED | OUTPATIENT
Start: 2024-02-21 | End: 2024-02-21

## 2024-02-21 RX ORDER — OXYCODONE HYDROCHLORIDE 10 MG/1
10 TABLET ORAL
Status: DISCONTINUED | OUTPATIENT
Start: 2024-02-21 | End: 2024-02-22

## 2024-02-21 RX ORDER — VITAMIN B COMPLEX
1000 TABLET ORAL DAILY
Status: DISCONTINUED | OUTPATIENT
Start: 2024-02-21 | End: 2024-03-07 | Stop reason: HOSPADM

## 2024-02-21 RX ORDER — LABETALOL HYDROCHLORIDE 5 MG/ML
10 INJECTION, SOLUTION INTRAVENOUS EVERY 4 HOURS PRN
Status: DISCONTINUED | OUTPATIENT
Start: 2024-02-21 | End: 2024-03-07 | Stop reason: HOSPADM

## 2024-02-21 RX ORDER — LANOLIN ALCOHOL/MO/W.PET/CERES
400 CREAM (GRAM) TOPICAL 2 TIMES DAILY
Status: DISCONTINUED | OUTPATIENT
Start: 2024-02-21 | End: 2024-03-07 | Stop reason: HOSPADM

## 2024-02-21 RX ORDER — PROMETHAZINE HYDROCHLORIDE 25 MG/1
12.5-25 TABLET ORAL EVERY 4 HOURS PRN
Status: DISCONTINUED | OUTPATIENT
Start: 2024-02-21 | End: 2024-03-07 | Stop reason: HOSPADM

## 2024-02-21 RX ORDER — PROMETHAZINE HYDROCHLORIDE 12.5 MG/1
12.5-25 SUPPOSITORY RECTAL EVERY 4 HOURS PRN
Status: DISCONTINUED | OUTPATIENT
Start: 2024-02-21 | End: 2024-03-07 | Stop reason: HOSPADM

## 2024-02-21 RX ORDER — FUROSEMIDE 10 MG/ML
40 INJECTION INTRAMUSCULAR; INTRAVENOUS 3 TIMES DAILY
Status: DISCONTINUED | OUTPATIENT
Start: 2024-02-21 | End: 2024-02-22

## 2024-02-21 RX ORDER — GUAIFENESIN/DEXTROMETHORPHAN 100-10MG/5
10 SYRUP ORAL EVERY 6 HOURS PRN
Status: DISCONTINUED | OUTPATIENT
Start: 2024-02-21 | End: 2024-02-21

## 2024-02-21 RX ORDER — SODIUM CHLORIDE 9 MG/ML
250 INJECTION, SOLUTION INTRAVENOUS ONCE
Status: COMPLETED | OUTPATIENT
Start: 2024-02-21 | End: 2024-02-21

## 2024-02-21 RX ORDER — ACETAMINOPHEN 325 MG/1
650 TABLET ORAL EVERY 6 HOURS
Status: COMPLETED | OUTPATIENT
Start: 2024-02-21 | End: 2024-02-25

## 2024-02-21 RX ORDER — ACETAMINOPHEN 325 MG/1
650 TABLET ORAL EVERY 6 HOURS PRN
Status: DISCONTINUED | OUTPATIENT
Start: 2024-02-21 | End: 2024-02-21

## 2024-02-21 RX ORDER — ATORVASTATIN CALCIUM 40 MG/1
40 TABLET, FILM COATED ORAL EVERY EVENING
Status: DISCONTINUED | OUTPATIENT
Start: 2024-02-21 | End: 2024-03-07 | Stop reason: HOSPADM

## 2024-02-21 RX ORDER — LINEZOLID 2 MG/ML
600 INJECTION, SOLUTION INTRAVENOUS EVERY 12 HOURS
Status: DISCONTINUED | OUTPATIENT
Start: 2024-02-21 | End: 2024-02-22

## 2024-02-21 RX ORDER — MORPHINE SULFATE 4 MG/ML
2-4 INJECTION INTRAVENOUS
Status: DISCONTINUED | OUTPATIENT
Start: 2024-02-21 | End: 2024-02-22

## 2024-02-21 RX ORDER — NYSTATIN 100000 [USP'U]/G
POWDER TOPICAL 3 TIMES DAILY
Status: COMPLETED | OUTPATIENT
Start: 2024-02-21 | End: 2024-02-23

## 2024-02-21 RX ORDER — ONDANSETRON 4 MG/1
4 TABLET, ORALLY DISINTEGRATING ORAL EVERY 4 HOURS PRN
Status: DISCONTINUED | OUTPATIENT
Start: 2024-02-21 | End: 2024-03-07 | Stop reason: HOSPADM

## 2024-02-21 RX ORDER — HYDROMORPHONE HYDROCHLORIDE 1 MG/ML
0.5 INJECTION, SOLUTION INTRAMUSCULAR; INTRAVENOUS; SUBCUTANEOUS
Status: DISCONTINUED | OUTPATIENT
Start: 2024-02-21 | End: 2024-02-22

## 2024-02-21 RX ORDER — DEXTROSE MONOHYDRATE 25 G/50ML
25 INJECTION, SOLUTION INTRAVENOUS
Status: DISCONTINUED | OUTPATIENT
Start: 2024-02-21 | End: 2024-03-07 | Stop reason: HOSPADM

## 2024-02-21 RX ORDER — METOPROLOL TARTRATE 1 MG/ML
5 INJECTION, SOLUTION INTRAVENOUS
Status: DISCONTINUED | OUTPATIENT
Start: 2024-02-21 | End: 2024-03-07 | Stop reason: HOSPADM

## 2024-02-21 RX ORDER — SODIUM CHLORIDE 9 MG/ML
250 INJECTION, SOLUTION INTRAVENOUS ONCE
Status: DISCONTINUED | OUTPATIENT
Start: 2024-02-21 | End: 2024-02-21

## 2024-02-21 RX ORDER — TOPIRAMATE 25 MG/1
25 TABLET ORAL 2 TIMES DAILY
Status: DISCONTINUED | OUTPATIENT
Start: 2024-02-21 | End: 2024-03-07 | Stop reason: HOSPADM

## 2024-02-21 RX ORDER — SODIUM CHLORIDE, SODIUM LACTATE, POTASSIUM CHLORIDE, AND CALCIUM CHLORIDE .6; .31; .03; .02 G/100ML; G/100ML; G/100ML; G/100ML
500 INJECTION, SOLUTION INTRAVENOUS
Status: DISCONTINUED | OUTPATIENT
Start: 2024-02-21 | End: 2024-02-22

## 2024-02-21 RX ORDER — CALCIUM GLUCONATE 20 MG/ML
2 INJECTION, SOLUTION INTRAVENOUS ONCE
Status: COMPLETED | OUTPATIENT
Start: 2024-02-21 | End: 2024-02-21

## 2024-02-21 RX ORDER — CYCLOBENZAPRINE HCL 10 MG
10 TABLET ORAL 3 TIMES DAILY PRN
Status: DISCONTINUED | OUTPATIENT
Start: 2024-02-21 | End: 2024-03-07 | Stop reason: HOSPADM

## 2024-02-21 RX ORDER — FERROUS GLUCONATE 324(38)MG
324 TABLET ORAL 2 TIMES DAILY WITH MEALS
Status: DISCONTINUED | OUTPATIENT
Start: 2024-02-21 | End: 2024-02-24

## 2024-02-21 RX ADMIN — PROCHLORPERAZINE EDISYLATE 10 MG: 5 INJECTION, SOLUTION INTRAMUSCULAR; INTRAVENOUS at 06:12

## 2024-02-21 RX ADMIN — DULOXETINE HYDROCHLORIDE 30 MG: 30 CAPSULE, DELAYED RELEASE ORAL at 06:10

## 2024-02-21 RX ADMIN — OXYCODONE HYDROCHLORIDE AND ACETAMINOPHEN 500 MG: 500 TABLET ORAL at 06:16

## 2024-02-21 RX ADMIN — IPRATROPIUM BROMIDE 0.5 MG: 0.5 SOLUTION RESPIRATORY (INHALATION) at 05:03

## 2024-02-21 RX ADMIN — POTASSIUM CHLORIDE 60 MEQ: 1500 TABLET, EXTENDED RELEASE ORAL at 06:10

## 2024-02-21 RX ADMIN — FOLIC ACID 1 MG: 1 TABLET ORAL at 06:11

## 2024-02-21 RX ADMIN — Medication 400 MG: at 17:45

## 2024-02-21 RX ADMIN — FUROSEMIDE 40 MG: 10 INJECTION INTRAMUSCULAR; INTRAVENOUS at 14:49

## 2024-02-21 RX ADMIN — ATORVASTATIN CALCIUM 40 MG: 40 TABLET, FILM COATED ORAL at 17:45

## 2024-02-21 RX ADMIN — DILTIAZEM HYDROCHLORIDE 30 MG: 30 TABLET, FILM COATED ORAL at 12:20

## 2024-02-21 RX ADMIN — Medication 1000 UNITS: at 06:12

## 2024-02-21 RX ADMIN — INSULIN HUMAN 4 UNITS: 100 INJECTION, SOLUTION PARENTERAL at 08:05

## 2024-02-21 RX ADMIN — AMPICILLIN AND SULBACTAM 3 G: 1; 2 INJECTION, POWDER, FOR SOLUTION INTRAMUSCULAR; INTRAVENOUS at 07:53

## 2024-02-21 RX ADMIN — FERROUS GLUCONATE 324 MG: 324 TABLET ORAL at 17:44

## 2024-02-21 RX ADMIN — LINEZOLID 600 MG: 600 INJECTION, SOLUTION INTRAVENOUS at 17:49

## 2024-02-21 RX ADMIN — IPRATROPIUM BROMIDE 0.5 MG: 0.5 SOLUTION RESPIRATORY (INHALATION) at 16:01

## 2024-02-21 RX ADMIN — TOPIRAMATE 25 MG: 25 TABLET, FILM COATED ORAL at 06:16

## 2024-02-21 RX ADMIN — ACETAMINOPHEN 650 MG: 325 TABLET, FILM COATED ORAL at 01:45

## 2024-02-21 RX ADMIN — CYANOCOBALAMIN TAB 500 MCG 1000 MCG: 500 TAB at 06:11

## 2024-02-21 RX ADMIN — ACETAMINOPHEN 650 MG: 325 TABLET, FILM COATED ORAL at 17:44

## 2024-02-21 RX ADMIN — INSULIN HUMAN 4 UNITS: 100 INJECTION, SOLUTION PARENTERAL at 12:26

## 2024-02-21 RX ADMIN — DILTIAZEM HYDROCHLORIDE 30 MG: 30 TABLET, FILM COATED ORAL at 06:21

## 2024-02-21 RX ADMIN — IPRATROPIUM BROMIDE 0.5 MG: 0.5 SOLUTION RESPIRATORY (INHALATION) at 08:42

## 2024-02-21 RX ADMIN — NYSTATIN: 100000 POWDER TOPICAL at 18:00

## 2024-02-21 RX ADMIN — FOLIC ACID 1 MG: 1 TABLET ORAL at 17:44

## 2024-02-21 RX ADMIN — FERROUS GLUCONATE 324 MG: 324 TABLET ORAL at 07:53

## 2024-02-21 RX ADMIN — Medication 400 MG: at 06:11

## 2024-02-21 RX ADMIN — AMPICILLIN AND SULBACTAM 3 G: 1; 2 INJECTION, POWDER, FOR SOLUTION INTRAMUSCULAR; INTRAVENOUS at 20:49

## 2024-02-21 RX ADMIN — NYSTATIN: 100000 POWDER TOPICAL at 06:13

## 2024-02-21 RX ADMIN — LINEZOLID 600 MG: 600 INJECTION, SOLUTION INTRAVENOUS at 06:13

## 2024-02-21 RX ADMIN — AMPICILLIN AND SULBACTAM 3 G: 1; 2 INJECTION, POWDER, FOR SOLUTION INTRAMUSCULAR; INTRAVENOUS at 02:26

## 2024-02-21 RX ADMIN — ACETAMINOPHEN 650 MG: 325 TABLET, FILM COATED ORAL at 06:19

## 2024-02-21 RX ADMIN — NYSTATIN: 100000 POWDER TOPICAL at 12:28

## 2024-02-21 RX ADMIN — FUROSEMIDE 40 MG: 10 INJECTION INTRAMUSCULAR; INTRAVENOUS at 20:48

## 2024-02-21 RX ADMIN — INSULIN HUMAN 3 UNITS: 100 INJECTION, SOLUTION PARENTERAL at 17:53

## 2024-02-21 RX ADMIN — CALCIUM GLUCONATE 2 G: 20 INJECTION, SOLUTION INTRAVENOUS at 06:03

## 2024-02-21 RX ADMIN — DILTIAZEM HYDROCHLORIDE 30 MG: 30 TABLET, FILM COATED ORAL at 17:46

## 2024-02-21 RX ADMIN — FUROSEMIDE 40 MG: 10 INJECTION INTRAMUSCULAR; INTRAVENOUS at 09:57

## 2024-02-21 RX ADMIN — RIVAROXABAN 20 MG: 20 TABLET, FILM COATED ORAL at 17:45

## 2024-02-21 RX ADMIN — OXYCODONE HYDROCHLORIDE 10 MG: 10 TABLET ORAL at 01:44

## 2024-02-21 RX ADMIN — INSULIN HUMAN 3 UNITS: 100 INJECTION, SOLUTION PARENTERAL at 20:52

## 2024-02-21 RX ADMIN — OXYCODONE HYDROCHLORIDE 10 MG: 10 TABLET ORAL at 15:10

## 2024-02-21 RX ADMIN — SODIUM CHLORIDE 250 ML: 9 INJECTION, SOLUTION INTRAVENOUS at 01:54

## 2024-02-21 RX ADMIN — ACETAMINOPHEN 650 MG: 325 TABLET, FILM COATED ORAL at 12:19

## 2024-02-21 RX ADMIN — AMPICILLIN AND SULBACTAM 3 G: 1; 2 INJECTION, POWDER, FOR SOLUTION INTRAMUSCULAR; INTRAVENOUS at 14:50

## 2024-02-21 RX ADMIN — DIGOXIN 125 MCG: 0.12 TABLET ORAL at 17:44

## 2024-02-21 RX ADMIN — MAGNESIUM SULFATE HEPTAHYDRATE 5 G: 500 INJECTION, SOLUTION INTRAMUSCULAR; INTRAVENOUS at 06:10

## 2024-02-21 RX ADMIN — ONDANSETRON 4 MG: 2 INJECTION INTRAMUSCULAR; INTRAVENOUS at 05:13

## 2024-02-21 RX ADMIN — TOPIRAMATE 25 MG: 25 TABLET, FILM COATED ORAL at 17:47

## 2024-02-21 RX ADMIN — DILTIAZEM HYDROCHLORIDE 60 MG: 60 TABLET ORAL at 01:49

## 2024-02-21 ASSESSMENT — ENCOUNTER SYMPTOMS
SPUTUM PRODUCTION: 0
WEAKNESS: 1
PALPITATIONS: 1
BRUISES/BLEEDS EASILY: 0
BLURRED VISION: 0
FEVER: 0
CHILLS: 1
NAUSEA: 1
ABDOMINAL PAIN: 0
WHEEZING: 1
SHORTNESS OF BREATH: 1
DIZZINESS: 0
DEPRESSION: 0
DOUBLE VISION: 0
HEADACHES: 0
COUGH: 1
HEARTBURN: 1
MYALGIAS: 1
VOMITING: 0
ORTHOPNEA: 1

## 2024-02-21 ASSESSMENT — PAIN DESCRIPTION - PAIN TYPE
TYPE: CHRONIC PAIN

## 2024-02-21 ASSESSMENT — PATIENT HEALTH QUESTIONNAIRE - PHQ9
SUM OF ALL RESPONSES TO PHQ9 QUESTIONS 1 AND 2: 0
1. LITTLE INTEREST OR PLEASURE IN DOING THINGS: NOT AT ALL
2. FEELING DOWN, DEPRESSED, IRRITABLE, OR HOPELESS: NOT AT ALL

## 2024-02-21 ASSESSMENT — LIFESTYLE VARIABLES: SUBSTANCE_ABUSE: 0

## 2024-02-21 ASSESSMENT — COPD QUESTIONNAIRES
HAVE YOU SMOKED AT LEAST 100 CIGARETTES IN YOUR ENTIRE LIFE: NO/DON'T KNOW
COPD SCREENING SCORE: 5
DO YOU EVER COUGH UP ANY MUCUS OR PHLEGM?: YES, A FEW DAYS A WEEK OR MONTH
DURING THE PAST 4 WEEKS HOW MUCH DID YOU FEEL SHORT OF BREATH: SOME OF THE TIME

## 2024-02-21 ASSESSMENT — PULMONARY FUNCTION TESTS: EPAP_CMH2O: 8

## 2024-02-21 NOTE — H&P
Hospital Medicine History & Physical Note    Date of Service  2/21/2024    Primary Care Physician  Michael Melvin P.A.-C.    Consultants  ICU (Dr. Wheeler) - to [facility]    Code Status  Full Code    Chief Complaint  No chief complaint on file.  Failure to thrive, fall, weakness    History of Presenting Illness  Miroslava Matta is a 63 y.o. morbidly obese female with history of chronic A-fib on Xarelto, CHF, diabetes, hyperlipidemia, hypertension, COPD dependent on 4 L, RUTH/OHS noncompliant with CPAP, mood disorder who presented 2/21/2024 as a direct transfer from [facility] for higher level care.    It was reported that patient's son called for a welfare check.  She apparently was assisted by home health RN, but reported to have quit last Friday 2/16.  Apparently patient was found sitting in the chair soiled in stool and urine.  Patient reported she has had a fall since 1/25/2024.  She admits to having difficulty ambulating due to her weight and obesity.  She stated her home health RN is no longer able to care for her due to her large body habitus, and quit home health.  She has not had home health assistance since 2/16. Noted to have extensive wound on the back, buttock and legs.  Per transfer paperwork, patient reported to have had atrial fibrillation with RVR with rate in 150s, improved to 130s with 1 L IVF bolus and given Cardizem 30 mg oral one-time.    Workup from [facility] included:  CBC: WBC 13.5, hemoglobin 9.7, platelet 689  CMP: Glucose 211, BUN 27, creatinine 1.0, sodium 135, potassium 4.6, chloride 100, bicarb 26, anion gap 9, calcium 9.2, total protein 7.4, albumin 3.4, total bilirubin 1.0, AST 18, ALT 17, alkaline phosphatase 169    Lactic acid 5.4 --> 1.8  Troponin I undetectable  proBNP 2360    CXR: Cardiomegaly with probable CHF    Patient received treatment with ceftriaxone 1 g IV, Cardizem 30 mg oral, NS to 250cc bolus x 3, Zofran 4 mg IV.    Due to concern of severe sepsis, case was  discussed with [facility] ICU attending.  ICU recommended IMCU service transfer.  Patient was seen by me at [facility] arrival, admitted to medicine service for further evaluation treatment.    I discussed the plan of care with patient, bedside RN, and pharmacy.    Review of Systems  Review of Systems   Constitutional:  Positive for chills and malaise/fatigue. Negative for fever.   HENT:  Negative for hearing loss and tinnitus.    Eyes:  Negative for blurred vision and double vision.   Respiratory:  Positive for cough, shortness of breath and wheezing. Negative for sputum production.    Cardiovascular:  Positive for palpitations, orthopnea and leg swelling. Negative for chest pain.   Gastrointestinal:  Positive for heartburn and nausea. Negative for abdominal pain and vomiting.   Genitourinary:  Negative for dysuria and frequency.   Musculoskeletal:  Positive for joint pain and myalgias.        Leg and buttock wounds   Skin:  Negative for itching and rash.   Neurological:  Positive for weakness. Negative for dizziness and headaches.   Endo/Heme/Allergies:  Negative for environmental allergies. Does not bruise/bleed easily.   Psychiatric/Behavioral:  Negative for depression and substance abuse.    All other systems reviewed and are negative.      Past Medical History   has a past medical history of Arthritis, Back pain, COPD (chronic obstructive pulmonary disease) ([facility]) (5/16/2018), Diabetes ([facility]), Fall, Fungal rash of torso (5/15/2018), Hypertension, Morbid obesity with BMI of 60.0-69.9, adult ([facility]) (5/17/2018), New onset atrial fibrillation ([facility]) (5/15/2018), Psychiatric problem, and Urinary incontinence.    Surgical History   has no past surgical history on file.     Family History  family history is not on file.   Family history reviewed with patient. There is no family history that is pertinent to the chief complaint.     Social History   reports that she has quit smoking. She has never used smokeless tobacco. She  reports current alcohol use. She reports that she does not use drugs.    Allergies  Allergies   Allergen Reactions    Metformin Shortness of Breath and Rash     To face    Codeine Nausea       Medications  Prior to Admission Medications   Prescriptions Last Dose Informant Patient Reported? Taking?   DILTIAZem CD (CARDIZEM CD) 360 MG CAPSULE SR 24 HR   No No   Sig: Take 1 Cap by mouth every day.   DULoxetine (CYMBALTA) 30 MG Cap DR Particles  Patient Yes No   Sig: Take 30 mg by mouth every day.   ascorbic acid (ASCORBIC ACID) 500 MG Tab   Yes No   Sig: Take 500 mg by mouth every day.   cyclobenzaprine (FLEXERIL) 10 MG Tab  Patient Yes No   Sig: Take 10 mg by mouth 3 times a day as needed for Mild Pain or Muscle Spasms.   digoxin (LANOXIN) 125 MCG Tab   No No   Sig: Take 1 Tab by mouth every day at 6 PM.   furosemide (LASIX) 40 MG Tab   No No   Sig: Take 1 Tab by mouth every day.   glyBURIDE (DIABETA) 5 MG Tab   No No   Sig: Take 1 Tab by mouth every morning with breakfast.   magnesium oxide (MAG-OX) 400 (241.3 Mg) MG Tab tablet   No No   Sig: Take 1 Tab by mouth 2 Times a Day.   nystatin (MYCOSTATIN) powder   No No   Sig: Apply to moist skin areas daily.   potassium chloride SA (KDUR) 20 MEQ Tab CR   No No   Sig: Take 1 Tab by mouth every day.   rivaroxaban (XARELTO) 20 MG Tab tablet   No No   Sig: Take 1 Tab by mouth with dinner.   topiramate (TOPAMAX) 25 MG Tab  Patient Yes No   Sig: Take 25 mg by mouth 2 times a day.   vitamin D (CHOLECALCIFEROL) 1000 UNIT Tab   Yes No   Sig: Take 1,000 Units by mouth every day.      Facility-Administered Medications: None       Physical Exam  Temp:  [36.3 °C (97.4 °F)] 36.3 °C (97.4 °F)  Pulse:  [117-135] 117  Resp:  [22-26] 26  BP: (117-169)/(55-79) 117/57  SpO2:  [82 %-100 %] 94 %                          Physical Exam  Vitals and nursing note reviewed.   Constitutional:       General: She is in acute distress.      Appearance: She is ill-appearing.   HENT:      Head:  Normocephalic and atraumatic.      Mouth/Throat:      Mouth: Mucous membranes are moist.      Pharynx: Oropharynx is clear.   Eyes:      General: No scleral icterus.     Extraocular Movements: Extraocular movements intact.   Cardiovascular:      Rate and Rhythm: Tachycardia present. Rhythm irregular.      Pulses: Normal pulses.      Heart sounds:      No friction rub.   Pulmonary:      Effort: No respiratory distress.      Breath sounds: No stridor. Wheezing and rales present.   Chest:      Chest wall: No tenderness.   Abdominal:      General: There is distension.      Tenderness: There is no abdominal tenderness. There is no guarding or rebound.   Musculoskeletal:      Cervical back: Neck supple. No tenderness.      Right lower leg: Edema present.      Left lower leg: Edema present.      Comments: Notable erythematous, mild discharge wounds and posterior aspect of legs   Skin:     General: Skin is warm and dry.      Capillary Refill: Capillary refill takes less than 2 seconds.   Neurological:      Mental Status: She is alert.   Psychiatric:         Mood and Affect: Mood normal.         Laboratory:  Recent Labs     02/21/24  0220 02/21/24  0420   WBC 10.9* 14.7*   RBC 2.75* 3.70*   HEMOGLOBIN 6.0* 8.1*   HEMATOCRIT 21.8* 29.0*   MCV 79.3* 78.4*   MCH 21.8* 21.9*   MCHC 27.5* 27.9*   RDW 66.4* 65.2*   PLATELETCT 392 539*   MPV 9.2 9.1     Recent Labs     02/21/24  0211   SODIUM 142   POTASSIUM 3.6   CHLORIDE 115*   CO2 17*   GLUCOSE 133*   BUN 19   CREATININE 0.47*   CALCIUM 5.3*     Recent Labs     02/21/24  0211   ALTSGPT 9   ASTSGOT 19   ALKPHOSPHAT 92   TBILIRUBIN 0.4   GLUCOSE 133*         Recent Labs     02/21/24  0211   NTPROBNP 1131*     Recent Labs     02/21/24  0211   TRIGLYCERIDE 120   HDL 16*   LDL 46     Recent Labs     02/21/24  0211   TROPONINT 24*       Imaging:  DX-CHEST-LIMITED (1 VIEW)   Final Result      1.  Cardiomegaly with mild pulmonary vascular congestion.      EC-ECHOCARDIOGRAM COMPLETE W/O  CONT    (Results Pending)   US-EXTREMITY ARTERY LOWER BILAT W/RENU (COMBO)    (Results Pending)               X-Ray:  I have personally reviewed the images and compared with prior images.  EKG:  I have personally reviewed the images and compared with prior images.    Assessment/Plan:  Justification for Admission Status  I anticipate this patient will require at least 2 midnights hospitalization, therefore appropriate for inpatient status.      * Severe sepsis (HCC)- (present on admission)  Assessment & Plan  This is Sepsis Present on admission  SIRS criteria identified on my evaluation include: Tachycardia, with heart rate greater than 90 BPM, Leukocytosis, with WBC greater than 12,000, and Bandemia, greater than 10% bands  Clinical indicators of end organ dysfunction include Lactic Acid greater than 2 and Toxic Metabolic Encephalopathy  Source is cellulitis leg, buttock  Sepsis protocol initiated  Crystalloid Fluid Administration: Fluid resuscitation ordered per standard protocol - 30 mL/kg per current or ideal body weight  IV antibiotics as appropriate for source of sepsis  Reassessment: I have reassessed the patient's hemodynamic status    Cellulitis of legs, buttock  WBC: 13.5, lactic acid 5.4 --> 1.8  S/p 1L IVF -- cautious with aggressive IVF due to volume overload concerns  Antibiotic: Unasyn, Zyvox  Wound care  Cultures    Atrial fibrillation with RVR (HCC)  Assessment & Plan  Continue Cardizem and digoxin for rate control  --As needed IV metoprolol  Continue Xarelto    Maintain potassium above 4, magnesium above 2  Trend CE, EKG  Check echo    Acute respiratory failure with hypoxia (HCC)- (present on admission)  Assessment & Plan  Multifactorial: OHS/RUTH noncompliant with CPAP, volume overload, CHF, COPD  On 10 L-wean off as tolerated  -- Require 4L at baseline  Diuresis as tolerated  Pulmonary hygiene  Check echo    Acute on chronic blood loss anemia  Assessment & Plan  Hgb 9.7 (prior to transfer) --> 8.1  at [facility]  No bleeding seen  Did receive 750cc IVF bolus    IV protonix for now  Replace Fe/B12/folate as needed    RUTH (obstructive sleep apnea)  Assessment & Plan  RUTH/OHS  Compliant with CPAP strongly advised    Cellulitis  Assessment & Plan  Of legs, buttock  Supportive wound care  Abx: Unasyn, Zyvox  --s/p ceftriaxone 1g prior to transfer  Check MRSA nares  Follow cultures    COPD (chronic obstructive pulmonary disease) ([facility])- (present on admission)  Assessment & Plan  Mild acute exacerbation  Nebs, pulmonary hygiene  Dulera, Spiriva    Moderate to severe pulmonary hypertension ([facility])- (present on admission)  Assessment & Plan  Per history  Diuresis    Diabetes ([facility])- (present on admission)  Assessment & Plan  ISS  Statin    Essential hypertension- (present on admission)  Assessment & Plan  Continue Cardizem    Elevated troponin  Assessment & Plan  Probable demand ischemia    ACP (advance care planning)  Assessment & Plan  Goal of care discussed with patient in IMCU.  Patient stated she is agreeable for noninvasive, as well as invasive/heroic life-sustaining measures-including CPR/defibrillation/intubation or mechanical ventilation if warranted.  She was also agreeable for treatment diuresis, antibiotic, breathing treatment including CPAP/BiPAP if warranted.  Diagnosis, prognosis, questions concern addressed.  Full CODE STATUS confirmed.  ACP: 16 minutes    Mood disorder (HCC)  Assessment & Plan  Cymbalta    Morbid (severe) obesity with alveolar hypoventilation (HCC)  Assessment & Plan  Diet and lifestyle modification  Body mass index is 64.91 kg/m².          VTE prophylaxis: therapeutic anticoagulation with Xarelto    Critical care time: 50 minutes spent in chart review, medical management, coordinate care with patient/IMCU staff/RN/pharmacy/consulting provider, frequent reevaluation of patient's treatment response

## 2024-02-21 NOTE — WOUND TEAM
[facility]  Inpatient Services  Initial Wound and Skin Care Evaluation    Admission Date: 2/21/2024     Last order of IP CONSULT TO WOUND CARE was found on 2/21/2024 from Hospital Encounter on 2/20/2024     HPI, PMH, SH: Reviewed    No past surgical history on file.  Social History     Tobacco Use    Smoking status: Former    Smokeless tobacco: Never   Substance Use Topics    Alcohol use: Yes     No chief complaint on file.    Diagnosis: Sepsis (HCC) [A41.9]    Unit where seen by Wound Team: [id number]     WOUND CONSULT RELATED TO:  Back, Sacrum, Bilateral thighs and BLE    WOUND TEAM PLAN OF CARE - Frequency of Follow-up:   Nursing to follow dressing orders written for wound care. Contact wound team if area fails to progress, deteriorates or with any questions/concerns if something comes up before next scheduled follow up (See below as to whether wound is following and frequency of wound follow up)   Weekly - Back, Legs, Heels    WOUND HISTORY:   Pt is a 63yr old morbidly obese woman with history of A. Fib, CHF, Diabetes, HTN and COPD on 4L at baseline. Pt was a transfer for higher level of care from [facility] after pts son called for a welfare check. Pt apparently did have home health but for unknown reasons she quit on Friday 2/16/24. PT was found sitting in a chair soiled in stool and urine. Pt reported she had not been out of chair since January 25, 2024. Wound team was consulted regarding extensive wounds to BLE.       WOUND ASSESSMENT/LDA         Wound 02/21/24 Pressure Injury Thigh Distal;Posterior Bilateral (Active)   Wound Image      02/21/24 1556   Site Assessment Pink;Red;Yellow    Periwound Assessment Edema    Margins Attached edges    Closure None    Drainage Amount Moderate    Drainage Description Yellow;Serosanguineous    Treatments Cleansed;Site care;CSWD - Conservative Sharp Wound Debridement;Chemical debridement    Wound Cleansing Approved Wound Cleanser    Periwound Protectant  Barrier Paste    Dressing Status Clean;Dry;Intact    Dressing Changed New    Dressing Cleansing/Solutions 1/4 Strength Dakin's Solution    Dressing Options Moist Roll Gauze;Absorbent Abdominal Pad;Hypafix Tape    Dressing Change/Treatment Frequency Every Shift, and As Needed    NEXT Dressing Change/Treatment Date 02/21/24    NEXT Weekly Photo (Inpatient Only) 02/28/24    Wound Team Following Weekly    WOUND NURSE ONLY - Pressure Injury Stage U    Wound Length (cm) 5.4 cm    Wound Width (cm) 17 cm    Wound Surface Area (cm^2) 91.8 cm^2    Wound Odor Strong    Pulses DP;PT;Right;1+    WOUND NURSE ONLY - Time Spent with Patient (mins) 60        Wound 02/21/24 Pressure Injury Heel Plantar;Lateral Right POA Unstageable (Active)   Wound Image   02/21/24 1556   Site Assessment Black;Brown    Periwound Assessment Pink;Red;Flaky;Edema    Margins Attached edges    Closure None    Drainage Amount None    Treatments Cleansed;Site care;Offloading    Wound Cleansing Approved Wound Cleanser    Dressing Status Open to Air    Dressing Cleansing/Solutions 3% Betadine    Dressing Options Open to Air    Dressing Change/Treatment Frequency Every Shift, and As Needed    NEXT Dressing Change/Treatment Date 02/21/24    Wound Team Following Weekly    WOUND NURSE ONLY - Pressure Injury Stage U    Wound Length (cm) 6 cm    Wound Width (cm) 6 cm    Wound Surface Area (cm^2) 36 cm^2        Wound 02/21/24 Full Thickness Wound Leg Posterior;Lower Bilateral (Active)   Wound Image      02/21/24 1600   Site Assessment Red;Pink;Purple    Periwound Assessment Edema    Margins Attached edges    Closure None    Drainage Amount Moderate    Drainage Description Serosanguineous;Yellow    Treatments Cleansed;Site care    Wound Cleansing Foam Cleanser/Washcloth    Periwound Protectant Viscopaste    Dressing Status Clean;Dry;Intact    Dressing Changed New    Dressing Cleansing/Solutions Not Applicable    Dressing Options Viscopaste;Absorbent Abdominal Pad;Dry  Roll Gauze    Dressing Change/Treatment Frequency Every Shift, and As Needed    NEXT Dressing Change/Treatment Date 24    NEXT Weekly Photo (Inpatient Only) 24    Wound Team Following Weekly    Non-staged Wound Description Full thickness      Moisture Associated Skin Damage 24 Buttock;Groin;Perineum;Other (comment) (Active)   Wound Image     24 1600   NEXT Weekly Photo (Inpatient Only) 24    Drainage Amount Small    Drainage Description Serosanguineous;Yellow    Periwound Assessment Edema;Flaky    IAD Cleansing Foam Cleanser/Washcloth    Periwound Protectant Antifungal Therapy;Interdry    IAD Containment Device Indwelling Catheter      Vascular:    RENU:   RENU Results, Last 30 Days US-RENU SINGLE LEVEL BILAT    Result Date: 2024  Narrative  Vascular Laboratory  Conclusions  Normal ankle brachial indices.  SALUDLUISSHAQUILLE  Age:    63    Gender:     F  MRN:    [id number]  :    1960      BSA:  Exam Date:     2024 07:50  Room #:     Inpatient  Priority:     Routine  Ht (in):             Wt (lb):  Ordering Physician:        POLLY DUTTON  Referring Physician:       [id number] Coto  Sonographer:               Dudley Riley RDMS, RVT  Study Type:                Complete Bilateral  Technical Quality:         Adequate  Indications:     Pain in leg, unspecified  CPT Codes:       78286  ICD Codes:       M79.606  History:         Nonhealing wound. No prior study.  Limitations:                 RIGHT  Waveform            Systolic BPs (mmHg)                             121           Brachial                                           Common Femoral                                           Popliteal  Hyperemic                  119           Posterior Tibial  Hyperemic                  108           Dorsalis Pedis                                           Digit                             0.97          RENU                                            TBI                       LEFT  Waveform        Systolic BPs (mmHg)                             123           Brachial                                           Common Femoral                                           Popliteal  Triphasic                  125           Posterior Tibial  Triphasic                  120           Dorsalis Pedis                                           Digit                             1.02          RENU                                           TBI  Findings  Right.  Doppler waveforms of the common femoral and popliteal arteries could not be  insonated due to patient body habitus.  Doppler waveforms of the posterior tibial and dorsalis pedis arteries are  hyperemic.  The ankle-brachial index is normal.  Left.  Doppler waveforms of the common femoral and popliteal arteries could not be  insonated due to patient body habitus.  Doppler waveforms of the posterior tibial and dorsalis pedis arteries are  triphasic.  The ankle-brachial index is normal.  Padmaja Alicia MD  (Electronically Signed)  Final Date:      21 February 2024                   13:41    Lab Values:    Lab Results   Component Value Date/Time    WBC 14.7 (H) 02/21/2024 04:20 AM    RBC 3.70 (L) 02/21/2024 04:20 AM    HEMOGLOBIN 8.1 (L) 02/21/2024 04:20 AM    HEMATOCRIT 29.0 (L) 02/21/2024 04:20 AM    CREACTPROT 8.31 (H) 02/21/2024 02:11 AM    SEDRATEWES 97 (H) 02/21/2024 02:20 AM    HBA1C 6.8 (H) 02/21/2024 02:20 AM       Culture Results show:  No results found for this or any previous visit (from the past 720 hour(s)).    Pain Level/Medicated:  PO pain medications administered by bedside RN immediately prior       INTERVENTIONS BY WOUND TEAM:  Chart and images reviewed. Discussed with bedside RN. All areas of concern (based on picture review, LDA review and discussion with bedside RN) have been thoroughly assessed. Documentation of areas based on significant findings. This RN in to assess patient.  Performed standard wound care which includes appropriate positioning, dressing removal and non-selective debridement. Pictures and measurements obtained weekly if/when required.    Wound:  Back Moisture associated breakdown  Preparation for Dressing removal: Open to air  Cleansed/Non-selectively Debrided with:  No rinse foam soap and Moist warm washcloth  Sara wound: Cleansed with No rinse foam soap and Moist warm washcloth, Prepped with Nystatin Powder  Primary Dressing:  Nystatin powder  Secondary (Outer) Dressing: Interdry cloth     Wound:  Bilateral Posterior thighs POA Unstageable  Preparation for Dressing removal: Removed without difficulty  Cleansed/Non-selectively Debrided with:  No rinse foam soap and Moist warm washcloth  Non-Excisional Conservative Sharp debridement: Slough debrided away using scissors and forceps < 20cm2 debrided down to slough and non-viable/devitalized tissue.  No Bleeding noted.  Sara wound: Cleansed with No rinse foam soap and Moist warm washcloth, Prepped with Barrier paste  Primary Dressing:  Dakins moistened roll gauze  Secondary (Outer) Dressing: ABD pad and hypafix tape     Wound:  Bilateral posterior lower legs full thickness wounds  Preparation for Dressing removal: Removed without difficulty  Cleansed/Non-selectively Debrided with:  No rinse foam soap and Moist warm washcloth  Sara wound: Cleansed with No rinse foam soap and Moist warm washcloth, Prepped with N/A  Primary Dressing:  Miconazole Mani ordered  Secondary (Outer) Dressing: Viscopaste and ABD/Trauma pads followed by roll gauze and tape.     Wound:  Right plantar heel POA unstageable  Preparation for Dressing removal: Removed without difficulty  Cleansed/Non-selectively Debrided with:  Moist warm washcloth  Non-Excisional Conservative Sharp debridement: Callus debrided away using scissors and forceps < 20cm2 debrided down to slough and eschar.  No Bleeding noted.  Sara wound: Cleansed with Moist warm washcloth,  Prepped with Betadine  Primary Dressing:  Betadine   Secondary (Outer) Dressing: wilmer     Area Assessed: Sacrococcygeal olivia  Intact on an LAURYN bariatric mattress and pillows for turns.     Advanced Wound Care Discharge Planning  Number of Clinicians necessary to complete wound care: 2  Is patient requiring IV pain medications for dressing changes:  Yes  Length of time for dressing change 45 min. (This does not include chart review, pre-medication time, set up, clean up or time spent charting.)    Interdisciplinary consultation: Patient, Bedside RN (Echo), Yany ZULETA (Wound RN).  Pressure injury and staging reviewed with Yany ZULETA (Wound RN).    EVALUATION / RATIONALE FOR TREATMENT:     Date:  02/21/24  Wound Status:  Initial evaluation    PT with significant wounds to BLE. Dakins applied to posterior thighs to chemically debride slough. Pts lower legs with full thickness wounds surrounded by redness likely from urine sitting on the skin. Miconazole LEYDI applied for its antimicrobial properties followed by viscopaste to soothe the area and protect. Pts right heel with unstageable pressure injury. Betadine applied to firm eschar and protect the area. Pts back with red rash likely from incontinence. Nystatin ordered and interdry cloth in use.          Goals: Steady decrease in wound area and depth weekly.    NURSING PLAN OF CARE ORDERS:  Dressing changes: See Dressing Care orders    NUTRITION RECOMMENDATIONS   Wound Team Recommendations:  N/A    DIET ORDERS (From admission to next 24h)       Start     Ordered    02/21/24 1214  Diet Order Diet: Cardiac (NO STRAWS); Second Modifier: (optional): Consistent CHO (Diabetic); Miscellaneous modifications: (optional): No Decaf, No Caffeine(for test); Tray Modifications (optional): No Straws  ALL MEALS        Question Answer Comment   Diet: Cardiac NO STRAWS   Second Modifier: (optional) Consistent CHO (Diabetic)    Miscellaneous modifications: (optional) No Decaf, No Caffeine(for  test)    Tray Modifications (optional) No Straws        02/21/24 1213                    PREVENTATIVE INTERVENTIONS:    Q shift Kyle - performed per nursing policy  Q shift pressure point assessments - performed per nursing policy    Surface/Positioning  Bariatric LAURYN - Currently in Place  Reposition q 2 hours - Currently in Place  Move and Transfer System (MATs) - Currently in Place    Offloading/Redistribution  Heel float boots (Prevalon boot) - Ordered      Respiratory  Silicone O2 tubing - Currently in Place  Gray Foam Ear protectors - Currently in Place    Containment/Moisture Prevention    Dri-milad pad - Currently in Place  Cespedes Catheter - Currently in Place  Barrier paste - Currently in Place  Antifungal treatment - Currently in Place  Interdry - Currently in Place    Mobilization      Unable to assess     Anticipated discharge plans:  TBD        Vac Discharge Needs:  Vac Discharge plan is purely a recommendation from wound team and not a requirement for discharge unless otherwise stated by physician.  Not Applicable Pt not on a wound vac

## 2024-02-21 NOTE — THERAPY
Speech Language Pathology   Clinical Swallow Evaluation     Patient Name: Miroslava Matta  AGE:  63 y.o., SEX:  female  Medical Record #: [id number]  Date of Service: 2/21/2024    History of Present Illness  63 y.o. female admitted on 2/21 after found sitting in a chair soiled in stool and urine. Pt recently quit home health. Not previously seen by SLP at [facility].     PMHx: COPD, diabetes, fall, obesity, atrial fibrillation, urinary incontinence, psychiatric problem    CMHx: severe sepsis, atrial fibrillation, acute respiratory failure with hypoxia, acute on chronic blood loss anemia, cellulitis, COPD, mod-severe pulmonary HTN, diabetes, mood disorder, morbid obesity with alveolar hypoventilation     Chest xray 2/21: 1.  Cardiomegaly with mild pulmonary vascular congestion  General Information:  Vitals  O2 (LPM): 30  O2 Delivery Device: Heated High Flow Nasal Cannula  Vitals Comments: 30 L/minute at 80% Fi02  Level of Consciousness: Alert, Awake  Patient Behaviors:  cooperative  Orientation: Oriented x 4     Prior Living Situation & Level of Function:  Communication: WFL  Swallowing: WFL     Oral Mechanism Evaluation:  Dentition: Fair, Natural dentition, Some missing dentition   Facial Symmetry: Equal  Labial Observations: WFL   Lingual Observations: Midline  Motor Speech: WFL      Laryngeal Function:  Secretion Management: Adequate  Voice Quality: WFL  Cough: Perceptually WNL    Subjective  Pt received awake in bed with breakfast tray at bedside. Pt reported reduced PO intake in previous weeks to admit. Pt endorsed no hx of swallowing difficulty. Pt on HFNC at 30L/Min at 80% FiO2.    Assessment  Current Method of Nutrition: Oral diet (Cardiac, Diabetic Regular/thins no caffeine)  Positioning: Semi-Brito's (30-45 degrees)  Bolus Administration: Patient  O2 (LPM): 30 O2 Delivery Device: Heated High Flow Nasal Cannula     Swallowing Trials:  Swallowing Trials  Thin Liquid (TN0): Impaired  Pureed (PU4): WFL  Easy to  Chew (EC7): Impaired  Regular (RG7): WFL    Comments: Pt fed self independently via fork, spoon, straw, and cup. Pt had difficulty opening food items independently and required clinician support. Inadequate labial seal and bolus acceptance/containment with thin liquids via cup noted with anterior spillage. Demonstrated adequate labial seal and bolus acceptance/bolus containment of thin liquids via straw, pureed, easy to chew, and regular textures. Pt coughed with all trials of thin liquids via straw, however, no overt s/sx of aspiration with thin liquids via cup. Pt demonstrated timely and suspect efficient mastication of easy-to-chew and regular texture trials. Pt reported globus sensation in 1 of 4 trials of easy-to-chew texture. Vocal quality remained clear across PO trials. Pt educated on role of SLP and s/sx of dysphagia; verbalized understanding. Discussed recommendation for no straws which pt did not agree with. SLP recommended FEES to assess thin liquids via straw sip which pt also declined. Pt educated on potential risks associated with use of straws, pt adamant to continue using; M.D. notified. SLP collaborated with MD who recommended putting in diet without straws as pt is still a full code.    Clinical Impressions  Pt is presenting with s/sx of aspiration with use of thin liquids via straw sip which is eliminated with thin liquids via cup sip. Collaborated with MD and ale to place pt on recommended diet per SLP. Pt is currently at risk for aspiration given increased oxygen needs and overt s/sx of aspiration with use of straws. SLP to follow for dysphagia therapy. Would recommend holding PO with any difficulty.    Recommendations  Diet Consistency: Regular/thin liquids - NO STRAWS  Instrumentation: Instrumental swallow study pending clinical progress  Medication: As tolerated  Supervision: Assist with meal tray set up, Distant supervision - check on patient 2-3 times per meal  Positioning: Fully upright  "and midline during oral intake (as tolerated)  Risk Management : Small bites/sips, Slow rate of intake, No straws, Physical mobility, as tolerated  Oral Care: Q6h     SLP Treatment Plan  Treatment Plan: Dysphagia Treatment  SLP Frequency: 3x Per Week  Estimated Duration: Until Therapy Goals Met    Anticipated Discharge Needs  Discharge Recommendations: Anticipate that the patient will have no further speech therapy needs after discharge from the hospital   Therapy Recommendations Upon DC: Not Indicated      Patient / Family Goals  Patient / Family Goal #1: \"I want Jello.\"  Short Term Goals  Short Term Goal # 1: Pt will consume a regular/thin liquid diet without s/sx of airway invasion.    Shea Frankenberger, Student   "

## 2024-02-21 NOTE — CARE PLAN
The patient is Watcher - Medium risk of patient condition declining or worsening    Shift Goals  Clinical Goals: Wound care hemodynamic stability    Progress made toward(s) clinical / shift goals:        Problem: Knowledge Deficit - Standard  Goal: Patient and family/care givers will demonstrate understanding of plan of care, disease process/condition, diagnostic tests and medications  Outcome: Progressing     Problem: Knowledge Deficit - COPD  Goal: Patient/significant other demonstrates understanding of disease process, utilization of the Action Plan, medications and discharge instruction  Outcome: Progressing     Problem: Risk for Infection - COPD  Goal: Patient will remain free from signs and symptoms of infection  Outcome: Progressing     Problem: Nutrition - Advanced  Goal: Patient will display progressive weight gain toward goal have adequate food and fluid intake  Outcome: Progressing     Problem: Ineffective Airway Clearance  Goal: Patient will maintain patent airway with clear/clearing breath sounds  Outcome: Progressing     Problem: Impaired Gas Exchange  Goal: Patient will demonstrate improved ventilation and adequate oxygenation and participate in treatment regimen within the level of ability/situation.  Outcome: Progressing     Problem: Risk for Aspiration  Goal: Patient's risk for aspiration will be absent or decrease  Outcome: Progressing     Problem: Self Care  Goal: Patient will have the ability to perform ADLs independently or with assistance (bathe, groom, dress, toilet and feed)  Outcome: Progressing     Problem: Hemodynamics  Goal: Patient's hemodynamics, fluid balance and neurologic status will be stable or improve  Outcome: Progressing     Problem: Fluid Volume  Goal: Fluid volume balance will be maintained  Outcome: Progressing     Problem: Urinary - Renal Perfusion  Goal: Ability to achieve and maintain adequate renal perfusion and functioning will improve  Outcome: Progressing     Problem:  Respiratory  Goal: Patient will achieve/maintain optimum respiratory ventilation and gas exchange  Outcome: Progressing     Problem: Physical Regulation  Goal: Diagnostic test results will improve  Outcome: Progressing  Goal: Signs and symptoms of infection will decrease  Outcome: Progressing     Problem: Pain - Standard  Goal: Alleviation of pain or a reduction in pain to the patient’s comfort goal  Outcome: Progressing     Problem: Skin Integrity  Goal: Skin integrity is maintained or improved  Outcome: Progressing     Problem: Fall Risk  Goal: Patient will remain free from falls  Outcome: Progressing

## 2024-02-21 NOTE — PROGRESS NOTES
I met with Ms. Matta this morning at bedside.  She has extensive wounds and a Cespedes was placed for skin breakdown.  I discussed with her speech-language pathologist Annabel who recommended a fees but patient refuses.  We had a long discussion that certainly she has the right to eat what ever she wants though given the risks of aspiration DNR/DNI CODE STATUS would need to be a package deal with this.  She refuses to undergo swallow eval and wants to be full code for now.  Wound care nursing has consulted.  We will refrain from wound cultures unless there is true purulent discharge.  Echocardiogram reveals severe pulmonary hypertension with right ventricular systolic pressure of 85 mmHg.  Please refer to Dr. Worthington's history and physical earlier this morning.

## 2024-02-21 NOTE — ASSESSMENT & PLAN NOTE
Restarted home Cardizem 360 for rate control  Continue Xarelto for anticoagulation  Continuous tele monitoring

## 2024-02-21 NOTE — PROGRESS NOTES
[facility] TRIAGE OFFICER ICU TRANSFER REPORT  Transfer requested by: Banner Adela Jolley  Chief complaint: welfare check, wounds  Pertinent history/ICU Course: 63F CHF, afib morbid obesity who's son called for a welfare check today.  Apparently she was receiving home health, but they quit last Friday.  She was found sitting in her chair in stool and urine. When Dr. Jama really pushed her on history, it sounds like she may not have gotten out of her chair since 1/25.  She has extensive wounds on back, buttock and posterior legs.  She was in afib with RVR 150s on arrival, which after small fluid boluses have improved to 120-130s.  Normotensive. Mental status is normal.  She's been afebrile, but WBC are up.  She wants to be transferred if needed.     Code Status: not discussed  Consultants involved and pertinent input from consultants: na  Floor requested: [facility]  Hospitalist assigned to transfer: per RTOC    For any question or concerns regarding the care of this patient, please reach out to the assigned hospitalist.

## 2024-02-21 NOTE — ASSESSMENT & PLAN NOTE
Goal of care discussed with patient in IMCU.  Patient stated she is agreeable for noninvasive, as well as invasive/heroic life-sustaining measures-including CPR/defibrillation/intubation or mechanical ventilation if warranted.  She was also agreeable for treatment diuresis, antibiotic, breathing treatment including CPAP/BiPAP if warranted.  Diagnosis, prognosis, questions concern addressed.  Full CODE STATUS confirmed.  ACP: 16 minutes

## 2024-02-21 NOTE — RESPIRATORY CARE
COPD EDUCATION by COPD CLINICAL EDUCATOR  2/21/2024 at 2:27 PM by Lulu Herrera, RRT     Patient reviewed by COPD education team. Patient does not have a history or diagnosis of COPD and is a non-smoker.  Therefore, patient does not qualify for the COPD program.      COPD Screen  COPD Risk Screening  Do you have a history of COPD?: No (Severe Sepsis AFIB)  Do you have a Pulmonologist?: No (Pt does not have COPD)  COPD Population Screener  During the past 4 weeks, how much did you feel short of breath?: Some of the time  Do you ever cough up any mucus or phlegm?: Yes, a few days a week or month  In the past 12 months, you do less than you used to because of your breathing problems: Agree  Have you smoked at least 100 cigarettes in your entire life?: No/don't know (quit when she was 16)  How old are you?: 60+  COPD Screening Score: 5  COPD Coordinator Not Recommended: Yes    COPD Assessment  COPD Clinical Specialists ONLY  COPD Education Initiated: Yes--Short Intervention (Pt denies COPD and smoking hx, stated she has AFIB 4lpm 24/7 roy)  Is this a COPD exacerbation patient?: No (AFIB severe sepsis)  DME Company: [employer] Equipment Type: oxygen concentrator 4lpm e tanks  Physician Follow Up Appointment:  (none at this time)  Physician Name: Michael Melvin P.A.-C.  Pulmonary Follow Up Appointment:  (none at this time)  Pulmonologist Name: Pt states she has no lung issues, all heart realated.  Referrals Initiated:  (none)  $ Demo/Eval of SVN's, MDI's and Aerosols:  (Pt takes no meds)  Interdisciplinary Rounds: Attendance at Rounds (30 Min)

## 2024-02-21 NOTE — ASSESSMENT & PLAN NOTE
Acute on chronic  Multifactorial: OHS/RUTH noncompliant with CPAP, volume overload, HFpEF  She is on 4 liters oxygen at home (not due to COPD but due to CHF and obesity).  Pulmonary hypertension thus IV lasix 80 mg BID.   She is requiring high flow oxygen which is being titrated.   Decrease Lasix to 80 mg twice daily  She may require pulmonology consultation

## 2024-02-21 NOTE — PROGRESS NOTES
4 Eyes Skin Assessment Completed by JOSEPH Moreno and JOSEPH Brian.    Head WDL  Ears Redness on back of Right ear. Blister on back of right ear. L ear WDL    Nose WDL  Mouth WDL, dry  Neck Redness and Discoloration  Breast/Chest Redness and Shiny, Moisture damage under breast, possible wound      Shoulder Blades WDL  Spine Redness, wounds    (R) Arm/Elbow/Hand WDL, Moisture damage under armpit, possible wound  (L) Arm/Elbow/Hand Redness, Moisture damage under armpit, possible wound  Abdomen Redness, wounds, Moisture damage  Groin Redness and Excoriation, shiny, Wounds    Scrotum/Coccyx/Buttocks Redness and Excoriation, Wounds  (R) Leg Redness and Shiny, wounds        (L) Leg Redness and Shiny, wounds        (R) Heel/Foot/Toe Redness, wounds    (L) Heel/Foot/Toe Redness, wounds          Devices In Places Blood Pressure Cuff, Pulse Ox, Cespedes, and Oxy Mask      Interventions In Place InterDry, TAP System, Pillows, Q2 Turns, Low Air Loss Mattress, and Barrier Cream, bariatric bed    Possible Skin Injury Yes    Pictures Uploaded Into Epic Yes  Wound Consult Placed Yes  RN Wound Prevention Protocol Ordered Yes

## 2024-02-21 NOTE — ASSESSMENT & PLAN NOTE
Of legs, buttock  Supportive wound care  Abx: Unasyn, Zyvox  --s/p ceftriaxone 1g prior to transfer  Check MRSA nares  Follow cultures

## 2024-02-22 PROBLEM — I27.20 PULMONARY HYPERTENSION (HCC): Status: ACTIVE | Noted: 2024-02-22

## 2024-02-22 PROBLEM — I27.20 MODERATE TO SEVERE PULMONARY HYPERTENSION (HCC): Status: RESOLVED | Noted: 2018-05-15 | Resolved: 2024-02-22

## 2024-02-22 PROBLEM — L03.90 CELLULITIS: Status: RESOLVED | Noted: 2024-02-21 | Resolved: 2024-02-22

## 2024-02-22 PROBLEM — Z71.89 ACP (ADVANCE CARE PLANNING): Status: RESOLVED | Noted: 2024-02-21 | Resolved: 2024-02-22

## 2024-02-22 LAB
ANION GAP SERPL CALC-SCNC: 10 MMOL/L (ref 7–16)
BACTERIA BLD CULT: ABNORMAL
BACTERIA BLD CULT: ABNORMAL
BUN SERPL-MCNC: 24 MG/DL (ref 8–22)
CALCIUM SERPL-MCNC: 8.8 MG/DL (ref 8.5–10.5)
CHLORIDE SERPL-SCNC: 95 MMOL/L (ref 96–112)
CO2 SERPL-SCNC: 30 MMOL/L (ref 20–33)
CREAT SERPL-MCNC: 0.91 MG/DL (ref 0.5–1.4)
EKG IMPRESSION: NORMAL
GFR SERPLBLD CREATININE-BSD FMLA CKD-EPI: 71 ML/MIN/1.73 M 2
GLUCOSE BLD STRIP.AUTO-MCNC: 148 MG/DL (ref 65–99)
GLUCOSE BLD STRIP.AUTO-MCNC: 161 MG/DL (ref 65–99)
GLUCOSE BLD STRIP.AUTO-MCNC: 172 MG/DL (ref 65–99)
GLUCOSE BLD STRIP.AUTO-MCNC: 181 MG/DL (ref 65–99)
GLUCOSE SERPL-MCNC: 171 MG/DL (ref 65–99)
POTASSIUM SERPL-SCNC: 4.2 MMOL/L (ref 3.6–5.5)
SIGNIFICANT IND 70042: ABNORMAL
SITE SITE: ABNORMAL
SODIUM SERPL-SCNC: 135 MMOL/L (ref 135–145)
SOURCE SOURCE: ABNORMAL

## 2024-02-22 PROCEDURE — 93010 ELECTROCARDIOGRAM REPORT: CPT | Performed by: INTERNAL MEDICINE

## 2024-02-22 PROCEDURE — A9270 NON-COVERED ITEM OR SERVICE: HCPCS | Mod: JZ | Performed by: HOSPITALIST

## 2024-02-22 PROCEDURE — 80048 BASIC METABOLIC PNL TOTAL CA: CPT

## 2024-02-22 PROCEDURE — 700102 HCHG RX REV CODE 250 W/ 637 OVERRIDE(OP): Mod: JZ | Performed by: HOSPITALIST

## 2024-02-22 PROCEDURE — 700111 HCHG RX REV CODE 636 W/ 250 OVERRIDE (IP): Mod: JZ | Performed by: STUDENT IN AN ORGANIZED HEALTH CARE EDUCATION/TRAINING PROGRAM

## 2024-02-22 PROCEDURE — 770000 HCHG ROOM/CARE - INTERMEDIATE ICU *

## 2024-02-22 PROCEDURE — 94660 CPAP INITIATION&MGMT: CPT

## 2024-02-22 PROCEDURE — 97163 PT EVAL HIGH COMPLEX 45 MIN: CPT

## 2024-02-22 PROCEDURE — 700105 HCHG RX REV CODE 258: Performed by: STUDENT IN AN ORGANIZED HEALTH CARE EDUCATION/TRAINING PROGRAM

## 2024-02-22 PROCEDURE — 82962 GLUCOSE BLOOD TEST: CPT | Mod: 91

## 2024-02-22 PROCEDURE — 700102 HCHG RX REV CODE 250 W/ 637 OVERRIDE(OP): Performed by: STUDENT IN AN ORGANIZED HEALTH CARE EDUCATION/TRAINING PROGRAM

## 2024-02-22 PROCEDURE — 700111 HCHG RX REV CODE 636 W/ 250 OVERRIDE (IP): Performed by: HOSPITALIST

## 2024-02-22 PROCEDURE — 97167 OT EVAL HIGH COMPLEX 60 MIN: CPT

## 2024-02-22 PROCEDURE — 93005 ELECTROCARDIOGRAM TRACING: CPT | Performed by: STUDENT IN AN ORGANIZED HEALTH CARE EDUCATION/TRAINING PROGRAM

## 2024-02-22 PROCEDURE — A9270 NON-COVERED ITEM OR SERVICE: HCPCS | Performed by: STUDENT IN AN ORGANIZED HEALTH CARE EDUCATION/TRAINING PROGRAM

## 2024-02-22 PROCEDURE — 99291 CRITICAL CARE FIRST HOUR: CPT | Performed by: HOSPITALIST

## 2024-02-22 PROCEDURE — 700101 HCHG RX REV CODE 250: Performed by: HOSPITALIST

## 2024-02-22 PROCEDURE — 94640 AIRWAY INHALATION TREATMENT: CPT

## 2024-02-22 RX ORDER — HYDROMORPHONE HYDROCHLORIDE 1 MG/ML
1 INJECTION, SOLUTION INTRAMUSCULAR; INTRAVENOUS; SUBCUTANEOUS
Status: DISCONTINUED | OUTPATIENT
Start: 2024-02-22 | End: 2024-02-23

## 2024-02-22 RX ORDER — DILTIAZEM HYDROCHLORIDE 360 MG/1
360 CAPSULE, EXTENDED RELEASE ORAL DAILY
Status: CANCELLED | OUTPATIENT
Start: 2024-02-22

## 2024-02-22 RX ORDER — FUROSEMIDE 10 MG/ML
80 INJECTION INTRAMUSCULAR; INTRAVENOUS
Status: COMPLETED | OUTPATIENT
Start: 2024-02-22 | End: 2024-02-23

## 2024-02-22 RX ORDER — POTASSIUM CHLORIDE 20 MEQ/1
20 TABLET, EXTENDED RELEASE ORAL 2 TIMES DAILY
Status: DISCONTINUED | OUTPATIENT
Start: 2024-02-22 | End: 2024-02-24

## 2024-02-22 RX ADMIN — FERROUS GLUCONATE 324 MG: 324 TABLET ORAL at 17:09

## 2024-02-22 RX ADMIN — CYANOCOBALAMIN TAB 500 MCG 1000 MCG: 500 TAB at 05:52

## 2024-02-22 RX ADMIN — NYSTATIN: 100000 POWDER TOPICAL at 05:54

## 2024-02-22 RX ADMIN — OXYCODONE HYDROCHLORIDE AND ACETAMINOPHEN 500 MG: 500 TABLET ORAL at 05:51

## 2024-02-22 RX ADMIN — NYSTATIN: 100000 POWDER TOPICAL at 14:04

## 2024-02-22 RX ADMIN — HYDROMORPHONE HYDROCHLORIDE 0.5 MG: 1 INJECTION, SOLUTION INTRAMUSCULAR; INTRAVENOUS; SUBCUTANEOUS at 02:08

## 2024-02-22 RX ADMIN — AMPICILLIN AND SULBACTAM 3 G: 1; 2 INJECTION, POWDER, FOR SOLUTION INTRAMUSCULAR; INTRAVENOUS at 08:53

## 2024-02-22 RX ADMIN — OXYCODONE 5 MG: 5 TABLET ORAL at 01:05

## 2024-02-22 RX ADMIN — Medication 1000 UNITS: at 05:51

## 2024-02-22 RX ADMIN — POTASSIUM CHLORIDE 20 MEQ: 1500 TABLET, EXTENDED RELEASE ORAL at 17:09

## 2024-02-22 RX ADMIN — ACETAMINOPHEN 650 MG: 325 TABLET, FILM COATED ORAL at 05:52

## 2024-02-22 RX ADMIN — ATORVASTATIN CALCIUM 40 MG: 40 TABLET, FILM COATED ORAL at 17:09

## 2024-02-22 RX ADMIN — ACETAMINOPHEN 650 MG: 325 TABLET, FILM COATED ORAL at 17:10

## 2024-02-22 RX ADMIN — Medication 400 MG: at 05:52

## 2024-02-22 RX ADMIN — RIVAROXABAN 20 MG: 20 TABLET, FILM COATED ORAL at 17:10

## 2024-02-22 RX ADMIN — AMPICILLIN AND SULBACTAM 3 G: 1; 2 INJECTION, POWDER, FOR SOLUTION INTRAMUSCULAR; INTRAVENOUS at 01:10

## 2024-02-22 RX ADMIN — TOPIRAMATE 25 MG: 25 TABLET, FILM COATED ORAL at 17:11

## 2024-02-22 RX ADMIN — DULOXETINE HYDROCHLORIDE 30 MG: 30 CAPSULE, DELAYED RELEASE ORAL at 05:51

## 2024-02-22 RX ADMIN — DAKIN'S SOLUTION 0.125% (QUARTER STRENGTH) 0.25 ML: 0.12 SOLUTION at 17:10

## 2024-02-22 RX ADMIN — Medication 400 MG: at 17:10

## 2024-02-22 RX ADMIN — TOPIRAMATE 25 MG: 25 TABLET, FILM COATED ORAL at 05:59

## 2024-02-22 RX ADMIN — INSULIN HUMAN 3 UNITS: 100 INJECTION, SOLUTION PARENTERAL at 22:16

## 2024-02-22 RX ADMIN — FOLIC ACID 1 MG: 1 TABLET ORAL at 17:10

## 2024-02-22 RX ADMIN — FERROUS GLUCONATE 324 MG: 324 TABLET ORAL at 07:55

## 2024-02-22 RX ADMIN — INSULIN HUMAN 3 UNITS: 100 INJECTION, SOLUTION PARENTERAL at 09:55

## 2024-02-22 RX ADMIN — LINEZOLID 600 MG: 600 INJECTION, SOLUTION INTRAVENOUS at 06:10

## 2024-02-22 RX ADMIN — INSULIN HUMAN 3 UNITS: 100 INJECTION, SOLUTION PARENTERAL at 14:57

## 2024-02-22 RX ADMIN — OXYCODONE HYDROCHLORIDE 10 MG: 10 TABLET ORAL at 11:16

## 2024-02-22 RX ADMIN — FUROSEMIDE 40 MG: 10 INJECTION INTRAMUSCULAR; INTRAVENOUS at 08:01

## 2024-02-22 RX ADMIN — ACETAMINOPHEN 650 MG: 325 TABLET, FILM COATED ORAL at 11:17

## 2024-02-22 RX ADMIN — DIGOXIN 125 MCG: 0.12 TABLET ORAL at 17:10

## 2024-02-22 RX ADMIN — DAKIN'S SOLUTION 0.125% (QUARTER STRENGTH) 150 ML: 0.12 SOLUTION at 05:53

## 2024-02-22 RX ADMIN — ACETAMINOPHEN 650 MG: 325 TABLET, FILM COATED ORAL at 00:00

## 2024-02-22 RX ADMIN — NYSTATIN: 100000 POWDER TOPICAL at 17:11

## 2024-02-22 RX ADMIN — HYDROMORPHONE HYDROCHLORIDE 1 MG: 1 INJECTION, SOLUTION INTRAMUSCULAR; INTRAVENOUS; SUBCUTANEOUS at 14:03

## 2024-02-22 RX ADMIN — FOLIC ACID 1 MG: 1 TABLET ORAL at 05:54

## 2024-02-22 RX ADMIN — FUROSEMIDE 80 MG: 10 INJECTION, SOLUTION INTRAVENOUS at 16:12

## 2024-02-22 RX ADMIN — DILTIAZEM HYDROCHLORIDE 30 MG: 30 TABLET, FILM COATED ORAL at 11:17

## 2024-02-22 ASSESSMENT — COGNITIVE AND FUNCTIONAL STATUS - GENERAL
TOILETING: TOTAL
WALKING IN HOSPITAL ROOM: TOTAL
EATING MEALS: A LITTLE
CLIMB 3 TO 5 STEPS WITH RAILING: TOTAL
MOVING TO AND FROM BED TO CHAIR: UNABLE
SUGGESTED CMS G CODE MODIFIER DAILY ACTIVITY: CL
SUGGESTED CMS G CODE MODIFIER MOBILITY: CN
DRESSING REGULAR LOWER BODY CLOTHING: A LOT
MOVING TO AND FROM BED TO CHAIR: UNABLE
MOVING FROM LYING ON BACK TO SITTING ON SIDE OF FLAT BED: UNABLE
TOILETING: TOTAL
HELP NEEDED FOR BATHING: TOTAL
DAILY ACTIVITIY SCORE: 10
CLIMB 3 TO 5 STEPS WITH RAILING: TOTAL
HELP NEEDED FOR BATHING: TOTAL
SUGGESTED CMS G CODE MODIFIER MOBILITY: CM
DRESSING REGULAR LOWER BODY CLOTHING: TOTAL
DRESSING REGULAR UPPER BODY CLOTHING: TOTAL
PERSONAL GROOMING: A LITTLE
MOBILITY SCORE: 8
WALKING IN HOSPITAL ROOM: TOTAL
SUGGESTED CMS G CODE MODIFIER DAILY ACTIVITY: CL
STANDING UP FROM CHAIR USING ARMS: TOTAL
PERSONAL GROOMING: A LITTLE
MOBILITY SCORE: 6
STANDING UP FROM CHAIR USING ARMS: TOTAL
DRESSING REGULAR UPPER BODY CLOTHING: TOTAL
DAILY ACTIVITIY SCORE: 12
MOVING FROM LYING ON BACK TO SITTING ON SIDE OF FLAT BED: UNABLE
TURNING FROM BACK TO SIDE WHILE IN FLAT BAD: A LITTLE
TURNING FROM BACK TO SIDE WHILE IN FLAT BAD: UNABLE

## 2024-02-22 ASSESSMENT — PAIN DESCRIPTION - PAIN TYPE
TYPE: ACUTE PAIN

## 2024-02-22 ASSESSMENT — LIFESTYLE VARIABLES
HAVE PEOPLE ANNOYED YOU BY CRITICIZING YOUR DRINKING: NO
AVERAGE NUMBER OF DAYS PER WEEK YOU HAVE A DRINK CONTAINING ALCOHOL: 0
ON A TYPICAL DAY WHEN YOU DRINK ALCOHOL HOW MANY DRINKS DO YOU HAVE: 0
ALCOHOL_USE: NO
EVER FELT BAD OR GUILTY ABOUT YOUR DRINKING: NO
DOES PATIENT WANT TO STOP DRINKING: NO
HOW MANY TIMES IN THE PAST YEAR HAVE YOU HAD 5 OR MORE DRINKS IN A DAY: 0
TOTAL SCORE: 0
HAVE YOU EVER FELT YOU SHOULD CUT DOWN ON YOUR DRINKING: NO
CONSUMPTION TOTAL: NEGATIVE
EVER HAD A DRINK FIRST THING IN THE MORNING TO STEADY YOUR NERVES TO GET RID OF A HANGOVER: NO

## 2024-02-22 ASSESSMENT — ENCOUNTER SYMPTOMS: SHORTNESS OF BREATH: 1

## 2024-02-22 ASSESSMENT — CHA2DS2 SCORE
HYPERTENSION: YES
AGE 75 OR GREATER: NO
PRIOR STROKE OR TIA OR THROMBOEMBOLISM: NO
SEX: FEMALE
VASCULAR DISEASE: NO
AGE 65 TO 74: NO
CHA2DS2 VASC SCORE: 4
DIABETES: YES
CHF OR LEFT VENTRICULAR DYSFUNCTION: YES

## 2024-02-22 ASSESSMENT — GAIT ASSESSMENTS: GAIT LEVEL OF ASSIST: UNABLE TO PARTICIPATE

## 2024-02-22 ASSESSMENT — PULMONARY FUNCTION TESTS
EPAP_CMH2O: 8
EPAP_CMH2O: 8

## 2024-02-22 ASSESSMENT — ACTIVITIES OF DAILY LIVING (ADL): TOILETING: INDEPENDENT

## 2024-02-22 NOTE — THERAPY
Occupational Therapy   Initial Evaluation     Patient Name: Miroslava Matta  Age:  63 y.o., Sex:  female  Medical Record #: [id number]  Today's Date: 2/22/2024     Precautions  Precautions: (P) Fall Risk, Swallow Precautions  Comments: (P) multiple wounds    Assessment    Patient is 63 y.o. female admitted after found sitting in chair soiled in stool/urine, GLF, per patient CG quit and pt had no assistance; PMH COPD, DM, obesity. Pt normally required assistance for IADLs from caregiver, but able to complete functional mobility and ADLs without physical assistance or AD. Pt required total assist for bed mobility and attempted ADLs, pt limited by severe pain in BLEs and weakness. Will continue to see for skilled therapy while admitted as well as recommend post-acute placement.      Plan    Occupational Therapy Initial Treatment Plan   Treatment Interventions: (P) Self Care / Activities of Daily Living, Adaptive Equipment, Manual Therapy Techniques, Neuro Re-Education / Balance, Therapeutic Exercises, Therapeutic Activity  Treatment Frequency: (P) 3 Times per Week  Duration: (P) Until Therapy Goals Met    DC Equipment Recommendations: (P) Unable to determine at this time  Discharge Recommendations: (P) Recommend post-acute placement for additional occupational therapy services prior to discharge home     Objective       02/22/24 1214   Prior Living Situation   Prior Services Intermittent Physical Support for ADL Per Service   Housing / Facility 1 Story House   Steps Into Home 1   Steps In Home 0   Bathroom Set up Bathtub / Shower Combination   Equipment Owned None   Lives with - Patient's Self Care Capacity Alone and Unable to Care For Self   Prior Level of ADL Function   Self Feeding Independent   Grooming / Hygiene Independent   Bathing Independent   Dressing Independent   Toileting Independent   Comments per patient questionable historian   Prior Level of IADL Function   Medication Management Requires Assist    Laundry Requires Assist   Kitchen Mobility Requires Assist   Finances Requires Assist   Home Management Requires Assist   Shopping Requires Assist   Prior Level Of Mobility Independent Without Device in Home   Driving / Transportation Relatives / Others Provide Transportation   History of Falls   History of Falls Yes   Date of Last Fall   (3 in last 6 months)   Precautions   Precautions Fall Risk;Swallow Precautions   Comments multiple wounds   Pain 0 - 10 Group   Therapist Pain Assessment Post Activity Pain Same as Prior to Activity;Nurse Notified   Cognition    Cognition / Consciousness X   Level of Consciousness Alert   Attention Impaired   Initiation Impaired   Comments distracted by pain, poor insight into deficits   Active ROM Upper Body   Active ROM Upper Body  WDL   Strength Upper Body   Upper Body Strength  X   Gross Strength Generalized Weakness, Equal Bilaterally.    Sensation Upper Body   Upper Extremity Sensation  WDL   Upper Body Muscle Tone   Upper Body Muscle Tone  WDL   Balance Assessment   Sitting Balance (Static) Trace +   Sitting Balance (Dynamic) Trace   Weight Shift Sitting Poor   Bed Mobility    Supine to Sit Total Assist   Sit to Supine Total Assist   Scooting Total Assist   ADL Assessment   Grooming Maximal Assist   Upper Body Dressing Maximal Assist   Lower Body Dressing Total Assist   Toileting Total Assist   How much help from another person does the patient currently need...   Putting on and taking off regular lower body clothing? 1   Bathing (including washing, rinsing, and drying)? 1   Toileting, which includes using a toilet, bedpan, or urinal? 1   Putting on and taking off regular upper body clothing? 1   Taking care of personal grooming such as brushing teeth? 3   Eating meals? 3   6 Clicks Daily Activity Score 10   Functional Mobility   Sit to Stand Unable to Participate   Bed, Chair, Wheelchair Transfer Unable to Participate   Mobility bed mobility, unable to sit at the EOB    Activity Tolerance   Sitting Edge of Bed 2 min   Short Term Goals   Short Term Goal # 1 Pt will complete ADL transfers with maxA   Short Term Goal # 2 Pt will complete LB dressing with maxA   Short Term Goal # 3 Pt will complete toileting with maxA   Education Group   Education Provided Role of Occupational Therapist   Role of Occupational Therapist Patient Response Patient;Acceptance;Explanation;Reinforcement Needed   Occupational Therapy Initial Treatment Plan    Treatment Interventions Self Care / Activities of Daily Living;Adaptive Equipment;Manual Therapy Techniques;Neuro Re-Education / Balance;Therapeutic Exercises;Therapeutic Activity   Treatment Frequency 3 Times per Week   Duration Until Therapy Goals Met   Problem List   Problem List Decreased Active Daily Living Skills;Decreased Homemaking Skills;Decreased Upper Extremity Strength Right;Decreased Upper Extremity Strength Left;Decreased Functional Mobility;Decreased Activity Tolerance;Impaired Posture / Trunk Alignment;Safety Awareness Deficits / Cognition;Impaired Postural Control / Balance;Impaired Cognitive Function   Anticipated Discharge Equipment and Recommendations   DC Equipment Recommendations Unable to determine at this time   Discharge Recommendations Recommend post-acute placement for additional occupational therapy services prior to discharge home   Interdisciplinary Plan of Care Collaboration   IDT Collaboration with  Nursing;Physical Therapist   Patient Position at End of Therapy In Bed;Bed Alarm On;Call Light within Reach;Tray Table within Reach;Phone within Reach   Collaboration Comments RN updated

## 2024-02-22 NOTE — THERAPY
Physical Therapy   Initial Evaluation     Patient Name: Miroslava Matta  Age:  63 y.o., Sex:  female  Medical Record #: [id number]  Today's Date: 2/22/2024     Precautions  Precautions: Fall Risk;Swallow Precautions  Comments: multiple wounds    Assessment  Patient is 63 y.o. female that presented to acute with failure to thrive. Patient reported she has been unable to get out of her chair for the past two weeks; EMR indicates she was found there soiled in stool and urine after son called for welfare check. She presented to PT with significant wounds, pain, functional weakness, and decreased activity tolerance which are limiting her ability to safely perform functional mobility. She required max A x2 to initiate bed mobility but was unable to tolerate sitting EOB given pressure from bariatric bed rail on posterior thighs. Anticipate she may be capable of improved performance if bed rail not contributing to pain. Will follow.    Plan    Physical Therapy Initial Treatment Plan   Treatment Plan : Bed Mobility, Equipment, Gait Training, Manual Therapy, Neuro Re-Education / Balance, Self Care / Home Evaluation, Therapeutic Activities, Therapeutic Exercise, Stair Training  Treatment Frequency: 3 Times per Week  Duration: Until Therapy Goals Met    DC Equipment Recommendations: Unable to determine at this time  Discharge Recommendations: Recommend post-acute placement for additional physical therapy services prior to discharge home       Subjective    RN cleared patient for therapy, received in bed, reported pain but agreeable to attempting mobility with encouragement     Objective       02/22/24 1212   Charge Group   PT Evaluation PT Evaluation High   Total Time Spent   PT Total Time Yes   PT Evaluation Time Spent (Mins) 17   PT Total Time Spent (Calculated) 17   Initial Contact Note    Initial Contact Note Order Received and Verified, Physical Therapy Evaluation in Progress with Full Report to Follow.   Precautions  "  Precautions Fall Risk;Swallow Precautions   Comments multiple wounds   Vitals   O2 Delivery Device Heated High Flow Nasal Cannula   Pain 0 - 10 Group   Location Leg   Location Orientation Right;Left   Therapist Pain Assessment During Activity;Nurse Notified   Prior Living Situation   Prior Services Intermittent Physical Support for ADL Per Service   Housing / Facility 1 Story House   Steps Into Home 1   Steps In Home 0   Equipment Owned None   Lives with - Patient's Self Care Capacity Alone and Unable to Care For Self   Comments Patient reported she had \"service\" where \"lady\" was assisting 3-4 hours total/week, was assisting with groceries, etc. Patient reported this person quit. Reported her nephew might be able to assist but he works and is CG for his mom   Prior Level of Functional Mobility   Bed Mobility Independent   Transfer Status Independent   Ambulation Independent   Ambulation Distance household   Assistive Devices Used None   Stairs Unable To Determine At This Time   Comments Patient reported she is typically independent with in home mobility   History of Falls   History of Falls Yes  (reported 3 in last 6 months)   Cognition    Cognition / Consciousness X   Level of Consciousness Alert   Comments distracted by pain, poor insight into safety, deficits and effect on function   Active ROM Lower Body    Comments grossly limited by pain from wounds, anticipate also likely limited by weakness, effort   Strength Lower Body   Comments as above, grossly 2/5 BLE   Balance Assessment   Sitting Balance (Static) Trace +   Sitting Balance (Dynamic) Trace   Weight Shift Sitting Poor   Comments pain limiting sitting tolerance; anticipate patient capable of improved performance if rail from ender bed not causing increased pain   Bed Mobility    Supine to Sit Maximal Assist  (x2)   Sit to Supine Maximal Assist  (x2)   Scooting Maximal Assist  (x2)   Gait Analysis   Gait Level Of Assist Unable to Participate   Functional " Mobility   Sit to Stand Unable to Participate   Bed, Chair, Wheelchair Transfer Unable to Participate   How much difficulty does the patient currently have...   Turning over in bed (including adjusting bedclothes, sheets and blankets)? 1   Sitting down on and standing up from a chair with arms (e.g., wheelchair, bedside commode, etc.) 1   Moving from lying on back to sitting on the side of the bed? 1   How much help from another person does the patient currently need...   Moving to and from a bed to a chair (including a wheelchair)? 1   Need to walk in a hospital room? 1   Climbing 3-5 steps with a railing? 1   6 clicks Mobility Score 6   Patient / Family Goals    Patient / Family Goal #1 none stated   Short Term Goals    Short Term Goal # 1 Patient will move supine <> sitting EOB with mod A within 6tx in order to get in/out of bed   Short Term Goal # 2 Patient will maintain sitting balance at EOB > 5 min with SBA within 6tx in order to progress independence and perform functional task   Short Term Goal # 3 Patient will move sitting <> standing with LRAD and mod A within 6tx in order to initiate transfers and gait   Education Group   Education Provided Role of Physical Therapist   Role of Physical Therapist Patient Response Patient;Acceptance;Explanation;Verbal Demonstration   Physical Therapy Initial Treatment Plan    Treatment Plan  Bed Mobility;Equipment;Gait Training;Manual Therapy;Neuro Re-Education / Balance;Self Care / Home Evaluation;Therapeutic Activities;Therapeutic Exercise;Stair Training   Treatment Frequency 3 Times per Week   Duration Until Therapy Goals Met   Problem List    Problems Pain;Impaired Bed Mobility;Impaired Transfers;Impaired Ambulation;Functional ROM Deficit;Functional Strength Deficit;Impaired Balance;Impaired Coordination;Decreased Activity Tolerance;Safety Awareness Deficits / Cognition   Anticipated Discharge Equipment and Recommendations   DC Equipment Recommendations Unable to  determine at this time   Discharge Recommendations Recommend post-acute placement for additional physical therapy services prior to discharge home   Interdisciplinary Plan of Care Collaboration   IDT Collaboration with  Nursing;Occupational Therapist   Patient Position at End of Therapy In Bed;Call Light within Reach;Tray Table within Reach;Phone within Reach  (RN at bedside)   Collaboration Comments RN aware of visit, response   Session Information   Date / Session Number  2/22-1 (1/3, 2/28)

## 2024-02-22 NOTE — ASSESSMENT & PLAN NOTE
RVSP 85 mmHg by echo while echo 2018 was 55-60 mmHg.  IV lasix 80 mg BID  and potassium will be increased  Follow urine output      3/6/2024    I have consulted pulmonology Dr. Reyes for evaluation for severe pulmonary hypertension

## 2024-02-22 NOTE — WOUND TEAM
Assisted Kranthi LOCKHART (Wound RN), with wound care, non-selective debridement performed using wound cleanser/NS and gauze. Please see Kranthi LOCKHART (Wound RN) wound note for further wound care details.

## 2024-02-22 NOTE — CARE PLAN
Problem: Humidified High Flow Nasal Cannula  Goal: Maintain adequate oxygenation dependent on patient condition  Description: Target End Date:  resolve prior to discharge or when underlying condition is resolved/stabilized    1.  Implement humidified high flow oxygen therapy  2.  Titrate high flow oxygen to maintain appropriate SpO2  Outcome: Progressing   45L / 70%    Bipap 12/8 60%

## 2024-02-22 NOTE — PROGRESS NOTES
4 Eyes Skin Assessment Completed by JOSEPH Bains and JOSEPH Bullock    Skin assessment is primarily focused on high risk bony prominences. Pay special attention to skin beneath and around medical devices, high risk bony prominences, skin to skin areas and areas where the patient lacks sensation to feel pain and areas where the patient previously had breakdown.     HIGH RISK PRESSURE POINTS -  Sacrum/Coccyx Red, Non-Blanching, and Excoriation/Scratched  Right Heel Purple/Alcon and Black    Left Heel Pink and Blanching, Dry, flaky    HEAD & NECK DEVICES:  Occipital Region WDL  Right Ear Red and Non-Blanching, Blistered    Left Ear Pink and Blanching  Chin WDL  Neck Red and Blanching  Sternum WDL  NA, Intact with no device in use    RESPIRATORY DEVICES:  Lips WDL  Tongue WDL  Right Cheek WDL  Left Cheek WDL  Bridge of Nose WDL  High Flow Nasal Cannula    FEEDING DEVICES:  Nasal Septum WDL  NA    TORSO DEVICES:  Scapula Pink, Red, and Excoriation/Scratched  Spine Pink, Red, and Excoriation/Scratched  Back Pink, Red, and Excoriation/Scratched    Abdomen Pink and Scar    NA    LINES, BP MONITORING DEVICES IN USE:  Right Elbow, Forearm, Wrist, Hand WDL  Left Elbow, Forearm, Wrist, Hand WDL  Peripheral IV, BP Cuff, and Pulse Ox    ORTHOPEDIC DEVICES:  Right Hip Red and Ulcers    Left Hip Red and Excoriation/Scratched  Right Thigh Red, Non-Blanching, Open wound, and Ulcers    Left Thigh Red, Non-Blanching, Open wound, and Ulcers    Right Leg Red, Non-Blanching, Open wound, and Ulcers    Left Leg Red, Non-Blanching, Open wound, and Ulcers    Right Foot Red, Purple/Alcon, Black, and Ulcers    Left Foot Pink and Dry, flaky  NA    MISCELLANEOUS:  Axilla Red, Non-Blanching, Excoriation/Scratched, and Skin Tear  Groin Red, Non-Blanching, Excoriation, Open  Cespedes    PROTOCOL INTERVENTIONS:   Bariatric Low Airloss Bed Already in place  Float Heels with pillows Already in place  Q2 turns with TAPs System Already in place  TAPs Glide  sheet Already in place  Dri-Kiran/Micro Climate Pads Already in place  Barrier Paste Changed  Cespedes Already in place  Interdry Changed    WOUND PHOTOS:   Completed and in EPIC     WOUND CONSULT:   Wound team already following area(s) of concern

## 2024-02-22 NOTE — CARE PLAN
Problem: Impaired Gas Exchange  Goal: Patient will demonstrate improved ventilation and adequate oxygenation and participate in treatment regimen within the level of ability/situation.  Outcome: Progressing     Problem: Pain - Standard  Goal: Alleviation of pain or a reduction in pain to the patient’s comfort goal  Outcome: Progressing   The patient is Watcher - Medium risk of patient condition declining or worsening    Shift Goals  Clinical Goals: Wound care, turn q2, decrease o2 needs.    Progress made toward(s) clinical / shift goals:  Pt monitored for pain. Turn q 2 hours while in bed.    \

## 2024-02-22 NOTE — PROGRESS NOTES
[facility] Medicine Daily Progress Note    Date of Service  2/22/2024    Chief Complaint  Miroslava Matta is a 63 y.o. female admitted 2/21/2024 with failure to thrive.    Hospital Course  Sigrid is a 63 y.o. morbidly obese female with history of chronic A-fib on Xarelto, CHF, diabetes, hyperlipidemia, hypertension, COPD dependent on 4 L, RUTH/OHS noncompliant with CPAP, mood disorder who presented 2/21/2024 as a direct transfer from [facility] for higher level care.     It was reported that patient's son called for a welfare check.  She apparently was assisted by home health RN, but reported to have quit last Friday 2/16.  Apparently patient was found sitting in the chair soiled in stool and urine.  Patient reported she has had a fall since 1/25/2024.  She admits to having difficulty ambulating due to her weight and obesity.  She stated her home health RN is no longer able to care for her due to her large body habitus, and quit home health.  She has not had home health assistance since 2/16. Noted to have extensive wound on the back, buttock and legs.  Per transfer paperwork, patient reported to have had atrial fibrillation with RVR with rate in 150s, improved to 130s with 1 L IVF bolus and given Cardizem 30 mg oral one-time.   Workup from [facility] included:  CBC: WBC 13.5, hemoglobin 9.7, platelet 689  CMP: Glucose 211, BUN 27, creatinine 1.0, sodium 135, potassium 4.6, chloride 100, bicarb 26, anion gap 9, calcium 9.2, total protein 7.4, albumin 3.4, total bilirubin 1.0, AST 18, ALT 17, alkaline phosphatase 169   Lactic acid 5.4 --> 1.8  Troponin I undetectable  proBNP 2360, CXR: Cardiomegaly with probable CHF   Patient received treatment with ceftriaxone 1 g IV, Cardizem 30 mg oral, NS to 250cc bolus x 3, Zofran 4 mg IV.   Due to concern of severe sepsis she was directly admitted to the IMCU.    Interval Problem Update  2/22: Ms. Matta was evaluated in the IMCU. An echo yesterday revealed a RVSP  severely elevated at 85 mmHg. She is requiring high flow oxygen. IV lasix 80 mg BID ordered with potassium and BID BMPs ordered.     I have discussed this patient's plan of care and discharge plan at IDT rounds today with Case Management, Nursing, Nursing leadership, and other members of the IDT team.    Consultants/Specialty  pulmonary    Code Status  Full Code    Disposition  <MEDICALLYCLEARED>  I have placed the appropriate orders for post-discharge needs.    Review of Systems  Review of Systems   Respiratory:  Positive for shortness of breath.    Cardiovascular:  Negative for leg swelling.   All other systems reviewed and are negative.       Physical Exam  Temp:  [36.1 °C (97 °F)-37 °C (98.6 °F)] 36.8 °C (98.2 °F)  Pulse:  [] 113  Resp:  [11-51] 15  BP: (105-155)/(55-88) 151/75  SpO2:  [83 %-99 %] 94 %    Physical Exam  Vitals and nursing note reviewed.   Constitutional:       Appearance: She is obese. She is ill-appearing.   HENT:      Mouth/Throat:      Mouth: Mucous membranes are dry.   Cardiovascular:      Rate and Rhythm: Tachycardia present. Rhythm irregular.   Pulmonary:      Comments: High flow oxygen  Poor air movement  Abdominal:      General: There is no distension.      Tenderness: There is no abdominal tenderness.      Comments: Pannus with intertrigo    Genitourinary:     Comments: floyd  Musculoskeletal:      Right lower leg: No edema.      Left lower leg: No edema.      Comments: Right heel black eschar  Right posterior calf abrasions and exudate  Bilateral posterior thigh ulcerations open wounds   Neurological:      General: No focal deficit present.      Mental Status: She is oriented to person, place, and time.         Fluids    Intake/Output Summary (Last 24 hours) at 2/22/2024 0751  Last data filed at 2/22/2024 0700  Gross per 24 hour   Intake 1998.86 ml   Output 2800 ml   Net -801.14 ml       Laboratory  Recent Labs     02/21/24  0220 02/21/24  0420   WBC 10.9* 14.7*   RBC 2.75* 3.70*    HEMOGLOBIN 6.0* 8.1*   HEMATOCRIT 21.8* 29.0*   MCV 79.3* 78.4*   MCH 21.8* 21.9*   MCHC 27.5* 27.9*   RDW 66.4* 65.2*   PLATELETCT 392 539*   MPV 9.2 9.1     Recent Labs     02/21/24  0211   SODIUM 142   POTASSIUM 3.6   CHLORIDE 115*   CO2 17*   GLUCOSE 133*   BUN 19   CREATININE 0.47*   CALCIUM 5.3*             Recent Labs     02/21/24  0211   TRIGLYCERIDE 120   HDL 16*   LDL 46       Imaging  EC-ECHOCARDIOGRAM COMPLETE W/O CONT   Final Result      US-RENU SINGLE LEVEL BILAT   Final Result      DX-CHEST-LIMITED (1 VIEW)   Final Result      1.  Cardiomegaly with mild pulmonary vascular congestion.           Assessment/Plan  Acute respiratory failure with hypoxia (HCC)- (present on admission)  Assessment & Plan  Multifactorial: OHS/RUTH noncompliant with CPAP, volume overload, CHF, COPD  Pulmonary hypertension thus IV lasix 80 mg BID  She is requiring high flow oxygen.     Pulmonary hypertension (HCC)- (present on admission)  Assessment & Plan  RVSP 85 mmHg by echo while echo 2018 was 55-60 mmHg.  IV lasix 80 mg BID and potassium  BID BMPs to follow renal function and potassium  Follow urine output      Acute on chronic blood loss anemia  Assessment & Plan  Hgb 9.7 (prior to transfer) --> 8.1 at [facility]  No evidence of blood loss  Monitor with Hb in the morning  Iron 19 with %sat 9 for which IV iron ordered.      Elevated troponin  Assessment & Plan  Probable demand ischemia    Mood disorder (HCC)- (present on admission)  Assessment & Plan  Cymbalta    RUTH (obstructive sleep apnea)- (present on admission)  Assessment & Plan  Hx of  Her CPAP machine broke and she has been off since October  Nightly BiPAP    Morbid (severe) obesity with alveolar hypoventilation (HCC)- (present on admission)  Assessment & Plan  Diet and lifestyle modification  Body mass index is 64.91 kg/m².      Atrial fibrillation with RVR (HCC)- (present on admission)  Assessment & Plan  Restart home Cardizem 360 for rate control  Continue  Xarelto  Continuous tele monitoring      Severe sepsis ([facility])- (present on admission)  Assessment & Plan  This is Sepsis Present on admission  SIRS criteria identified on my evaluation include: Tachycardia, with heart rate greater than 90 BPM, Leukocytosis, with WBC greater than 12,000, and Bandemia, greater than 10% bands  Clinical indicators of end organ dysfunction include Lactic Acid greater than 2 and Toxic Metabolic Encephalopathy  Source is cellulitis leg, buttock  Sepsis protocol initiated  Crystalloid Fluid Administration: Fluid resuscitation ordered per standard protocol - 30 mL/kg per current or ideal body weight  IV antibiotics as appropriate for source of sepsis  Reassessment: I have reassessed the patient's hemodynamic status    Cellulitis of legs, buttock  WBC: 13.5, lactic acid 5.4 --> 1.8  S/p 1L IVF -- cautious with aggressive IVF due to volume overload concerns  Antibiotic: to be stopped  Wound care  Cultures    COPD (chronic obstructive pulmonary disease) ([facility])- (present on admission)  Assessment & Plan  Without exacerbation  Nebs, pulmonary hygiene  Dulera, Spiriva    Diabetes ([facility])- (present on admission)  Assessment & Plan  ISS  Statin    Essential hypertension- (present on admission)  Assessment & Plan  Restart Cardizem 360         VTE prophylaxis:    therapeutic anticoagulation with xarelto 20 mg daily witih meals      I have performed a physical exam and reviewed and updated ROS and Plan today (2/22/2024). In review of yesterday's note (2/21/2024), there are no changes except as documented above.    Ms. Matta is critically ill. 34 minutes of critcal care time were spent with patient, nursing, pharmacy and in specific management of acute respiratory failure requiring initiation and titration of high flow oxygen as well as IV lasix with K in the IMCU. Prognosis is guarded.

## 2024-02-22 NOTE — DISCHARGE PLANNING
RNLOYDA spoke with Nara at [facility] in [place].  [phone] Confirmed that Pt had a caregiver going into the home for 20 hours per week.

## 2024-02-22 NOTE — DISCHARGE PLANNING
"Met with Pt at bedside, A&Ox4,on HHFNC 45L/70% SpO2 96%. At first does not engage in conversation, but eventually agreeable. She does not make eye contact. Initial answer as to why she is here is \"Because I fell\", however, eventually reports just feeling like \"giving up\". +Hx of SI 'one other time in her life' after divorce 6 years ago. Current ideation was to \"take off my oxygen and just go for a walk\".   Pt was receiving in home visits from [facility] [place] [place] [address]     [phone] Until \"Roro' her CG told Pt she 'didnt feel safe in her home because of Pt's incontinence\" and stopped visits as of 2/16. Pt says none of her wounds were addressed by CG. RNCM called S office in [facility], the  would not offer answers and referred to Nara the APS Manager. RNCM lvm. Also lvm with [facility] .   No other  services were in place for pt, she denies any other DMW other than 4LNC O2 at baseline.   Case Management Discharge Planning    Admission Date: 2/21/2024      6-Clicks ADL Score: 12  6-Clicks Mobility Score: 8    Anticipated Discharge Dispo: Discharge Disposition: D/T to SNF with Medicare cert in anticipation of skilled care (03)  Discharge Contact Phone Number: Mynor Blackwell ()    Action(s) Taken: Updated Provider/Nurse on Discharge Plan and DC Assessment Complete (See below)    Escalations Completed: None    Medically Clear: No    Next Steps: Anticipate post acute referrals after evals.     Barriers to Discharge: Medical clearance, Pending PT Evaluation, DME, and No Social Support    Is the patient up for discharge tomorrow: No  Care Transition Team Assessment    Information Source  Orientation Level: Oriented X4  Information Given By: Patient, Relative  Informant's Name: Rishi Lowe ()  Who is responsible for making decisions for patient? : Patient    Readmission Evaluation  Is this a " readmission?: No    Elopement Risk  Legal Hold: No  Ambulatory or Self Mobile in Wheelchair: No-Not an Elopement Risk  Elopement Risk: Not at Risk for Elopement    Interdisciplinary Discharge Planning  Lives with - Patient's Self Care Capacity: Alone and Unable to Care For Self  Patient or legal guardian wants to designate a caregiver: No  Support Systems: None  Housing / Facility: [address]  Name of Care Facility: None  Durable Medical Equipment: Home Oxygen    Discharge Preparedness  What is your plan after discharge?: Uncertain - pending medical team collaboration  What are your discharge supports?: Child  Prior Functional Level: Ambulatory, Bladder Incontinence, Needs Assist with Activities of Daily Living, Needs Assist with Medication Management  Difficulity with ADLs: Bathing, Dressing  Difficulity with IADLs: Driving    Functional Assesment  Prior Functional Level: Ambulatory, Bladder Incontinence, Needs Assist with Activities of Daily Living, Needs Assist with Medication Management    Finances  Financial Barriers to Discharge: No  Prescription Coverage: Yes    Vision / Hearing Impairment  Vision Impairment : No  Hearing Impairment : Yes  Hearing Impairment: Both Ears  Does Pt Need Special Equipment for the Hearing Impaired?: No         Advance Directive  Advance Directive?: None  Advance Directive offered?: AD Booklet refused    Domestic Abuse  Have you ever been the victim of abuse or violence?: Refused  Physical Abuse or Sexual Abuse: No  Verbal Abuse or Emotional Abuse: No  Possible Abuse/Neglect Reported to:: Not Applicable    Psychological Assessment  History of Substance Abuse: None  History of Psychiatric Problems: Yes (PTSD, Depression, Mood D/O)  Non-compliant with Treatment: No  Newly Diagnosed Illness: Yes    Discharge Risks or Barriers  Discharge risks or barriers?: Mental health, Lives alone, no community support, Complex medical needs  Patient risk factors: Bariatric, Cognitive / sensory /  physical deficit, Complex medical needs, Lack of outside supports, Lives alone and no community support, Mental health, Multiple organizational systems involved    Anticipated Discharge Information  Discharge Disposition: Disch to IP rehab facility or distinct part unit (62)  Discharge Contact Phone Number: Mynor Blackwell ()

## 2024-02-23 LAB
ALBUMIN SERPL BCP-MCNC: 2.7 G/DL (ref 3.2–4.9)
ALBUMIN SERPL ELPH-MCNC: 1.85 G/DL (ref 3.75–5.01)
ALBUMIN/GLOB SERPL: 0.9 G/DL
ALP SERPL-CCNC: 183 U/L (ref 30–99)
ALPHA1 GLOB SERPL ELPH-MCNC: 0.43 G/DL (ref 0.19–0.46)
ALPHA2 GLOB SERPL ELPH-MCNC: 0.77 G/DL (ref 0.48–1.05)
ALT SERPL-CCNC: 16 U/L (ref 2–50)
ANION GAP SERPL CALC-SCNC: 11 MMOL/L (ref 7–16)
AST SERPL-CCNC: 10 U/L (ref 12–45)
B-GLOBULIN SERPL ELPH-MCNC: 0.58 G/DL (ref 0.48–1.1)
BACTERIA UR CULT: NORMAL
BASOPHILS # BLD AUTO: 0.6 % (ref 0–1.8)
BASOPHILS # BLD: 0.04 K/UL (ref 0–0.12)
BILIRUB SERPL-MCNC: 0.5 MG/DL (ref 0.1–1.5)
BUN SERPL-MCNC: 23 MG/DL (ref 8–22)
CALCIUM ALBUM COR SERPL-MCNC: 9.4 MG/DL (ref 8.5–10.5)
CALCIUM SERPL-MCNC: 8.4 MG/DL (ref 8.5–10.5)
CHLORIDE SERPL-SCNC: 97 MMOL/L (ref 96–112)
CO2 SERPL-SCNC: 32 MMOL/L (ref 20–33)
CREAT SERPL-MCNC: 0.89 MG/DL (ref 0.5–1.4)
EOSINOPHIL # BLD AUTO: 0.37 K/UL (ref 0–0.51)
EOSINOPHIL NFR BLD: 5.1 % (ref 0–6.9)
ERYTHROCYTE [DISTWIDTH] IN BLOOD BY AUTOMATED COUNT: 67.8 FL (ref 35.9–50)
GAMMA GLOB SERPL ELPH-MCNC: 0.77 G/DL (ref 0.62–1.51)
GFR SERPLBLD CREATININE-BSD FMLA CKD-EPI: 73 ML/MIN/1.73 M 2
GLOBULIN SER CALC-MCNC: 3.1 G/DL (ref 1.9–3.5)
GLUCOSE BLD STRIP.AUTO-MCNC: 167 MG/DL (ref 65–99)
GLUCOSE BLD STRIP.AUTO-MCNC: 169 MG/DL (ref 65–99)
GLUCOSE BLD STRIP.AUTO-MCNC: 184 MG/DL (ref 65–99)
GLUCOSE SERPL-MCNC: 156 MG/DL (ref 65–99)
HCT VFR BLD AUTO: 28.5 % (ref 37–47)
HGB BLD-MCNC: 7.6 G/DL (ref 12–16)
IMM GRANULOCYTES # BLD AUTO: 0.06 K/UL (ref 0–0.11)
IMM GRANULOCYTES NFR BLD AUTO: 0.8 % (ref 0–0.9)
INTERPRETATION SERPL IFE-IMP: ABNORMAL
INTERPRETATION SERPL IFE-IMP: ABNORMAL
LYMPHOCYTES # BLD AUTO: 1.7 K/UL (ref 1–4.8)
LYMPHOCYTES NFR BLD: 23.5 % (ref 22–41)
MAGNESIUM SERPL-MCNC: 1.8 MG/DL (ref 1.5–2.5)
MCH RBC QN AUTO: 21.5 PG (ref 27–33)
MCHC RBC AUTO-ENTMCNC: 26.7 G/DL (ref 32.2–35.5)
MCV RBC AUTO: 80.7 FL (ref 81.4–97.8)
MONOCLONAL PROTEIN NL11656: ABNORMAL G/DL
MONOCYTES # BLD AUTO: 0.52 K/UL (ref 0–0.85)
MONOCYTES NFR BLD AUTO: 7.2 % (ref 0–13.4)
NEUTROPHILS # BLD AUTO: 4.53 K/UL (ref 1.82–7.42)
NEUTROPHILS NFR BLD: 62.8 % (ref 44–72)
NRBC # BLD AUTO: 0.02 K/UL
NRBC BLD-RTO: 0.3 /100 WBC (ref 0–0.2)
PATHOLOGY STUDY: ABNORMAL
PHOSPHATE SERPL-MCNC: 5.2 MG/DL (ref 2.5–4.5)
PLATELET # BLD AUTO: 490 K/UL (ref 164–446)
PMV BLD AUTO: 9.1 FL (ref 9–12.9)
POTASSIUM SERPL-SCNC: 4.2 MMOL/L (ref 3.6–5.5)
PROT SERPL-MCNC: 4.4 G/DL (ref 6.3–8.2)
PROT SERPL-MCNC: 5.8 G/DL (ref 6–8.2)
RBC # BLD AUTO: 3.53 M/UL (ref 4.2–5.4)
SIGNIFICANT IND 70042: NORMAL
SITE SITE: NORMAL
SODIUM SERPL-SCNC: 140 MMOL/L (ref 135–145)
SOURCE SOURCE: NORMAL
WBC # BLD AUTO: 7.2 K/UL (ref 4.8–10.8)

## 2024-02-23 PROCEDURE — A9270 NON-COVERED ITEM OR SERVICE: HCPCS | Performed by: HOSPITALIST

## 2024-02-23 PROCEDURE — 94660 CPAP INITIATION&MGMT: CPT

## 2024-02-23 PROCEDURE — 85025 COMPLETE CBC W/AUTO DIFF WBC: CPT

## 2024-02-23 PROCEDURE — A9270 NON-COVERED ITEM OR SERVICE: HCPCS | Performed by: STUDENT IN AN ORGANIZED HEALTH CARE EDUCATION/TRAINING PROGRAM

## 2024-02-23 PROCEDURE — 82962 GLUCOSE BLOOD TEST: CPT | Mod: 91

## 2024-02-23 PROCEDURE — 84100 ASSAY OF PHOSPHORUS: CPT

## 2024-02-23 PROCEDURE — 770000 HCHG ROOM/CARE - INTERMEDIATE ICU *

## 2024-02-23 PROCEDURE — 700111 HCHG RX REV CODE 636 W/ 250 OVERRIDE (IP): Mod: JZ | Performed by: HOSPITALIST

## 2024-02-23 PROCEDURE — 94640 AIRWAY INHALATION TREATMENT: CPT

## 2024-02-23 PROCEDURE — 83735 ASSAY OF MAGNESIUM: CPT

## 2024-02-23 PROCEDURE — 99291 CRITICAL CARE FIRST HOUR: CPT | Performed by: HOSPITALIST

## 2024-02-23 PROCEDURE — 700111 HCHG RX REV CODE 636 W/ 250 OVERRIDE (IP): Performed by: STUDENT IN AN ORGANIZED HEALTH CARE EDUCATION/TRAINING PROGRAM

## 2024-02-23 PROCEDURE — 80053 COMPREHEN METABOLIC PANEL: CPT

## 2024-02-23 PROCEDURE — 700102 HCHG RX REV CODE 250 W/ 637 OVERRIDE(OP): Performed by: HOSPITALIST

## 2024-02-23 PROCEDURE — 92526 ORAL FUNCTION THERAPY: CPT

## 2024-02-23 PROCEDURE — 700102 HCHG RX REV CODE 250 W/ 637 OVERRIDE(OP): Performed by: STUDENT IN AN ORGANIZED HEALTH CARE EDUCATION/TRAINING PROGRAM

## 2024-02-23 PROCEDURE — 700101 HCHG RX REV CODE 250: Performed by: HOSPITALIST

## 2024-02-23 RX ORDER — HYDROMORPHONE HYDROCHLORIDE 1 MG/ML
1 INJECTION, SOLUTION INTRAMUSCULAR; INTRAVENOUS; SUBCUTANEOUS 2 TIMES DAILY PRN
Status: DISCONTINUED | OUTPATIENT
Start: 2024-02-23 | End: 2024-02-25

## 2024-02-23 RX ORDER — FUROSEMIDE 10 MG/ML
80 INJECTION INTRAMUSCULAR; INTRAVENOUS
Status: DISCONTINUED | OUTPATIENT
Start: 2024-02-23 | End: 2024-02-25

## 2024-02-23 RX ADMIN — Medication 400 MG: at 17:43

## 2024-02-23 RX ADMIN — POTASSIUM CHLORIDE 20 MEQ: 1500 TABLET, EXTENDED RELEASE ORAL at 17:42

## 2024-02-23 RX ADMIN — ACETAMINOPHEN 650 MG: 325 TABLET, FILM COATED ORAL at 17:42

## 2024-02-23 RX ADMIN — DULOXETINE HYDROCHLORIDE 30 MG: 30 CAPSULE, DELAYED RELEASE ORAL at 05:37

## 2024-02-23 RX ADMIN — ACETAMINOPHEN 650 MG: 325 TABLET, FILM COATED ORAL at 00:29

## 2024-02-23 RX ADMIN — HYDROMORPHONE HYDROCHLORIDE 1 MG: 1 INJECTION, SOLUTION INTRAMUSCULAR; INTRAVENOUS; SUBCUTANEOUS at 15:26

## 2024-02-23 RX ADMIN — FUROSEMIDE 80 MG: 10 INJECTION, SOLUTION INTRAVENOUS at 16:49

## 2024-02-23 RX ADMIN — NYSTATIN: 100000 POWDER TOPICAL at 17:42

## 2024-02-23 RX ADMIN — ACETAMINOPHEN 650 MG: 325 TABLET, FILM COATED ORAL at 12:19

## 2024-02-23 RX ADMIN — INSULIN HUMAN 3 UNITS: 100 INJECTION, SOLUTION PARENTERAL at 12:16

## 2024-02-23 RX ADMIN — FERROUS GLUCONATE 324 MG: 324 TABLET ORAL at 07:56

## 2024-02-23 RX ADMIN — Medication 400 MG: at 05:37

## 2024-02-23 RX ADMIN — INSULIN HUMAN 4 UNITS: 100 INJECTION, SOLUTION PARENTERAL at 22:04

## 2024-02-23 RX ADMIN — Medication 1000 UNITS: at 05:32

## 2024-02-23 RX ADMIN — INSULIN HUMAN 3 UNITS: 100 INJECTION, SOLUTION PARENTERAL at 17:39

## 2024-02-23 RX ADMIN — OXYCODONE HYDROCHLORIDE AND ACETAMINOPHEN 500 MG: 500 TABLET ORAL at 05:37

## 2024-02-23 RX ADMIN — FOLIC ACID 1 MG: 1 TABLET ORAL at 17:42

## 2024-02-23 RX ADMIN — HYDROMORPHONE HYDROCHLORIDE 1 MG: 1 INJECTION, SOLUTION INTRAMUSCULAR; INTRAVENOUS; SUBCUTANEOUS at 03:38

## 2024-02-23 RX ADMIN — DAKIN'S SOLUTION 0.125% (QUARTER STRENGTH) 473 ML: 0.12 SOLUTION at 04:00

## 2024-02-23 RX ADMIN — NYSTATIN: 100000 POWDER TOPICAL at 12:22

## 2024-02-23 RX ADMIN — NYSTATIN: 100000 POWDER TOPICAL at 04:00

## 2024-02-23 RX ADMIN — CYANOCOBALAMIN TAB 500 MCG 1000 MCG: 500 TAB at 05:36

## 2024-02-23 RX ADMIN — MICONAZOLE NITRATE: 20 CREAM TOPICAL at 04:00

## 2024-02-23 RX ADMIN — DAKIN'S SOLUTION 0.125% (QUARTER STRENGTH) 1 ML: 0.12 SOLUTION at 17:41

## 2024-02-23 RX ADMIN — TOPIRAMATE 25 MG: 25 TABLET, FILM COATED ORAL at 18:00

## 2024-02-23 RX ADMIN — FUROSEMIDE 80 MG: 10 INJECTION, SOLUTION INTRAVENOUS at 09:41

## 2024-02-23 RX ADMIN — INSULIN HUMAN 3 UNITS: 100 INJECTION, SOLUTION PARENTERAL at 07:53

## 2024-02-23 RX ADMIN — DIGOXIN 125 MCG: 0.12 TABLET ORAL at 17:42

## 2024-02-23 RX ADMIN — ATORVASTATIN CALCIUM 40 MG: 40 TABLET, FILM COATED ORAL at 17:43

## 2024-02-23 RX ADMIN — POTASSIUM CHLORIDE 20 MEQ: 1500 TABLET, EXTENDED RELEASE ORAL at 05:32

## 2024-02-23 RX ADMIN — RIVAROXABAN 20 MG: 20 TABLET, FILM COATED ORAL at 17:42

## 2024-02-23 RX ADMIN — ACETAMINOPHEN 650 MG: 325 TABLET, FILM COATED ORAL at 05:31

## 2024-02-23 RX ADMIN — FOLIC ACID 1 MG: 1 TABLET ORAL at 05:36

## 2024-02-23 RX ADMIN — MICONAZOLE NITRATE: 20 CREAM TOPICAL at 18:00

## 2024-02-23 RX ADMIN — HYDROMORPHONE HYDROCHLORIDE 1 MG: 1 INJECTION, SOLUTION INTRAMUSCULAR; INTRAVENOUS; SUBCUTANEOUS at 15:00

## 2024-02-23 RX ADMIN — FERROUS GLUCONATE 324 MG: 324 TABLET ORAL at 17:43

## 2024-02-23 RX ADMIN — TOPIRAMATE 25 MG: 25 TABLET, FILM COATED ORAL at 05:38

## 2024-02-23 RX ADMIN — FUROSEMIDE 80 MG: 10 INJECTION, SOLUTION INTRAVENOUS at 05:31

## 2024-02-23 ASSESSMENT — PAIN DESCRIPTION - PAIN TYPE
TYPE: ACUTE PAIN

## 2024-02-23 ASSESSMENT — PULMONARY FUNCTION TESTS: EPAP_CMH2O: 8

## 2024-02-23 ASSESSMENT — ENCOUNTER SYMPTOMS
ROS GI COMMENTS: EATING WELL
FEVER: 0
SHORTNESS OF BREATH: 1

## 2024-02-23 NOTE — PROGRESS NOTES
Assumed care of patient, report received from JOSEPH Hughes.  Patient is resting in bed, pain well controlled.  Safety precautions in place, no needs at this time.

## 2024-02-23 NOTE — PROGRESS NOTES
4 Eyes Skin Assessment Completed by Ellen RUIZ and Edwige RUIZ.    Occipital Region WDL  Ears Right Ear Red and non-blanching, blistered  Left Ear Pink and blanching  Chin WDL  Lips WDL   Tongue WDL   Right Cheek WDL   Left Cheek WDL   Neck Red and blanching  Sternum WDL Bridge of Nose WDL   Scapula Pink, Red, and Excoriation/Scratched  Spine Pink, Red, and Excoriation/Scratched  Back Pink, Red, and Excoriation/Scratched  Abdomen Pink and Scar  Right Elbow, Forearm, Wrist, Hand WDL  Left Elbow, Forearm, Wrist, Hand WDL   Right Hip Red and Ulcers  Left Hip Red and Excoriation/Scratched  Right Thigh Red, Non-blanching, open wound and ulcers  Right Leg Red, Non-blanching, open wound, and ulcers  Left leg Red, non blanching, open wound and ulcers  Right Foot Red, Purple/maroon, black and ulcers  Left Foot Pink and dry, flake   Sacrum/Coccys Red, non-blanching, and excoriation/scratched  Right Heel Purple/maroon and black  Left Heel Pink and blanching, dry, flaky          Devices In Places ECG, Blood Pressure Cuff, Pulse Ox, Cespedes, HFNC, and Bipap      Interventions In Place Bipap Protecta-Gel, InterDry, Heel Float Boots, TAP System, Q2 Turns, Low Air Loss Mattress, and Barrier Cream    Possible Skin Injury Yes    Pictures Uploaded Into Epic Yes  Wound Consult Placed Yes  RN Wound Prevention Protocol Ordered Yes

## 2024-02-23 NOTE — CARE PLAN
The patient is Watcher - Medium risk of patient condition declining or worsening    Shift Goals  Clinical Goals: wound care, wean oxygen, VSS  Patient Goals: eating and comfort  Family Goals: unable to assess    Progress made toward(s) clinical / shift goals:     Problem: Knowledge Deficit - Standard  Goal: Patient and family/care givers will demonstrate understanding of plan of care, disease process/condition, diagnostic tests and medications  Outcome: Progressing     Problem: Knowledge Deficit - COPD  Goal: Patient/significant other demonstrates understanding of disease process, utilization of the Action Plan, medications and discharge instruction  Outcome: Progressing     Problem: Risk for Infection - COPD  Goal: Patient will remain free from signs and symptoms of infection  Outcome: Progressing     Problem: Nutrition - Advanced  Goal: Patient will display progressive weight gain toward goal have adequate food and fluid intake  Outcome: Progressing     Problem: Ineffective Airway Clearance  Goal: Patient will maintain patent airway with clear/clearing breath sounds  Outcome: Progressing     Problem: Risk for Aspiration  Goal: Patient's risk for aspiration will be absent or decrease  Outcome: Progressing       Patient is not progressing towards the following goals:    Problem: Impaired Gas Exchange  Goal: Patient will demonstrate improved ventilation and adequate oxygenation and participate in treatment regimen within the level of ability/situation.  Outcome: Not Progressing         Problem: Impaired Gas Exchange  Goal: Patient will demonstrate improved ventilation and adequate oxygenation and participate in treatment regimen within the level of ability/situation.  Outcome: Not Progressing

## 2024-02-23 NOTE — THERAPY
Speech Language Pathology   Daily Treatment     Patient Name: Miroslava Matta  AGE:  63 y.o., SEX:  female  Medical Record #: [id number]  Date of Service: 2/23/2024      Precautions:  Precautions: Fall Risk, Swallow Precautions    Subjective  RN cleared patient for dysphagia tx session. Patient received awake, alert, and cooperative. Patient currently on RG7/TN0 diet and increased to 50L O2 via HFNC compared to 30L O2 via HFNC during initial CSE.     Assessment  Patient agreeable to PO trials and independently elevated HOB to 90 degrees prior to any PO trials. Patient consumed PO trials of self-delivered soft solids, mixed consistencies, dry solids, and thin liquids via straw. Patient demonstrated appropriate bite/sip size and rate and appropriate bolus acceptance and containment. Mastication was timely w/ complete oral clearance achieved on all textures. No s/sx of aspiration noted on any textures trialed, including thin liquids via straw, which is improvement from initial CSE. Patient educated extensively on dysphagia, increased risk for aspiration w/ high O2 needs, aspiration as possible contributing factor to high O2 needs, and SLP recs/role. Patient verbalized understanding and still declining diagnostic swallow study at this time.     Clinical Impressions  Patient presents with functional oropharyngeal swallow at the bedside to continue regular diet w/ thin liquids. However, patient would benefit from FEES to further evaluate oropharyngeal swallow function and r/o aspiration as etiology of high O2 needs, but she is currently refusing. SLP will follow for continued diet tolerance and will complete diagnostic swallow study if/when patient agreeable.       Recommendations  Treatment Completed: Dysphagia Treatment     Dysphagia Treatment  Diet Consistency: Regular diet w/ thin liquids  Instrumentation: Instrumental swallow study pending clinical progress  Medication: As tolerated  Supervision: Assist with meal  "tray set up, Encourage self-feeding  Positioning: Fully upright and midline during oral intake  Risk Management : Small bites/sips, Slow rate of intake, Reduce environmental distractions, Physical mobility, as tolerated  Oral Care: Q6h    SLP Treatment Plan  Treatment Plan: Dysphagia Treatment  SLP Frequency: 3x Per Week  Estimated Duration: Until Therapy Goals Met    Anticipated Discharge Needs  Discharge Recommendations: Anticipate that the patient will have no further speech therapy needs after discharge from the hospital  Therapy Recommendations Upon DC: Dysphagia Training, Community Re-Integration, Patient / Family / Caregiver Education    Patient / Family Goals  Patient / Family Goal #1: \"I want Jello.\"  Goal #1 Outcome: Progressing as expected  Short Term Goals  Short Term Goal # 1: Pt will consume a regular.thin liquid diet without  s/sx of airway invasion.  Goal Outcome # 1: Progressing as expected    Natalia Crabtree, SLP  "

## 2024-02-23 NOTE — DISCHARGE PLANNING
Completed [facility] #[id number]    0900 Per telephone conversation with SonMynor, Pt's home is non inhabitable and the Owner of the residence has started eviction process.

## 2024-02-23 NOTE — PROGRESS NOTES
[facility] Medicine Daily Progress Note    Date of Service  2/23/2024    Chief Complaint  Miroslava Matta is a 63 y.o. female admitted 2/21/2024 with failure to thrive.    Hospital Course  Sigrid is a 63 y.o. morbidly obese female with history of chronic A-fib on Xarelto, CHF, diabetes, hyperlipidemia, hypertension, COPD dependent on 4 L, RUTH/OHS noncompliant with CPAP, mood disorder who presented 2/21/2024 as a direct transfer from [facility] for higher level care.     It was reported that patient's son called for a welfare check.  She apparently was assisted by home health RN, but reported to have quit last Friday 2/16.  Apparently patient was found sitting in the chair soiled in stool and urine.  Patient reported she has had a fall since 1/25/2024.  She admits to having difficulty ambulating due to her weight and obesity.  She stated her home health RN is no longer able to care for her due to her large body habitus, and quit home health.  She has not had home health assistance since 2/16. Noted to have extensive wound on the back, buttock and legs.  Per transfer paperwork, patient reported to have had atrial fibrillation with RVR with rate in 150s, improved to 130s with 1 L IVF bolus and given Cardizem 30 mg oral one-time.   Workup from [facility] included:  CBC: WBC 13.5, hemoglobin 9.7, platelet 689  CMP: Glucose 211, BUN 27, creatinine 1.0, sodium 135, potassium 4.6, chloride 100, bicarb 26, anion gap 9, calcium 9.2, total protein 7.4, albumin 3.4, total bilirubin 1.0, AST 18, ALT 17, alkaline phosphatase 169   Lactic acid 5.4 --> 1.8  Troponin I undetectable  proBNP 2360, CXR: Cardiomegaly with probable CHF   Patient received treatment with ceftriaxone 1 g IV, Cardizem 30 mg oral, NS to 250cc bolus x 3, Zofran 4 mg IV.   Due to concern of severe sepsis she was directly admitted to the IMCU.    Interval Problem Update  2/22: Ms. Matta was evaluated in the IMCU. An echo yesterday revealed a RVSP  severely elevated at 85 mmHg. She is requiring high flow oxygen. IV lasix 80 mg BID ordered with potassium and BID BMPs ordered.   2/23: Ms. Matta was seen in the IMCU. She is on IV lasix 80 mg BID with K supplementation and her K this morning is 4.2. she was on BiPAP over night now high flow 50 liters and 70%. She remains in afib. She had 950 mL urine over night.      I have discussed this patient's plan of care and discharge plan at IDT rounds today with Case Management, Nursing, Nursing leadership, and other members of the IDT team.    Consultants/Specialty  none    Code Status  Full Code    Disposition  The patient is not medically cleared for discharge to home or a post-acute facility.  Anticipate discharge to: skilled nursing facility    I have placed the appropriate orders for post-discharge needs.    Review of Systems  Review of Systems   Constitutional:  Negative for fever.   Respiratory:  Positive for shortness of breath.    Cardiovascular:  Negative for leg swelling.   Gastrointestinal:         Eating well   All other systems reviewed and are negative.       Physical Exam  Pulse:  [] 113  Resp:  [10-37] 10  BP: (101-152)/(58-83) 128/64  SpO2:  [92 %-99 %] 92 %    Physical Exam  Vitals and nursing note reviewed.   Constitutional:       Appearance: She is obese. She is ill-appearing. She is not toxic-appearing.   HENT:      Mouth/Throat:      Mouth: Mucous membranes are dry.   Cardiovascular:      Rate and Rhythm: Tachycardia present. Rhythm irregular.   Pulmonary:      Comments: High flow oxygen  Poor air movement  Abdominal:      General: There is no distension.      Tenderness: There is no abdominal tenderness.      Comments: Pannus with intertrigo    Genitourinary:     Comments: floyd  Musculoskeletal:      Cervical back: Neck supple.      Right lower leg: No edema.      Left lower leg: No edema.      Comments: Right heel black eschar  Right posterior calf abrasions and exudate  Bilateral  posterior thigh ulcerations open wounds   Neurological:      General: No focal deficit present.      Mental Status: She is oriented to person, place, and time.   Psychiatric:         Mood and Affect: Mood normal.         Behavior: Behavior normal.         Fluids    Intake/Output Summary (Last 24 hours) at 2/23/2024 0727  Last data filed at 2/23/2024 0500  Gross per 24 hour   Intake 1440 ml   Output 3175 ml   Net -1735 ml       Laboratory  Recent Labs     02/21/24  0220 02/21/24  0420 02/23/24  0230   WBC 10.9* 14.7* 7.2   RBC 2.75* 3.70* 3.53*   HEMOGLOBIN 6.0* 8.1* 7.6*   HEMATOCRIT 21.8* 29.0* 28.5*   MCV 79.3* 78.4* 80.7*   MCH 21.8* 21.9* 21.5*   MCHC 27.5* 27.9* 26.7*   RDW 66.4* 65.2* 67.8*   PLATELETCT 392 539* 490*   MPV 9.2 9.1 9.1     Recent Labs     02/21/24  0211 02/22/24  1445 02/23/24  0230   SODIUM 142 135 140   POTASSIUM 3.6 4.2 4.2   CHLORIDE 115* 95* 97   CO2 17* 30 32   GLUCOSE 133* 171* 156*   BUN 19 24* 23*   CREATININE 0.47* 0.91 0.89   CALCIUM 5.3* 8.8 8.4*             Recent Labs     02/21/24  0211   TRIGLYCERIDE 120   HDL 16*   LDL 46       Imaging  EC-ECHOCARDIOGRAM COMPLETE W/O CONT   Final Result      US-RENU SINGLE LEVEL BILAT   Final Result      DX-CHEST-LIMITED (1 VIEW)   Final Result      1.  Cardiomegaly with mild pulmonary vascular congestion.           Assessment/Plan  Acute respiratory failure with hypoxia (HCC)- (present on admission)  Assessment & Plan  Acute on chronic  Multifactorial: OHS/RUTH noncompliant with CPAP, volume overload, HFpEF  She is on 4 liters oxygen at home  Pulmonary hypertension thus IV lasix 80 mg BID  She is requiring high flow oxygen which is being titrated.   Nocturnal BiPAP     Pulmonary hypertension (HCC)- (present on admission)  Assessment & Plan  RVSP 85 mmHg by echo while echo 2018 was 55-60 mmHg.  IV lasix 80 mg BID and potassium  BID BMPs to follow renal function and potassium  Follow urine output  Fluid restriction.      Acute on chronic blood loss  anemia- (present on admission)  Assessment & Plan  Hgb 9.7 (prior to transfer) --> 7.6 at [facility]  No evidence of blood loss  Monitor with Hb in the next few days. It may go up with diuresis.  Iron 19 with %sat 9 for which IV iron ordered.      Elevated troponin  Assessment & Plan  Probable demand ischemia    Mood disorder ([facility])- (present on admission)  Assessment & Plan  Cymbalta    RUTH (obstructive sleep apnea)- (present on admission)  Assessment & Plan  Hx of  Her CPAP machine broke and she has been off since October  Nightly BiPAP while inpatient    Morbid (severe) obesity with alveolar hypoventilation ([facility])- (present on admission)  Assessment & Plan  Diet and lifestyle modification  Body mass index is 64.91 kg/m².      Atrial fibrillation with RVR ([facility])- (present on admission)  Assessment & Plan  Restart home Cardizem 360 for rate control  Continue Xarelto  Continuous tele monitoring      Severe sepsis ([facility])- (present on admission)  Assessment & Plan  This is Sepsis Present on admission  SIRS criteria identified on my evaluation include: Tachycardia, with heart rate greater than 90 BPM, Leukocytosis, with WBC greater than 12,000, and Bandemia, greater than 10% bands  Clinical indicators of end organ dysfunction include Lactic Acid greater than 2 and Toxic Metabolic Encephalopathy  Source is cellulitis leg, buttock  Sepsis protocol initiated  Crystalloid Fluid Administration: Fluid resuscitation ordered per standard protocol was given  IV antibiotics as appropriate for source of sepsis  Reassessment: I have reassessed the patient's hemodynamic status    Cellulitis of legs, buttock  WBC: 13.5, lactic acid 5.4 --> 1.8  S/p 1L IVF no further fluids  Antibiotic:  stopped  Wound care      COPD (chronic obstructive pulmonary disease) ([facility])- (present on admission)  Assessment & Plan  Without exacerbation  Nebs, pulmonary hygiene  Dulera, Spiriva    Diabetes ([facility])- (present on admission)  Assessment &  Plan  ISS  Statin    Essential hypertension- (present on admission)  Assessment & Plan  Restart Cardizem 360         VTE prophylaxis:    therapeutic anticoagulation with xarelto 20 mg daily witih meals      I have performed a physical exam and reviewed and updated ROS and Plan today (2/23/2024). In review of yesterday's note (2/22/2024), there are no changes except as documented above.    Ms. Matta is critically ill. 31 minutes of critcal care time were spent with patient, nursing, pharmacy and in specific management of acute respiratory failure requiring initiation and titration of high flow oxygen as well as IV lasix with K in the IMCU. Prognosis is guarded.

## 2024-02-24 PROBLEM — J44.9 COPD (CHRONIC OBSTRUCTIVE PULMONARY DISEASE) (HCC): Status: RESOLVED | Noted: 2018-05-16 | Resolved: 2024-02-24

## 2024-02-24 LAB
ANION GAP SERPL CALC-SCNC: 7 MMOL/L (ref 7–16)
BUN SERPL-MCNC: 25 MG/DL (ref 8–22)
CALCIUM SERPL-MCNC: 8.4 MG/DL (ref 8.5–10.5)
CHLORIDE SERPL-SCNC: 95 MMOL/L (ref 96–112)
CO2 SERPL-SCNC: 38 MMOL/L (ref 20–33)
COLLECT DURATION TIME SPEC: ABNORMAL HRS
CREAT SERPL-MCNC: 0.83 MG/DL (ref 0.5–1.4)
GFR SERPLBLD CREATININE-BSD FMLA CKD-EPI: 79 ML/MIN/1.73 M 2
GLUCOSE BLD STRIP.AUTO-MCNC: 142 MG/DL (ref 65–99)
GLUCOSE BLD STRIP.AUTO-MCNC: 161 MG/DL (ref 65–99)
GLUCOSE BLD STRIP.AUTO-MCNC: 164 MG/DL (ref 65–99)
GLUCOSE BLD STRIP.AUTO-MCNC: 179 MG/DL (ref 65–99)
GLUCOSE BLD STRIP.AUTO-MCNC: 215 MG/DL (ref 65–99)
GLUCOSE SERPL-MCNC: 138 MG/DL (ref 65–99)
INTERPRETATION UR IFE-IMP: ABNORMAL
KAPPA LC FREE 24H UR-MRATE: ABNORMAL MG/D
KAPPA LC FREE UR-MCNC: 163.59 MG/L (ref 0–32.9)
LAMBDA LC FREE 24H UR-MRATE: ABNORMAL MG/D
LAMBDA LC FREE UR-MCNC: 23.22 MG/L (ref 0–3.79)
POTASSIUM SERPL-SCNC: 3.7 MMOL/L (ref 3.6–5.5)
PROT 24H UR-MRATE: ABNORMAL MG/D
SODIUM SERPL-SCNC: 140 MMOL/L (ref 135–145)
SPECIMEN VOL ?TM UR: ABNORMAL ML
VIT B1 BLD-MCNC: 115 NMOL/L (ref 70–180)

## 2024-02-24 PROCEDURE — 700102 HCHG RX REV CODE 250 W/ 637 OVERRIDE(OP): Performed by: STUDENT IN AN ORGANIZED HEALTH CARE EDUCATION/TRAINING PROGRAM

## 2024-02-24 PROCEDURE — 700111 HCHG RX REV CODE 636 W/ 250 OVERRIDE (IP): Performed by: STUDENT IN AN ORGANIZED HEALTH CARE EDUCATION/TRAINING PROGRAM

## 2024-02-24 PROCEDURE — A9270 NON-COVERED ITEM OR SERVICE: HCPCS | Performed by: STUDENT IN AN ORGANIZED HEALTH CARE EDUCATION/TRAINING PROGRAM

## 2024-02-24 PROCEDURE — 700102 HCHG RX REV CODE 250 W/ 637 OVERRIDE(OP): Mod: JZ | Performed by: HOSPITALIST

## 2024-02-24 PROCEDURE — 770000 HCHG ROOM/CARE - INTERMEDIATE ICU *

## 2024-02-24 PROCEDURE — 94640 AIRWAY INHALATION TREATMENT: CPT

## 2024-02-24 PROCEDURE — 80048 BASIC METABOLIC PNL TOTAL CA: CPT

## 2024-02-24 PROCEDURE — 700101 HCHG RX REV CODE 250: Performed by: HOSPITALIST

## 2024-02-24 PROCEDURE — 82962 GLUCOSE BLOOD TEST: CPT

## 2024-02-24 PROCEDURE — 700105 HCHG RX REV CODE 258: Performed by: HOSPITALIST

## 2024-02-24 PROCEDURE — 99291 CRITICAL CARE FIRST HOUR: CPT | Performed by: HOSPITALIST

## 2024-02-24 PROCEDURE — 700111 HCHG RX REV CODE 636 W/ 250 OVERRIDE (IP): Mod: JZ | Performed by: HOSPITALIST

## 2024-02-24 PROCEDURE — A9270 NON-COVERED ITEM OR SERVICE: HCPCS | Mod: JZ | Performed by: HOSPITALIST

## 2024-02-24 RX ORDER — POTASSIUM CHLORIDE 20 MEQ/1
40 TABLET, EXTENDED RELEASE ORAL 2 TIMES DAILY
Status: DISCONTINUED | OUTPATIENT
Start: 2024-02-24 | End: 2024-02-25

## 2024-02-24 RX ORDER — HYDROMORPHONE HYDROCHLORIDE 1 MG/ML
1 INJECTION, SOLUTION INTRAMUSCULAR; INTRAVENOUS; SUBCUTANEOUS ONCE
Status: COMPLETED | OUTPATIENT
Start: 2024-02-24 | End: 2024-02-24

## 2024-02-24 RX ADMIN — DULOXETINE HYDROCHLORIDE 30 MG: 30 CAPSULE, DELAYED RELEASE ORAL at 05:59

## 2024-02-24 RX ADMIN — POTASSIUM CHLORIDE 20 MEQ: 1500 TABLET, EXTENDED RELEASE ORAL at 05:59

## 2024-02-24 RX ADMIN — SODIUM CHLORIDE 250 MG: 9 INJECTION, SOLUTION INTRAVENOUS at 15:02

## 2024-02-24 RX ADMIN — HYDROMORPHONE HYDROCHLORIDE 1 MG: 1 INJECTION, SOLUTION INTRAMUSCULAR; INTRAVENOUS; SUBCUTANEOUS at 01:37

## 2024-02-24 RX ADMIN — POTASSIUM CHLORIDE 40 MEQ: 1500 TABLET, EXTENDED RELEASE ORAL at 18:02

## 2024-02-24 RX ADMIN — SODIUM CHLORIDE 250 MG: 9 INJECTION, SOLUTION INTRAVENOUS at 18:07

## 2024-02-24 RX ADMIN — MOMETASONE FUROATE AND FORMOTEROL FUMARATE DIHYDRATE 2 PUFF: 200; 5 AEROSOL RESPIRATORY (INHALATION) at 06:21

## 2024-02-24 RX ADMIN — OXYCODONE HYDROCHLORIDE AND ACETAMINOPHEN 500 MG: 500 TABLET ORAL at 05:59

## 2024-02-24 RX ADMIN — MICONAZOLE NITRATE: 20 CREAM TOPICAL at 16:34

## 2024-02-24 RX ADMIN — DIGOXIN 125 MCG: 0.12 TABLET ORAL at 18:02

## 2024-02-24 RX ADMIN — Medication 400 MG: at 05:59

## 2024-02-24 RX ADMIN — MOMETASONE FUROATE AND FORMOTEROL FUMARATE DIHYDRATE 2 PUFF: 200; 5 AEROSOL RESPIRATORY (INHALATION) at 18:05

## 2024-02-24 RX ADMIN — ACETAMINOPHEN 650 MG: 325 TABLET, FILM COATED ORAL at 05:58

## 2024-02-24 RX ADMIN — FUROSEMIDE 80 MG: 10 INJECTION, SOLUTION INTRAVENOUS at 16:17

## 2024-02-24 RX ADMIN — MICONAZOLE NITRATE: 20 CREAM TOPICAL at 06:27

## 2024-02-24 RX ADMIN — HYDROMORPHONE HYDROCHLORIDE 1 MG: 1 INJECTION, SOLUTION INTRAMUSCULAR; INTRAVENOUS; SUBCUTANEOUS at 17:03

## 2024-02-24 RX ADMIN — HYDROMORPHONE HYDROCHLORIDE 1 MG: 1 INJECTION, SOLUTION INTRAMUSCULAR; INTRAVENOUS; SUBCUTANEOUS at 16:10

## 2024-02-24 RX ADMIN — ACETAMINOPHEN 650 MG: 325 TABLET, FILM COATED ORAL at 18:02

## 2024-02-24 RX ADMIN — Medication 400 MG: at 18:02

## 2024-02-24 RX ADMIN — ACETAMINOPHEN 650 MG: 325 TABLET, FILM COATED ORAL at 13:04

## 2024-02-24 RX ADMIN — INSULIN HUMAN 3 UNITS: 100 INJECTION, SOLUTION PARENTERAL at 10:16

## 2024-02-24 RX ADMIN — ACETAMINOPHEN 650 MG: 325 TABLET, FILM COATED ORAL at 23:50

## 2024-02-24 RX ADMIN — FOLIC ACID 1 MG: 1 TABLET ORAL at 05:59

## 2024-02-24 RX ADMIN — Medication 1000 UNITS: at 06:01

## 2024-02-24 RX ADMIN — DAKIN'S SOLUTION 0.125% (QUARTER STRENGTH) 473 ML: 0.12 SOLUTION at 16:35

## 2024-02-24 RX ADMIN — CYANOCOBALAMIN TAB 500 MCG 1000 MCG: 500 TAB at 06:00

## 2024-02-24 RX ADMIN — FOLIC ACID 1 MG: 1 TABLET ORAL at 18:02

## 2024-02-24 RX ADMIN — TOPIRAMATE 25 MG: 25 TABLET, FILM COATED ORAL at 18:02

## 2024-02-24 RX ADMIN — ACETAMINOPHEN 650 MG: 325 TABLET, FILM COATED ORAL at 01:19

## 2024-02-24 RX ADMIN — RIVAROXABAN 20 MG: 20 TABLET, FILM COATED ORAL at 18:00

## 2024-02-24 RX ADMIN — HYDROMORPHONE HYDROCHLORIDE 1 MG: 1 INJECTION, SOLUTION INTRAMUSCULAR; INTRAVENOUS; SUBCUTANEOUS at 17:01

## 2024-02-24 RX ADMIN — FERROUS GLUCONATE 324 MG: 324 TABLET ORAL at 10:20

## 2024-02-24 RX ADMIN — FUROSEMIDE 80 MG: 10 INJECTION, SOLUTION INTRAVENOUS at 06:01

## 2024-02-24 RX ADMIN — TIOTROPIUM BROMIDE INHALATION SPRAY 5 MCG: 3.12 SPRAY, METERED RESPIRATORY (INHALATION) at 10:25

## 2024-02-24 RX ADMIN — ATORVASTATIN CALCIUM 40 MG: 40 TABLET, FILM COATED ORAL at 18:02

## 2024-02-24 RX ADMIN — DAKIN'S SOLUTION 0.125% (QUARTER STRENGTH) 25 ML: 0.12 SOLUTION at 06:26

## 2024-02-24 RX ADMIN — INSULIN HUMAN 3 UNITS: 100 INJECTION, SOLUTION PARENTERAL at 22:14

## 2024-02-24 RX ADMIN — INSULIN HUMAN 3 UNITS: 100 INJECTION, SOLUTION PARENTERAL at 14:38

## 2024-02-24 RX ADMIN — TOPIRAMATE 25 MG: 25 TABLET, FILM COATED ORAL at 06:00

## 2024-02-24 ASSESSMENT — PAIN DESCRIPTION - PAIN TYPE
TYPE: ACUTE PAIN

## 2024-02-24 ASSESSMENT — ENCOUNTER SYMPTOMS
SHORTNESS OF BREATH: 1
FEVER: 0
ROS GI COMMENTS: EATING WELL
BACK PAIN: 1

## 2024-02-24 NOTE — PROGRESS NOTES
[facility] Medicine Daily Progress Note    Date of Service  2/24/2024    Chief Complaint  Miroslava Matta is a 63 y.o. female admitted 2/21/2024 with failure to thrive.    Hospital Course  Sigrid is a 63 y.o. morbidly obese female with history of chronic A-fib on Xarelto, CHF, diabetes, hyperlipidemia, hypertension, COPD dependent on 4 L, RUTH/OHS noncompliant with CPAP, mood disorder who presented 2/21/2024 as a direct transfer from [facility] for higher level care.     It was reported that patient's son called for a welfare check.  She apparently was assisted by home health RN, but reported to have quit last Friday 2/16.  Apparently patient was found sitting in the chair soiled in stool and urine.  Patient reported she has had a fall since 1/25/2024.  She admits to having difficulty ambulating due to her weight and obesity.  She stated her home health RN is no longer able to care for her due to her large body habitus, and quit home health.  She has not had home health assistance since 2/16. Noted to have extensive wound on the back, buttock and legs.  Per transfer paperwork, patient reported to have had atrial fibrillation with RVR with rate in 150s, improved to 130s with 1 L IVF bolus and given Cardizem 30 mg oral one-time.   Workup from [facility] included:  CBC: WBC 13.5, hemoglobin 9.7, platelet 689  CMP: Glucose 211, BUN 27, creatinine 1.0, sodium 135, potassium 4.6, chloride 100, bicarb 26, anion gap 9, calcium 9.2, total protein 7.4, albumin 3.4, total bilirubin 1.0, AST 18, ALT 17, alkaline phosphatase 169   Lactic acid 5.4 --> 1.8  Troponin I undetectable  proBNP 2360, CXR: Cardiomegaly with probable CHF   Patient received treatment with ceftriaxone 1 g IV, Cardizem 30 mg oral, NS to 250cc bolus x 3, Zofran 4 mg IV.   Due to concern of severe sepsis she was directly admitted to the IMCU.    Interval Problem Update  2/22: Ms. Matta was evaluated in the IMCU. An echo yesterday revealed a RVSP  severely elevated at 85 mmHg. She is requiring high flow oxygen. IV lasix 80 mg BID ordered with potassium and BID BMPs ordered.   2/23: Ms. Matta was seen in the IMCU. She is on IV lasix 80 mg BID with K supplementation and her K this morning is 4.2. she was on BiPAP over night now high flow 50 liters and 70%. She remains in afib. She had 950 mL urine over night.    2/24: Ms. Matta was evaluated in the IMCU. She is on IV Lasix 80 mg BID. Cr today is 0.83 with low K of 3.7.  She was not able to get up to side of bed yesterday with PT. 1.3 liters of urine over night. She was on BiPAP last night. We discussed the importance of trying to get up to side of bed.     I have discussed this patient's plan of care and discharge plan at IDT rounds today with Case Management, Nursing, Nursing leadership, and other members of the IDT team.    Consultants/Specialty  none    Code Status  Full Code    Disposition  The patient is not medically cleared for discharge to home or a post-acute facility.  Anticipate discharge to: skilled nursing facility    I have placed the appropriate orders for post-discharge needs.    Review of Systems  Review of Systems   Constitutional:  Negative for fever.   Respiratory:  Positive for shortness of breath.    Cardiovascular:  Negative for leg swelling.   Gastrointestinal:         Eating well   Musculoskeletal:  Positive for back pain.        Foot pain   All other systems reviewed and are negative.       Physical Exam  Temp:  [37.1 °C (98.8 °F)] 37.1 °C (98.8 °F)  Pulse:  [] 117  Resp:  [9-27] 20  BP: ()/() 95/69  SpO2:  [80 %-99 %] 95 %    Physical Exam  Vitals and nursing note reviewed.   Constitutional:       Appearance: She is obese. She is ill-appearing. She is not toxic-appearing.   Cardiovascular:      Rate and Rhythm: Tachycardia present. Rhythm irregular.   Pulmonary:      Comments: High flow oxygen  Poor air movement  No wheezing  Abdominal:      General: There is no  distension.      Tenderness: There is no abdominal tenderness.      Comments: Pannus with intertrigo    Genitourinary:     Comments: floyd  Musculoskeletal:      Cervical back: Neck supple.      Right lower leg: No edema.      Left lower leg: No edema.      Comments: Right heel black eschar  Right posterior calf abrasions and exudate  Bilateral posterior thigh ulcerations open wounds   Neurological:      General: No focal deficit present.      Mental Status: She is alert and oriented to person, place, and time.   Psychiatric:         Mood and Affect: Mood normal.         Behavior: Behavior normal.         Thought Content: Thought content normal.         Fluids    Intake/Output Summary (Last 24 hours) at 2/24/2024 0646  Last data filed at 2/24/2024 0200  Gross per 24 hour   Intake 1200 ml   Output 4470 ml   Net -3270 ml       Laboratory  Recent Labs     02/23/24  0230   WBC 7.2   RBC 3.53*   HEMOGLOBIN 7.6*   HEMATOCRIT 28.5*   MCV 80.7*   MCH 21.5*   MCHC 26.7*   RDW 67.8*   PLATELETCT 490*   MPV 9.1     Recent Labs     02/22/24  1445 02/23/24  0230 02/24/24  0425   SODIUM 135 140 140   POTASSIUM 4.2 4.2 3.7   CHLORIDE 95* 97 95*   CO2 30 32 38*   GLUCOSE 171* 156* 138*   BUN 24* 23* 25*   CREATININE 0.91 0.89 0.83   CALCIUM 8.8 8.4* 8.4*                     Imaging  EC-ECHOCARDIOGRAM COMPLETE W/O CONT   Final Result      US-RENU SINGLE LEVEL BILAT   Final Result      DX-CHEST-LIMITED (1 VIEW)   Final Result      1.  Cardiomegaly with mild pulmonary vascular congestion.           Assessment/Plan  Acute respiratory failure with hypoxia (HCC)- (present on admission)  Assessment & Plan  Acute on chronic  Multifactorial: OHS/RUTH noncompliant with CPAP, volume overload, HFpEF  She is on 4 liters oxygen at home (not due to COPD but due to CHF and obesity).  Pulmonary hypertension thus IV lasix 80 mg BID and increase potassium  She is requiring high flow oxygen which is being titrated.   Nocturnal BiPAP     Pulmonary  hypertension (HCC)- (present on admission)  Assessment & Plan  RVSP 85 mmHg by echo while echo 2018 was 55-60 mmHg.  IV lasix 80 mg BID and potassium will be increased  Follow urine output  Fluid restriction 1.8 liters       Acute on chronic blood loss anemia- (present on admission)  Assessment & Plan  Hgb 9.7 (prior to transfer) --> 7.6 at [facility]  No evidence of blood loss  Monitor with Hb in the next few days. It may go up with diuresis.  Iron 19 with %sat 9 for which IV iron ordered.      Elevated troponin  Assessment & Plan  Probable demand ischemia    Mood disorder ([facility])- (present on admission)  Assessment & Plan  Cymbalta    RUTH (obstructive sleep apnea)- (present on admission)  Assessment & Plan  Hx of  Her CPAP machine broke and she has been off since October  Nightly BiPAP while inpatient    Morbid (severe) obesity with alveolar hypoventilation ([facility])- (present on admission)  Assessment & Plan  Diet and lifestyle modification  Body mass index is 64.91 kg/m².      Atrial fibrillation with RVR ([facility])- (present on admission)  Assessment & Plan  Restart home Cardizem 360 for rate control  Continue Xarelto  Continuous tele monitoring      Severe sepsis ([facility])- (present on admission)  Assessment & Plan  This is Sepsis Present on admission  SIRS criteria identified on my evaluation include: Tachycardia, with heart rate greater than 90 BPM, Leukocytosis, with WBC greater than 12,000, and Bandemia, greater than 10% bands  Clinical indicators of end organ dysfunction include Lactic Acid greater than 2 and Toxic Metabolic Encephalopathy  Source is cellulitis leg, buttock  Sepsis protocol initiated  Crystalloid Fluid Administration: Fluid resuscitation ordered per standard protocol was given  IV antibiotics as appropriate for source of sepsis  Reassessment: I have reassessed the patient's hemodynamic status    Cellulitis of legs, buttock  WBC: 13.5, lactic acid 5.4 --> 1.8  S/p 1L IVF no further fluids  Antibiotic:   stopped  Wound care      Diabetes (HCC)- (present on admission)  Assessment & Plan  ISS  Statin    Essential hypertension- (present on admission)  Assessment & Plan  Restart Cardizem 360         VTE prophylaxis:    therapeutic anticoagulation with xarelto 20 mg daily witih meals      I have performed a physical exam and reviewed and updated ROS and Plan today (2/24/2024). In review of yesterday's note (2/23/2024), there are no changes except as documented above.    Ms. Matta is critically ill. 33 minutes of critcal care time were spent with patient, nursing, pharmacy and in specific management of acute respiratory failure requiring initiation and titration of high flow oxygen as well as IV lasix with K in the IMCU. Prognosis is guarded.

## 2024-02-24 NOTE — CARE PLAN
The patient is Watcher - Medium risk of patient condition declining or worsening    Shift Goals  Clinical Goals: wound care, pain control, diurese  Patient Goals: rest, comfort  Family Goals: ELDER    Progress made toward(s) clinical / shift goals:    Problem: Pain - Standard  Goal: Alleviation of pain or a reduction in pain to the patient’s comfort goal  Outcome: Progressing     Problem: Skin Integrity  Goal: Skin integrity is maintained or improved  Outcome: Progressing       Patient is not progressing towards the following goals:      Problem: Knowledge Deficit - Standard  Goal: Patient and family/care givers will demonstrate understanding of plan of care, disease process/condition, diagnostic tests and medications  Outcome: Not Progressing     Problem: Impaired Gas Exchange  Goal: Patient will demonstrate improved ventilation and adequate oxygenation and participate in treatment regimen within the level of ability/situation.  Outcome: Not Progressing     Problem: Self Care  Goal: Patient will have the ability to perform ADLs independently or with assistance (bathe, groom, dress, toilet and feed)  Outcome: Not Progressing    Patient frequently refuses turns, education provided throughout the day.  Patient non-participatory in ADLs.  Increasing O2 needs, refuses bipap at this time.

## 2024-02-24 NOTE — CARE PLAN
Problem: Humidified High Flow Nasal Cannula  Goal: Maintain adequate oxygenation dependent on patient condition  Description: Target End Date:  resolve prior to discharge or when underlying condition is resolved/stabilized    1.  Implement humidified high flow oxygen therapy  2.  Titrate high flow oxygen to maintain appropriate SpO2  Outcome: Not Met    50L 90% satting 92%

## 2024-02-24 NOTE — PROGRESS NOTES
4 Eyes Skin Assessment Completed by JOSEPH Abraham and JOSEPH Maynard.    Head WDL  Ears Redness and Non-Blanching and scars + blanching areas  Nose WDL  Mouth WDL  Neck Blanching  Breast/Chest Rash and Excoriation  Shoulder Blades Redness and Blanching  Spine Redness and Blanching rash excoriation  (R) Arm/Elbow/Hand WDL  (L) Arm/Elbow/Hand WDL  Abdomen Scar  Groin Redness, Excoriation, and Rash  Scrotum/Coccyx/Buttocks Redness, Blanching, Excoriation, and Scab open wounds that patient has scratched open  (R) Leg wounds which we are performing q shift wound care on  (L) Leg wounds which we are performing q shift care on  (R) Heel/Foot/Toe Discoloration and Ulcer(s) wound care on  (L) Heel/Foot/Toe Discoloration          Devices In Places ECG, Blood Pressure Cuff, Pulse Ox, and Cespedes HFNC      Interventions In Place InterDry, Heel Float Boots, TAP System, Pillows, Barrier Cream, and Dri-Kiran Pads bariatric bed, and frequent turns. Q shift wound care to groin, pannus, breasts, back, sacrum, and bilateral lower extremities.     Possible Skin Injury Yes  since hospital arrival-wounds appear better than prior pictures    Pictures Uploaded Into Epic N/A already uploaded several day in a row -see media for progress  Wound Consult Placed N/A- already following  RN Wound Prevention Protocol Ordered Yes

## 2024-02-25 LAB
ANION GAP SERPL CALC-SCNC: 9 MMOL/L (ref 7–16)
BUN SERPL-MCNC: 17 MG/DL (ref 8–22)
CALCIUM SERPL-MCNC: 8.2 MG/DL (ref 8.5–10.5)
CHLORIDE SERPL-SCNC: 95 MMOL/L (ref 96–112)
CO2 SERPL-SCNC: 38 MMOL/L (ref 20–33)
CREAT SERPL-MCNC: 0.69 MG/DL (ref 0.5–1.4)
GFR SERPLBLD CREATININE-BSD FMLA CKD-EPI: 98 ML/MIN/1.73 M 2
GLUCOSE BLD STRIP.AUTO-MCNC: 136 MG/DL (ref 65–99)
GLUCOSE BLD STRIP.AUTO-MCNC: 155 MG/DL (ref 65–99)
GLUCOSE BLD STRIP.AUTO-MCNC: 169 MG/DL (ref 65–99)
GLUCOSE BLD STRIP.AUTO-MCNC: 183 MG/DL (ref 65–99)
GLUCOSE SERPL-MCNC: 144 MG/DL (ref 65–99)
POTASSIUM SERPL-SCNC: 3.3 MMOL/L (ref 3.6–5.5)
SODIUM SERPL-SCNC: 142 MMOL/L (ref 135–145)

## 2024-02-25 PROCEDURE — 80048 BASIC METABOLIC PNL TOTAL CA: CPT

## 2024-02-25 PROCEDURE — 700111 HCHG RX REV CODE 636 W/ 250 OVERRIDE (IP): Performed by: STUDENT IN AN ORGANIZED HEALTH CARE EDUCATION/TRAINING PROGRAM

## 2024-02-25 PROCEDURE — 700102 HCHG RX REV CODE 250 W/ 637 OVERRIDE(OP): Mod: JZ | Performed by: HOSPITALIST

## 2024-02-25 PROCEDURE — A9270 NON-COVERED ITEM OR SERVICE: HCPCS | Performed by: STUDENT IN AN ORGANIZED HEALTH CARE EDUCATION/TRAINING PROGRAM

## 2024-02-25 PROCEDURE — 97602 WOUND(S) CARE NON-SELECTIVE: CPT

## 2024-02-25 PROCEDURE — 94640 AIRWAY INHALATION TREATMENT: CPT

## 2024-02-25 PROCEDURE — 700105 HCHG RX REV CODE 258: Performed by: HOSPITALIST

## 2024-02-25 PROCEDURE — 82962 GLUCOSE BLOOD TEST: CPT | Mod: 91

## 2024-02-25 PROCEDURE — A9270 NON-COVERED ITEM OR SERVICE: HCPCS | Mod: JZ | Performed by: HOSPITALIST

## 2024-02-25 PROCEDURE — 700111 HCHG RX REV CODE 636 W/ 250 OVERRIDE (IP): Mod: JZ | Performed by: HOSPITALIST

## 2024-02-25 PROCEDURE — 99291 CRITICAL CARE FIRST HOUR: CPT | Performed by: HOSPITALIST

## 2024-02-25 PROCEDURE — 700102 HCHG RX REV CODE 250 W/ 637 OVERRIDE(OP): Performed by: STUDENT IN AN ORGANIZED HEALTH CARE EDUCATION/TRAINING PROGRAM

## 2024-02-25 PROCEDURE — 770000 HCHG ROOM/CARE - INTERMEDIATE ICU *

## 2024-02-25 RX ORDER — OXYCODONE HYDROCHLORIDE 10 MG/1
10 TABLET ORAL EVERY 8 HOURS PRN
Status: DISCONTINUED | OUTPATIENT
Start: 2024-02-25 | End: 2024-03-07 | Stop reason: HOSPADM

## 2024-02-25 RX ORDER — FUROSEMIDE 10 MG/ML
80 INJECTION INTRAMUSCULAR; INTRAVENOUS
Status: DISCONTINUED | OUTPATIENT
Start: 2024-02-25 | End: 2024-03-05

## 2024-02-25 RX ORDER — POTASSIUM CHLORIDE 20 MEQ/1
40 TABLET, EXTENDED RELEASE ORAL 3 TIMES DAILY
Status: DISCONTINUED | OUTPATIENT
Start: 2024-02-25 | End: 2024-03-07 | Stop reason: HOSPADM

## 2024-02-25 RX ADMIN — HYDROMORPHONE HYDROCHLORIDE 1 MG: 1 INJECTION, SOLUTION INTRAMUSCULAR; INTRAVENOUS; SUBCUTANEOUS at 03:41

## 2024-02-25 RX ADMIN — MOMETASONE FUROATE AND FORMOTEROL FUMARATE DIHYDRATE 2 PUFF: 200; 5 AEROSOL RESPIRATORY (INHALATION) at 06:03

## 2024-02-25 RX ADMIN — OXYCODONE HYDROCHLORIDE 10 MG: 10 TABLET ORAL at 12:59

## 2024-02-25 RX ADMIN — ATORVASTATIN CALCIUM 40 MG: 40 TABLET, FILM COATED ORAL at 18:15

## 2024-02-25 RX ADMIN — CYANOCOBALAMIN TAB 500 MCG 1000 MCG: 500 TAB at 05:16

## 2024-02-25 RX ADMIN — DULOXETINE HYDROCHLORIDE 30 MG: 30 CAPSULE, DELAYED RELEASE ORAL at 05:16

## 2024-02-25 RX ADMIN — RIVAROXABAN 20 MG: 20 TABLET, FILM COATED ORAL at 18:15

## 2024-02-25 RX ADMIN — FUROSEMIDE 80 MG: 10 INJECTION, SOLUTION INTRAMUSCULAR; INTRAVENOUS at 13:02

## 2024-02-25 RX ADMIN — TIOTROPIUM BROMIDE INHALATION SPRAY 5 MCG: 3.12 SPRAY, METERED RESPIRATORY (INHALATION) at 06:03

## 2024-02-25 RX ADMIN — INSULIN HUMAN 3 UNITS: 100 INJECTION, SOLUTION PARENTERAL at 18:25

## 2024-02-25 RX ADMIN — MICONAZOLE NITRATE: 20 CREAM TOPICAL at 04:13

## 2024-02-25 RX ADMIN — TOPIRAMATE 25 MG: 25 TABLET, FILM COATED ORAL at 18:13

## 2024-02-25 RX ADMIN — MICONAZOLE NITRATE: 20 CREAM TOPICAL at 18:13

## 2024-02-25 RX ADMIN — Medication 400 MG: at 05:16

## 2024-02-25 RX ADMIN — POTASSIUM CHLORIDE 40 MEQ: 1500 TABLET, EXTENDED RELEASE ORAL at 18:15

## 2024-02-25 RX ADMIN — FUROSEMIDE 80 MG: 10 INJECTION, SOLUTION INTRAMUSCULAR; INTRAVENOUS at 18:14

## 2024-02-25 RX ADMIN — POTASSIUM CHLORIDE 40 MEQ: 1500 TABLET, EXTENDED RELEASE ORAL at 05:16

## 2024-02-25 RX ADMIN — SODIUM CHLORIDE 250 MG: 9 INJECTION, SOLUTION INTRAVENOUS at 06:45

## 2024-02-25 RX ADMIN — POTASSIUM CHLORIDE 40 MEQ: 1500 TABLET, EXTENDED RELEASE ORAL at 12:59

## 2024-02-25 RX ADMIN — DAKIN'S SOLUTION 0.125% (QUARTER STRENGTH) 473 ML: 0.12 SOLUTION at 04:14

## 2024-02-25 RX ADMIN — INSULIN HUMAN 3 UNITS: 100 INJECTION, SOLUTION PARENTERAL at 21:42

## 2024-02-25 RX ADMIN — TOPIRAMATE 25 MG: 25 TABLET, FILM COATED ORAL at 06:43

## 2024-02-25 RX ADMIN — OXYCODONE HYDROCHLORIDE AND ACETAMINOPHEN 500 MG: 500 TABLET ORAL at 05:16

## 2024-02-25 RX ADMIN — Medication 400 MG: at 18:16

## 2024-02-25 RX ADMIN — ACETAMINOPHEN 650 MG: 325 TABLET, FILM COATED ORAL at 18:15

## 2024-02-25 RX ADMIN — FOLIC ACID 1 MG: 1 TABLET ORAL at 18:16

## 2024-02-25 RX ADMIN — ACETAMINOPHEN 650 MG: 325 TABLET, FILM COATED ORAL at 13:00

## 2024-02-25 RX ADMIN — Medication 1000 UNITS: at 05:16

## 2024-02-25 RX ADMIN — FUROSEMIDE 80 MG: 10 INJECTION, SOLUTION INTRAVENOUS at 05:16

## 2024-02-25 RX ADMIN — SODIUM CHLORIDE 250 MG: 9 INJECTION, SOLUTION INTRAVENOUS at 18:02

## 2024-02-25 RX ADMIN — DIGOXIN 125 MCG: 0.12 TABLET ORAL at 18:17

## 2024-02-25 RX ADMIN — CYCLOBENZAPRINE 10 MG: 10 TABLET, FILM COATED ORAL at 05:16

## 2024-02-25 RX ADMIN — DAKIN'S SOLUTION 0.125% (QUARTER STRENGTH) 473 ML: 0.12 SOLUTION at 18:13

## 2024-02-25 RX ADMIN — ACETAMINOPHEN 650 MG: 325 TABLET, FILM COATED ORAL at 05:15

## 2024-02-25 RX ADMIN — INSULIN HUMAN 3 UNITS: 100 INJECTION, SOLUTION PARENTERAL at 15:10

## 2024-02-25 RX ADMIN — FOLIC ACID 1 MG: 1 TABLET ORAL at 05:16

## 2024-02-25 ASSESSMENT — PAIN DESCRIPTION - PAIN TYPE
TYPE: ACUTE PAIN
TYPE: ACUTE PAIN;CHRONIC PAIN
TYPE: ACUTE PAIN
TYPE: ACUTE PAIN;CHRONIC PAIN
TYPE: ACUTE PAIN
TYPE: ACUTE PAIN

## 2024-02-25 ASSESSMENT — ENCOUNTER SYMPTOMS
ROS GI COMMENTS: EATING WELL
SHORTNESS OF BREATH: 1
BACK PAIN: 1
FEVER: 0

## 2024-02-25 NOTE — PROGRESS NOTES
[facility] Medicine Daily Progress Note    Date of Service  2/25/2024    Chief Complaint  Miroslava Matta is a 63 y.o. female admitted 2/21/2024 with failure to thrive.    Hospital Course  Sigrid is a 63 y.o. morbidly obese female with history of chronic A-fib on Xarelto, CHF, diabetes, hyperlipidemia, hypertension, COPD dependent on 4 L, RUTH/OHS noncompliant with CPAP, mood disorder who presented 2/21/2024 as a direct transfer from [facility] for higher level care.     It was reported that patient's son called for a welfare check.  She apparently was assisted by home health RN, but reported to have quit last Friday 2/16.  Apparently patient was found sitting in the chair soiled in stool and urine.  Patient reported she has had a fall since 1/25/2024.  She admits to having difficulty ambulating due to her weight and obesity.  She stated her home health RN is no longer able to care for her due to her large body habitus, and quit home health.  She has not had home health assistance since 2/16. Noted to have extensive wound on the back, buttock and legs.  Per transfer paperwork, patient reported to have had atrial fibrillation with RVR with rate in 150s, improved to 130s with 1 L IVF bolus and given Cardizem 30 mg oral one-time.   Workup from [facility] included:  CBC: WBC 13.5, hemoglobin 9.7, platelet 689  CMP: Glucose 211, BUN 27, creatinine 1.0, sodium 135, potassium 4.6, chloride 100, bicarb 26, anion gap 9, calcium 9.2, total protein 7.4, albumin 3.4, total bilirubin 1.0, AST 18, ALT 17, alkaline phosphatase 169   Lactic acid 5.4 --> 1.8  Troponin I undetectable  proBNP 2360, CXR: Cardiomegaly with probable CHF   Patient received treatment with ceftriaxone 1 g IV, Cardizem 30 mg oral, NS to 250cc bolus x 3, Zofran 4 mg IV.   Due to concern of severe sepsis she was directly admitted to the IMCU.    Interval Problem Update  2/22: Ms. Matta was evaluated in the IMCU. An echo yesterday revealed a RVSP  severely elevated at 85 mmHg. She is requiring high flow oxygen. IV lasix 80 mg BID ordered with potassium and BID BMPs ordered.   2/23: Ms. Matta was seen in the IMCU. She is on IV lasix 80 mg BID with K supplementation and her K this morning is 4.2. she was on BiPAP over night now high flow 50 liters and 70%. She remains in afib. She had 950 mL urine over night.    2/24: Ms. Matta was evaluated in the IMCU. She is on IV Lasix 80 mg BID. Cr today is 0.83 with low K of 3.7.  She was not able to get up to side of bed yesterday with PT. 1.3 liters of urine over night. She was on BiPAP last night. We discussed the importance of trying to get up to side of bed.   2/25: Ms. Matta was seen in the IMCU. She has been on lasix 80 mg IV BID with KCl 40 mEq BID. Her Cr is 0.69 and K 3.3. She remains on high flow oxygen 30 L 90%. She had 2.4 liters of urine in the past 24 hours. She was up to side of bed yesterday. Incentive spirometry 10x/hour requested.     I have discussed this patient's plan of care and discharge plan at IDT rounds today with Case Management, Nursing, Nursing leadership, and other members of the IDT team.    Consultants/Specialty  none    Code Status  Full Code    Disposition  The patient is not medically cleared for discharge to home or a post-acute facility.  Anticipate discharge to: skilled nursing facility    I have placed the appropriate orders for post-discharge needs.    Review of Systems  Review of Systems   Constitutional:  Negative for fever.   Respiratory:  Positive for shortness of breath.    Cardiovascular:  Negative for leg swelling.   Gastrointestinal:         Eating well   Musculoskeletal:  Positive for back pain.        Foot pain   All other systems reviewed and are negative.       Physical Exam  Temp:  [37.5 °C (99.5 °F)-37.7 °C (99.9 °F)] 37.5 °C (99.5 °F)  Pulse:  [] 103  Resp:  [11-74] 17  BP: (109-150)/(53-88) 117/53  SpO2:  [87 %-99 %] 96 %    Physical Exam  Vitals and  nursing note reviewed.   Constitutional:       Appearance: She is obese. She is ill-appearing. She is not toxic-appearing.   HENT:      Head:      Comments: Hirsutism      Mouth/Throat:      Mouth: Mucous membranes are dry.   Cardiovascular:      Rate and Rhythm: Tachycardia present. Rhythm irregular.   Pulmonary:      Comments: High flow oxygen  Poor air movement  No wheezing  Abdominal:      General: There is no distension.      Tenderness: There is no abdominal tenderness.      Comments: Pannus with intertrigo    Genitourinary:     Comments: floyd  Musculoskeletal:      Cervical back: Neck supple.      Right lower leg: No edema.      Left lower leg: No edema.      Comments: Right heel black eschar  Right posterior calf abrasions and exudate  Bilateral posterior thigh ulcerations open wounds   Neurological:      General: No focal deficit present.      Mental Status: She is alert and oriented to person, place, and time.   Psychiatric:         Mood and Affect: Mood normal.         Behavior: Behavior normal.         Thought Content: Thought content normal.         Fluids    Intake/Output Summary (Last 24 hours) at 2/25/2024 0734  Last data filed at 2/25/2024 0520  Gross per 24 hour   Intake 1620 ml   Output 4375 ml   Net -2755 ml       Laboratory  Recent Labs     02/23/24  0230   WBC 7.2   RBC 3.53*   HEMOGLOBIN 7.6*   HEMATOCRIT 28.5*   MCV 80.7*   MCH 21.5*   MCHC 26.7*   RDW 67.8*   PLATELETCT 490*   MPV 9.1     Recent Labs     02/23/24  0230 02/24/24  0425 02/25/24  0401   SODIUM 140 140 142   POTASSIUM 4.2 3.7 3.3*   CHLORIDE 97 95* 95*   CO2 32 38* 38*   GLUCOSE 156* 138* 144*   BUN 23* 25* 17   CREATININE 0.89 0.83 0.69   CALCIUM 8.4* 8.4* 8.2*                     Imaging  EC-ECHOCARDIOGRAM COMPLETE W/O CONT   Final Result      US-RENU SINGLE LEVEL BILAT   Final Result      DX-CHEST-LIMITED (1 VIEW)   Final Result      1.  Cardiomegaly with mild pulmonary vascular congestion.           Assessment/Plan  Acute  respiratory failure with hypoxia (HCC)- (present on admission)  Assessment & Plan  Acute on chronic  Multifactorial: OHS/RUTH noncompliant with CPAP, volume overload, HFpEF  She is on 4 liters oxygen at home (not due to COPD but due to CHF and obesity).  Pulmonary hypertension thus IV lasix 80 mg BID.   She is requiring high flow oxygen which is being titrated.   Increase Lasix to 80 mg TID with KCl 40 mEq TID and titrate up until she is off BiPAP or until renal function goes up.   Nocturnal BiPAP   Incentive spirometry 10x/hour while awake    Pulmonary hypertension (HCC)- (present on admission)  Assessment & Plan  RVSP 85 mmHg by echo while echo 2018 was 55-60 mmHg.  IV lasix 80 mg BID and potassium will be increased  Follow urine output  Fluid restriction 1.8 liters       Acute on chronic blood loss anemia- (present on admission)  Assessment & Plan  Hgb 9.7 (prior to transfer) --> 7.6 at [facility]  No evidence of blood loss  Monitor with Hb in the next few days. It may go up with diuresis.  Iron 19 with %sat 9 for which IV iron ordered.      Elevated troponin  Assessment & Plan  Probable demand ischemia    Mood disorder (HCC)- (present on admission)  Assessment & Plan  Cymbalta    RUTH (obstructive sleep apnea)- (present on admission)  Assessment & Plan  Hx of  Her CPAP machine broke and she has been off since October  Nightly BiPAP while inpatient    Morbid (severe) obesity with alveolar hypoventilation (HCC)- (present on admission)  Assessment & Plan  Diet and lifestyle modification  Body mass index is 64.91 kg/m².      Atrial fibrillation with RVR (HCC)- (present on admission)  Assessment & Plan  Restarted home Cardizem 360 for rate control  Continue Xarelto for anticoagulation  Continuous tele monitoring      Severe sepsis (HCC)- (present on admission)  Assessment & Plan  This is Sepsis Present on admission  SIRS criteria identified on my evaluation include: Tachycardia, with heart rate greater than 90 BPM,  Leukocytosis, with WBC greater than 12,000, and Bandemia, greater than 10% bands  Clinical indicators of end organ dysfunction include Lactic Acid greater than 2 and Toxic Metabolic Encephalopathy  Source is cellulitis leg, buttock  Sepsis protocol initiated  Crystalloid Fluid Administration: Fluid resuscitation ordered per standard protocol was given  IV antibiotics as appropriate for source of sepsis  Reassessment: I have reassessed the patient's hemodynamic status    Cellulitis of legs, buttock  WBC: 13.5, lactic acid 5.4 --> 1.8  S/p 1L IVF no further fluids  Antibiotic:  stopped  Wound care      Diabetes (HCC)- (present on admission)  Assessment & Plan  ISS  Statin    Essential hypertension- (present on admission)  Assessment & Plan  Restart Cardizem 360         VTE prophylaxis:    therapeutic anticoagulation with xarelto 20 mg daily witih meals      I have performed a physical exam and reviewed and updated ROS and Plan today (2/25/2024). In review of yesterday's note (2/24/2024), there are no changes except as documented above.    Ms. Matta is critically ill. 31 minutes of critcal care time were spent with patient, nursing, pharmacy and in specific management of acute respiratory failure requiring initiation and titration of high flow oxygen as well as an increase in IV lasix with K in the IMCU. Prognosis is guarded.

## 2024-02-25 NOTE — PROGRESS NOTES
4 Eyes Skin Assessment Completed by JOSEPH Mc and JOSEPH Zaragoza.    Head Redness face has bilateral pink cheeks from HHFNC, pink dressings applied under respiratory device  Ears Redness and Non-Blanching Right ear has red and non-blanching wound, gauze applied, clinical picture taken and wound consult was placed  Nose WDL  Mouth Redness  Neck WDL  Breast/Chest Redness, Rash, and Excoriation scratched  Shoulder Blades red, excoriated   Spine Redness new open wounds on back  (R) Arm/Elbow/Hand WDL  (L) Arm/Elbow/Hand WDL  Abdomen Redness and Rash pink excoriation under panus area  Groin Redness, Excoriation, Abrasion, and Rash  Scrotum/Coccyx/Buttocks Redness, Blanching, Excoriation, Discoloration, and Scab  (R) Leg Redness, Non-Blanching, Rash, Scar, Swelling, Abrasion, and Edema non-blanching open wounds and ulcers  (L) Leg Redness, Non-Blanching, Swelling, Abrasion, and Edema non-blanching open wounds and ulcers  (R) Heel/Foot/Toe Redness, Non-Blanching, Ulcer(s), Swelling, and Edema red, purple maroon black ulcers non-blanching  (L) Heel/Foot/Toe Redness, Discoloration, and Swelling pink , dry and flaky, new black wound in between second toe on left foot    Right ear non-blanching redness.                   Potential new wounds Right lower back open wounds 3 small open skin areas, clinical picture was taken      Non-blanching small purple area on Left side of foot, clinical picture was taken, offloading heel float boots in place                        Devices In Places ECG, Blood Pressure Cuff, Pulse Ox, Cespedes, HFNC, and Bipap      Interventions In Place InterDry, Heel Float Boots, TAP System, Pillows, Q2 Turns, Low Air Loss Mattress, Barrier Cream, Heels Loaded W/Pillows, and Pressure Redistribution Mattress vicopaste, zenaida cream abdominal pads, dry roll gauze and 1/4 dakins solution, pink dressings under bilateral cheeks    Possible Skin Injury Yes    Pictures Uploaded Into Epic Yes  Wound Consult Placed Yes  RN  Wound Prevention Protocol Ordered Yes

## 2024-02-25 NOTE — WOUND TEAM
Asked to see patient right ear.  Area padded with cut piece of sacral offloading dressing.  Patient states area not as painful anymore.  Raised area that does seem to be fluid filled.  Continue with offloading.  Wound team will continue to follow for other wounds and additional needs.

## 2024-02-25 NOTE — PROGRESS NOTES
..4 Eyes Skin Assessment Completed by Audra RN and JOSEPH Giang.    Head Blanching, Non-Blanching, and Redness cheeks from HFNC - pink foam dressings in place bilaterally under device  Ears Redness and Non-Blanching Right ear non-blanching redness with gauze in place  Nose WDL  Mouth Redness  Neck WDL  Breast/Chest Redness, Rash, and Excoriation *patient scratching  Shoulder Blades Redness Excorated  Spine Redness several small open wounds on back *patient scratching. Back covered in dark, non-blanching redness  (R) Arm/Elbow/Hand Bruising  (L) Arm/Elbow/Hand WDL  Abdomen Redness and Rash pink excoriation under panus  Groin Redness, Excoriation, Abrasion, and Rash   Scrotum/Coccyx/Buttocks Redness, Blanching, Excoriation, Discoloration, and Scar  (R) Leg Non-Blanching, Rash, Scar, Swelling, Abrasion, and Edema ulcer/wounds  (L) Leg Non-Blanching, Rash, Scar, Swelling, Abrasion, and Edema ulcer/wounds  (R) Heel/Foot/Toe Non-Blanching, Discoloration, Ulcer(s), Swelling, and Edema black, purple ulcers  (L) Heel/Foot/Toe Redness, Discoloration, Ulcer(s), and Swelling pink, dry, and flaky, black ulcer between large toe and second toes          Devices In Places Tele Box, Blood Pressure Cuff, Pulse Ox, Cespedes, and HFNC      Interventions In Place InterDry, Heel Float Boots, TAP System, Pillows, Q2 Turns, Low Air Loss Mattress, Barrier Cream, and Pressure Redistribution Mattress    Possible Skin Injury Yes    Pictures Uploaded Into Epic Yes  Wound Consult Placed Yes  RN Wound Prevention Protocol Ordered Yes

## 2024-02-25 NOTE — CARE PLAN
The patient is     Shift Goals  Clinical Goals: physiologic stability, wound care, pain management, comfort  Patient Goals: comfort, pain control  Family Goals: ELDER    Progress made toward(s) clinical / shift goals:        Problem: Pain - Standard  Goal: Alleviation of pain or a reduction in pain to the patient’s comfort goal  Outcome: Progressing  Note: Pt assessed for pain regularly and medicated PRN per MAR.      Problem: Knowledge Deficit - Standard  Goal: Patient and family/care givers will demonstrate understanding of plan of care, disease process/condition, diagnostic tests and medications  Outcome: Progressing  Note: Pt educated regarding plan of care and medications. All questions answered.      Problem: Skin Integrity  Goal: Skin integrity is maintained or improved  Outcome: Progressing  Note: Pt assessed for pain regularly and medicated PRN per MAR.

## 2024-02-25 NOTE — WOUND TEAM
Received new wound consult for under high flow nasal canula.  Patient was seen by wound team on 02/21, wound team is following weekly and will check under HFNC at next follow up on 02/28 or sooner.  Offloading measures currently in place and all other orders in place.

## 2024-02-26 LAB
ANION GAP SERPL CALC-SCNC: 8 MMOL/L (ref 7–16)
BACTERIA BLD CULT: NORMAL
BUN SERPL-MCNC: 13 MG/DL (ref 8–22)
CALCIUM SERPL-MCNC: 8.2 MG/DL (ref 8.5–10.5)
CHLORIDE SERPL-SCNC: 93 MMOL/L (ref 96–112)
CO2 SERPL-SCNC: 40 MMOL/L (ref 20–33)
CREAT SERPL-MCNC: 0.75 MG/DL (ref 0.5–1.4)
GFR SERPLBLD CREATININE-BSD FMLA CKD-EPI: 90 ML/MIN/1.73 M 2
GLUCOSE BLD STRIP.AUTO-MCNC: 132 MG/DL (ref 65–99)
GLUCOSE BLD STRIP.AUTO-MCNC: 144 MG/DL (ref 65–99)
GLUCOSE BLD STRIP.AUTO-MCNC: 193 MG/DL (ref 65–99)
GLUCOSE BLD STRIP.AUTO-MCNC: 225 MG/DL (ref 65–99)
GLUCOSE SERPL-MCNC: 161 MG/DL (ref 65–99)
POTASSIUM SERPL-SCNC: 3.6 MMOL/L (ref 3.6–5.5)
SIGNIFICANT IND 70042: NORMAL
SITE SITE: NORMAL
SODIUM SERPL-SCNC: 141 MMOL/L (ref 135–145)
SOURCE SOURCE: NORMAL

## 2024-02-26 PROCEDURE — 770020 HCHG ROOM/CARE - TELE (206)

## 2024-02-26 PROCEDURE — 700111 HCHG RX REV CODE 636 W/ 250 OVERRIDE (IP): Mod: JZ | Performed by: HOSPITALIST

## 2024-02-26 PROCEDURE — 99233 SBSQ HOSP IP/OBS HIGH 50: CPT | Performed by: HOSPITALIST

## 2024-02-26 PROCEDURE — 82962 GLUCOSE BLOOD TEST: CPT

## 2024-02-26 PROCEDURE — A9270 NON-COVERED ITEM OR SERVICE: HCPCS | Performed by: STUDENT IN AN ORGANIZED HEALTH CARE EDUCATION/TRAINING PROGRAM

## 2024-02-26 PROCEDURE — 700101 HCHG RX REV CODE 250: Performed by: HOSPITALIST

## 2024-02-26 PROCEDURE — 700102 HCHG RX REV CODE 250 W/ 637 OVERRIDE(OP): Performed by: HOSPITALIST

## 2024-02-26 PROCEDURE — 700102 HCHG RX REV CODE 250 W/ 637 OVERRIDE(OP): Performed by: STUDENT IN AN ORGANIZED HEALTH CARE EDUCATION/TRAINING PROGRAM

## 2024-02-26 PROCEDURE — A9270 NON-COVERED ITEM OR SERVICE: HCPCS | Mod: JZ | Performed by: HOSPITALIST

## 2024-02-26 PROCEDURE — 94640 AIRWAY INHALATION TREATMENT: CPT

## 2024-02-26 PROCEDURE — 80048 BASIC METABOLIC PNL TOTAL CA: CPT

## 2024-02-26 RX ADMIN — OXYCODONE HYDROCHLORIDE AND ACETAMINOPHEN 500 MG: 500 TABLET ORAL at 05:11

## 2024-02-26 RX ADMIN — RIVAROXABAN 20 MG: 20 TABLET, FILM COATED ORAL at 17:27

## 2024-02-26 RX ADMIN — FUROSEMIDE 80 MG: 10 INJECTION, SOLUTION INTRAMUSCULAR; INTRAVENOUS at 17:31

## 2024-02-26 RX ADMIN — FOLIC ACID 1 MG: 1 TABLET ORAL at 05:11

## 2024-02-26 RX ADMIN — Medication 1000 UNITS: at 05:11

## 2024-02-26 RX ADMIN — Medication 400 MG: at 17:27

## 2024-02-26 RX ADMIN — CYANOCOBALAMIN TAB 500 MCG 1000 MCG: 500 TAB at 05:12

## 2024-02-26 RX ADMIN — MICONAZOLE NITRATE: 20 CREAM TOPICAL at 17:40

## 2024-02-26 RX ADMIN — INSULIN HUMAN 4 UNITS: 100 INJECTION, SOLUTION PARENTERAL at 20:26

## 2024-02-26 RX ADMIN — OXYCODONE HYDROCHLORIDE 10 MG: 10 TABLET ORAL at 03:59

## 2024-02-26 RX ADMIN — POTASSIUM CHLORIDE 40 MEQ: 1500 TABLET, EXTENDED RELEASE ORAL at 17:28

## 2024-02-26 RX ADMIN — POTASSIUM CHLORIDE 40 MEQ: 1500 TABLET, EXTENDED RELEASE ORAL at 05:11

## 2024-02-26 RX ADMIN — FUROSEMIDE 80 MG: 10 INJECTION, SOLUTION INTRAMUSCULAR; INTRAVENOUS at 05:10

## 2024-02-26 RX ADMIN — MOMETASONE FUROATE AND FORMOTEROL FUMARATE DIHYDRATE 2 PUFF: 200; 5 AEROSOL RESPIRATORY (INHALATION) at 17:32

## 2024-02-26 RX ADMIN — TOPIRAMATE 25 MG: 25 TABLET, FILM COATED ORAL at 17:28

## 2024-02-26 RX ADMIN — Medication 400 MG: at 05:12

## 2024-02-26 RX ADMIN — FOLIC ACID 1 MG: 1 TABLET ORAL at 17:28

## 2024-02-26 RX ADMIN — POTASSIUM CHLORIDE 40 MEQ: 1500 TABLET, EXTENDED RELEASE ORAL at 12:32

## 2024-02-26 RX ADMIN — MICONAZOLE NITRATE: 20 CREAM TOPICAL at 05:13

## 2024-02-26 RX ADMIN — DAKIN'S SOLUTION 0.125% (QUARTER STRENGTH) 30 ML: 0.12 SOLUTION at 17:39

## 2024-02-26 RX ADMIN — FUROSEMIDE 80 MG: 10 INJECTION, SOLUTION INTRAMUSCULAR; INTRAVENOUS at 12:32

## 2024-02-26 RX ADMIN — TOPIRAMATE 25 MG: 25 TABLET, FILM COATED ORAL at 05:12

## 2024-02-26 RX ADMIN — OXYCODONE HYDROCHLORIDE 10 MG: 10 TABLET ORAL at 15:19

## 2024-02-26 RX ADMIN — TIOTROPIUM BROMIDE INHALATION SPRAY 5 MCG: 3.12 SPRAY, METERED RESPIRATORY (INHALATION) at 05:14

## 2024-02-26 RX ADMIN — ATORVASTATIN CALCIUM 40 MG: 40 TABLET, FILM COATED ORAL at 17:27

## 2024-02-26 RX ADMIN — MOMETASONE FUROATE AND FORMOTEROL FUMARATE DIHYDRATE 2 PUFF: 200; 5 AEROSOL RESPIRATORY (INHALATION) at 05:13

## 2024-02-26 RX ADMIN — DAKIN'S SOLUTION 0.125% (QUARTER STRENGTH) 1 ML: 0.12 SOLUTION at 05:14

## 2024-02-26 RX ADMIN — DULOXETINE HYDROCHLORIDE 30 MG: 30 CAPSULE, DELAYED RELEASE ORAL at 05:10

## 2024-02-26 RX ADMIN — INSULIN HUMAN 3 UNITS: 100 INJECTION, SOLUTION PARENTERAL at 13:05

## 2024-02-26 RX ADMIN — DIGOXIN 125 MCG: 0.12 TABLET ORAL at 17:28

## 2024-02-26 ASSESSMENT — PATIENT HEALTH QUESTIONNAIRE - PHQ9
1. LITTLE INTEREST OR PLEASURE IN DOING THINGS: NOT AT ALL
SUM OF ALL RESPONSES TO PHQ9 QUESTIONS 1 AND 2: 0
2. FEELING DOWN, DEPRESSED, IRRITABLE, OR HOPELESS: NOT AT ALL

## 2024-02-26 ASSESSMENT — PAIN DESCRIPTION - PAIN TYPE
TYPE: ACUTE PAIN

## 2024-02-26 ASSESSMENT — ENCOUNTER SYMPTOMS
BACK PAIN: 1
ROS GI COMMENTS: EATING WELL
FEVER: 0
DIZZINESS: 0

## 2024-02-26 NOTE — CARE PLAN
The patient is Watcher - Medium risk of patient condition declining or worsening    Shift Goals  Clinical Goals: Wound care, pain management  Patient Goals: Comfort  Family Goals: ELDER    Progress made toward(s) clinical / shift goals:    Problem: Knowledge Deficit - Standard  Goal: Patient and family/care givers will demonstrate understanding of plan of care, disease process/condition, diagnostic tests and medications  Outcome: Progressing     Problem: Risk for Aspiration  Goal: Patient's risk for aspiration will be absent or decrease  Outcome: Progressing     Problem: Pain - Standard  Goal: Alleviation of pain or a reduction in pain to the patient’s comfort goal  Outcome: Progressing     Problem: Fall Risk  Goal: Patient will remain free from falls  Outcome: Progressing       Patient is not progressing towards the following goals:

## 2024-02-26 NOTE — PROGRESS NOTES
[facility] Medicine Daily Progress Note    Date of Service  2/26/2024    Chief Complaint  Miroslava Matta is a 63 y.o. female admitted 2/21/2024 with failure to thrive.    Hospital Course  Sigrid is a 63 y.o. morbidly obese female with history of chronic A-fib on Xarelto, CHF, diabetes, hyperlipidemia, hypertension, COPD dependent on 4 L, RUTH/OHS noncompliant with CPAP, mood disorder who presented 2/21/2024 as a direct transfer from [facility] for higher level care.     It was reported that patient's son called for a welfare check.  She apparently was assisted by home health RN, but reported to have quit last Friday 2/16.  Apparently patient was found sitting in the chair soiled in stool and urine.  Patient reported she has had a fall since 1/25/2024.  She admits to having difficulty ambulating due to her weight and obesity.  She stated her home health RN is no longer able to care for her due to her large body habitus, and quit home health.  She has not had home health assistance since 2/16. Noted to have extensive wound on the back, buttock and legs.  Per transfer paperwork, patient reported to have had atrial fibrillation with RVR with rate in 150s, improved to 130s with 1 L IVF bolus and given Cardizem 30 mg oral one-time.   Workup from [facility] included:  CBC: WBC 13.5, hemoglobin 9.7, platelet 689  CMP: Glucose 211, BUN 27, creatinine 1.0, sodium 135, potassium 4.6, chloride 100, bicarb 26, anion gap 9, calcium 9.2, total protein 7.4, albumin 3.4, total bilirubin 1.0, AST 18, ALT 17, alkaline phosphatase 169   Lactic acid 5.4 --> 1.8  Troponin I undetectable  proBNP 2360, CXR: Cardiomegaly with probable CHF   Patient received treatment with ceftriaxone 1 g IV, Cardizem 30 mg oral, NS to 250cc bolus x 3, Zofran 4 mg IV.   Due to concern of severe sepsis she was directly admitted to the IMCU.    Interval Problem Update  2/22: Ms. Matta was evaluated in the IMCU. An echo yesterday revealed a RVSP  severely elevated at 85 mmHg. She is requiring high flow oxygen. IV lasix 80 mg BID ordered with potassium and BID BMPs ordered.   2/23: Ms. Matta was seen in the IMCU. She is on IV lasix 80 mg BID with K supplementation and her K this morning is 4.2. she was on BiPAP over night now high flow 50 liters and 70%. She remains in afib. She had 950 mL urine over night.    2/24: Ms. Matta was evaluated in the IMCU. She is on IV Lasix 80 mg BID. Cr today is 0.83 with low K of 3.7.  She was not able to get up to side of bed yesterday with PT. 1.3 liters of urine over night. She was on BiPAP last night. We discussed the importance of trying to get up to side of bed.   2/25: Ms. Matta was seen in the IMCU. She has been on lasix 80 mg IV BID with KCl 40 mEq BID. Her Cr is 0.69 and K 3.3. She remains on high flow oxygen 30 L 90%. She had 2.4 liters of urine in the past 24 hours. She was up to side of bed yesterday. Incentive spirometry 10x/hour requested.   2/26: Ms. Matta was evaluated in the IMCU. The dose of Lasix was increased to 80 mg IV TID and her  Cr remains 0.75.  She is doing well with wound care.  She remains on high flow oxygen.    I have discussed this patient's plan of care and discharge plan at IDT rounds today with Case Management, Nursing, Nursing leadership, and other members of the IDT team.    Consultants/Specialty  none    Code Status  Full Code    Disposition  The patient is not medically cleared for discharge to home or a post-acute facility.  Anticipate discharge to: skilled nursing facility    I have placed the appropriate orders for post-discharge needs.    Review of Systems  Review of Systems   Constitutional:  Negative for fever.   Cardiovascular:  Negative for leg swelling.   Gastrointestinal:         Eating well   Musculoskeletal:  Positive for back pain.        Foot pain   Neurological:  Negative for dizziness.   All other systems reviewed and are negative.       Physical Exam  Temp:  [37.2 °C  (99 °F)-37.6 °C (99.7 °F)] 37.2 °C (99 °F)  Pulse:  [] 103  Resp:  [14-53] 19  BP: ()/(54-97) 128/70  SpO2:  [88 %-98 %] 90 %    Physical Exam  Vitals and nursing note reviewed.   Constitutional:       General: She is not in acute distress.     Appearance: She is obese. She is ill-appearing. She is not toxic-appearing.   HENT:      Head:      Comments: Hirsutism      Mouth/Throat:      Mouth: Mucous membranes are dry.   Cardiovascular:      Rate and Rhythm: Tachycardia present. Rhythm irregular.   Pulmonary:      Comments: High flow oxygen  Poor air movement  No wheezing  Abdominal:      General: There is no distension.      Tenderness: There is no abdominal tenderness.      Comments: Pannus with intertrigo    Genitourinary:     Comments: floyd  Musculoskeletal:      Cervical back: Neck supple.      Right lower leg: No edema.      Left lower leg: No edema.      Comments: Right heel black eschar  Right posterior calf abrasions and exudate  Bilateral posterior thigh ulcerations open wounds   Neurological:      General: No focal deficit present.      Mental Status: She is alert and oriented to person, place, and time.   Psychiatric:         Mood and Affect: Mood normal.         Behavior: Behavior normal.         Thought Content: Thought content normal.         Fluids    Intake/Output Summary (Last 24 hours) at 2/26/2024 0705  Last data filed at 2/26/2024 0100  Gross per 24 hour   Intake 573.44 ml   Output 4510 ml   Net -3936.56 ml       Laboratory        Recent Labs     02/24/24  0425 02/25/24  0401 02/26/24  0036   SODIUM 140 142 141   POTASSIUM 3.7 3.3* 3.6   CHLORIDE 95* 95* 93*   CO2 38* 38* 40*   GLUCOSE 138* 144* 161*   BUN 25* 17 13   CREATININE 0.83 0.69 0.75   CALCIUM 8.4* 8.2* 8.2*                     Imaging  EC-ECHOCARDIOGRAM COMPLETE W/O CONT   Final Result      US-RENU SINGLE LEVEL BILAT   Final Result      DX-CHEST-LIMITED (1 VIEW)   Final Result      1.  Cardiomegaly with mild pulmonary  vascular congestion.           Assessment/Plan  Acute respiratory failure with hypoxia (HCC)- (present on admission)  Assessment & Plan  Acute on chronic  Multifactorial: OHS/RUTH noncompliant with CPAP, volume overload, HFpEF  She is on 4 liters oxygen at home (not due to COPD but due to CHF and obesity).  Pulmonary hypertension thus IV lasix 80 mg BID.   She is requiring high flow oxygen which is being titrated.   Increased Lasix to 80 mg TID with KCl 40 mEq TID and titrate up until she i is able to transition off high flow oxygen or until her creatinine starts to go up substantially  Nocturnal BiPAP   Incentive spirometry 10x/hour while awake  She may require pulmonology consultation    Pulmonary hypertension (HCC)- (present on admission)  Assessment & Plan  RVSP 85 mmHg by echo while echo 2018 was 55-60 mmHg.  IV lasix 80 mg TID and potassium will be increased  Follow urine output  Fluid restriction 1.8 liters       Acute on chronic blood loss anemia- (present on admission)  Assessment & Plan  Hgb 9.7 (prior to transfer) --> 7.6 at [facility]  No evidence of blood loss  Iron 19 with %sat 9 for which IV iron was given  Minimize blood draws and she will require hemoglobin to be checked intermittently      Elevated troponin  Assessment & Plan  Probable demand ischemia    Mood disorder (HCC)- (present on admission)  Assessment & Plan  Cymbalta    RUTH (obstructive sleep apnea)- (present on admission)  Assessment & Plan  Hx of  Her CPAP machine broke and she has been off since October  Nightly BiPAP while inpatient    Morbid (severe) obesity with alveolar hypoventilation (HCC)- (present on admission)  Assessment & Plan  Diet and lifestyle modification  Body mass index is 64.91 kg/m².      Atrial fibrillation with RVR (HCC)- (present on admission)  Assessment & Plan  Restarted home Cardizem 360 for rate control  Continue Xarelto for anticoagulation  Continuous tele monitoring      Severe sepsis (HCC)- (present on  admission)  Assessment & Plan  This is Sepsis Present on admission  SIRS criteria identified on my evaluation include: Tachycardia, with heart rate greater than 90 BPM, Leukocytosis, with WBC greater than 12,000, and Bandemia, greater than 10% bands  Clinical indicators of end organ dysfunction include Lactic Acid greater than 2 and Toxic Metabolic Encephalopathy  Source is cellulitis leg, buttock  Sepsis protocol initiated  Crystalloid Fluid Administration: Fluid resuscitation ordered per standard protocol was given  IV antibiotics as appropriate for source of sepsis  Reassessment: I have reassessed the patient's hemodynamic status    Cellulitis of legs, buttock  WBC: 13.5, lactic acid 5.4 --> 1.8  S/p 1L IVF no further fluids  Antibiotic:  stopped  Wound care      Diabetes (HCC)- (present on admission)  Assessment & Plan  ISS  Statin    Essential hypertension- (present on admission)  Assessment & Plan  Restart Cardizem 360         VTE prophylaxis:    therapeutic anticoagulation with xarelto 20 mg daily witih meals      I have performed a physical exam and reviewed and updated ROS and Plan today (2/26/2024). In review of yesterday's note (2/25/2024), there are no changes except as documented above.

## 2024-02-26 NOTE — PROGRESS NOTES
4 Eyes Skin Assessment Completed by JOSEPH Randall and JOSEPH Sepulveda.    Head Blanching and Redness on bilateral cheeks, pink dressings in place  Ears Redness and Blanching, blister on right ear  Nose WDL  Mouth WDL  Neck WDL  Breast/Chest Redness, Blanching, and Excoriation,   Shoulder Blades Redness and Blanching  Spine Redness and Blanching, open wounds  (R) Arm/Elbow/Hand WDL  (L) Arm/Elbow/Hand WDL  Abdomen Redness and Scar, excoriation pannus area  Groin Redness, excoriation  Scrotum/Coccyx/Buttocks Redness, Non-Blanching, and Excoriation, open wounds   (R) Leg Redness, Non-Blanching, Scab, Swelling, and Weeping, multiple open wounds  (L) Leg Redness, Non-Blanching, Scab, Swelling, and Weeping multiple open wounds  (R) Heel/Foot/Toe Redness, Non-Blanching, Discoloration, Ulcer(s), Swelling, and Scab, purple/black tissue injury  (L) Heel/Foot/Toe Redness, Discoloration, and Swellingblack wound on second toe          Devices In Places Tele Box, Pulse Ox, Cespedes, and HFNC      Interventions In Place NC Cheek Stickers, InterDry, Heel Float Boots, TAP System, Pillows, Q2 Turns, Low Air Loss Mattress, and Barrier Cream    Possible Skin Injury Yes    Pictures Uploaded Into Epic Yes  Wound Consult Placed Yes  RN Wound Prevention Protocol Ordered Yes

## 2024-02-26 NOTE — PROGRESS NOTES
Pt received from [facility]. Tele monitor in place. VSS. Pt oriented to room. Educated on use of the call light. Pt demonstrated use of the call light. Discussed POC. All questions answered.

## 2024-02-26 NOTE — PROGRESS NOTES
4 Eyes Skin Assessment Completed by JOSEPH Raymundo and JOSEPH Up.    Head Redness  Ears Redness and Non-Blanching  Nose WDL  Mouth dry  Neck WDL  Breast/Chest Redness, Rash, and Excoriation  Shoulder Blades Redness excoriation  Spine Redness open wounds  (R) Arm/Elbow/Hand WDL  (L) Arm/Elbow/Hand WDL  Abdomen Redness excoriation in pannus  Groin Redness, Excoriation, Abrasion, and Rash  Scrotum/Coccyx/Buttocks Redness, Excoriation, Discoloration, and Scab  (R) Leg Redness, Non-Blanching, Swelling, Abrasion, and Edema open wounds  (L) Leg Redness, Non-Blanching, Swelling, Abrasion, and Edema open wounds  (R) Heel/Foot/Toe Redness, Non-Blanching, Swelling, and Edema open wounds, dry, flaky  (L) Heel/Foot/Toe Redness and Swelling dry, flaky, wound in between toes          Devices In Places Tele Box, Blood Pressure Cuff, Pulse Ox, Cespedes, and HFNC      Interventions In Place NC Cheek Stickers, InterDry, Sacral Mepilex, Heel Float Boots, TAP System, Pillows, Q2 Turns, Low Air Loss Mattress, Barrier Cream, Heels Loaded W/Pillows, and Pressure Redistribution Mattress    Possible Skin Injury Yes    Pictures Uploaded Into Epic Yes  Wound Consult Placed Yes  RN Wound Prevention Protocol Ordered Yes

## 2024-02-26 NOTE — PROGRESS NOTES
4 eyes skin assessment completed by Alexandro RN and Nik RN    Head Redness face has bilateral pink cheeks from FNC, pink dressings changed and applied under respiratory device  Ears Redness and Non-Blanching Right ear has red and non-blanching wound, gauze applied  Nose WDL  Mouth Redness, dry lips  Neck WDL  Breast/Chest Redness, Rash, and Excoriation scratched  Shoulder Blades red, excoriated   Spine Redness new open wounds on back  (R) Arm/Elbow/Hand WDL  (L) Arm/Elbow/Hand WDL  Abdomen Redness and Rash pink excoriation under panus area  Groin Redness, Excoriation, Abrasion, and Rash  Scrotum/Coccyx/Buttocks Redness, Blanching, Excoriation, Discoloration, and Scab  (R) Leg Redness, Non-Blanching, Rash, Scar, Swelling, Abrasion, and Edema non-blanching open wounds and ulcers  (L) Leg Redness, Non-Blanching, Swelling, Abrasion, and Edema non-blanching open wounds and ulcers  (R) Heel/Foot/Toe Redness, Non-Blanching, Ulcer(s), Swelling, and Edema red, purple maroon black ulcers non-blanching  (L) Heel/Foot/Toe Redness, Discoloration, and Swelling pink , dry and flaky, new black wound in between second toe on left foot                                                                       Devices In Places ECG, Blood Pressure Cuff, Pulse Ox, Cespedes, HFNC, and Bipap        Interventions In Place InterDry, Heel Float Boots, TAP System, Pillows, Q2 Turns, Low Air Loss Mattress, Barrier Cream, Heels Loaded W/Pillows, and Pressure Redistribution Mattress vicopaste, zenaida cream abdominal pads, dry roll gauze and 1/4 dakins solution, pink dressings under bilateral cheeks     Possible Skin Injury Yes     Pictures Uploaded Into Epic Yes  Wound Consult Placed Yes  RN Wound Prevention Protocol Ordered Yes

## 2024-02-27 LAB
ANION GAP SERPL CALC-SCNC: 8 MMOL/L (ref 7–16)
BUN SERPL-MCNC: 20 MG/DL (ref 8–22)
CALCIUM SERPL-MCNC: 8.8 MG/DL (ref 8.5–10.5)
CHLORIDE SERPL-SCNC: 91 MMOL/L (ref 96–112)
CO2 SERPL-SCNC: 40 MMOL/L (ref 20–33)
CREAT SERPL-MCNC: 0.93 MG/DL (ref 0.5–1.4)
GFR SERPLBLD CREATININE-BSD FMLA CKD-EPI: 69 ML/MIN/1.73 M 2
GLUCOSE BLD STRIP.AUTO-MCNC: 179 MG/DL (ref 65–99)
GLUCOSE BLD STRIP.AUTO-MCNC: 182 MG/DL (ref 65–99)
GLUCOSE BLD STRIP.AUTO-MCNC: 183 MG/DL (ref 65–99)
GLUCOSE BLD STRIP.AUTO-MCNC: 224 MG/DL (ref 65–99)
GLUCOSE SERPL-MCNC: 164 MG/DL (ref 65–99)
POTASSIUM SERPL-SCNC: 3.7 MMOL/L (ref 3.6–5.5)
SODIUM SERPL-SCNC: 139 MMOL/L (ref 135–145)

## 2024-02-27 PROCEDURE — 82962 GLUCOSE BLOOD TEST: CPT

## 2024-02-27 PROCEDURE — 700102 HCHG RX REV CODE 250 W/ 637 OVERRIDE(OP): Performed by: STUDENT IN AN ORGANIZED HEALTH CARE EDUCATION/TRAINING PROGRAM

## 2024-02-27 PROCEDURE — 700102 HCHG RX REV CODE 250 W/ 637 OVERRIDE(OP): Performed by: HOSPITALIST

## 2024-02-27 PROCEDURE — A9270 NON-COVERED ITEM OR SERVICE: HCPCS | Performed by: HOSPITALIST

## 2024-02-27 PROCEDURE — 700111 HCHG RX REV CODE 636 W/ 250 OVERRIDE (IP): Mod: JZ | Performed by: HOSPITALIST

## 2024-02-27 PROCEDURE — 94640 AIRWAY INHALATION TREATMENT: CPT

## 2024-02-27 PROCEDURE — 99291 CRITICAL CARE FIRST HOUR: CPT | Performed by: STUDENT IN AN ORGANIZED HEALTH CARE EDUCATION/TRAINING PROGRAM

## 2024-02-27 PROCEDURE — 770020 HCHG ROOM/CARE - TELE (206)

## 2024-02-27 PROCEDURE — 36415 COLL VENOUS BLD VENIPUNCTURE: CPT

## 2024-02-27 PROCEDURE — 80048 BASIC METABOLIC PNL TOTAL CA: CPT

## 2024-02-27 PROCEDURE — A9270 NON-COVERED ITEM OR SERVICE: HCPCS | Performed by: STUDENT IN AN ORGANIZED HEALTH CARE EDUCATION/TRAINING PROGRAM

## 2024-02-27 RX ADMIN — DAKIN'S SOLUTION 0.125% (QUARTER STRENGTH) 30 ML: 0.12 SOLUTION at 06:17

## 2024-02-27 RX ADMIN — RIVAROXABAN 20 MG: 20 TABLET, FILM COATED ORAL at 17:54

## 2024-02-27 RX ADMIN — FOLIC ACID 1 MG: 1 TABLET ORAL at 17:54

## 2024-02-27 RX ADMIN — OXYCODONE HYDROCHLORIDE AND ACETAMINOPHEN 500 MG: 500 TABLET ORAL at 05:03

## 2024-02-27 RX ADMIN — TOPIRAMATE 25 MG: 25 TABLET, FILM COATED ORAL at 05:03

## 2024-02-27 RX ADMIN — MOMETASONE FUROATE AND FORMOTEROL FUMARATE DIHYDRATE 2 PUFF: 200; 5 AEROSOL RESPIRATORY (INHALATION) at 17:55

## 2024-02-27 RX ADMIN — TOPIRAMATE 25 MG: 25 TABLET, FILM COATED ORAL at 17:54

## 2024-02-27 RX ADMIN — DIGOXIN 125 MCG: 0.12 TABLET ORAL at 17:54

## 2024-02-27 RX ADMIN — CYANOCOBALAMIN TAB 500 MCG 1000 MCG: 500 TAB at 05:03

## 2024-02-27 RX ADMIN — MOMETASONE FUROATE AND FORMOTEROL FUMARATE DIHYDRATE 2 PUFF: 200; 5 AEROSOL RESPIRATORY (INHALATION) at 09:20

## 2024-02-27 RX ADMIN — OXYCODONE HYDROCHLORIDE 10 MG: 10 TABLET ORAL at 05:03

## 2024-02-27 RX ADMIN — INSULIN HUMAN 3 UNITS: 100 INJECTION, SOLUTION PARENTERAL at 18:03

## 2024-02-27 RX ADMIN — FUROSEMIDE 80 MG: 10 INJECTION, SOLUTION INTRAMUSCULAR; INTRAVENOUS at 12:00

## 2024-02-27 RX ADMIN — POTASSIUM CHLORIDE 40 MEQ: 1500 TABLET, EXTENDED RELEASE ORAL at 05:03

## 2024-02-27 RX ADMIN — MICONAZOLE NITRATE: 20 CREAM TOPICAL at 17:55

## 2024-02-27 RX ADMIN — POTASSIUM CHLORIDE 40 MEQ: 1500 TABLET, EXTENDED RELEASE ORAL at 11:59

## 2024-02-27 RX ADMIN — DULOXETINE HYDROCHLORIDE 30 MG: 30 CAPSULE, DELAYED RELEASE ORAL at 05:03

## 2024-02-27 RX ADMIN — INSULIN HUMAN 4 UNITS: 100 INJECTION, SOLUTION PARENTERAL at 21:07

## 2024-02-27 RX ADMIN — Medication 1000 UNITS: at 05:03

## 2024-02-27 RX ADMIN — INSULIN HUMAN 3 UNITS: 100 INJECTION, SOLUTION PARENTERAL at 08:09

## 2024-02-27 RX ADMIN — TIOTROPIUM BROMIDE INHALATION SPRAY 5 MCG: 3.12 SPRAY, METERED RESPIRATORY (INHALATION) at 05:09

## 2024-02-27 RX ADMIN — Medication 400 MG: at 17:54

## 2024-02-27 RX ADMIN — DAKIN'S SOLUTION 0.125% (QUARTER STRENGTH) 30 ML: 0.12 SOLUTION at 17:59

## 2024-02-27 RX ADMIN — INSULIN HUMAN 3 UNITS: 100 INJECTION, SOLUTION PARENTERAL at 12:06

## 2024-02-27 RX ADMIN — POTASSIUM CHLORIDE 40 MEQ: 1500 TABLET, EXTENDED RELEASE ORAL at 17:54

## 2024-02-27 RX ADMIN — FOLIC ACID 1 MG: 1 TABLET ORAL at 05:03

## 2024-02-27 RX ADMIN — OXYCODONE HYDROCHLORIDE 10 MG: 10 TABLET ORAL at 17:01

## 2024-02-27 RX ADMIN — MICONAZOLE NITRATE: 20 CREAM TOPICAL at 06:17

## 2024-02-27 RX ADMIN — Medication 400 MG: at 05:03

## 2024-02-27 RX ADMIN — ATORVASTATIN CALCIUM 40 MG: 40 TABLET, FILM COATED ORAL at 17:54

## 2024-02-27 RX ADMIN — FUROSEMIDE 80 MG: 10 INJECTION, SOLUTION INTRAMUSCULAR; INTRAVENOUS at 17:57

## 2024-02-27 RX ADMIN — FUROSEMIDE 80 MG: 10 INJECTION, SOLUTION INTRAMUSCULAR; INTRAVENOUS at 05:02

## 2024-02-27 ASSESSMENT — ENCOUNTER SYMPTOMS
FEVER: 0
ROS GI COMMENTS: EATING WELL
DIZZINESS: 0
BACK PAIN: 1

## 2024-02-27 ASSESSMENT — FIBROSIS 4 INDEX: FIB4 SCORE: 0.32

## 2024-02-27 ASSESSMENT — PAIN DESCRIPTION - PAIN TYPE
TYPE: ACUTE PAIN
TYPE: ACUTE PAIN

## 2024-02-27 NOTE — PROGRESS NOTES
Bedside report received from off going RN/tech: Trevor RUIZ, assumed care of patient.     Fall Risk Score: HIGH RISK  Fall risk interventions in place: Place yellow fall risk ID band on patient, Provide patient/family education based on risk assessment, Educate patient/family to call staff for assistance when getting out of bed, Place fall precaution signage outside patient door, Place patient in room close to nursing station, and Utilize bed/chair fall alarm  Bed type: Bariatric (Kyle Score less than 17 interventions in place)  Patient on cardiac monitor: Yes  IVF/IV medications: Not Applicable   Oxygen: How many liters 40L 70%  Bedside sitter: Not Applicable   Isolation: Not applicable

## 2024-02-27 NOTE — DISCHARGE PLANNING
Case Management Discharge Planning    Admission Date: 2/21/2024  GMLOS: 5.1  ALOS: 6    6-Clicks ADL Score: 10  6-Clicks Mobility Score: 6  PT and/or OT Eval ordered: Yes  Post-acute Referrals Ordered: Yes  Post-acute Choice Obtained: Yes  Has referral(s) been sent to post-acute provider:  Yes      Anticipated Discharge Dispo: Discharge Disposition: Disch to a long term care facility (64)  Discharge Contact Phone Number: SonMynor ()    DME Needed: No    Action(s) Taken: Choice obtained    Patient discussed in rounds, pt on 40L HFNC. Provider thinks pateint will be a good candidate for LTAC.     RN CM placed order per protocol, referral sent to [facility]. RN CM called and spoke with pt's son Mynor to update him on DCP.     Per Mynor, he hopes his mother chooses [facility] in [place]. Pending acceptance from [facility].    Escalations Completed: None    Medically Clear: No    Next Steps: F/U with medical team regarding D/C needs/ barriers.     Barriers to Discharge: Medical clearance and Pending Placement    Is the patient up for discharge tomorrow: No

## 2024-02-27 NOTE — CARE PLAN
Problem: Knowledge Deficit - Standard  Goal: Patient and family/care givers will demonstrate understanding of plan of care, disease process/condition, diagnostic tests and medications  Outcome: Progressing     Problem: Ineffective Airway Clearance  Goal: Patient will maintain patent airway with clear/clearing breath sounds  Outcome: Progressing     Problem: Impaired Gas Exchange  Goal: Patient will demonstrate improved ventilation and adequate oxygenation and participate in treatment regimen within the level of ability/situation.  Outcome: Progressing     Problem: Fluid Volume  Goal: Fluid volume balance will be maintained  Outcome: Progressing     Problem: Skin Integrity  Goal: Skin integrity is maintained or improved  Outcome: Progressing   The patient is Stable - Low risk of patient condition declining or worsening    Shift Goals  Clinical Goals: wound care, resp status  Patient Goals: rest  Family Goals: jean    Progress made toward(s) clinical / shift goals:  progressing    Patient is not progressing towards the following goals:

## 2024-02-27 NOTE — THERAPY
Occupational Therapy Contact Note    Patient Name: Miroslava Matta  Age:  63 y.o., Sex:  female  Medical Record #: [id number]  Today's Date: 2/27/2024    Attempted OT tx session, patient with BLE pain and feels like wraps on legs are too tight alerted RN. Will hold and follow up as able/appropriate.    Luc Jimenez OTD, OTR/L

## 2024-02-27 NOTE — PROGRESS NOTES
Monitor Summary:   Rhythm: afib  Rate: 103-120  Measurement: .-/.09/.-  Ectopy: pvc's, couplets

## 2024-02-27 NOTE — PROGRESS NOTES
Bedside report received from day RN, pt care assumed, assessment completed. Pt is A&O4, pain 8/10, afib on the monitor. Updated on POC, questions answered. Bed in lowest, locked position, treaded socks on, call light and belongings within reach. Fall precautions in place.      Fall Risk Score: HIGH RISK  Fall risk interventions in place: Provide patient/family education based on risk assessment, Educate patient/family to call staff for assistance when getting out of bed, Place fall precaution signage outside patient door, Utilize bed/chair fall alarm, and Bed alarm connected correctly  Bed type: Low air loss and Bariatric (Kyle Score less than 17 interventions in place)  Patient on cardiac monitor: Yes  IVF/IV medications: Not Applicable   Oxygen: How many liters Hi Flow 40L, Traced the line to wall oxygen, and No oxygen tank in room  Bedside sitter: Not Applicable   Isolation: Not applicable

## 2024-02-28 LAB
ANION GAP SERPL CALC-SCNC: 10 MMOL/L (ref 7–16)
BUN SERPL-MCNC: 31 MG/DL (ref 8–22)
CALCIUM SERPL-MCNC: 9.2 MG/DL (ref 8.5–10.5)
CHLORIDE SERPL-SCNC: 90 MMOL/L (ref 96–112)
CO2 SERPL-SCNC: 38 MMOL/L (ref 20–33)
CREAT SERPL-MCNC: 0.78 MG/DL (ref 0.5–1.4)
GFR SERPLBLD CREATININE-BSD FMLA CKD-EPI: 86 ML/MIN/1.73 M 2
GLUCOSE BLD STRIP.AUTO-MCNC: 179 MG/DL (ref 65–99)
GLUCOSE BLD STRIP.AUTO-MCNC: 198 MG/DL (ref 65–99)
GLUCOSE BLD STRIP.AUTO-MCNC: 202 MG/DL (ref 65–99)
GLUCOSE BLD STRIP.AUTO-MCNC: 227 MG/DL (ref 65–99)
GLUCOSE SERPL-MCNC: 173 MG/DL (ref 65–99)
POTASSIUM SERPL-SCNC: 3.6 MMOL/L (ref 3.6–5.5)
SODIUM SERPL-SCNC: 138 MMOL/L (ref 135–145)

## 2024-02-28 PROCEDURE — 82962 GLUCOSE BLOOD TEST: CPT

## 2024-02-28 PROCEDURE — 97602 WOUND(S) CARE NON-SELECTIVE: CPT

## 2024-02-28 PROCEDURE — 94640 AIRWAY INHALATION TREATMENT: CPT

## 2024-02-28 PROCEDURE — A9270 NON-COVERED ITEM OR SERVICE: HCPCS

## 2024-02-28 PROCEDURE — 99291 CRITICAL CARE FIRST HOUR: CPT | Performed by: STUDENT IN AN ORGANIZED HEALTH CARE EDUCATION/TRAINING PROGRAM

## 2024-02-28 PROCEDURE — 36415 COLL VENOUS BLD VENIPUNCTURE: CPT

## 2024-02-28 PROCEDURE — 770020 HCHG ROOM/CARE - TELE (206)

## 2024-02-28 PROCEDURE — 700111 HCHG RX REV CODE 636 W/ 250 OVERRIDE (IP): Mod: JZ | Performed by: HOSPITALIST

## 2024-02-28 PROCEDURE — 700102 HCHG RX REV CODE 250 W/ 637 OVERRIDE(OP): Mod: JZ | Performed by: HOSPITALIST

## 2024-02-28 PROCEDURE — 700102 HCHG RX REV CODE 250 W/ 637 OVERRIDE(OP): Performed by: STUDENT IN AN ORGANIZED HEALTH CARE EDUCATION/TRAINING PROGRAM

## 2024-02-28 PROCEDURE — A9270 NON-COVERED ITEM OR SERVICE: HCPCS | Performed by: STUDENT IN AN ORGANIZED HEALTH CARE EDUCATION/TRAINING PROGRAM

## 2024-02-28 PROCEDURE — 700102 HCHG RX REV CODE 250 W/ 637 OVERRIDE(OP)

## 2024-02-28 PROCEDURE — 92526 ORAL FUNCTION THERAPY: CPT

## 2024-02-28 PROCEDURE — A9270 NON-COVERED ITEM OR SERVICE: HCPCS | Mod: JZ | Performed by: HOSPITALIST

## 2024-02-28 PROCEDURE — 80048 BASIC METABOLIC PNL TOTAL CA: CPT

## 2024-02-28 RX ORDER — NYSTATIN 100000 [USP'U]/G
POWDER TOPICAL 3 TIMES DAILY
Status: COMPLETED | OUTPATIENT
Start: 2024-02-28 | End: 2024-03-06

## 2024-02-28 RX ORDER — AMOXICILLIN 250 MG
2 CAPSULE ORAL 2 TIMES DAILY PRN
Status: DISCONTINUED | OUTPATIENT
Start: 2024-02-28 | End: 2024-03-07 | Stop reason: HOSPADM

## 2024-02-28 RX ORDER — POLYETHYLENE GLYCOL 3350 17 G/17G
1 POWDER, FOR SOLUTION ORAL
Status: DISCONTINUED | OUTPATIENT
Start: 2024-02-28 | End: 2024-03-07 | Stop reason: HOSPADM

## 2024-02-28 RX ADMIN — ATORVASTATIN CALCIUM 40 MG: 40 TABLET, FILM COATED ORAL at 18:13

## 2024-02-28 RX ADMIN — POTASSIUM CHLORIDE 40 MEQ: 1500 TABLET, EXTENDED RELEASE ORAL at 11:43

## 2024-02-28 RX ADMIN — INSULIN HUMAN 4 UNITS: 100 INJECTION, SOLUTION PARENTERAL at 21:26

## 2024-02-28 RX ADMIN — RIVAROXABAN 20 MG: 20 TABLET, FILM COATED ORAL at 18:16

## 2024-02-28 RX ADMIN — CYANOCOBALAMIN TAB 500 MCG 1000 MCG: 500 TAB at 04:43

## 2024-02-28 RX ADMIN — Medication 400 MG: at 18:15

## 2024-02-28 RX ADMIN — TIOTROPIUM BROMIDE INHALATION SPRAY 5 MCG: 3.12 SPRAY, METERED RESPIRATORY (INHALATION) at 04:49

## 2024-02-28 RX ADMIN — MOMETASONE FUROATE AND FORMOTEROL FUMARATE DIHYDRATE 2 PUFF: 200; 5 AEROSOL RESPIRATORY (INHALATION) at 17:57

## 2024-02-28 RX ADMIN — OXYCODONE HYDROCHLORIDE AND ACETAMINOPHEN 500 MG: 500 TABLET ORAL at 04:43

## 2024-02-28 RX ADMIN — INSULIN HUMAN 3 UNITS: 100 INJECTION, SOLUTION PARENTERAL at 08:28

## 2024-02-28 RX ADMIN — ACETAMINOPHEN 650 MG: 325 TABLET, FILM COATED ORAL at 13:51

## 2024-02-28 RX ADMIN — NYSTATIN: 100000 POWDER TOPICAL at 21:30

## 2024-02-28 RX ADMIN — DULOXETINE HYDROCHLORIDE 30 MG: 30 CAPSULE, DELAYED RELEASE ORAL at 04:43

## 2024-02-28 RX ADMIN — OXYCODONE HYDROCHLORIDE 10 MG: 10 TABLET ORAL at 04:46

## 2024-02-28 RX ADMIN — Medication 400 MG: at 04:43

## 2024-02-28 RX ADMIN — INSULIN HUMAN 4 UNITS: 100 INJECTION, SOLUTION PARENTERAL at 11:36

## 2024-02-28 RX ADMIN — Medication 1000 UNITS: at 04:43

## 2024-02-28 RX ADMIN — FOLIC ACID 1 MG: 1 TABLET ORAL at 04:43

## 2024-02-28 RX ADMIN — POTASSIUM CHLORIDE 40 MEQ: 1500 TABLET, EXTENDED RELEASE ORAL at 18:16

## 2024-02-28 RX ADMIN — MICONAZOLE NITRATE: 20 CREAM TOPICAL at 04:55

## 2024-02-28 RX ADMIN — DIGOXIN 125 MCG: 0.12 TABLET ORAL at 18:13

## 2024-02-28 RX ADMIN — DOCUSATE SODIUM 50 MG AND SENNOSIDES 8.6 MG 2 TABLET: 8.6; 5 TABLET, FILM COATED ORAL at 05:38

## 2024-02-28 RX ADMIN — OXYCODONE HYDROCHLORIDE 10 MG: 10 TABLET ORAL at 13:52

## 2024-02-28 RX ADMIN — TOPIRAMATE 25 MG: 25 TABLET, FILM COATED ORAL at 04:43

## 2024-02-28 RX ADMIN — INSULIN HUMAN 3 UNITS: 100 INJECTION, SOLUTION PARENTERAL at 18:00

## 2024-02-28 RX ADMIN — MOMETASONE FUROATE AND FORMOTEROL FUMARATE DIHYDRATE 2 PUFF: 200; 5 AEROSOL RESPIRATORY (INHALATION) at 04:49

## 2024-02-28 RX ADMIN — FUROSEMIDE 80 MG: 10 INJECTION, SOLUTION INTRAMUSCULAR; INTRAVENOUS at 18:17

## 2024-02-28 RX ADMIN — DAKIN'S SOLUTION 0.125% (QUARTER STRENGTH) 100 ML: 0.12 SOLUTION at 04:55

## 2024-02-28 RX ADMIN — FUROSEMIDE 80 MG: 10 INJECTION, SOLUTION INTRAMUSCULAR; INTRAVENOUS at 04:43

## 2024-02-28 RX ADMIN — FOLIC ACID 1 MG: 1 TABLET ORAL at 18:15

## 2024-02-28 RX ADMIN — POTASSIUM CHLORIDE 40 MEQ: 1500 TABLET, EXTENDED RELEASE ORAL at 04:43

## 2024-02-28 RX ADMIN — TOPIRAMATE 25 MG: 25 TABLET, FILM COATED ORAL at 18:16

## 2024-02-28 RX ADMIN — FUROSEMIDE 80 MG: 10 INJECTION, SOLUTION INTRAMUSCULAR; INTRAVENOUS at 11:43

## 2024-02-28 ASSESSMENT — ENCOUNTER SYMPTOMS
DIZZINESS: 0
FEVER: 0
BACK PAIN: 1
ROS GI COMMENTS: EATING WELL

## 2024-02-28 ASSESSMENT — FIBROSIS 4 INDEX: FIB4 SCORE: 0.32

## 2024-02-28 ASSESSMENT — PAIN DESCRIPTION - PAIN TYPE: TYPE: ACUTE PAIN

## 2024-02-28 NOTE — PROGRESS NOTES
In addition to worsening wounds, new L outer thigh open wound and blister found during wound care. Picture taken and ADL opened. Wound consult placed yesterday has been updated this AM to reflect changes.    never

## 2024-02-28 NOTE — CARE PLAN
The patient is Stable - Low risk of patient condition declining or worsening    Shift Goals  Clinical Goals: monitor O2, wound care  Patient Goals: get better  Family Goals: jean    Progress made toward(s) clinical / shift goals:      Problem: Knowledge Deficit - Standard  Goal: Patient and family/care givers will demonstrate understanding of plan of care, disease process/condition, diagnostic tests and medications  Outcome: Progressing     Problem: Impaired Gas Exchange  Goal: Patient will demonstrate improved ventilation and adequate oxygenation and participate in treatment regimen within the level of ability/situation.  Outcome: Progressing     Problem: Respiratory  Goal: Patient will achieve/maintain optimum respiratory ventilation and gas exchange  Outcome: Progressing  Note: Pt remains on hi flow NC, adjustments per RT when appropriate.        Patient is not progressing towards the following goals:      Problem: Self Care  Goal: Patient will have the ability to perform ADLs independently or with assistance (bathe, groom, dress, toilet and feed)  Outcome: Not Progressing     Problem: Skin Integrity  Goal: Skin integrity is maintained or improved  Outcome: Not Progressing  Note: Patient skin appears worsening per previous pictures. New wound consult placed today for developing wounds. Per day RN, spoke with wound team who will be by tomorrow for assessment. Offloading devices in place, q2 turns, low airloss bed, floyd for wounds, interdry in pannus and groin. New pictures taken yesterday. Wound care per orders qshift.

## 2024-02-28 NOTE — CARE PLAN
Problem: Respiratory  Goal: Patient will achieve/maintain optimum respiratory ventilation and gas exchange  Outcome: Progressing  Note: Assess and monitor pulmonary status. Adjust oxygen as needed to maintain adequate saturation. Encourage ambulation, turning, coughing and deep breathing. Educate and encourage use of IS as needed. Continue to monitor.        Problem: Pain - Standard  Goal: Alleviation of pain or a reduction in pain to the patient’s comfort goal  Outcome: Progressing  Note: Assess and monitor for pain. Provide pharmacological and non-pharmacological interventions as appropriate. Re-evaluate and continue to monitor.        Problem: Skin Integrity  Goal: Skin integrity is maintained or improved  Outcome: Not Progressing  Note: Assess skin and monitor for skin breakdown. Alleviate pressure to bony prominences and provide assistance with turning, repositioning, ROM and mobility as appropriate. Use of barriatric low air loss bed, TAPs, wedges, barrier cream, and wound orders as needed. Continue to monitor.      The patient is Watcher - Medium risk of patient condition declining or worsening    Shift Goals: Monitor skin integrity   Clinical Goals: (P) monitor oxygen  Patient Goals: (P) sit up, get better  Family Goals: jean    Progress made toward(s) clinical / shift goals: Diuresis    Patient is not progressing towards the following goals: Skin worsening, notified Annemarie wound RN. Wound care completed

## 2024-02-28 NOTE — THERAPY
"Speech Language Pathology   Daily Treatment     Patient Name: Miroslava Matta  AGE:  63 y.o., SEX:  female  Medical Record #: [id number]  Date of Service: 2/28/2024      Precautions:  Precautions: Fall Risk, Swallow Precautions         Subjective  Pt generally agreeable with SLP tx tasks. Patient is very focused on \"no straw\" recommendation from time of CSE despite current diet order not including no straws and patient with straws at bedside on SLP arrival. Per patient - \"I barely every drink from a straw\" and \"I don't feel it going down the wrong pipe.\"      Assessment  Pt seen for dysphagia management. On 30L/70% FiO2 HHNFC satting in the 90s throughout session. Finished breakfast tray of regular textures at bedside. No new CXR or labs with WBC count. Discussed with patient overall aspiration risk and elevated risk for aspiration-related complications given limitations with mobility, wounds, and high respiratory needs. Patient with poor learning evidence; continues to declined instrumentation. Patient then drank ~4 oz of thin liquids with rapid, sequential straw sips and no overt s/sx of aspiration, increased WOB, or change in oxygenation.       Clinical Impressions  Patient continues to be at elevated risk for dysphagia and related sequela; however, has not had concern for new lung infection and continues to refuse further evaluation to rule out aspiration/dysphagia. SLP to discharge from service at this time secondary to goals met and likely no further benefit. Please re-consult SLP with s/sx concerning for aspiration or change in pt status.      Recommendations  Regular diet with thin liquids  Instrumentation: Continues to refuse instrumentation in spite of ongoing education of elevated risk for aspiration and aspiration PNA  Medication: As tolerated  Supervision: Assist with meal tray set up  Positioning: Fully upright and midline during oral intake  Risk Management : Small bites/sips, Slow rate of intake, " "Physical mobility, as tolerated, Reduce environmental distractions  Oral Care: BID      SLP Treatment Plan  Treatment Plan: None Indicated  SLP Frequency: D/c 2 to goals met; d/c 2 to likley no benefit         Anticipated Discharge Needs  Discharge Recommendations: Anticipate that the patient will have no further speech therapy needs after discharge from the hospital  Therapy Recommendations Upon DC: Not Indicated      Patient / Family Goals  Patient / Family Goal #1: \"I want Jello.\"  Goal #1 Outcome: Goal met  Short Term Goals  Short Term Goal # 1: Pt will consume a regular.thin liquid diet without  s/sx of airway invasion.  Goal Outcome # 1: Goal met      Radha Corea, ESPERANZA  "

## 2024-02-28 NOTE — CARE PLAN
Problem: Knowledge Deficit - Standard  Goal: Patient and family/care givers will demonstrate understanding of plan of care, disease process/condition, diagnostic tests and medications  Outcome: Progressing     Problem: Pain - Standard  Goal: Alleviation of pain or a reduction in pain to the patient’s comfort goal  Outcome: Progressing     Problem: Skin Integrity  Goal: Skin integrity is maintained or improved  Outcome: Progressing     Problem: Fall Risk  Goal: Patient will remain free from falls  Outcome: Progressing   The patient is Stable - Low risk of patient condition declining or worsening    Shift Goals  Clinical Goals: Remain free of falls, no skin breakdown  Patient Goals: Rest  Family Goals: ELDER    Progress made toward(s) clinical / shift goals:      Pt educated on plan of care, pt verbalizes understanding, reinforcement needed. Pt educated on pain/anxiety scales, alleviating factors, pain/anxiety medications, and side effects, and need for pain/anxiety reassessment, pt pain/anxiety controlled at this time, reinforcement needed. Pt educated on the need to reposition frequently and remain dry to avoid skin breakdown, reinforcement needed, no further skin breakdown during shift. Fall risk education provided to pt, pt educated about the need to call for assistance while attempting to ambulate, reinforcement needed, pt remains free of falls this shift.

## 2024-02-28 NOTE — PROGRESS NOTES
[facility] Medicine Daily Progress Note    Date of Service  2/27/2024    Chief Complaint  Miroslava Matta is a 63 y.o. female admitted 2/21/2024 with failure to thrive.    Hospital Course  Sigrid is a 63 y.o. morbidly obese female with history of chronic A-fib on Xarelto, CHF, diabetes, hyperlipidemia, hypertension, COPD dependent on 4 L, RUTH/OHS noncompliant with CPAP, mood disorder who presented 2/21/2024 as a direct transfer from [facility] [place] [facility] for higher level care.     It was reported that patient's son called for a welfare check.  She apparently was assisted by home health RN, but reported to have quit last Friday 2/16.  Apparently patient was found sitting in the chair soiled in stool and urine.  Patient reported she has had a fall since 1/25/2024.  She admits to having difficulty ambulating due to her weight and obesity.  She stated her home health RN is no longer able to care for her due to her large body habitus, and quit home health.  She has not had home health assistance since 2/16. Noted to have extensive wound on the back, buttock and legs.  Per transfer paperwork, patient reported to have had atrial fibrillation with RVR with rate in 150s, improved to 130s with 1 L IVF bolus and given Cardizem 30 mg oral one-time.   Workup from [facility] included:  CBC: WBC 13.5, hemoglobin 9.7, platelet 689  CMP: Glucose 211, BUN 27, creatinine 1.0, sodium 135, potassium 4.6, chloride 100, bicarb 26, anion gap 9, calcium 9.2, total protein 7.4, albumin 3.4, total bilirubin 1.0, AST 18, ALT 17, alkaline phosphatase 169   Lactic acid 5.4 --> 1.8  Troponin I undetectable  proBNP 2360, CXR: Cardiomegaly with probable CHF   Patient received treatment with ceftriaxone 1 g IV, Cardizem 30 mg oral, NS to 250cc bolus x 3, Zofran 4 mg IV.   Due to concern of severe sepsis she was directly admitted to the IMCU.    Interval Problem Update  No acute events overnight.  Patient feels well, denies any dyspnea.  On 35L  HFNC, weaning as tolerated.  Continue IV lasix diuresis, having good urine output. -5L net out yesterday.  Continue K dur, monitor daily BMP.  LTAC referral placed given need for HFNC and prolonged weaning time.      I have discussed this patient's plan of care and discharge plan at IDT rounds today with Case Management, Nursing, Nursing leadership, and other members of the IDT team.    Consultants/Specialty  none    Code Status  Full Code    Disposition  The patient is not medically cleared for discharge to home or a post-acute facility.  Anticipate discharge to: a long-term acute care hospital    I have placed the appropriate orders for post-discharge needs.    Review of Systems  Review of Systems   Constitutional:  Negative for fever.   Cardiovascular:  Negative for leg swelling.   Gastrointestinal:         Eating well   Musculoskeletal:  Positive for back pain.        Foot pain   Neurological:  Negative for dizziness.   All other systems reviewed and are negative.       Physical Exam  Temp:  [36.1 °C (97 °F)-36.8 °C (98.2 °F)] 36.6 °C (97.8 °F)  Pulse:  [101-131] 110  Resp:  [17-22] 18  BP: (124-138)/(66-84) 135/77  SpO2:  [91 %-97 %] 96 %    Physical Exam  Vitals and nursing note reviewed.   Constitutional:       General: She is not in acute distress.     Appearance: She is obese. She is ill-appearing. She is not toxic-appearing.   HENT:      Head:      Comments: Hirsutism      Mouth/Throat:      Mouth: Mucous membranes are dry.   Cardiovascular:      Rate and Rhythm: Tachycardia present. Rhythm irregular.   Pulmonary:      Comments: High flow oxygen  Poor air movement  No wheezing  Abdominal:      General: There is no distension.      Tenderness: There is no abdominal tenderness.      Comments: Pannus with intertrigo    Genitourinary:     Comments: floyd  Musculoskeletal:      Cervical back: Neck supple.      Right lower leg: No edema.      Left lower leg: No edema.      Comments: Right heel black  eschar  Right posterior calf abrasions and exudate  Bilateral posterior thigh ulcerations open wounds   Neurological:      General: No focal deficit present.      Mental Status: She is alert and oriented to person, place, and time.   Psychiatric:         Mood and Affect: Mood normal.         Behavior: Behavior normal.         Thought Content: Thought content normal.         Fluids    Intake/Output Summary (Last 24 hours) at 2/27/2024 1736  Last data filed at 2/27/2024 1623  Gross per 24 hour   Intake 1104 ml   Output 5150 ml   Net -4046 ml       Laboratory        Recent Labs     02/25/24  0401 02/26/24  0036 02/27/24  0047   SODIUM 142 141 139   POTASSIUM 3.3* 3.6 3.7   CHLORIDE 95* 93* 91*   CO2 38* 40* 40*   GLUCOSE 144* 161* 164*   BUN 17 13 20   CREATININE 0.69 0.75 0.93   CALCIUM 8.2* 8.2* 8.8                     Imaging  EC-ECHOCARDIOGRAM COMPLETE W/O CONT   Final Result      US-RENU SINGLE LEVEL BILAT   Final Result      DX-CHEST-LIMITED (1 VIEW)   Final Result      1.  Cardiomegaly with mild pulmonary vascular congestion.           Assessment/Plan  Pulmonary hypertension (HCC)- (present on admission)  Assessment & Plan  RVSP 85 mmHg by echo while echo 2018 was 55-60 mmHg.  IV lasix 80 mg TID and potassium will be increased  Follow urine output  Fluid restriction 1.8 liters       Acute on chronic blood loss anemia- (present on admission)  Assessment & Plan  Hgb 9.7 (prior to transfer) --> 7.6 at [facility]  No evidence of blood loss  Iron 19 with %sat 9 for which IV iron was given  Minimize blood draws and she will require hemoglobin to be checked intermittently      Elevated troponin  Assessment & Plan  Probable demand ischemia    Mood disorder (HCC)- (present on admission)  Assessment & Plan  Cymbalta    RUTH (obstructive sleep apnea)- (present on admission)  Assessment & Plan  Hx of  Her CPAP machine broke and she has been off since October  Nightly BiPAP while inpatient    Morbid (severe) obesity with alveolar  hypoventilation (HCC)- (present on admission)  Assessment & Plan  Diet and lifestyle modification  Body mass index is 64.91 kg/m².      Atrial fibrillation with RVR (HCC)- (present on admission)  Assessment & Plan  Restarted home Cardizem 360 for rate control  Continue Xarelto for anticoagulation  Continuous tele monitoring      Severe sepsis (HCC)- (present on admission)  Assessment & Plan  This is Sepsis Present on admission  SIRS criteria identified on my evaluation include: Tachycardia, with heart rate greater than 90 BPM, Leukocytosis, with WBC greater than 12,000, and Bandemia, greater than 10% bands  Clinical indicators of end organ dysfunction include Lactic Acid greater than 2 and Toxic Metabolic Encephalopathy  Source is cellulitis leg, buttock  Sepsis protocol initiated  Crystalloid Fluid Administration: Fluid resuscitation ordered per standard protocol was given  IV antibiotics as appropriate for source of sepsis  Reassessment: I have reassessed the patient's hemodynamic status    Cellulitis of legs, buttock  WBC: 13.5, lactic acid 5.4 --> 1.8  S/p 1L IVF no further fluids  Antibiotic:  stopped  Wound care      Diabetes (HCC)- (present on admission)  Assessment & Plan  ISS  Statin    Essential hypertension- (present on admission)  Assessment & Plan  Restart Cardizem 360    Acute respiratory failure with hypoxia (HCC)- (present on admission)  Assessment & Plan  Acute on chronic  Multifactorial: OHS/RUTH noncompliant with CPAP, volume overload, HFpEF  She is on 4 liters oxygen at home (not due to COPD but due to CHF and obesity).  Pulmonary hypertension thus IV lasix 80 mg BID.   She is requiring high flow oxygen which is being titrated.   Increased Lasix to 80 mg TID with KCl 40 mEq TID and titrate up until she i is able to transition off high flow oxygen or until her creatinine starts to go up substantially  Nocturnal BiPAP   Incentive spirometry 10x/hour while awake  She may require pulmonology  consultation         VTE prophylaxis:    therapeutic anticoagulation with xarelto 20 mg daily witih meals      Patient is critically ill.   The patient continues to have: acute hypoxic respiratory failure  The vital organ system that is affected is the: lungs  If untreated there is a high chance of deterioration into: worsening respiratory failure, cardiac arrest, and eventually death.   The critical care that I am providing today is: managing respiratory failure with high flow nasal cannula and IV lasix therapy  The critical that has been undertaken is medically complex.   There has been no overlap in critical care time.   Critical Care Time not including procedures: 35 minutes

## 2024-02-28 NOTE — THERAPY
Physical Therapy Contact Note    Patient Name: Miroslava Matta  Age:  63 y.o., Sex:  female  Medical Record #: [id number]  Today's Date: 2/27/2024 02/27/24 1635   Treatment Variance   Reason For Missed Therapy Non-Medical - Other (Please Comment)  (Pt reports she is having BLE pain and feels as if her bandages are too tight, RN notified. Will re-attempt for PT tx session as able.)   Session Information   Date / Session Number  2/22 - 1 (1/3, 2/28) - att 2/27     Jolly Duarte, Pt, DPT

## 2024-02-28 NOTE — PROGRESS NOTES
Report received from night RN at 0700. PT seen at bedside and pt care assumed. Pt is A&Ox4, on HF NC 30L@70%, denies pain. PIV flushed and intact. PT is Afib on telemetry monitor. PT is up max assist.     Plan of care reviewed with pt's family, call light, phone, and personal belongings within reach. Bed alarm on, and bed in low locked position. All pt's needs met at this time.    Bedside report received from off going RN/tech: April RN, assumed care of patient.     Fall Risk Score: HIGH RISK  Fall risk interventions in place: Place yellow fall risk ID band on patient, Provide patient/family education based on risk assessment, Educate patient/family to call staff for assistance when getting out of bed, Place fall precaution signage outside patient door, Place patient in room close to nursing station, Utilize bed/chair fall alarm, Notify charge of high risk for huddle, and Bed alarm connected correctly  Bed type: Bariatric (Kyle Score less than 17 interventions in place)  Patient on cardiac monitor: Yes  IVF/IV medications: Not Applicable   Oxygen: How many liters 30L @ 70%  Bedside sitter: Not Applicable   Isolation: Not applicable

## 2024-02-28 NOTE — DISCHARGE PLANNING
LMSW spoke with Yany from [facility] to see where the pt's referral is stands. Yany stated that she is working pt up and speaking with her team on trying to get pt accepted. Yany stated that as long as pt continues to work with nursing and therapy there should not be an issues. They currently have no beds available but [facility] will have some discharges later this afternoon and tomorrow.Yany will update CM when she knows more.

## 2024-02-28 NOTE — PROGRESS NOTES
Discussed with day shift RN about worsening wounds, wound consult was placed by dayshift today. Wound confirmed they will be back tomorrow to assess.

## 2024-02-28 NOTE — PROGRESS NOTES
Bedside report received from off going RN/tech: Coco RUIZ, assumed care of patient.     Fall Risk Score: HIGH RISK  Fall risk interventions in place: Place yellow fall risk ID band on patient, Provide patient/family education based on risk assessment, Educate patient/family to call staff for assistance when getting out of bed, Place fall precaution signage outside patient door, Place patient in room close to nursing station, and Utilize bed/chair fall alarm  Bed type: Bariatric (Kyle Score less than 17 interventions in place)  Patient on cardiac monitor: Yes  IVF/IV medications: Not Applicable   Oxygen: How many liters hi flow 30L 50%  Bedside sitter: Not Applicable   Isolation: Not applicable

## 2024-02-29 LAB
ANION GAP SERPL CALC-SCNC: 12 MMOL/L (ref 7–16)
BUN SERPL-MCNC: 38 MG/DL (ref 8–22)
CALCIUM SERPL-MCNC: 9.4 MG/DL (ref 8.5–10.5)
CHLORIDE SERPL-SCNC: 92 MMOL/L (ref 96–112)
CO2 SERPL-SCNC: 36 MMOL/L (ref 20–33)
CREAT SERPL-MCNC: 0.76 MG/DL (ref 0.5–1.4)
GFR SERPLBLD CREATININE-BSD FMLA CKD-EPI: 88 ML/MIN/1.73 M 2
GLUCOSE BLD STRIP.AUTO-MCNC: 202 MG/DL (ref 65–99)
GLUCOSE BLD STRIP.AUTO-MCNC: 212 MG/DL (ref 65–99)
GLUCOSE BLD STRIP.AUTO-MCNC: 216 MG/DL (ref 65–99)
GLUCOSE SERPL-MCNC: 194 MG/DL (ref 65–99)
POTASSIUM SERPL-SCNC: 3.7 MMOL/L (ref 3.6–5.5)
SODIUM SERPL-SCNC: 140 MMOL/L (ref 135–145)

## 2024-02-29 PROCEDURE — 36415 COLL VENOUS BLD VENIPUNCTURE: CPT

## 2024-02-29 PROCEDURE — 770020 HCHG ROOM/CARE - TELE (206)

## 2024-02-29 PROCEDURE — A9270 NON-COVERED ITEM OR SERVICE: HCPCS | Mod: JZ | Performed by: HOSPITALIST

## 2024-02-29 PROCEDURE — 97530 THERAPEUTIC ACTIVITIES: CPT

## 2024-02-29 PROCEDURE — 99291 CRITICAL CARE FIRST HOUR: CPT | Performed by: STUDENT IN AN ORGANIZED HEALTH CARE EDUCATION/TRAINING PROGRAM

## 2024-02-29 PROCEDURE — 700102 HCHG RX REV CODE 250 W/ 637 OVERRIDE(OP): Performed by: STUDENT IN AN ORGANIZED HEALTH CARE EDUCATION/TRAINING PROGRAM

## 2024-02-29 PROCEDURE — 94640 AIRWAY INHALATION TREATMENT: CPT

## 2024-02-29 PROCEDURE — A9270 NON-COVERED ITEM OR SERVICE: HCPCS

## 2024-02-29 PROCEDURE — 80048 BASIC METABOLIC PNL TOTAL CA: CPT

## 2024-02-29 PROCEDURE — 97535 SELF CARE MNGMENT TRAINING: CPT

## 2024-02-29 PROCEDURE — 700102 HCHG RX REV CODE 250 W/ 637 OVERRIDE(OP): Mod: JZ | Performed by: HOSPITALIST

## 2024-02-29 PROCEDURE — 82962 GLUCOSE BLOOD TEST: CPT | Mod: 91

## 2024-02-29 PROCEDURE — 700102 HCHG RX REV CODE 250 W/ 637 OVERRIDE(OP)

## 2024-02-29 PROCEDURE — 700111 HCHG RX REV CODE 636 W/ 250 OVERRIDE (IP): Mod: JZ | Performed by: HOSPITALIST

## 2024-02-29 PROCEDURE — A9270 NON-COVERED ITEM OR SERVICE: HCPCS | Performed by: STUDENT IN AN ORGANIZED HEALTH CARE EDUCATION/TRAINING PROGRAM

## 2024-02-29 PROCEDURE — 700111 HCHG RX REV CODE 636 W/ 250 OVERRIDE (IP): Performed by: STUDENT IN AN ORGANIZED HEALTH CARE EDUCATION/TRAINING PROGRAM

## 2024-02-29 RX ORDER — HYDROMORPHONE HYDROCHLORIDE 1 MG/ML
1 INJECTION, SOLUTION INTRAMUSCULAR; INTRAVENOUS; SUBCUTANEOUS PRN
Status: DISCONTINUED | OUTPATIENT
Start: 2024-02-29 | End: 2024-03-07 | Stop reason: HOSPADM

## 2024-02-29 RX ADMIN — NYSTATIN: 100000 POWDER TOPICAL at 04:53

## 2024-02-29 RX ADMIN — DAKIN'S SOLUTION 0.125% (QUARTER STRENGTH) 1 ML: 0.12 SOLUTION at 18:34

## 2024-02-29 RX ADMIN — INSULIN HUMAN 4 UNITS: 100 INJECTION, SOLUTION PARENTERAL at 20:35

## 2024-02-29 RX ADMIN — TOPIRAMATE 25 MG: 25 TABLET, FILM COATED ORAL at 04:47

## 2024-02-29 RX ADMIN — INSULIN GLARGINE-YFGN 5 UNITS: 100 INJECTION, SOLUTION SUBCUTANEOUS at 17:58

## 2024-02-29 RX ADMIN — DOCUSATE SODIUM 50 MG AND SENNOSIDES 8.6 MG 2 TABLET: 8.6; 5 TABLET, FILM COATED ORAL at 04:53

## 2024-02-29 RX ADMIN — INSULIN HUMAN 4 UNITS: 100 INJECTION, SOLUTION PARENTERAL at 18:00

## 2024-02-29 RX ADMIN — INSULIN HUMAN 4 UNITS: 100 INJECTION, SOLUTION PARENTERAL at 08:02

## 2024-02-29 RX ADMIN — RIVAROXABAN 20 MG: 20 TABLET, FILM COATED ORAL at 17:54

## 2024-02-29 RX ADMIN — OXYCODONE HYDROCHLORIDE 10 MG: 10 TABLET ORAL at 14:47

## 2024-02-29 RX ADMIN — DAKIN'S SOLUTION 0.125% (QUARTER STRENGTH) 100 ML: 0.12 SOLUTION at 05:02

## 2024-02-29 RX ADMIN — POTASSIUM CHLORIDE 40 MEQ: 1500 TABLET, EXTENDED RELEASE ORAL at 04:47

## 2024-02-29 RX ADMIN — MOMETASONE FUROATE AND FORMOTEROL FUMARATE DIHYDRATE 2 PUFF: 200; 5 AEROSOL RESPIRATORY (INHALATION) at 17:53

## 2024-02-29 RX ADMIN — HYDROMORPHONE HYDROCHLORIDE 1 MG: 1 INJECTION, SOLUTION INTRAMUSCULAR; INTRAVENOUS; SUBCUTANEOUS at 17:54

## 2024-02-29 RX ADMIN — POTASSIUM CHLORIDE 40 MEQ: 1500 TABLET, EXTENDED RELEASE ORAL at 13:55

## 2024-02-29 RX ADMIN — FOLIC ACID 1 MG: 1 TABLET ORAL at 17:54

## 2024-02-29 RX ADMIN — INSULIN HUMAN 4 UNITS: 100 INJECTION, SOLUTION PARENTERAL at 13:56

## 2024-02-29 RX ADMIN — OXYCODONE HYDROCHLORIDE 10 MG: 10 TABLET ORAL at 04:49

## 2024-02-29 RX ADMIN — CYANOCOBALAMIN TAB 500 MCG 1000 MCG: 500 TAB at 04:47

## 2024-02-29 RX ADMIN — MOMETASONE FUROATE AND FORMOTEROL FUMARATE DIHYDRATE 2 PUFF: 200; 5 AEROSOL RESPIRATORY (INHALATION) at 04:53

## 2024-02-29 RX ADMIN — FOLIC ACID 1 MG: 1 TABLET ORAL at 04:47

## 2024-02-29 RX ADMIN — TOPIRAMATE 25 MG: 25 TABLET, FILM COATED ORAL at 17:54

## 2024-02-29 RX ADMIN — ACETAMINOPHEN 650 MG: 325 TABLET, FILM COATED ORAL at 07:59

## 2024-02-29 RX ADMIN — DULOXETINE HYDROCHLORIDE 30 MG: 30 CAPSULE, DELAYED RELEASE ORAL at 04:47

## 2024-02-29 RX ADMIN — POTASSIUM CHLORIDE 40 MEQ: 1500 TABLET, EXTENDED RELEASE ORAL at 17:54

## 2024-02-29 RX ADMIN — OXYCODONE HYDROCHLORIDE AND ACETAMINOPHEN 500 MG: 500 TABLET ORAL at 04:47

## 2024-02-29 RX ADMIN — NYSTATIN: 100000 POWDER TOPICAL at 17:53

## 2024-02-29 RX ADMIN — DIGOXIN 125 MCG: 0.12 TABLET ORAL at 17:54

## 2024-02-29 RX ADMIN — FUROSEMIDE 80 MG: 10 INJECTION, SOLUTION INTRAMUSCULAR; INTRAVENOUS at 13:55

## 2024-02-29 RX ADMIN — ATORVASTATIN CALCIUM 40 MG: 40 TABLET, FILM COATED ORAL at 17:54

## 2024-02-29 RX ADMIN — NYSTATIN: 100000 POWDER TOPICAL at 14:07

## 2024-02-29 RX ADMIN — Medication 400 MG: at 04:47

## 2024-02-29 RX ADMIN — Medication 1000 UNITS: at 04:47

## 2024-02-29 RX ADMIN — FUROSEMIDE 80 MG: 10 INJECTION, SOLUTION INTRAMUSCULAR; INTRAVENOUS at 04:47

## 2024-02-29 RX ADMIN — FUROSEMIDE 80 MG: 10 INJECTION, SOLUTION INTRAMUSCULAR; INTRAVENOUS at 17:53

## 2024-02-29 RX ADMIN — TIOTROPIUM BROMIDE INHALATION SPRAY 5 MCG: 3.12 SPRAY, METERED RESPIRATORY (INHALATION) at 04:53

## 2024-02-29 RX ADMIN — Medication 400 MG: at 17:54

## 2024-02-29 ASSESSMENT — COGNITIVE AND FUNCTIONAL STATUS - GENERAL
MOBILITY SCORE: 8
HELP NEEDED FOR BATHING: TOTAL
WALKING IN HOSPITAL ROOM: TOTAL
TURNING FROM BACK TO SIDE WHILE IN FLAT BAD: A LOT
SUGGESTED CMS G CODE MODIFIER DAILY ACTIVITY: CL
CLIMB 3 TO 5 STEPS WITH RAILING: TOTAL
DAILY ACTIVITIY SCORE: 12
STANDING UP FROM CHAIR USING ARMS: TOTAL
STANDING UP FROM CHAIR USING ARMS: TOTAL
CLIMB 3 TO 5 STEPS WITH RAILING: TOTAL
MOVING FROM LYING ON BACK TO SITTING ON SIDE OF FLAT BED: A LOT
SUGGESTED CMS G CODE MODIFIER MOBILITY: CM
TOILETING: TOTAL
PERSONAL GROOMING: A LITTLE
DRESSING REGULAR LOWER BODY CLOTHING: TOTAL
HELP NEEDED FOR BATHING: TOTAL
MOBILITY SCORE: 8
MOVING FROM LYING ON BACK TO SITTING ON SIDE OF FLAT BED: A LOT
MOVING TO AND FROM BED TO CHAIR: UNABLE
TOILETING: TOTAL
SUGGESTED CMS G CODE MODIFIER MOBILITY: CM
TURNING FROM BACK TO SIDE WHILE IN FLAT BAD: A LOT
DAILY ACTIVITIY SCORE: 12
PERSONAL GROOMING: A LITTLE
DRESSING REGULAR UPPER BODY CLOTHING: A LOT
SUGGESTED CMS G CODE MODIFIER DAILY ACTIVITY: CL
WALKING IN HOSPITAL ROOM: TOTAL
DRESSING REGULAR UPPER BODY CLOTHING: A LOT
DRESSING REGULAR LOWER BODY CLOTHING: TOTAL
MOVING TO AND FROM BED TO CHAIR: UNABLE

## 2024-02-29 ASSESSMENT — ENCOUNTER SYMPTOMS
FEVER: 0
ROS GI COMMENTS: EATING WELL
DIZZINESS: 0
BACK PAIN: 1

## 2024-02-29 ASSESSMENT — PAIN DESCRIPTION - PAIN TYPE
TYPE: ACUTE PAIN
TYPE: ACUTE PAIN;CHRONIC PAIN

## 2024-02-29 ASSESSMENT — GAIT ASSESSMENTS: GAIT LEVEL OF ASSIST: UNABLE TO PARTICIPATE

## 2024-02-29 ASSESSMENT — FIBROSIS 4 INDEX: FIB4 SCORE: 0.32

## 2024-02-29 NOTE — PROGRESS NOTES
[facility] Medicine Daily Progress Note    Date of Service  2/29/2024    Chief Complaint  Miroslava Matta is a 63 y.o. female admitted 2/21/2024 with failure to thrive.    Hospital Course  Sigrid is a 63 y.o. morbidly obese female with history of chronic A-fib on Xarelto, CHF, diabetes, hyperlipidemia, hypertension, COPD dependent on 4 L, RUTH/OHS noncompliant with CPAP, mood disorder who presented 2/21/2024 as a direct transfer from [facility] for higher level care.     It was reported that patient's son called for a welfare check.  She apparently was assisted by home health RN, but reported to have quit last Friday 2/16.  Apparently patient was found sitting in the chair soiled in stool and urine.  Patient reported she has had a fall since 1/25/2024.  She admits to having difficulty ambulating due to her weight and obesity.  She stated her home health RN is no longer able to care for her due to her large body habitus, and quit home health.  She has not had home health assistance since 2/16. Noted to have extensive wound on the back, buttock and legs.  Per transfer paperwork, patient reported to have had atrial fibrillation with RVR with rate in 150s, improved to 130s with 1 L IVF bolus and given Cardizem 30 mg oral one-time.   Workup from [facility] included:  CBC: WBC 13.5, hemoglobin 9.7, platelet 689  CMP: Glucose 211, BUN 27, creatinine 1.0, sodium 135, potassium 4.6, chloride 100, bicarb 26, anion gap 9, calcium 9.2, total protein 7.4, albumin 3.4, total bilirubin 1.0, AST 18, ALT 17, alkaline phosphatase 169   Lactic acid 5.4 --> 1.8  Troponin I undetectable  proBNP 2360, CXR: Cardiomegaly with probable CHF   Patient received treatment with ceftriaxone 1 g IV, Cardizem 30 mg oral, NS to 250cc bolus x 3, Zofran 4 mg IV.   Due to concern of severe sepsis she was directly admitted to the IMCU.    Interval Problem Update  No acute events overnight.  On 30L HFNC, weaning as tolerated.  Continue IV  lasix diuresis.  Continue K dur, monitor daily BMP.  LTAC referral placed given need for HFNC and prolonged weaning time.      I have discussed this patient's plan of care and discharge plan at IDT rounds today with Case Management, Nursing, Nursing leadership, and other members of the IDT team.    Consultants/Specialty  none    Code Status  Full Code    Disposition  Medically Cleared  I have placed the appropriate orders for post-discharge needs.    Review of Systems  Review of Systems   Constitutional:  Negative for fever.   Cardiovascular:  Negative for leg swelling.   Gastrointestinal:         Eating well   Musculoskeletal:  Positive for back pain.        Foot pain   Neurological:  Negative for dizziness.   All other systems reviewed and are negative.       Physical Exam  Temp:  [36.1 °C (97 °F)-36.8 °C (98.2 °F)] 36.1 °C (97 °F)  Pulse:  [] 103  Resp:  [17-20] 20  BP: (117-134)/(71-83) 117/76  SpO2:  [91 %-95 %] 93 %    Physical Exam  Vitals and nursing note reviewed.   Constitutional:       General: She is not in acute distress.     Appearance: She is obese. She is ill-appearing. She is not toxic-appearing.   HENT:      Head:      Comments: Hirsutism      Mouth/Throat:      Mouth: Mucous membranes are dry.   Cardiovascular:      Rate and Rhythm: Tachycardia present. Rhythm irregular.   Pulmonary:      Comments: High flow oxygen  Poor air movement  No wheezing  Abdominal:      General: There is no distension.      Tenderness: There is no abdominal tenderness.      Comments: Pannus with intertrigo    Genitourinary:     Comments: floyd  Musculoskeletal:      Cervical back: Neck supple.      Right lower leg: No edema.      Left lower leg: No edema.      Comments: Right heel black eschar  Right posterior calf abrasions and exudate  Bilateral posterior thigh ulcerations open wounds   Neurological:      General: No focal deficit present.      Mental Status: She is alert and oriented to person, place, and time.    Psychiatric:         Mood and Affect: Mood normal.         Behavior: Behavior normal.         Thought Content: Thought content normal.         Fluids    Intake/Output Summary (Last 24 hours) at 2/29/2024 1404  Last data filed at 2/29/2024 1112  Gross per 24 hour   Intake 900 ml   Output 3900 ml   Net -3000 ml       Laboratory        Recent Labs     02/27/24  0047 02/28/24  0403 02/29/24  0106   SODIUM 139 138 140   POTASSIUM 3.7 3.6 3.7   CHLORIDE 91* 90* 92*   CO2 40* 38* 36*   GLUCOSE 164* 173* 194*   BUN 20 31* 38*   CREATININE 0.93 0.78 0.76   CALCIUM 8.8 9.2 9.4                     Imaging  EC-ECHOCARDIOGRAM COMPLETE W/O CONT   Final Result      US-RENU SINGLE LEVEL BILAT   Final Result      DX-CHEST-LIMITED (1 VIEW)   Final Result      1.  Cardiomegaly with mild pulmonary vascular congestion.           Assessment/Plan  Pulmonary hypertension (HCC)- (present on admission)  Assessment & Plan  RVSP 85 mmHg by echo while echo 2018 was 55-60 mmHg.  IV lasix 80 mg TID and potassium will be increased  Follow urine output  Fluid restriction 1.8 liters       Acute on chronic blood loss anemia- (present on admission)  Assessment & Plan  Hgb 9.7 (prior to transfer) --> 7.6 at [facility]  No evidence of blood loss  Iron 19 with %sat 9 for which IV iron was given  Minimize blood draws and she will require hemoglobin to be checked intermittently      Elevated troponin  Assessment & Plan  Probable demand ischemia    Mood disorder (HCC)- (present on admission)  Assessment & Plan  Cymbalta    RUTH (obstructive sleep apnea)- (present on admission)  Assessment & Plan  Hx of  Her CPAP machine broke and she has been off since October  Nightly BiPAP while inpatient    Morbid (severe) obesity with alveolar hypoventilation (HCC)- (present on admission)  Assessment & Plan  Diet and lifestyle modification  Body mass index is 64.91 kg/m².      Atrial fibrillation with RVR (HCC)- (present on admission)  Assessment & Plan  Restarted home  Cardizem 360 for rate control  Continue Xarelto for anticoagulation  Continuous tele monitoring      Severe sepsis (HCC)- (present on admission)  Assessment & Plan  This is Sepsis Present on admission  SIRS criteria identified on my evaluation include: Tachycardia, with heart rate greater than 90 BPM, Leukocytosis, with WBC greater than 12,000, and Bandemia, greater than 10% bands  Clinical indicators of end organ dysfunction include Lactic Acid greater than 2 and Toxic Metabolic Encephalopathy  Source is cellulitis leg, buttock  Sepsis protocol initiated  Crystalloid Fluid Administration: Fluid resuscitation ordered per standard protocol was given  IV antibiotics as appropriate for source of sepsis  Reassessment: I have reassessed the patient's hemodynamic status    Cellulitis of legs, buttock  WBC: 13.5, lactic acid 5.4 --> 1.8  S/p 1L IVF no further fluids  Antibiotic:  stopped  Wound care      Diabetes (HCC)- (present on admission)  Assessment & Plan  ISS  Statin    Essential hypertension- (present on admission)  Assessment & Plan  Restart Cardizem 360    Acute respiratory failure with hypoxia (HCC)- (present on admission)  Assessment & Plan  Acute on chronic  Multifactorial: OHS/RUTH noncompliant with CPAP, volume overload, HFpEF  She is on 4 liters oxygen at home (not due to COPD but due to CHF and obesity).  Pulmonary hypertension thus IV lasix 80 mg BID.   She is requiring high flow oxygen which is being titrated.   Increased Lasix to 80 mg TID with KCl 40 mEq TID and titrate up until she i is able to transition off high flow oxygen or until her creatinine starts to go up substantially  Nocturnal BiPAP   Incentive spirometry 10x/hour while awake  She may require pulmonology consultation         VTE prophylaxis: VTE Selection    Patient is critically ill.   The patient continues to have: acute hypoxic respiratory failure  The vital organ system that is affected is the: lungs  If untreated there is a high  chance of deterioration into: worsening respiratory failure, cardiac arrest, and eventually death.   The critical care that I am providing today is: managing respiratory failure with high flow nasal cannula and IV lasix therapy  The critical that has been undertaken is medically complex.   There has been no overlap in critical care time.   Critical Care Time not including procedures: 32 minutes

## 2024-02-29 NOTE — DISCHARGE PLANNING
Case Management Discharge Planning    Admission Date: 2/21/2024  GMLOS: 5.1  ALOS: 8    6-Clicks ADL Score: 12  6-Clicks Mobility Score: 8  PT and/or OT Eval ordered: Yes  Post-acute Referrals Ordered: Yes  Post-acute Choice Obtained: Yes  Has referral(s) been sent to post-acute provider:  Yes      Anticipated Discharge Dispo: Discharge Disposition: Disch to a long term care facility (64)  Discharge Contact Phone Number: SonMynor ()    DME Needed: No    Action(s) Taken: Pt discussed in rounds, MC to discharge to LTAC today.     RN CM called and spoke with Yany at [facility], per Yany they cannot accept patient due to bed unavailability and inclement weather.  She asked that CM follow up on Monday for bed availability.     RN CM called and spoke with Karrie at [facility] who stated they do not have a bed available until after Monday.     1401- RN LOYDA met with patient at bedside to update her on DCP. Pt would like to D/C to  [facility] in [place].     RN CM called and updated patient's son Mynor as well, he plans on coming to visit his mother on Sunday.       Escalations Completed: None    Medically Clear: Yes    Next Steps: CM will F/U with [facility] for bed availability on Monday.    Barriers to Discharge: Pending Placement    Is the patient up for discharge tomorrow: No

## 2024-02-29 NOTE — PROGRESS NOTES
0700 - Report received from April RN at patient's bedside. Patient resting in bed quietly with no complaints at this time. Telemetry monitor intact et functioning. Call light and belongings within reach, safety measures intact, white board updated.     1930 - Reported off to April RN at patient's bedside.

## 2024-02-29 NOTE — CARE PLAN
Problem: Humidified High Flow Nasal Cannula  Goal: Maintain adequate oxygenation dependent on patient condition  Description: Target End Date:  resolve prior to discharge or when underlying condition is resolved/stabilized    1.  Implement humidified high flow oxygen therapy  2.  Titrate high flow oxygen to maintain appropriate SpO2  2/29/2024 0442 by Anibal Ocampo  Flowsheets  Taken 2/29/2024 0356 by Mak Weinberg C.N.A.  O2 (LPM): 30  Taken 2/29/2024 0204 by Anibal Ocampo  FiO2%: 50 %  Taken 2/21/2024 0736 by Trudi Mendez RRT  Indication: COPD diagnosis: SpO2 less than 89% despite conventional supplemental oxygen devices  Outcome: Normoxia  2/29/2024 0442 by Anibal Ocampo  Outcome: Progressing

## 2024-02-29 NOTE — PROGRESS NOTES
Assumed care of patient, bedside report received from Diego RUIZ. Updated on POC, call light within reach and fall precautions in place. Bed locked and in lowest position. Patient instructed to call for assistance before getting out of bed. All questions answered, no other needs at this time.       Fall Risk Score: HIGH RISK  Fall risk interventions in place: Place yellow fall risk ID band on patient, Provide patient/family education based on risk assessment, Educate patient/family to call staff for assistance when getting out of bed, Place fall precaution signage outside patient door, Place patient in room close to nursing station, and Utilize bed/chair fall alarm  Bed type: Low air loss and Bariatric (Kyle Score less than 17 interventions in place)  Patient on cardiac monitor: Yes  IVF/IV medications: Not Applicable   Oxygen: How many liters 30L 60% hi flow  Bedside sitter: Not Applicable   Isolation: Not applicable

## 2024-02-29 NOTE — CARE PLAN
The patient is Stable - Low risk of patient condition declining or worsening    Shift Goals  Clinical Goals: safety, wound care  Patient Goals: sleep, bed bath  Family Goals: ELDER    Progress made toward(s) clinical / shift goals:  Wound care, oxygen weaning if possible.       Problem: Knowledge Deficit - Standard  Goal: Patient and family/care givers will demonstrate understanding of plan of care, disease process/condition, diagnostic tests and medications  Outcome: Progressing     Problem: Knowledge Deficit - COPD  Goal: Patient/significant other demonstrates understanding of disease process, utilization of the Action Plan, medications and discharge instruction  Outcome: Progressing     Problem: Risk for Infection - COPD  Goal: Patient will remain free from signs and symptoms of infection  Outcome: Progressing     Problem: Nutrition - Advanced  Goal: Patient will display progressive weight gain toward goal have adequate food and fluid intake  Outcome: Progressing     Problem: Ineffective Airway Clearance  Goal: Patient will maintain patent airway with clear/clearing breath sounds  Outcome: Progressing     Problem: Impaired Gas Exchange  Goal: Patient will demonstrate improved ventilation and adequate oxygenation and participate in treatment regimen within the level of ability/situation.  Outcome: Progressing     Problem: Risk for Aspiration  Goal: Patient's risk for aspiration will be absent or decrease  Outcome: Progressing     Problem: Self Care  Goal: Patient will have the ability to perform ADLs independently or with assistance (bathe, groom, dress, toilet and feed)  Outcome: Progressing     Problem: Hemodynamics  Goal: Patient's hemodynamics, fluid balance and neurologic status will be stable or improve  Outcome: Progressing     Problem: Fluid Volume  Goal: Fluid volume balance will be maintained  Outcome: Progressing     Problem: Urinary - Renal Perfusion  Goal: Ability to achieve and maintain adequate renal  perfusion and functioning will improve  Outcome: Progressing     Problem: Respiratory  Goal: Patient will achieve/maintain optimum respiratory ventilation and gas exchange  Outcome: Progressing     Problem: Mechanical Ventilation  Goal: Patient will be able to express needs and understand communication  Outcome: Progressing     Problem: Physical Regulation  Goal: Diagnostic test results will improve  Outcome: Progressing  Goal: Signs and symptoms of infection will decrease  Outcome: Progressing     Problem: Pain - Standard  Goal: Alleviation of pain or a reduction in pain to the patient’s comfort goal  Outcome: Progressing     Problem: Skin Integrity  Goal: Skin integrity is maintained or improved  Outcome: Progressing     Problem: Fall Risk  Goal: Patient will remain free from falls  Outcome: Progressing     Problem: Mechanical Ventilation  Goal: Safe management of artificial airway and ventilation  Outcome: Met  Goal: Successful weaning off mechanical ventilator, spontaneously maintains adequate gas exchange  Outcome: Met       Patient is not progressing towards the following goals:

## 2024-02-29 NOTE — CARE PLAN
The patient is Stable - Low risk of patient condition declining or worsening    Shift Goals  Clinical Goals: safety, wound care  Patient Goals: sleep, bed bath  Family Goals: ELDER    Progress made toward(s) clinical / shift goals:      Problem: Impaired Gas Exchange  Goal: Patient will demonstrate improved ventilation and adequate oxygenation and participate in treatment regimen within the level of ability/situation.  Outcome: Progressing  Note: Pt on 30L at 50% hi flow        Patient is not progressing towards the following goals:      Problem: Self Care  Goal: Patient will have the ability to perform ADLs independently or with assistance (bathe, groom, dress, toilet and feed)  Outcome: Not Progressing  Note: Pt still requires assist with ADLs

## 2024-02-29 NOTE — CARE PLAN
Problem: Humidified High Flow Nasal Cannula  Goal: Maintain adequate oxygenation dependent on patient condition  Description: Target End Date:  resolve prior to discharge or when underlying condition is resolved/stabilized    1.  Implement humidified high flow oxygen therapy  2.  Titrate high flow oxygen to maintain appropriate SpO2  Outcome: Progressing     Titrated HHFNC to 30L 60%

## 2024-02-29 NOTE — CARE PLAN
Problem: Humidified High Flow Nasal Cannula  Goal: Maintain adequate oxygenation dependent on patient condition  Description: Target End Date:  resolve prior to discharge or when underlying condition is resolved/stabilized    1.  Implement humidified high flow oxygen therapy  2.  Titrate high flow oxygen to maintain appropriate SpO2  Outcome: Progressing     HHFNC 30L 50%

## 2024-02-29 NOTE — PROGRESS NOTES
Bedside report received from off going RN/tech: April RN, assumed care of patient.     Fall Risk Score: HIGH RISK  Fall risk interventions in place: Place yellow fall risk ID band on patient, Provide patient/family education based on risk assessment, Educate patient/family to call staff for assistance when getting out of bed, Place fall precaution signage outside patient door, Place patient in room close to nursing station, Utilize bed/chair fall alarm, Notify charge of high risk for huddle, and Bed alarm connected correctly  Bed type: Low air loss and Bariatric (Kyle Score less than 17 interventions in place)  Patient on cardiac monitor: Yes  IVF/IV medications: Not Applicable   Oxygen: How many liters 30L 50%L and Traced the line to wall oxygen  Bedside sitter: Not Applicable   Isolation: Not applicable

## 2024-02-29 NOTE — WOUND TEAM
[facility]  Inpatient Services  Wound and Skin Care Follow-up    Admission Date: 2/21/2024     Last order of IP CONSULT TO WOUND CARE was found on 2/28/2024 from Hospital Encounter on 2/20/2024     HPI, PMH, SH: Reviewed    No past surgical history on file.  Social History     Tobacco Use    Smoking status: Former    Smokeless tobacco: Never   Substance Use Topics    Alcohol use: Yes     No chief complaint on file.    Diagnosis: Sepsis (HCC) [A41.9]    Unit where seen by Wound Team: T815/02     WOUND FOLLOW UP RELATED TO:  BL posterior thighs, BL posterior lower legs, R heel, nares & Back       WOUND TEAM PLAN OF CARE - Frequency of Follow-up:   Nursing to follow dressing orders written for wound care. Contact wound team if area fails to progress, deteriorates or with any questions/concerns if something comes up before next scheduled follow up (See below as to whether wound is following and frequency of wound follow up)  Dressing changes by wound team:                   Weekly - BL posterior thighs, BL posterior lower legs, and R heel  Not following, consult as needed  - Backmanees    WOUND HISTORY:     Pt is a 63yr old morbidly obese woman with history of A. Fib, CHF, Diabetes, HTN and COPD on 4L at baseline. Pt was a transfer for higher level of care from [facility] after pts son called for a welfare check. Pt apparently did have home health but for unknown reasons she quit on Friday 2/16/24. PT was found sitting in a chair soiled in stool and urine. Pt reported she had not been out of chair since January 25, 2024. Wound team was consulted regarding extensive wounds to BLE.         WOUND ASSESSMENT/LDA  Wound 02/21/24 Pressure Injury Thigh Distal;Posterior Bilateral (Active)   Date First Assessed/Time First Assessed: 02/21/24 0200   Present on Original Admission: Yes  Hand Hygiene Completed: Yes  Primary Wound Type: Pressure Injury  Location: Thigh  Wound Orientation: Distal;Posterior   Laterality: Bilateral      Assessments 2/28/2024  5:00 PM   Wound Image       Site Assessment Red;Painful;Slough   Periwound Assessment Clean;Dry;Intact   Margins Defined edges;Attached edges   Closure Secondary intention   Drainage Amount Scant   Drainage Description Serosanguineous   Treatments Cleansed;Offloading   Wound Cleansing Approved Wound Cleanser   Periwound Protectant Barrier Paste   Dressing Status Clean;Dry;Intact   Dressing Changed Changed   Dressing Cleansing/Solutions 1/4 Strength Dakin's Solution   Dressing Options Moist Roll Gauze;Offloading Dressing - Sacral   Dressing Change/Treatment Frequency Every Shift, and As Needed   NEXT Dressing Change/Treatment Date 02/29/24   NEXT Weekly Photo (Inpatient Only) 03/06/24   Wound Team Following Weekly   Pressure Injury Stage Unstageable   Wound Length (cm) 5.5 cm   Wound Width (cm) 16 cm   Wound Surface Area (cm^2) 88 cm^2   Shape Irregular, linear   Wound Odor None   WOUND NURSE ONLY - Time Spent with Patient (mins) 45       Wound 02/21/24 Pressure Injury Heel Plantar;Lateral Right POA Unstageable (Active)   Date First Assessed/Time First Assessed: 02/21/24 0200   Present on Original Admission: Yes  Hand Hygiene Completed: Yes  Primary Wound Type: Pressure Injury  Location: Heel  Wound Orientation: Plantar;Lateral  Laterality: Right  Wound Description (Co...      Assessments 2/28/2024  5:00 PM   Wound Image     Site Assessment Eschar   Periwound Assessment Dry   Margins Defined edges;Attached edges   Closure Open to air   Drainage Amount None   Treatments Cleansed;Offloading   Offloading/DME Heel float boot   Wound Cleansing Approved Wound Cleanser   Periwound Protectant Not Applicable   Dressing Status Open to Air   Dressing Changed Changed   Dressing Cleansing/Solutions 3% Betadine   Dressing Options Open to Air   Dressing Change/Treatment Frequency Every Shift, and As Needed   NEXT Dressing Change/Treatment Date 02/29/24   NEXT Weekly Photo (Inpatient  Only) 03/06/24   Wound Team Following Weekly   Pressure Injury Stage Unstageable   Wound Length (cm) 6 cm   Wound Width (cm) 6 cm   Wound Surface Area (cm^2) 36 cm^2   Shape Oval   Wound Odor None       Wound 02/21/24 Full Thickness Wound Leg Posterior;Lower Bilateral (Active)   Date First Assessed/Time First Assessed: 02/21/24 0200   Present on Original Admission: Yes  Hand Hygiene Completed: Yes  Primary Wound Type: Full Thickness Wound  Location: Leg  Wound Orientation: Posterior;Lower  Laterality: Bilateral      Assessments 2/28/2024  5:00 PM   Wound Image      Site Assessment Red;Pink;Painful;Slough   Periwound Assessment Edema   Margins Defined edges;Attached edges   Closure Secondary intention   Drainage Amount Scant   Drainage Description Serosanguineous   Treatments Cleansed   Wound Cleansing Approved Wound Cleanser   Periwound Protectant Not Applicable   Dressing Status Clean;Dry;Intact   Dressing Changed Changed   Dressing Cleansing/Solutions Not Applicable   Dressing Options Viscopaste;Absorbent Abdominal Pad;Dry Roll Gauze   Dressing Change/Treatment Frequency Every Shift, and As Needed   NEXT Dressing Change/Treatment Date 02/29/24   NEXT Weekly Photo (Inpatient Only) 03/06/24   Wound Team Following Weekly   Non-staged Wound Description Full thickness       Moisture Associated Skin Damage 02/21/24 Buttock;Groin;Perineum;Other (comment) (Active)   First Observed Date/First Observed Time: 02/21/24 1557   Present on Original Admission: Yes  Laterality: Bilateral  Wound Location : (c) Buttock;Groin;Perineum;Other (comment)      Assessments 2/28/2024  5:00 PM   Wound Image     NEXT Weekly Photo (Inpatient Only) 03/06/24   Drainage Amount Scant   Drainage Description Serosanguineous   Periwound Assessment Rash;Pink   IAD Cleansing Foam Cleanser/Washcloth;Dimethecone Wipes   Periwound Protectant Antifungal Therapy;Interdry   IAD Containment Device Interdry Cloth                  Vascular:    RENU:   RENU  Results, Last 30 Days US-RENU SINGLE LEVEL BILAT    Result Date: 2024  Narrative  Vascular Laboratory  Conclusions  Normal ankle brachial indices.  SHAQUILLE CANDELARIA  Age:    63    Gender:     F  MRN:    [id number]  :    1960      BSA:  Exam Date:     2024 07:50  Room #:     Inpatient  Priority:     Routine  Ht (in):             Wt (lb):  Ordering Physician:        POLLY DUTTON  Referring Physician:       NATO Gipson  Sonographer:               Dudley Riley RDMS, RVT  Study Type:                Complete Bilateral  Technical Quality:         Adequate  Indications:     Pain in leg, unspecified  CPT Codes:       35221  ICD Codes:       M79.606  History:         Nonhealing wound. No prior study.  Limitations:                 RIGHT  Waveform            Systolic BPs (mmHg)                             121           Brachial                                           Common Femoral                                           Popliteal  Hyperemic                  119           Posterior Tibial  Hyperemic                  108           Dorsalis Pedis                                           Digit                             0.97          RENU                                           TBI                       LEFT  Waveform        Systolic BPs (mmHg)                             123           Brachial                                           Common Femoral                                           Popliteal  Triphasic                  125           Posterior Tibial  Triphasic                  120           Dorsalis Pedis                                           Digit                             1.02          RENU                                           TBI  Findings  Right.  Doppler waveforms of the common femoral and popliteal arteries could not be  insonated due to patient body habitus.  Doppler waveforms of the posterior tibial and dorsalis  pedis arteries are  hyperemic.  The ankle-brachial index is normal.  Left.  Doppler waveforms of the common femoral and popliteal arteries could not be  insonated due to patient body habitus.  Doppler waveforms of the posterior tibial and dorsalis pedis arteries are  triphasic.  The ankle-brachial index is normal.  Padmaja Alicia MD  (Electronically Signed)  Final Date:      21 February 2024                   13:41      Lab Values:    Lab Results   Component Value Date/Time    WBC 7.2 02/23/2024 02:30 AM    RBC 3.53 (L) 02/23/2024 02:30 AM    HEMOGLOBIN 7.6 (L) 02/23/2024 02:30 AM    HEMATOCRIT 28.5 (L) 02/23/2024 02:30 AM    CREACTPROT 8.31 (H) 02/21/2024 02:11 AM    SEDRATEWES 97 (H) 02/21/2024 02:20 AM    HBA1C 6.8 (H) 02/21/2024 02:20 AM         Culture Results show:  No results found for this or any previous visit (from the past 720 hour(s)).    Pain Level/Medicated:  PO pain medications administered by bedside RN 60min prior       INTERVENTIONS BY WOUND TEAM:  Chart and images reviewed. Discussed with bedside RN. All areas of concern (based on picture review, LDA review and discussion with bedside RN) have been thoroughly assessed. Documentation of areas based on significant findings. This RN in to assess patient. Performed standard wound care which includes appropriate positioning, dressing removal and non-selective debridement. Pictures and measurements obtained weekly if/when required.    Wound:  BL Posterior Thighs  Preparation for Dressing removal: Dressing soaked with wound cleanser  Cleansed/Non-selectively Debrided with:  Wound cleanser and Gauze  Non-Excisional Conservative Sharp debridement: Slough debrided away using scissors and forceps < 20cm2 debrided down to viable tissue.  No Bleeding noted.  Sara wound: Cleansed with Wound cleanser and Gauze, Prepped with Barrier paste  Primary Dressing:  Dakins moistened roll gauze  Secondary (Outer) Dressing: Offloading adhesive foams     Wound:  BL  Calves  Preparation for Dressing removal: Dressing soaked with wound cleanser  Cleansed/Non-selectively Debrided with:  Wound cleanser and Gauze  Sara wound: Cleansed with Wound cleanser and Gauze, Prepped with Barrier paste  Primary Dressing:  Viscopaste patches  Secondary (Outer) Dressing: ABD pad and dry roll gauze     Wound:  Back Folds  Cleansed/Non-selectively Debrided with:  No rinse foam soap, Perineal Wipes (Barrier wipes), and Moist warm washcloth  Primary Dressing:  Interdry (nystatin will be ordered)    Advanced Wound Care Discharge Planning  Number of Clinicians necessary to complete wound care: 1-2  Is patient requiring IV pain medications for dressing changes:  No   Length of time for dressing change 30 min. (This does not include chart review, pre-medication time, set up, clean up or time spent charting.)    Interdisciplinary consultation: Patient, Bedside RN (Diego), Modesto DE SANTIAGO (Wound RN).    EVALUATION / RATIONALE FOR TREATMENT:     Date:  02/28/24  Wound Status:  Wound progressing as expected    All BLE wounds continuing to progress, very minimal slough left in all wound beds. Continuing with Dakins wet to dry to posterior thighs and viscopaste to calves.  Back rash improving, but still present, nystatin re-ordered.  R heel with more defined eschar, continue keeping area dry with betadine.    Date:  02/21/24  Wound Status:  Initial evaluation     PT with significant wounds to BLE. Dakins applied to posterior thighs to chemically debride slough. Pts lower legs with full thickness wounds surrounded by redness likely from urine sitting on the skin. Miconazole LEYDI applied for its antimicrobial properties followed by viscopaste to soothe the area and protect. Pts right heel with unstageable pressure injury. Betadine applied to firm eschar and protect the area. Pts back with red rash likely from incontinence. Nystatin ordered and interdry cloth in use.              Goals: Steady decrease in wound area and  depth weekly.    NURSING PLAN OF CARE ORDERS:  No new orders this visit    NUTRITION RECOMMENDATIONS   Wound Team Recommendations:  N/A     DIET ORDERS (From admission to next 24h)       Start     Ordered    02/24/24 0911  Diet Order Diet: Consistent CHO (Diabetic); Second Modifier: (optional): 2 Gram Sodium; Fluid modifications: (optional): 1800 ml Fluid Restriction  ALL MEALS        Question Answer Comment   Diet: Consistent CHO (Diabetic)    Second Modifier: (optional) 2 Gram Sodium    Fluid modifications: (optional) 1800 ml Fluid Restriction        02/24/24 0910    02/22/24 1522  Supplements  ALL MEALS        Question:  Which Supplement  Answer:  Other (Specify in Comments)  Comment:  Sheldon    02/22/24 1521                    PREVENTATIVE INTERVENTIONS:   Q shift Kyle - performed per nursing policy  Q shift pressure point assessments - performed per nursing policy    Surface/Positioning  Bariatric LAURYN - Currently in Place  Reposition q 2 hours - Currently in Place  TAPs Turning system - Currently in Place    Offloading/Redistribution  Heel float boots (Prevalon boot) - Currently in Place    Respiratory  High flow offloading Clip - Currently in Place    Containment/Moisture Prevention    Cespedes Catheter - Currently in Place  Barrier wipes - Applied this Visit  Barrier paste - Applied this Visit  Antifungal treatment - Ordered  Interdry - Applied this Visit    Anticipated discharge plans:  TBD        Vac Discharge Needs:  Vac Discharge plan is purely a recommendation from wound team and not a requirement for discharge unless otherwise stated by physician.  Not Applicable Pt not on a wound vac

## 2024-02-29 NOTE — PROGRESS NOTES
[facility] Medicine Daily Progress Note    Date of Service  2/28/2024    Chief Complaint  Miroslava Matta is a 63 y.o. female admitted 2/21/2024 with failure to thrive.    Hospital Course  Sigrid is a 63 y.o. morbidly obese female with history of chronic A-fib on Xarelto, CHF, diabetes, hyperlipidemia, hypertension, COPD dependent on 4 L, RUTH/OHS noncompliant with CPAP, mood disorder who presented 2/21/2024 as a direct transfer from [facility] for higher level care.     It was reported that patient's son called for a welfare check.  She apparently was assisted by home health RN, but reported to have quit last Friday 2/16.  Apparently patient was found sitting in the chair soiled in stool and urine.  Patient reported she has had a fall since 1/25/2024.  She admits to having difficulty ambulating due to her weight and obesity.  She stated her home health RN is no longer able to care for her due to her large body habitus, and quit home health.  She has not had home health assistance since 2/16. Noted to have extensive wound on the back, buttock and legs.  Per transfer paperwork, patient reported to have had atrial fibrillation with RVR with rate in 150s, improved to 130s with 1 L IVF bolus and given Cardizem 30 mg oral one-time.   Workup from [facility] included:  CBC: WBC 13.5, hemoglobin 9.7, platelet 689  CMP: Glucose 211, BUN 27, creatinine 1.0, sodium 135, potassium 4.6, chloride 100, bicarb 26, anion gap 9, calcium 9.2, total protein 7.4, albumin 3.4, total bilirubin 1.0, AST 18, ALT 17, alkaline phosphatase 169   Lactic acid 5.4 --> 1.8  Troponin I undetectable  proBNP 2360, CXR: Cardiomegaly with probable CHF   Patient received treatment with ceftriaxone 1 g IV, Cardizem 30 mg oral, NS to 250cc bolus x 3, Zofran 4 mg IV.   Due to concern of severe sepsis she was directly admitted to the IMCU.    Interval Problem Update  No acute events overnight.  Patient feels well, eating lunch.  On 30L HFNC,  weaning as tolerated.  Continue IV lasix diuresis.  Continue K dur, monitor daily BMP.  LTAC referral placed given need for HFNC and prolonged weaning time.      I have discussed this patient's plan of care and discharge plan at IDT rounds today with Case Management, Nursing, Nursing leadership, and other members of the IDT team.    Consultants/Specialty  none    Code Status  Full Code    Disposition  Medically Cleared  I have placed the appropriate orders for post-discharge needs.    Review of Systems  Review of Systems   Constitutional:  Negative for fever.   Cardiovascular:  Negative for leg swelling.   Gastrointestinal:         Eating well   Musculoskeletal:  Positive for back pain.        Foot pain   Neurological:  Negative for dizziness.   All other systems reviewed and are negative.       Physical Exam  Temp:  [36.2 °C (97.2 °F)-36.9 °C (98.5 °F)] 36.2 °C (97.2 °F)  Pulse:  [] 96  Resp:  [16-20] 18  BP: (112-131)/(64-86) 112/86  SpO2:  [93 %-98 %] 93 %    Physical Exam  Vitals and nursing note reviewed.   Constitutional:       General: She is not in acute distress.     Appearance: She is obese. She is ill-appearing. She is not toxic-appearing.   HENT:      Head:      Comments: Hirsutism      Mouth/Throat:      Mouth: Mucous membranes are dry.   Cardiovascular:      Rate and Rhythm: Tachycardia present. Rhythm irregular.   Pulmonary:      Comments: High flow oxygen  Poor air movement  No wheezing  Abdominal:      General: There is no distension.      Tenderness: There is no abdominal tenderness.      Comments: Pannus with intertrigo    Genitourinary:     Comments: floyd  Musculoskeletal:      Cervical back: Neck supple.      Right lower leg: No edema.      Left lower leg: No edema.      Comments: Right heel black eschar  Right posterior calf abrasions and exudate  Bilateral posterior thigh ulcerations open wounds   Neurological:      General: No focal deficit present.      Mental Status: She is alert and  oriented to person, place, and time.   Psychiatric:         Mood and Affect: Mood normal.         Behavior: Behavior normal.         Thought Content: Thought content normal.         Fluids    Intake/Output Summary (Last 24 hours) at 2/28/2024 1633  Last data filed at 2/28/2024 0700  Gross per 24 hour   Intake 540 ml   Output 1900 ml   Net -1360 ml       Laboratory        Recent Labs     02/26/24  0036 02/27/24  0047 02/28/24  0403   SODIUM 141 139 138   POTASSIUM 3.6 3.7 3.6   CHLORIDE 93* 91* 90*   CO2 40* 40* 38*   GLUCOSE 161* 164* 173*   BUN 13 20 31*   CREATININE 0.75 0.93 0.78   CALCIUM 8.2* 8.8 9.2                     Imaging  EC-ECHOCARDIOGRAM COMPLETE W/O CONT   Final Result      US-RENU SINGLE LEVEL BILAT   Final Result      DX-CHEST-LIMITED (1 VIEW)   Final Result      1.  Cardiomegaly with mild pulmonary vascular congestion.           Assessment/Plan  Pulmonary hypertension (HCC)- (present on admission)  Assessment & Plan  RVSP 85 mmHg by echo while echo 2018 was 55-60 mmHg.  IV lasix 80 mg TID and potassium will be increased  Follow urine output  Fluid restriction 1.8 liters       Acute on chronic blood loss anemia- (present on admission)  Assessment & Plan  Hgb 9.7 (prior to transfer) --> 7.6 at [facility]  No evidence of blood loss  Iron 19 with %sat 9 for which IV iron was given  Minimize blood draws and she will require hemoglobin to be checked intermittently      Elevated troponin  Assessment & Plan  Probable demand ischemia    Mood disorder (HCC)- (present on admission)  Assessment & Plan  Cymbalta    RUTH (obstructive sleep apnea)- (present on admission)  Assessment & Plan  Hx of  Her CPAP machine broke and she has been off since October  Nightly BiPAP while inpatient    Morbid (severe) obesity with alveolar hypoventilation (HCC)- (present on admission)  Assessment & Plan  Diet and lifestyle modification  Body mass index is 64.91 kg/m².      Atrial fibrillation with RVR (HCC)- (present on  admission)  Assessment & Plan  Restarted home Cardizem 360 for rate control  Continue Xarelto for anticoagulation  Continuous tele monitoring      Severe sepsis (HCC)- (present on admission)  Assessment & Plan  This is Sepsis Present on admission  SIRS criteria identified on my evaluation include: Tachycardia, with heart rate greater than 90 BPM, Leukocytosis, with WBC greater than 12,000, and Bandemia, greater than 10% bands  Clinical indicators of end organ dysfunction include Lactic Acid greater than 2 and Toxic Metabolic Encephalopathy  Source is cellulitis leg, buttock  Sepsis protocol initiated  Crystalloid Fluid Administration: Fluid resuscitation ordered per standard protocol was given  IV antibiotics as appropriate for source of sepsis  Reassessment: I have reassessed the patient's hemodynamic status    Cellulitis of legs, buttock  WBC: 13.5, lactic acid 5.4 --> 1.8  S/p 1L IVF no further fluids  Antibiotic:  stopped  Wound care      Diabetes (HCC)- (present on admission)  Assessment & Plan  ISS  Statin    Essential hypertension- (present on admission)  Assessment & Plan  Restart Cardizem 360    Acute respiratory failure with hypoxia (HCC)- (present on admission)  Assessment & Plan  Acute on chronic  Multifactorial: OHS/RUTH noncompliant with CPAP, volume overload, HFpEF  She is on 4 liters oxygen at home (not due to COPD but due to CHF and obesity).  Pulmonary hypertension thus IV lasix 80 mg BID.   She is requiring high flow oxygen which is being titrated.   Increased Lasix to 80 mg TID with KCl 40 mEq TID and titrate up until she i is able to transition off high flow oxygen or until her creatinine starts to go up substantially  Nocturnal BiPAP   Incentive spirometry 10x/hour while awake  She may require pulmonology consultation         VTE prophylaxis: VTE Selection    Patient is critically ill.   The patient continues to have: acute hypoxic respiratory failure  The vital organ system that is affected is  the: lungs  If untreated there is a high chance of deterioration into: worsening respiratory failure, cardiac arrest, and eventually death.   The critical care that I am providing today is: managing respiratory failure with high flow nasal cannula and IV lasix therapy  The critical that has been undertaken is medically complex.   There has been no overlap in critical care time.   Critical Care Time not including procedures: 32 minutes

## 2024-02-29 NOTE — DOCUMENTATION QUERY
[facility]                                                                       Query Response Note      PATIENT:               SHAQUILLE CANDELARIA  ACCT #:                  [id number]  MRN:                     [id number]  :                      1960  ADMIT DATE:       2024 12:46 AM  DISCH DATE:          RESPONDING  PROVIDER #:        [id number]           QUERY TEXT:    HFpEF Congestive Heart Failure is documented in the Medical Record. Please document the acuity (includes probable or suspected)      The patient's Clinical Indicators include:  - Findings:  Acute respiratory failure with hypoxia, Multifactorial: OHS/RUTH noncompliant with CPAP, volume overload, HFpEF.  She is on 4 liters oxygen at home (not due to COPD but due to CHF and obesity).  Pulmonary hypertension thus IV lasix 80 mg BID.    - Treatments:  echocardiogram, chest x ray, lasix, labs, 02    - Risk factors:  acute respiratory failure with hypoxia, volume overload, atrial fibrillation, morbid obesity, RUTH, demand ischemia     Thank You,  Layla Capps RN  Clinical Documentation   [email or web address]  Connect via [email or web address]  Options provided:   -- Acute Diastolic heart failure   -- Chronic Diastolic heart failure   -- Acute on Chronic Diastolic heart failure   -- Other explanation, Please specify other explanation   -- Unable to determine      Query created by: Layla Capps on 2024 12:15 PM    RESPONSE TEXT:    Acute Diastolic heart failure          Electronically signed by:  LOCO MONTE MD 2024 3:42 PM

## 2024-03-01 LAB
ANION GAP SERPL CALC-SCNC: 11 MMOL/L (ref 7–16)
BUN SERPL-MCNC: 39 MG/DL (ref 8–22)
CALCIUM SERPL-MCNC: 9.2 MG/DL (ref 8.5–10.5)
CHLORIDE SERPL-SCNC: 91 MMOL/L (ref 96–112)
CO2 SERPL-SCNC: 35 MMOL/L (ref 20–33)
CREAT SERPL-MCNC: 0.79 MG/DL (ref 0.5–1.4)
GFR SERPLBLD CREATININE-BSD FMLA CKD-EPI: 84 ML/MIN/1.73 M 2
GLUCOSE BLD STRIP.AUTO-MCNC: 174 MG/DL (ref 65–99)
GLUCOSE BLD STRIP.AUTO-MCNC: 181 MG/DL (ref 65–99)
GLUCOSE BLD STRIP.AUTO-MCNC: 202 MG/DL (ref 65–99)
GLUCOSE BLD STRIP.AUTO-MCNC: 209 MG/DL (ref 65–99)
GLUCOSE SERPL-MCNC: 200 MG/DL (ref 65–99)
POTASSIUM SERPL-SCNC: 3.9 MMOL/L (ref 3.6–5.5)
SODIUM SERPL-SCNC: 137 MMOL/L (ref 135–145)

## 2024-03-01 PROCEDURE — 36415 COLL VENOUS BLD VENIPUNCTURE: CPT

## 2024-03-01 PROCEDURE — 700111 HCHG RX REV CODE 636 W/ 250 OVERRIDE (IP): Mod: JZ | Performed by: HOSPITALIST

## 2024-03-01 PROCEDURE — A9270 NON-COVERED ITEM OR SERVICE: HCPCS | Performed by: STUDENT IN AN ORGANIZED HEALTH CARE EDUCATION/TRAINING PROGRAM

## 2024-03-01 PROCEDURE — 700111 HCHG RX REV CODE 636 W/ 250 OVERRIDE (IP): Performed by: STUDENT IN AN ORGANIZED HEALTH CARE EDUCATION/TRAINING PROGRAM

## 2024-03-01 PROCEDURE — 700102 HCHG RX REV CODE 250 W/ 637 OVERRIDE(OP): Performed by: STUDENT IN AN ORGANIZED HEALTH CARE EDUCATION/TRAINING PROGRAM

## 2024-03-01 PROCEDURE — 80048 BASIC METABOLIC PNL TOTAL CA: CPT

## 2024-03-01 PROCEDURE — 82962 GLUCOSE BLOOD TEST: CPT | Mod: 91

## 2024-03-01 PROCEDURE — A9270 NON-COVERED ITEM OR SERVICE: HCPCS | Mod: JZ | Performed by: HOSPITALIST

## 2024-03-01 PROCEDURE — 700102 HCHG RX REV CODE 250 W/ 637 OVERRIDE(OP): Mod: JZ | Performed by: HOSPITALIST

## 2024-03-01 PROCEDURE — 99291 CRITICAL CARE FIRST HOUR: CPT | Performed by: STUDENT IN AN ORGANIZED HEALTH CARE EDUCATION/TRAINING PROGRAM

## 2024-03-01 PROCEDURE — A9270 NON-COVERED ITEM OR SERVICE: HCPCS

## 2024-03-01 PROCEDURE — 94640 AIRWAY INHALATION TREATMENT: CPT

## 2024-03-01 PROCEDURE — 770020 HCHG ROOM/CARE - TELE (206)

## 2024-03-01 PROCEDURE — 700102 HCHG RX REV CODE 250 W/ 637 OVERRIDE(OP)

## 2024-03-01 RX ORDER — LOSARTAN POTASSIUM AND HYDROCHLOROTHIAZIDE 25; 100 MG/1; MG/1
1 TABLET ORAL DAILY
Status: ON HOLD | COMMUNITY
End: 2024-03-22

## 2024-03-01 RX ORDER — BLOOD-GLUCOSE METER
KIT MISCELLANEOUS
COMMUNITY

## 2024-03-01 RX ORDER — TIZANIDINE 4 MG/1
TABLET ORAL
COMMUNITY
End: 2024-03-16

## 2024-03-01 RX ORDER — IBUPROFEN 600 MG/1
TABLET ORAL
Status: ON HOLD | COMMUNITY
End: 2024-03-07

## 2024-03-01 RX ORDER — INSULIN GLARGINE 100 [IU]/ML
10 INJECTION, SOLUTION SUBCUTANEOUS DAILY
COMMUNITY

## 2024-03-01 RX ADMIN — OXYCODONE HYDROCHLORIDE AND ACETAMINOPHEN 500 MG: 500 TABLET ORAL at 05:02

## 2024-03-01 RX ADMIN — TIOTROPIUM BROMIDE INHALATION SPRAY 5 MCG: 3.12 SPRAY, METERED RESPIRATORY (INHALATION) at 06:00

## 2024-03-01 RX ADMIN — POTASSIUM CHLORIDE 40 MEQ: 1500 TABLET, EXTENDED RELEASE ORAL at 05:01

## 2024-03-01 RX ADMIN — POLYETHYLENE GLYCOL 3350 1 PACKET: 17 POWDER, FOR SOLUTION ORAL at 05:02

## 2024-03-01 RX ADMIN — TOPIRAMATE 25 MG: 25 TABLET, FILM COATED ORAL at 18:11

## 2024-03-01 RX ADMIN — DULOXETINE HYDROCHLORIDE 30 MG: 30 CAPSULE, DELAYED RELEASE ORAL at 05:00

## 2024-03-01 RX ADMIN — FOLIC ACID 1 MG: 1 TABLET ORAL at 05:01

## 2024-03-01 RX ADMIN — RIVAROXABAN 20 MG: 20 TABLET, FILM COATED ORAL at 18:11

## 2024-03-01 RX ADMIN — CYANOCOBALAMIN TAB 500 MCG 1000 MCG: 500 TAB at 05:01

## 2024-03-01 RX ADMIN — MOMETASONE FUROATE AND FORMOTEROL FUMARATE DIHYDRATE 2 PUFF: 200; 5 AEROSOL RESPIRATORY (INHALATION) at 18:21

## 2024-03-01 RX ADMIN — FUROSEMIDE 80 MG: 10 INJECTION, SOLUTION INTRAMUSCULAR; INTRAVENOUS at 12:32

## 2024-03-01 RX ADMIN — FOLIC ACID 1 MG: 1 TABLET ORAL at 18:11

## 2024-03-01 RX ADMIN — INSULIN HUMAN 3 UNITS: 100 INJECTION, SOLUTION PARENTERAL at 18:19

## 2024-03-01 RX ADMIN — DIGOXIN 125 MCG: 0.12 TABLET ORAL at 18:12

## 2024-03-01 RX ADMIN — HYDROMORPHONE HYDROCHLORIDE 1 MG: 1 INJECTION, SOLUTION INTRAMUSCULAR; INTRAVENOUS; SUBCUTANEOUS at 17:37

## 2024-03-01 RX ADMIN — NYSTATIN: 100000 POWDER TOPICAL at 18:12

## 2024-03-01 RX ADMIN — NYSTATIN: 100000 POWDER TOPICAL at 12:15

## 2024-03-01 RX ADMIN — DAKIN'S SOLUTION 0.125% (QUARTER STRENGTH) 1 ML: 0.12 SOLUTION at 18:12

## 2024-03-01 RX ADMIN — POTASSIUM CHLORIDE 40 MEQ: 1500 TABLET, EXTENDED RELEASE ORAL at 18:12

## 2024-03-01 RX ADMIN — ATORVASTATIN CALCIUM 40 MG: 40 TABLET, FILM COATED ORAL at 18:12

## 2024-03-01 RX ADMIN — Medication 400 MG: at 18:12

## 2024-03-01 RX ADMIN — POTASSIUM CHLORIDE 40 MEQ: 1500 TABLET, EXTENDED RELEASE ORAL at 12:33

## 2024-03-01 RX ADMIN — NYSTATIN: 100000 POWDER TOPICAL at 05:02

## 2024-03-01 RX ADMIN — INSULIN HUMAN 4 UNITS: 100 INJECTION, SOLUTION PARENTERAL at 08:45

## 2024-03-01 RX ADMIN — OXYCODONE HYDROCHLORIDE 10 MG: 10 TABLET ORAL at 04:46

## 2024-03-01 RX ADMIN — Medication 1000 UNITS: at 05:01

## 2024-03-01 RX ADMIN — FUROSEMIDE 80 MG: 10 INJECTION, SOLUTION INTRAMUSCULAR; INTRAVENOUS at 18:12

## 2024-03-01 RX ADMIN — Medication 400 MG: at 05:00

## 2024-03-01 RX ADMIN — TOPIRAMATE 25 MG: 25 TABLET, FILM COATED ORAL at 05:01

## 2024-03-01 RX ADMIN — MOMETASONE FUROATE AND FORMOTEROL FUMARATE DIHYDRATE 2 PUFF: 200; 5 AEROSOL RESPIRATORY (INHALATION) at 05:02

## 2024-03-01 RX ADMIN — INSULIN HUMAN 3 UNITS: 100 INJECTION, SOLUTION PARENTERAL at 21:19

## 2024-03-01 RX ADMIN — INSULIN HUMAN 4 UNITS: 100 INJECTION, SOLUTION PARENTERAL at 12:37

## 2024-03-01 RX ADMIN — DAKIN'S SOLUTION 0.125% (QUARTER STRENGTH) 4 ML: 0.12 SOLUTION at 06:00

## 2024-03-01 RX ADMIN — FUROSEMIDE 80 MG: 10 INJECTION, SOLUTION INTRAMUSCULAR; INTRAVENOUS at 05:01

## 2024-03-01 RX ADMIN — INSULIN GLARGINE-YFGN 5 UNITS: 100 INJECTION, SOLUTION SUBCUTANEOUS at 18:20

## 2024-03-01 RX ADMIN — DOCUSATE SODIUM 50 MG AND SENNOSIDES 8.6 MG 2 TABLET: 8.6; 5 TABLET, FILM COATED ORAL at 18:11

## 2024-03-01 ASSESSMENT — ENCOUNTER SYMPTOMS
DIZZINESS: 0
ROS GI COMMENTS: EATING WELL
FEVER: 0
BACK PAIN: 1

## 2024-03-01 ASSESSMENT — FIBROSIS 4 INDEX: FIB4 SCORE: 0.32

## 2024-03-01 ASSESSMENT — PAIN DESCRIPTION - PAIN TYPE
TYPE: ACUTE PAIN
TYPE: ACUTE PAIN

## 2024-03-01 NOTE — PROGRESS NOTES
0700 - Report received from Joel RUIZ at patient's bedside. Patient resting in bed quietly with no complaints at this time. Telemetry monitor intact et functioning. Call light and belongings within reach, safety measures intact, white board updated.     1850 - Reported off to Joel RUIZ at patient's bedside.

## 2024-03-01 NOTE — PROGRESS NOTES
Assumed care of patient from CN April shift RN at 2010, Patient AOX4, VSS. Fall education reinforced, call bell within reach, bed locked and in lowest position. POC discussed and all questions answered.  Purposeful and or hourly rounding in place.    Bedside report received from off going RN/tech: April, assumed care of patient.     Fall Risk Score: HIGH RISK  Fall risk interventions in place: Place yellow fall risk ID band on patient, Provide patient/family education based on risk assessment, Educate patient/family to call staff for assistance when getting out of bed, Place fall precaution signage outside patient door, Utilize bed/chair fall alarm, Notify charge of high risk for huddle, and Bed alarm connected correctly  Bed type: Low air loss and Bariatric (Kyle Score less than 17 interventions in place)  Patient on cardiac monitor: Yes  IVF/IV medications: Not Applicable   Oxygen: How many liters 40L  Bedside sitter: Not Applicable   Isolation: Not applicable     Pt refusing moon boots, education provided and understood, CHAD April informed.

## 2024-03-01 NOTE — CARE PLAN
The patient is Stable - Low risk of patient condition declining or worsening    Shift Goals  Clinical Goals: QT2, heart R/R, prevent further skin breakdown  Patient Goals: sleep  Family Goals: ELDER    Progress made toward(s) clinical / shift goals:  Wound care, DC planning      Problem: Knowledge Deficit - Standard  Goal: Patient and family/care givers will demonstrate understanding of plan of care, disease process/condition, diagnostic tests and medications  Outcome: Progressing     Problem: Knowledge Deficit - COPD  Goal: Patient/significant other demonstrates understanding of disease process, utilization of the Action Plan, medications and discharge instruction  Outcome: Progressing     Problem: Risk for Infection - COPD  Goal: Patient will remain free from signs and symptoms of infection  Outcome: Progressing     Problem: Nutrition - Advanced  Goal: Patient will display progressive weight gain toward goal have adequate food and fluid intake  Outcome: Progressing     Problem: Ineffective Airway Clearance  Goal: Patient will maintain patent airway with clear/clearing breath sounds  Outcome: Progressing     Problem: Impaired Gas Exchange  Goal: Patient will demonstrate improved ventilation and adequate oxygenation and participate in treatment regimen within the level of ability/situation.  Outcome: Progressing     Problem: Risk for Aspiration  Goal: Patient's risk for aspiration will be absent or decrease  Outcome: Progressing     Problem: Self Care  Goal: Patient will have the ability to perform ADLs independently or with assistance (bathe, groom, dress, toilet and feed)  Outcome: Progressing     Problem: Hemodynamics  Goal: Patient's hemodynamics, fluid balance and neurologic status will be stable or improve  Outcome: Progressing     Problem: Fluid Volume  Goal: Fluid volume balance will be maintained  Outcome: Progressing     Problem: Urinary - Renal Perfusion  Goal: Ability to achieve and maintain adequate renal  perfusion and functioning will improve  Outcome: Progressing     Problem: Respiratory  Goal: Patient will achieve/maintain optimum respiratory ventilation and gas exchange  Outcome: Progressing     Problem: Physical Regulation  Goal: Diagnostic test results will improve  Outcome: Progressing  Goal: Signs and symptoms of infection will decrease  Outcome: Progressing     Problem: Pain - Standard  Goal: Alleviation of pain or a reduction in pain to the patient’s comfort goal  Outcome: Progressing     Problem: Skin Integrity  Goal: Skin integrity is maintained or improved  Outcome: Progressing     Problem: Fall Risk  Goal: Patient will remain free from falls  Outcome: Progressing     Problem: Mechanical Ventilation  Goal: Patient will be able to express needs and understand communication  Outcome: Met       Patient is not progressing towards the following goals:

## 2024-03-01 NOTE — PROGRESS NOTES
Monitor Summary:   Rhythm: atrial fibrillation  Rate: 100 - 117  Measurement: ../.11/..  Ectopy: Rare PVC, elevated up to 174 during activity    12 hour chart check

## 2024-03-01 NOTE — CARE PLAN
Problem: Skin Integrity  Goal: Skin integrity is maintained or improved  Outcome: Progressing     Problem: Fall Risk  Goal: Patient will remain free from falls  Outcome: Progressing   The patient is Stable - Low risk of patient condition declining or worsening    Shift Goals  Clinical Goals: QT2, heart R/R, prevent further skin breakdown  Patient Goals: sleep  Family Goals: ELDER    Progress made toward(s) clinical / shift goals:  Pt uses call bell appropriately and remains fall free during this shift.     Wound care complete, pt refuses to wear moonboots.     Patient is not progressing towards the following goals:

## 2024-03-01 NOTE — THERAPY
"Occupational Therapy  Daily Treatment     Patient Name: Miroslava Matat  Age:  63 y.o., Sex:  female  Medical Record #: [id number]  Today's Date: 2/29/2024     Precautions  Precautions: (P) Fall Risk, Swallow Precautions  Comments: multiple wounds    Assessment    Pt seen for follow up OT tx session, pt making steady progress with functional mobility and ADLs less limited by pain on this date and able to better participate in functional tasks even attempting STS at EOB, prolonged EOB for ADLs. Will continue to see for skilled therapy while admitted as well as recommend post-acute placement.    Plan    Treatment Plan Status: (P) Continue Current Treatment Plan  Type of Treatment: Self Care / Activities of Daily Living, Adaptive Equipment, Manual Therapy Techniques, Neuro Re-Education / Balance, Therapeutic Exercises, Therapeutic Activity  Treatment Frequency: 3 Times per Week  Treatment Duration: Until Therapy Goals Met    DC Equipment Recommendations: (P) Unable to determine at this time  Discharge Recommendations: (P) Recommend post-acute placement for additional occupational therapy services prior to discharge home    Subjective    \"Just cut my hair off\"     Objective       02/29/24 1450   Precautions   Precautions Fall Risk;Swallow Precautions   Cognition    Cognition / Consciousness X   Level of Consciousness Alert   Attention Impaired   Initiation Impaired   Active ROM Upper Body   Active ROM Upper Body  WDL   Balance   Sitting Balance (Static) Fair   Sitting Balance (Dynamic) Fair -   Standing Balance (Static) Dependent   Weight Shift Sitting Poor   Skilled Intervention Verbal Cuing;Facilitation   Bed Mobility    Supine to Sit Moderate Assist   Sit to Supine Total Assist   Scooting Moderate Assist   Skilled Intervention Verbal Cuing;Facilitation   Activities of Daily Living   Grooming Supervision;Seated   Upper Body Dressing Minimal Assist   Lower Body Dressing Total Assist   Skilled Intervention Verbal " Cuing;Facilitation   How much help from another person does the patient currently need...   Putting on and taking off regular lower body clothing? 1   Bathing (including washing, rinsing, and drying)? 1   Toileting, which includes using a toilet, bedpan, or urinal? 1   Putting on and taking off regular upper body clothing? 2   Taking care of personal grooming such as brushing teeth? 3   Eating meals? 4   6 Clicks Daily Activity Score 12   Functional Mobility   Sit to Stand Total Assist  (attempted x2, unable to fully stand)   Mobility bed mobility, seated ADLs at EOB, attempted STS x2, returned to supine   Skilled Intervention Verbal Cuing;Facilitation   Comments w/ ender FWW   Activity Tolerance   Sitting Edge of Bed 15 min   Short Term Goals   Short Term Goal # 1 Pt will complete ADL transfers with maxA   Goal Outcome # 1 Progressing as expected   Short Term Goal # 2 Pt will complete LB dressing with maxA   Goal Outcome # 2 Progressing slower than expected   Short Term Goal # 3 Pt will complete toileting with maxA   Goal Outcome # 3 Goal not met   Education Group   Education Provided Role of Occupational Therapist   Role of Occupational Therapist Patient Response Patient;Acceptance;Explanation;Reinforcement Needed   Occupational Therapy Treatment Plan    O.T. Treatment Plan Continue Current Treatment Plan   Anticipated Discharge Equipment and Recommendations   DC Equipment Recommendations Unable to determine at this time   Discharge Recommendations Recommend post-acute placement for additional occupational therapy services prior to discharge home   Interdisciplinary Plan of Care Collaboration   IDT Collaboration with  Nursing;Therapy Tech   Patient Position at End of Therapy In Bed;Bed Alarm On;Call Light within Reach;Tray Table within Reach;Phone within Reach   Collaboration Comments RN updated

## 2024-03-01 NOTE — PROGRESS NOTES
Bedside report received from off going RN/tech: Joel RUIZ, assumed care of patient.     Fall Risk Score: HIGH RISK  Fall risk interventions in place: Place yellow fall risk ID band on patient, Provide patient/family education based on risk assessment, Educate patient/family to call staff for assistance when getting out of bed, Place fall precaution signage outside patient door, Place patient in room close to nursing station, Utilize bed/chair fall alarm, Notify charge of high risk for huddle, and Bed alarm connected correctly  Bed type: Low air loss and Bariatric (Kyle Score less than 17 interventions in place)  Patient on cardiac monitor: Yes  IVF/IV medications: Not Applicable   Oxygen: How many liters 30 L 50%  Bedside sitter: Not Applicable   Isolation: Not applicable

## 2024-03-01 NOTE — CARE PLAN
Problem: Humidified High Flow Nasal Cannula  Goal: Maintain adequate oxygenation dependent on patient condition  Description: Target End Date:  resolve prior to discharge or when underlying condition is resolved/stabilized    1.  Implement humidified high flow oxygen therapy  2.  Titrate high flow oxygen to maintain appropriate SpO2  Outcome: Not Met  Flowsheets  Taken 3/1/2024 0444 by Reginaldo Arreguin  O2 (LPM): 30  Taken 3/1/2024 0230 by Anibal Ocampo  FiO2%: 50 %  Taken 2/21/2024 0736 by Trudi Mendez, ILIA  Indication: COPD diagnosis: SpO2 less than 89% despite conventional supplemental oxygen devices  Outcome: Normoxia

## 2024-03-01 NOTE — DISCHARGE PLANNING
-1134  Per RN JAYMIE SCALES sent [facility] SNF referrals + [facility]. Pt may not require LTACH by Monday 03/04 when they can admit him.

## 2024-03-01 NOTE — PROGRESS NOTES
[facility] Medicine Daily Progress Note    Date of Service  3/1/2024    Chief Complaint  Miroslava Matta is a 63 y.o. female admitted 2/21/2024 with failure to thrive.    Hospital Course  Sigrid is a 63 y.o. morbidly obese female with history of chronic A-fib on Xarelto, CHF, diabetes, hyperlipidemia, hypertension, COPD dependent on 4 L, RUTH/OHS noncompliant with CPAP, mood disorder who presented 2/21/2024 as a direct transfer from [facility] for higher level care.     It was reported that patient's son called for a welfare check.  She apparently was assisted by home health RN, but reported to have quit last Friday 2/16.  Apparently patient was found sitting in the chair soiled in stool and urine.  Patient reported she has had a fall since 1/25/2024.  She admits to having difficulty ambulating due to her weight and obesity.  She stated her home health RN is no longer able to care for her due to her large body habitus, and quit home health.  She has not had home health assistance since 2/16. Noted to have extensive wound on the back, buttock and legs.  Per transfer paperwork, patient reported to have had atrial fibrillation with RVR with rate in 150s, improved to 130s with 1 L IVF bolus and given Cardizem 30 mg oral one-time.   Workup from [facility] included:  CBC: WBC 13.5, hemoglobin 9.7, platelet 689  CMP: Glucose 211, BUN 27, creatinine 1.0, sodium 135, potassium 4.6, chloride 100, bicarb 26, anion gap 9, calcium 9.2, total protein 7.4, albumin 3.4, total bilirubin 1.0, AST 18, ALT 17, alkaline phosphatase 169   Lactic acid 5.4 --> 1.8  Troponin I undetectable  proBNP 2360, CXR: Cardiomegaly with probable CHF   Patient received treatment with ceftriaxone 1 g IV, Cardizem 30 mg oral, NS to 250cc bolus x 3, Zofran 4 mg IV.   Due to concern of severe sepsis she was directly admitted to the IMCU.    Interval Problem Update  No acute events overnight.  On 30L HFNC, weaning as tolerated.  Patient has no  complaints, having continued urine output.  Continue IV lasix diuresis.  Continue K dur, monitor daily BMP. K maintaining normal range.  LTAC referral placed given need for HFNC and prolonged weaning time.      I have discussed this patient's plan of care and discharge plan at IDT rounds today with Case Management, Nursing, Nursing leadership, and other members of the IDT team.    Consultants/Specialty  none    Code Status  Full Code    Disposition  Medically Cleared  I have placed the appropriate orders for post-discharge needs.    Review of Systems  Review of Systems   Constitutional:  Negative for fever.   Cardiovascular:  Negative for leg swelling.   Gastrointestinal:         Eating well   Musculoskeletal:  Positive for back pain.        Foot pain   Neurological:  Negative for dizziness.   All other systems reviewed and are negative.       Physical Exam  Temp:  [36.2 °C (97.2 °F)-36.8 °C (98.2 °F)] 36.8 °C (98.2 °F)  Pulse:  [] 86  Resp:  [18-20] 18  BP: (103-135)/(64-80) 103/64  SpO2:  [90 %-95 %] 90 %    Physical Exam  Vitals and nursing note reviewed.   Constitutional:       General: She is not in acute distress.     Appearance: She is obese. She is ill-appearing. She is not toxic-appearing.   HENT:      Head:      Comments: Hirsutism      Mouth/Throat:      Mouth: Mucous membranes are dry.   Cardiovascular:      Rate and Rhythm: Tachycardia present. Rhythm irregular.   Pulmonary:      Comments: High flow oxygen  Poor air movement  No wheezing  Abdominal:      General: There is no distension.      Tenderness: There is no abdominal tenderness.      Comments: Pannus with intertrigo    Genitourinary:     Comments: floyd  Musculoskeletal:      Cervical back: Neck supple.      Right lower leg: No edema.      Left lower leg: No edema.      Comments: Right heel black eschar  Right posterior calf abrasions and exudate  Bilateral posterior thigh ulcerations open wounds   Neurological:      General: No focal  deficit present.      Mental Status: She is alert and oriented to person, place, and time.   Psychiatric:         Mood and Affect: Mood normal.         Behavior: Behavior normal.         Thought Content: Thought content normal.         Fluids    Intake/Output Summary (Last 24 hours) at 3/1/2024 1543  Last data filed at 3/1/2024 1507  Gross per 24 hour   Intake 1130 ml   Output 4000 ml   Net -2870 ml       Laboratory        Recent Labs     02/28/24  0403 02/29/24  0106 03/01/24  0200   SODIUM 138 140 137   POTASSIUM 3.6 3.7 3.9   CHLORIDE 90* 92* 91*   CO2 38* 36* 35*   GLUCOSE 173* 194* 200*   BUN 31* 38* 39*   CREATININE 0.78 0.76 0.79   CALCIUM 9.2 9.4 9.2                     Imaging  EC-ECHOCARDIOGRAM COMPLETE W/O CONT   Final Result      US-RENU SINGLE LEVEL BILAT   Final Result      DX-CHEST-LIMITED (1 VIEW)   Final Result      1.  Cardiomegaly with mild pulmonary vascular congestion.           Assessment/Plan  Pulmonary hypertension (HCC)- (present on admission)  Assessment & Plan  RVSP 85 mmHg by echo while echo 2018 was 55-60 mmHg.  IV lasix 80 mg TID and potassium will be increased  Follow urine output  Fluid restriction 1.8 liters       Acute on chronic blood loss anemia- (present on admission)  Assessment & Plan  Hgb 9.7 (prior to transfer) --> 7.6 at [facility]  No evidence of blood loss  Iron 19 with %sat 9 for which IV iron was given  Minimize blood draws and she will require hemoglobin to be checked intermittently      Elevated troponin  Assessment & Plan  Probable demand ischemia    Mood disorder (HCC)- (present on admission)  Assessment & Plan  Cymbalta    RUTH (obstructive sleep apnea)- (present on admission)  Assessment & Plan  Hx of  Her CPAP machine broke and she has been off since October  Nightly BiPAP while inpatient    Morbid (severe) obesity with alveolar hypoventilation (HCC)- (present on admission)  Assessment & Plan  Diet and lifestyle modification  Body mass index is 64.91 kg/m².      Atrial  fibrillation with RVR (HCC)- (present on admission)  Assessment & Plan  Restarted home Cardizem 360 for rate control  Continue Xarelto for anticoagulation  Continuous tele monitoring      Severe sepsis (HCC)- (present on admission)  Assessment & Plan  This is Sepsis Present on admission  SIRS criteria identified on my evaluation include: Tachycardia, with heart rate greater than 90 BPM, Leukocytosis, with WBC greater than 12,000, and Bandemia, greater than 10% bands  Clinical indicators of end organ dysfunction include Lactic Acid greater than 2 and Toxic Metabolic Encephalopathy  Source is cellulitis leg, buttock  Sepsis protocol initiated  Crystalloid Fluid Administration: Fluid resuscitation ordered per standard protocol was given  IV antibiotics as appropriate for source of sepsis  Reassessment: I have reassessed the patient's hemodynamic status    Cellulitis of legs, buttock  WBC: 13.5, lactic acid 5.4 --> 1.8  S/p 1L IVF no further fluids  Antibiotic:  stopped  Wound care      Diabetes (HCC)- (present on admission)  Assessment & Plan  ISS  Statin    Essential hypertension- (present on admission)  Assessment & Plan  Restart Cardizem 360    Acute respiratory failure with hypoxia (HCC)- (present on admission)  Assessment & Plan  Acute on chronic  Multifactorial: OHS/RUTH noncompliant with CPAP, volume overload, HFpEF  She is on 4 liters oxygen at home (not due to COPD but due to CHF and obesity).  Pulmonary hypertension thus IV lasix 80 mg BID.   She is requiring high flow oxygen which is being titrated.   Increased Lasix to 80 mg TID with KCl 40 mEq TID and titrate up until she i is able to transition off high flow oxygen or until her creatinine starts to go up substantially  Nocturnal BiPAP   Incentive spirometry 10x/hour while awake  She may require pulmonology consultation         VTE prophylaxis: VTE Selection    Patient is critically ill.   The patient continues to have: acute hypoxic respiratory  failure  The vital organ system that is affected is the: lungs  If untreated there is a high chance of deterioration into: worsening respiratory failure, cardiac arrest, and eventually death.   The critical care that I am providing today is: managing respiratory failure with high flow nasal cannula and IV lasix therapy  The critical that has been undertaken is medically complex.   There has been no overlap in critical care time.   Critical Care Time not including procedures: 34 minutes

## 2024-03-01 NOTE — THERAPY
"Physical Therapy   Daily Treatment     Patient Name: Miroslava Matta  Age:  63 y.o., Sex:  female  Medical Record #: [id number]  Today's Date: 2/29/2024     Precautions  Precautions: (P) Fall Risk;Swallow Precautions    Assessment    Pt received in bed and agreeable to PT session. Pt continues to require maxA for bed mobility and was able to attempt x2 STS with FWW however unsuccessful with maxA x2 people. Pt reporting significant pain at end of session and required maxA for sit to supine. Extensive time taken to educate pt on importance of frequent mobility; pt receptive to info. Pt is primarily limited by pain, and decreased functional strength and activity tolerance. Continue to recommend post acute placement. Will follow for PT needs.     Plan    Treatment Plan Status: (P) Continue Current Treatment Plan  Type of Treatment: Bed Mobility, Equipment, Gait Training, Manual Therapy, Neuro Re-Education / Balance, Self Care / Home Evaluation, Therapeutic Activities, Therapeutic Exercise, Stair Training  Treatment Frequency: 3 Times per Week  Treatment Duration: Until Therapy Goals Met    DC Equipment Recommendations: (P) Unable to determine at this time  Discharge Recommendations: (P) Recommend post-acute placement for additional physical therapy services prior to discharge home      Subjective    \"The food here is great\". (Said sarcastically).      Objective       02/29/24 1504   Precautions   Precautions Fall Risk;Swallow Precautions   Vitals   O2 (LPM) 30   O2 Delivery Device Heated High Flow Nasal Cannula   Pain 0 - 10 Group   Therapist Pain Assessment Post Activity Pain Same as Prior to Activity;Nurse Notified  (pain not rated; pt reports her back is itchy and her ankles are hurting from the socks)   Cognition    Cognition / Consciousness X   Level of Consciousness Alert   Initiation Impaired   Comments distracted by pain, poor insight to deficits   Active ROM Lower Body    Comments limited by wounds, weakness, " effort   Strength Lower Body   Comments limited by wounds, pain, effort   Lower Body Muscle Tone   Lower Body Muscle Tone  WDL   Neuro-Muscular Treatments   Neuro-Muscular Treatments Anterior weight shift;Weight Shift Right;Weight Shift Left   Comments to assess seated balance   Balance   Sitting Balance (Static) Fair   Sitting Balance (Dynamic) Fair   Weight Shift Sitting Poor   Skilled Intervention Verbal Cuing;Tactile Cuing;Facilitation   Bed Mobility    Supine to Sit Maximal Assist   Sit to Supine Maximal Assist   Scooting Maximal Assist   Rolling Maximum Assist to Lt.   Skilled Intervention Verbal Cuing   Gait Analysis   Gait Level Of Assist Unable to Participate   Functional Mobility   Sit to Stand Unable to Participate  (STSx2 attempted; not able to perform with 2 person maxA)   Bed, Chair, Wheelchair Transfer Unable to Participate   Mobility supine > EOB > attempt STS > supine   Skilled Intervention Verbal Cuing   6 Clicks Assessment - How much HELP from from another person do you currently need... (If the patient hasn't done an activity recently, how much help from another person do you think he/she would need if he/she tried?)   Turning from your back to your side while in a flat bed without using bedrails? 2   Moving from lying on your back to sitting on the side of a flat bed without using bedrails? 2   Moving to and from a bed to a chair (including a wheelchair)? 1   Standing up from a chair using your arms (e.g., wheelchair, or bedside chair)? 1   Walking in hospital room? 1   Climbing 3-5 steps with a railing? 1   6 clicks Mobility Score 8   Activity Tolerance   Sitting Edge of Bed 15 mins   Short Term Goals    Short Term Goal # 1 Patient will move supine <> sitting EOB with mod A within 6tx in order to get in/out of bed   Goal Outcome # 1 goal not met   Short Term Goal # 2 Patient will maintain sitting balance at EOB > 5 min with SBA within 6tx in order to progress independence and perform functional  task   Goal Outcome # 2 Goal met   Short Term Goal # 3 Patient will move sitting <> standing with LRAD and mod A within 6tx in order to initiate transfers and gait   Goal Outcome # 3 Goal not met   Education Group   Education Provided Role of Physical Therapist   Role of Physical Therapist Patient Response Patient;Acceptance;Explanation;Verbal Demonstration   Physical Therapy Treatment Plan   Physical Therapy Treatment Plan Continue Current Treatment Plan   Anticipated Discharge Equipment and Recommendations   DC Equipment Recommendations Unable to determine at this time   Discharge Recommendations Recommend post-acute placement for additional physical therapy services prior to discharge home   Interdisciplinary Plan of Care Collaboration   IDT Collaboration with  Nursing;Occupational Therapist   Patient Position at End of Therapy In Bed;Bed Alarm On;Call Light within Reach;Tray Table within Reach;Phone within Reach   Collaboration Comments RN updated   Session Information   Date / Session Number  2/29 - 2 (1/3, 3/06)

## 2024-03-01 NOTE — PROGRESS NOTES
Assumed care of patient, bedside report received from Nelly RUIZ. Updated on POC, call light within reach and fall precautions in place. Bed locked and in lowest position. Patient instructed to call for assistance before getting out of bed. All questions answered, no other needs at this time.       Fall Risk Score: HIGH RISK  Fall risk interventions in place: Place yellow fall risk ID band on patient, Provide patient/family education based on risk assessment, Educate patient/family to call staff for assistance when getting out of bed, Place fall precaution signage outside patient door, Place patient in room close to nursing station, Utilize bed/chair fall alarm, and Bed alarm connected correctly  Bed type: Low air loss and Bariatric (Kyle Score less than 17 interventions in place)  Patient on cardiac monitor: Yes  IVF/IV medications: Not Applicable   Oxygen: How many liters 30L 50% hi flow  Bedside sitter: Not Applicable   Isolation: Not applicable

## 2024-03-02 LAB
ANION GAP SERPL CALC-SCNC: 17 MMOL/L (ref 7–16)
BUN SERPL-MCNC: 32 MG/DL (ref 8–22)
CALCIUM SERPL-MCNC: 8.8 MG/DL (ref 8.5–10.5)
CHLORIDE SERPL-SCNC: 93 MMOL/L (ref 96–112)
CO2 SERPL-SCNC: 29 MMOL/L (ref 20–33)
CREAT SERPL-MCNC: 0.78 MG/DL (ref 0.5–1.4)
GFR SERPLBLD CREATININE-BSD FMLA CKD-EPI: 86 ML/MIN/1.73 M 2
GLUCOSE BLD STRIP.AUTO-MCNC: 168 MG/DL (ref 65–99)
GLUCOSE BLD STRIP.AUTO-MCNC: 231 MG/DL (ref 65–99)
GLUCOSE BLD STRIP.AUTO-MCNC: 244 MG/DL (ref 65–99)
GLUCOSE BLD STRIP.AUTO-MCNC: 249 MG/DL (ref 65–99)
GLUCOSE SERPL-MCNC: 184 MG/DL (ref 65–99)
POTASSIUM SERPL-SCNC: 3.8 MMOL/L (ref 3.6–5.5)
SODIUM SERPL-SCNC: 139 MMOL/L (ref 135–145)

## 2024-03-02 PROCEDURE — 700111 HCHG RX REV CODE 636 W/ 250 OVERRIDE (IP): Mod: JZ | Performed by: HOSPITALIST

## 2024-03-02 PROCEDURE — 99291 CRITICAL CARE FIRST HOUR: CPT | Performed by: STUDENT IN AN ORGANIZED HEALTH CARE EDUCATION/TRAINING PROGRAM

## 2024-03-02 PROCEDURE — 700111 HCHG RX REV CODE 636 W/ 250 OVERRIDE (IP): Performed by: STUDENT IN AN ORGANIZED HEALTH CARE EDUCATION/TRAINING PROGRAM

## 2024-03-02 PROCEDURE — 700102 HCHG RX REV CODE 250 W/ 637 OVERRIDE(OP): Performed by: STUDENT IN AN ORGANIZED HEALTH CARE EDUCATION/TRAINING PROGRAM

## 2024-03-02 PROCEDURE — 80048 BASIC METABOLIC PNL TOTAL CA: CPT

## 2024-03-02 PROCEDURE — 700102 HCHG RX REV CODE 250 W/ 637 OVERRIDE(OP)

## 2024-03-02 PROCEDURE — 770020 HCHG ROOM/CARE - TELE (206)

## 2024-03-02 PROCEDURE — A9270 NON-COVERED ITEM OR SERVICE: HCPCS | Mod: JZ | Performed by: HOSPITALIST

## 2024-03-02 PROCEDURE — 700102 HCHG RX REV CODE 250 W/ 637 OVERRIDE(OP): Mod: JZ | Performed by: HOSPITALIST

## 2024-03-02 PROCEDURE — 94640 AIRWAY INHALATION TREATMENT: CPT

## 2024-03-02 PROCEDURE — A9270 NON-COVERED ITEM OR SERVICE: HCPCS | Performed by: STUDENT IN AN ORGANIZED HEALTH CARE EDUCATION/TRAINING PROGRAM

## 2024-03-02 PROCEDURE — 36415 COLL VENOUS BLD VENIPUNCTURE: CPT

## 2024-03-02 PROCEDURE — A9270 NON-COVERED ITEM OR SERVICE: HCPCS

## 2024-03-02 PROCEDURE — 82962 GLUCOSE BLOOD TEST: CPT

## 2024-03-02 RX ADMIN — INSULIN HUMAN 4 UNITS: 100 INJECTION, SOLUTION PARENTERAL at 13:50

## 2024-03-02 RX ADMIN — TIOTROPIUM BROMIDE INHALATION SPRAY 5 MCG: 3.12 SPRAY, METERED RESPIRATORY (INHALATION) at 04:24

## 2024-03-02 RX ADMIN — INSULIN HUMAN 4 UNITS: 100 INJECTION, SOLUTION PARENTERAL at 17:23

## 2024-03-02 RX ADMIN — Medication 400 MG: at 04:22

## 2024-03-02 RX ADMIN — TOPIRAMATE 25 MG: 25 TABLET, FILM COATED ORAL at 04:23

## 2024-03-02 RX ADMIN — INSULIN GLARGINE-YFGN 5 UNITS: 100 INJECTION, SOLUTION SUBCUTANEOUS at 17:24

## 2024-03-02 RX ADMIN — INSULIN HUMAN 3 UNITS: 100 INJECTION, SOLUTION PARENTERAL at 09:21

## 2024-03-02 RX ADMIN — POTASSIUM CHLORIDE 40 MEQ: 1500 TABLET, EXTENDED RELEASE ORAL at 04:22

## 2024-03-02 RX ADMIN — POTASSIUM CHLORIDE 40 MEQ: 1500 TABLET, EXTENDED RELEASE ORAL at 17:16

## 2024-03-02 RX ADMIN — NYSTATIN: 100000 POWDER TOPICAL at 17:22

## 2024-03-02 RX ADMIN — HYDROMORPHONE HYDROCHLORIDE 1 MG: 1 INJECTION, SOLUTION INTRAMUSCULAR; INTRAVENOUS; SUBCUTANEOUS at 16:19

## 2024-03-02 RX ADMIN — NYSTATIN: 100000 POWDER TOPICAL at 04:24

## 2024-03-02 RX ADMIN — OXYCODONE HYDROCHLORIDE AND ACETAMINOPHEN 500 MG: 500 TABLET ORAL at 04:23

## 2024-03-02 RX ADMIN — FOLIC ACID 1 MG: 1 TABLET ORAL at 04:22

## 2024-03-02 RX ADMIN — FUROSEMIDE 80 MG: 10 INJECTION, SOLUTION INTRAMUSCULAR; INTRAVENOUS at 15:23

## 2024-03-02 RX ADMIN — OXYCODONE HYDROCHLORIDE 10 MG: 10 TABLET ORAL at 15:24

## 2024-03-02 RX ADMIN — FOLIC ACID 1 MG: 1 TABLET ORAL at 17:17

## 2024-03-02 RX ADMIN — Medication 1000 UNITS: at 04:23

## 2024-03-02 RX ADMIN — POLYETHYLENE GLYCOL 3350 1 PACKET: 17 POWDER, FOR SOLUTION ORAL at 04:23

## 2024-03-02 RX ADMIN — DAKIN'S SOLUTION 0.125% (QUARTER STRENGTH) 2 ML: 0.12 SOLUTION at 04:24

## 2024-03-02 RX ADMIN — Medication 400 MG: at 17:15

## 2024-03-02 RX ADMIN — DULOXETINE HYDROCHLORIDE 30 MG: 30 CAPSULE, DELAYED RELEASE ORAL at 04:23

## 2024-03-02 RX ADMIN — TOPIRAMATE 25 MG: 25 TABLET, FILM COATED ORAL at 17:15

## 2024-03-02 RX ADMIN — FUROSEMIDE 80 MG: 10 INJECTION, SOLUTION INTRAMUSCULAR; INTRAVENOUS at 12:08

## 2024-03-02 RX ADMIN — DIGOXIN 125 MCG: 0.12 TABLET ORAL at 17:16

## 2024-03-02 RX ADMIN — INSULIN HUMAN 4 UNITS: 100 INJECTION, SOLUTION PARENTERAL at 21:57

## 2024-03-02 RX ADMIN — POTASSIUM CHLORIDE 40 MEQ: 1500 TABLET, EXTENDED RELEASE ORAL at 12:08

## 2024-03-02 RX ADMIN — CYANOCOBALAMIN TAB 500 MCG 1000 MCG: 500 TAB at 04:22

## 2024-03-02 RX ADMIN — DAKIN'S SOLUTION 0.125% (QUARTER STRENGTH) 1 ML: 0.12 SOLUTION at 17:22

## 2024-03-02 RX ADMIN — ATORVASTATIN CALCIUM 40 MG: 40 TABLET, FILM COATED ORAL at 17:15

## 2024-03-02 RX ADMIN — FUROSEMIDE 80 MG: 10 INJECTION, SOLUTION INTRAMUSCULAR; INTRAVENOUS at 04:23

## 2024-03-02 RX ADMIN — MOMETASONE FUROATE AND FORMOTEROL FUMARATE DIHYDRATE 2 PUFF: 200; 5 AEROSOL RESPIRATORY (INHALATION) at 17:18

## 2024-03-02 RX ADMIN — OXYCODONE HYDROCHLORIDE 10 MG: 10 TABLET ORAL at 04:21

## 2024-03-02 RX ADMIN — DOCUSATE SODIUM 50 MG AND SENNOSIDES 8.6 MG 2 TABLET: 8.6; 5 TABLET, FILM COATED ORAL at 15:24

## 2024-03-02 RX ADMIN — HYDROMORPHONE HYDROCHLORIDE 1 MG: 1 INJECTION, SOLUTION INTRAMUSCULAR; INTRAVENOUS; SUBCUTANEOUS at 05:45

## 2024-03-02 RX ADMIN — MOMETASONE FUROATE AND FORMOTEROL FUMARATE DIHYDRATE 2 PUFF: 200; 5 AEROSOL RESPIRATORY (INHALATION) at 04:24

## 2024-03-02 RX ADMIN — RIVAROXABAN 20 MG: 20 TABLET, FILM COATED ORAL at 17:17

## 2024-03-02 RX ADMIN — NYSTATIN: 100000 POWDER TOPICAL at 12:11

## 2024-03-02 ASSESSMENT — PAIN DESCRIPTION - PAIN TYPE
TYPE: ACUTE PAIN

## 2024-03-02 ASSESSMENT — ENCOUNTER SYMPTOMS
DIZZINESS: 0
BACK PAIN: 1
ROS GI COMMENTS: EATING WELL
FEVER: 0

## 2024-03-02 ASSESSMENT — FIBROSIS 4 INDEX: FIB4 SCORE: 0.32

## 2024-03-02 NOTE — PROGRESS NOTES
[facility] Medicine Daily Progress Note    Date of Service  3/2/2024    Chief Complaint  Miroslava Matta is a 63 y.o. female admitted 2/21/2024 with failure to thrive.    Hospital Course  Sigrid is a 63 y.o. morbidly obese female with history of chronic A-fib on Xarelto, CHF, diabetes, hyperlipidemia, hypertension, COPD dependent on 4 L, RUTH/OHS noncompliant with CPAP, mood disorder who presented 2/21/2024 as a direct transfer from [facility] for higher level care.     It was reported that patient's son called for a welfare check.  She apparently was assisted by home health RN, but reported to have quit last Friday 2/16.  Apparently patient was found sitting in the chair soiled in stool and urine.  Patient reported she has had a fall since 1/25/2024.  She admits to having difficulty ambulating due to her weight and obesity.  She stated her home health RN is no longer able to care for her due to her large body habitus, and quit home health.  She has not had home health assistance since 2/16. Noted to have extensive wound on the back, buttock and legs.  Per transfer paperwork, patient reported to have had atrial fibrillation with RVR with rate in 150s, improved to 130s with 1 L IVF bolus and given Cardizem 30 mg oral one-time.   Workup from [facility] included:  CBC: WBC 13.5, hemoglobin 9.7, platelet 689  CMP: Glucose 211, BUN 27, creatinine 1.0, sodium 135, potassium 4.6, chloride 100, bicarb 26, anion gap 9, calcium 9.2, total protein 7.4, albumin 3.4, total bilirubin 1.0, AST 18, ALT 17, alkaline phosphatase 169   Lactic acid 5.4 --> 1.8  Troponin I undetectable  proBNP 2360, CXR: Cardiomegaly with probable CHF   Patient received treatment with ceftriaxone 1 g IV, Cardizem 30 mg oral, NS to 250cc bolus x 3, Zofran 4 mg IV.   Due to concern of severe sepsis she was directly admitted to the IMCU.    Interval Problem Update  No acute events overnight.  On 50L HFNC, weaning as tolerated. Oxygenation  worse today.  Patient feels well.  Continue IV lasix diuresis.  Continue K dur, monitor daily BMP. K maintaining normal range.  LTAC referral placed given need for HFNC and prolonged weaning time.      I have discussed this patient's plan of care and discharge plan at IDT rounds today with Case Management, Nursing, Nursing leadership, and other members of the IDT team.    Consultants/Specialty  none    Code Status  Full Code    Disposition  Medically Cleared  I have placed the appropriate orders for post-discharge needs.    Review of Systems  Review of Systems   Constitutional:  Negative for fever.   Cardiovascular:  Negative for leg swelling.   Gastrointestinal:         Eating well   Musculoskeletal:  Positive for back pain.        Foot pain   Neurological:  Negative for dizziness.   All other systems reviewed and are negative.       Physical Exam  Temp:  [36.3 °C (97.3 °F)-36.9 °C (98.4 °F)] 36.6 °C (97.9 °F)  Pulse:  [] 111  Resp:  [18] 18  BP: (117-155)/(65-85) 117/71  SpO2:  [89 %-98 %] 93 %    Physical Exam  Vitals and nursing note reviewed.   Constitutional:       General: She is not in acute distress.     Appearance: She is obese. She is ill-appearing. She is not toxic-appearing.   HENT:      Head:      Comments: Hirsutism      Mouth/Throat:      Mouth: Mucous membranes are dry.   Cardiovascular:      Rate and Rhythm: Tachycardia present. Rhythm irregular.   Pulmonary:      Comments: High flow oxygen  Poor air movement  No wheezing  Abdominal:      General: There is no distension.      Tenderness: There is no abdominal tenderness.      Comments: Pannus with intertrigo    Genitourinary:     Comments: floyd  Musculoskeletal:      Cervical back: Neck supple.      Right lower leg: No edema.      Left lower leg: No edema.      Comments: Right heel black eschar  Right posterior calf abrasions and exudate  Bilateral posterior thigh ulcerations open wounds   Neurological:      General: No focal deficit  present.      Mental Status: She is alert and oriented to person, place, and time.   Psychiatric:         Mood and Affect: Mood normal.         Behavior: Behavior normal.         Thought Content: Thought content normal.         Fluids    Intake/Output Summary (Last 24 hours) at 3/2/2024 1510  Last data filed at 3/2/2024 1440  Gross per 24 hour   Intake 840 ml   Output 2200 ml   Net -1360 ml       Laboratory        Recent Labs     02/29/24  0106 03/01/24  0200 03/02/24  0121   SODIUM 140 137 139   POTASSIUM 3.7 3.9 3.8   CHLORIDE 92* 91* 93*   CO2 36* 35* 29   GLUCOSE 194* 200* 184*   BUN 38* 39* 32*   CREATININE 0.76 0.79 0.78   CALCIUM 9.4 9.2 8.8                     Imaging  EC-ECHOCARDIOGRAM COMPLETE W/O CONT   Final Result      US-RENU SINGLE LEVEL BILAT   Final Result      DX-CHEST-LIMITED (1 VIEW)   Final Result      1.  Cardiomegaly with mild pulmonary vascular congestion.           Assessment/Plan  Pulmonary hypertension (HCC)- (present on admission)  Assessment & Plan  RVSP 85 mmHg by echo while echo 2018 was 55-60 mmHg.  IV lasix 80 mg TID and potassium will be increased  Follow urine output  Fluid restriction 1.8 liters       Acute on chronic blood loss anemia- (present on admission)  Assessment & Plan  Hgb 9.7 (prior to transfer) --> 7.6 at [facility]  No evidence of blood loss  Iron 19 with %sat 9 for which IV iron was given  Minimize blood draws and she will require hemoglobin to be checked intermittently      Elevated troponin  Assessment & Plan  Probable demand ischemia    Mood disorder (HCC)- (present on admission)  Assessment & Plan  Cymbalta    RUTH (obstructive sleep apnea)- (present on admission)  Assessment & Plan  Hx of  Her CPAP machine broke and she has been off since October  Nightly BiPAP while inpatient    Morbid (severe) obesity with alveolar hypoventilation (HCC)- (present on admission)  Assessment & Plan  Diet and lifestyle modification  Body mass index is 64.91 kg/m².      Atrial  fibrillation with RVR (HCC)- (present on admission)  Assessment & Plan  Restarted home Cardizem 360 for rate control  Continue Xarelto for anticoagulation  Continuous tele monitoring      Severe sepsis (HCC)- (present on admission)  Assessment & Plan  This is Sepsis Present on admission  SIRS criteria identified on my evaluation include: Tachycardia, with heart rate greater than 90 BPM, Leukocytosis, with WBC greater than 12,000, and Bandemia, greater than 10% bands  Clinical indicators of end organ dysfunction include Lactic Acid greater than 2 and Toxic Metabolic Encephalopathy  Source is cellulitis leg, buttock  Sepsis protocol initiated  Crystalloid Fluid Administration: Fluid resuscitation ordered per standard protocol was given  IV antibiotics as appropriate for source of sepsis  Reassessment: I have reassessed the patient's hemodynamic status    Cellulitis of legs, buttock  WBC: 13.5, lactic acid 5.4 --> 1.8  S/p 1L IVF no further fluids  Antibiotic:  stopped  Wound care      Diabetes (HCC)- (present on admission)  Assessment & Plan  ISS  Statin    Essential hypertension- (present on admission)  Assessment & Plan  Restart Cardizem 360    Acute respiratory failure with hypoxia (HCC)- (present on admission)  Assessment & Plan  Acute on chronic  Multifactorial: OHS/RUTH noncompliant with CPAP, volume overload, HFpEF  She is on 4 liters oxygen at home (not due to COPD but due to CHF and obesity).  Pulmonary hypertension thus IV lasix 80 mg BID.   She is requiring high flow oxygen which is being titrated.   Increased Lasix to 80 mg TID with KCl 40 mEq TID and titrate up until she i is able to transition off high flow oxygen or until her creatinine starts to go up substantially  Nocturnal BiPAP   Incentive spirometry 10x/hour while awake  She may require pulmonology consultation         VTE prophylaxis: VTE Selection    Patient is critically ill.   The patient continues to have: acute hypoxic respiratory  failure  The vital organ system that is affected is the: lungs  If untreated there is a high chance of deterioration into: worsening respiratory failure, cardiac arrest, and eventually death.   The critical care that I am providing today is: managing respiratory failure with high flow nasal cannula and IV lasix therapy  The critical that has been undertaken is medically complex.   There has been no overlap in critical care time.   Critical Care Time not including procedures: 32 minutes

## 2024-03-02 NOTE — PROGRESS NOTES
Monitor Summary:   Rhythm: Atrial fibrillation  Rate: 100 - 119  Measurement: ../.07/..  Ectopy: Frequent PVC, rare couplet    12 hour chart check

## 2024-03-02 NOTE — PROGRESS NOTES
Bedside report received from off going RN/tech: Joel, assumed care of patient.     Fall Risk Score: HIGH RISK  Fall risk interventions in place: Place yellow fall risk ID band on patient, Provide patient/family education based on risk assessment, Educate patient/family to call staff for assistance when getting out of bed, Place fall precaution signage outside patient door, Utilize bed/chair fall alarm, Tele-sitter, and Bed alarm connected correctly  Bed type: Bariatric (Kyle Score less than 17 interventions in place)  Patient on cardiac monitor: Yes  IVF/IV medications: Not Applicable   Oxygen: How many liters 50L, Traced the line to wall oxygen, and No oxygen tank in room  Bedside sitter: Not Applicable   Isolation: Not applicable

## 2024-03-02 NOTE — PROGRESS NOTES
Assumed care of patient from day shift RN at 1855, Patient AOX4, VSS.   Fall education reinforced, call bell within reach, bed locked and in lowest position. POC discussed and all questions answered.  Purposeful and or hourly rounding in place.      Bedside report received from off going RN/tech: April, assumed care of patient.      Fall Risk Score: HIGH RISK  Fall risk interventions in place: Place yellow fall risk ID band on patient, Provide patient/family education based on risk assessment, Educate patient/family to call staff for assistance when getting out of bed, Place fall precaution signage outside patient door, Utilize bed/chair fall alarm, Notify charge of high risk for huddle, and Bed alarm connected correctly  Bed type: Low air loss and Bariatric (Kyle Score less than 17 interventions in place)  Patient on cardiac monitor: Yes  IVF/IV medications: Not Applicable   Oxygen: How many liters 50L  Bedside sitter: Not Applicable   Isolation: Not applicable

## 2024-03-03 LAB
ANION GAP SERPL CALC-SCNC: 13 MMOL/L (ref 7–16)
BUN SERPL-MCNC: 29 MG/DL (ref 8–22)
CALCIUM SERPL-MCNC: 9 MG/DL (ref 8.5–10.5)
CHLORIDE SERPL-SCNC: 94 MMOL/L (ref 96–112)
CO2 SERPL-SCNC: 31 MMOL/L (ref 20–33)
CREAT SERPL-MCNC: 0.83 MG/DL (ref 0.5–1.4)
GFR SERPLBLD CREATININE-BSD FMLA CKD-EPI: 79 ML/MIN/1.73 M 2
GLUCOSE BLD STRIP.AUTO-MCNC: 187 MG/DL (ref 65–99)
GLUCOSE BLD STRIP.AUTO-MCNC: 217 MG/DL (ref 65–99)
GLUCOSE BLD STRIP.AUTO-MCNC: 284 MG/DL (ref 65–99)
GLUCOSE BLD STRIP.AUTO-MCNC: 300 MG/DL (ref 65–99)
GLUCOSE SERPL-MCNC: 191 MG/DL (ref 65–99)
POTASSIUM SERPL-SCNC: 4.2 MMOL/L (ref 3.6–5.5)
SODIUM SERPL-SCNC: 138 MMOL/L (ref 135–145)

## 2024-03-03 PROCEDURE — 700102 HCHG RX REV CODE 250 W/ 637 OVERRIDE(OP): Performed by: STUDENT IN AN ORGANIZED HEALTH CARE EDUCATION/TRAINING PROGRAM

## 2024-03-03 PROCEDURE — 700102 HCHG RX REV CODE 250 W/ 637 OVERRIDE(OP)

## 2024-03-03 PROCEDURE — 80048 BASIC METABOLIC PNL TOTAL CA: CPT

## 2024-03-03 PROCEDURE — 700111 HCHG RX REV CODE 636 W/ 250 OVERRIDE (IP): Mod: JZ | Performed by: HOSPITALIST

## 2024-03-03 PROCEDURE — A9270 NON-COVERED ITEM OR SERVICE: HCPCS | Performed by: STUDENT IN AN ORGANIZED HEALTH CARE EDUCATION/TRAINING PROGRAM

## 2024-03-03 PROCEDURE — 99291 CRITICAL CARE FIRST HOUR: CPT | Performed by: STUDENT IN AN ORGANIZED HEALTH CARE EDUCATION/TRAINING PROGRAM

## 2024-03-03 PROCEDURE — 770020 HCHG ROOM/CARE - TELE (206)

## 2024-03-03 PROCEDURE — 94640 AIRWAY INHALATION TREATMENT: CPT

## 2024-03-03 PROCEDURE — 700102 HCHG RX REV CODE 250 W/ 637 OVERRIDE(OP): Mod: JZ | Performed by: HOSPITALIST

## 2024-03-03 PROCEDURE — 82962 GLUCOSE BLOOD TEST: CPT | Mod: 91

## 2024-03-03 PROCEDURE — A9270 NON-COVERED ITEM OR SERVICE: HCPCS | Mod: JZ | Performed by: HOSPITALIST

## 2024-03-03 PROCEDURE — A9270 NON-COVERED ITEM OR SERVICE: HCPCS

## 2024-03-03 PROCEDURE — 36415 COLL VENOUS BLD VENIPUNCTURE: CPT

## 2024-03-03 PROCEDURE — 700111 HCHG RX REV CODE 636 W/ 250 OVERRIDE (IP): Performed by: STUDENT IN AN ORGANIZED HEALTH CARE EDUCATION/TRAINING PROGRAM

## 2024-03-03 PROCEDURE — 700111 HCHG RX REV CODE 636 W/ 250 OVERRIDE (IP)

## 2024-03-03 RX ORDER — TEMAZEPAM 7.5 MG/1
7.5 CAPSULE ORAL ONCE
Status: COMPLETED | OUTPATIENT
Start: 2024-03-03 | End: 2024-03-03

## 2024-03-03 RX ORDER — HYDROMORPHONE HYDROCHLORIDE 1 MG/ML
0.5 INJECTION, SOLUTION INTRAMUSCULAR; INTRAVENOUS; SUBCUTANEOUS
Status: DISCONTINUED | OUTPATIENT
Start: 2024-03-03 | End: 2024-03-07 | Stop reason: HOSPADM

## 2024-03-03 RX ADMIN — NYSTATIN: 100000 POWDER TOPICAL at 18:23

## 2024-03-03 RX ADMIN — INSULIN HUMAN 7 UNITS: 100 INJECTION, SOLUTION PARENTERAL at 20:28

## 2024-03-03 RX ADMIN — DAKIN'S SOLUTION 0.125% (QUARTER STRENGTH) 2 ML: 0.12 SOLUTION at 04:23

## 2024-03-03 RX ADMIN — RIVAROXABAN 20 MG: 20 TABLET, FILM COATED ORAL at 18:19

## 2024-03-03 RX ADMIN — CYANOCOBALAMIN TAB 500 MCG 1000 MCG: 500 TAB at 04:22

## 2024-03-03 RX ADMIN — OXYCODONE HYDROCHLORIDE 10 MG: 10 TABLET ORAL at 15:51

## 2024-03-03 RX ADMIN — INSULIN HUMAN 4 UNITS: 100 INJECTION, SOLUTION PARENTERAL at 13:23

## 2024-03-03 RX ADMIN — MOMETASONE FUROATE AND FORMOTEROL FUMARATE DIHYDRATE 2 PUFF: 200; 5 AEROSOL RESPIRATORY (INHALATION) at 18:34

## 2024-03-03 RX ADMIN — OXYCODONE HYDROCHLORIDE 10 MG: 10 TABLET ORAL at 04:08

## 2024-03-03 RX ADMIN — TOPIRAMATE 25 MG: 25 TABLET, FILM COATED ORAL at 04:22

## 2024-03-03 RX ADMIN — NYSTATIN: 100000 POWDER TOPICAL at 11:09

## 2024-03-03 RX ADMIN — FUROSEMIDE 80 MG: 10 INJECTION, SOLUTION INTRAMUSCULAR; INTRAVENOUS at 11:09

## 2024-03-03 RX ADMIN — OXYCODONE HYDROCHLORIDE AND ACETAMINOPHEN 500 MG: 500 TABLET ORAL at 04:23

## 2024-03-03 RX ADMIN — DULOXETINE HYDROCHLORIDE 30 MG: 30 CAPSULE, DELAYED RELEASE ORAL at 04:22

## 2024-03-03 RX ADMIN — FOLIC ACID 1 MG: 1 TABLET ORAL at 18:18

## 2024-03-03 RX ADMIN — ACETAMINOPHEN 650 MG: 325 TABLET, FILM COATED ORAL at 11:14

## 2024-03-03 RX ADMIN — HYDROMORPHONE HYDROCHLORIDE 1 MG: 1 INJECTION, SOLUTION INTRAMUSCULAR; INTRAVENOUS; SUBCUTANEOUS at 05:44

## 2024-03-03 RX ADMIN — Medication 400 MG: at 18:18

## 2024-03-03 RX ADMIN — POTASSIUM CHLORIDE 40 MEQ: 1500 TABLET, EXTENDED RELEASE ORAL at 11:09

## 2024-03-03 RX ADMIN — TOPIRAMATE 25 MG: 25 TABLET, FILM COATED ORAL at 18:18

## 2024-03-03 RX ADMIN — DIGOXIN 125 MCG: 0.12 TABLET ORAL at 18:17

## 2024-03-03 RX ADMIN — FOLIC ACID 1 MG: 1 TABLET ORAL at 04:22

## 2024-03-03 RX ADMIN — NYSTATIN: 100000 POWDER TOPICAL at 04:23

## 2024-03-03 RX ADMIN — TEMAZEPAM 7.5 MG: 7.5 CAPSULE ORAL at 00:27

## 2024-03-03 RX ADMIN — FUROSEMIDE 80 MG: 10 INJECTION, SOLUTION INTRAMUSCULAR; INTRAVENOUS at 15:49

## 2024-03-03 RX ADMIN — HYDROMORPHONE HYDROCHLORIDE 0.5 MG: 1 INJECTION, SOLUTION INTRAMUSCULAR; INTRAVENOUS; SUBCUTANEOUS at 00:32

## 2024-03-03 RX ADMIN — ATORVASTATIN CALCIUM 40 MG: 40 TABLET, FILM COATED ORAL at 18:18

## 2024-03-03 RX ADMIN — DAKIN'S SOLUTION 0.125% (QUARTER STRENGTH) 2 ML: 0.12 SOLUTION at 18:23

## 2024-03-03 RX ADMIN — TIOTROPIUM BROMIDE INHALATION SPRAY 5 MCG: 3.12 SPRAY, METERED RESPIRATORY (INHALATION) at 06:00

## 2024-03-03 RX ADMIN — POTASSIUM CHLORIDE 40 MEQ: 1500 TABLET, EXTENDED RELEASE ORAL at 18:18

## 2024-03-03 RX ADMIN — Medication 400 MG: at 04:22

## 2024-03-03 RX ADMIN — FUROSEMIDE 80 MG: 10 INJECTION, SOLUTION INTRAMUSCULAR; INTRAVENOUS at 04:23

## 2024-03-03 RX ADMIN — MOMETASONE FUROATE AND FORMOTEROL FUMARATE DIHYDRATE 2 PUFF: 200; 5 AEROSOL RESPIRATORY (INHALATION) at 04:23

## 2024-03-03 RX ADMIN — Medication 1000 UNITS: at 04:22

## 2024-03-03 RX ADMIN — POTASSIUM CHLORIDE 40 MEQ: 1500 TABLET, EXTENDED RELEASE ORAL at 04:22

## 2024-03-03 RX ADMIN — INSULIN HUMAN 7 UNITS: 100 INJECTION, SOLUTION PARENTERAL at 18:27

## 2024-03-03 RX ADMIN — INSULIN HUMAN 3 UNITS: 100 INJECTION, SOLUTION PARENTERAL at 08:38

## 2024-03-03 ASSESSMENT — ENCOUNTER SYMPTOMS
ROS GI COMMENTS: EATING WELL
BACK PAIN: 1
DIZZINESS: 0
FEVER: 0

## 2024-03-03 ASSESSMENT — PAIN DESCRIPTION - PAIN TYPE
TYPE: ACUTE PAIN
TYPE: ACUTE PAIN

## 2024-03-03 ASSESSMENT — FIBROSIS 4 INDEX: FIB4 SCORE: 0.32

## 2024-03-03 NOTE — PROGRESS NOTES
Assumed care of patient. Received bedside report from night shift RN. Pt is A+O4. Bed alarm in place and armed. POC discussed with patient and pt has no further needs/questions at this time. Hourly rounding initiated.

## 2024-03-03 NOTE — CARE PLAN
Problem: Humidified High Flow Nasal Cannula  Goal: Maintain adequate oxygenation dependent on patient condition  Description: Target End Date:  resolve prior to discharge or when underlying condition is resolved/stabilized    1.  Implement humidified high flow oxygen therapy  2.  Titrate high flow oxygen to maintain appropriate SpO2  Outcome: Progressing   HFNC 50 l/m 40% Fio2

## 2024-03-03 NOTE — CARE PLAN
The patient is Stable - Low risk of patient condition declining or worsening    Shift Goals  Clinical Goals: Wound care, improve oxygenatinon, skin integrity  Patient Goals: Pain management  Family Goals: ELDER    Progress made toward(s) clinical / shift goals:    Problem: Knowledge Deficit - Standard  Goal: Patient and family/care givers will demonstrate understanding of plan of care, disease process/condition, diagnostic tests and medications  Outcome: Progressing     Problem: Fluid Volume  Goal: Fluid volume balance will be maintained  Outcome: Progressing     Problem: Pain - Standard  Goal: Alleviation of pain or a reduction in pain to the patient’s comfort goal  Outcome: Progressing     Problem: Skin Integrity  Goal: Skin integrity is maintained or improved  Outcome: Progressing     Problem: Fall Risk  Goal: Patient will remain free from falls  Outcome: Progressing       Patient is not progressing towards the following goals:

## 2024-03-03 NOTE — PROGRESS NOTES
[facility] Medicine Daily Progress Note    Date of Service  3/3/2024    Chief Complaint  Miroslava Matta is a 63 y.o. female admitted 2/21/2024 with failure to thrive.    Hospital Course  Sigrid is a 63 y.o. morbidly obese female with history of chronic A-fib on Xarelto, CHF, diabetes, hyperlipidemia, hypertension, COPD dependent on 4 L, RUTH/OHS noncompliant with CPAP, mood disorder who presented 2/21/2024 as a direct transfer from [facility] for higher level care.     It was reported that patient's son called for a welfare check.  She apparently was assisted by home health RN, but reported to have quit last Friday 2/16.  Apparently patient was found sitting in the chair soiled in stool and urine.  Patient reported she has had a fall since 1/25/2024.  She admits to having difficulty ambulating due to her weight and obesity.  She stated her home health RN is no longer able to care for her due to her large body habitus, and quit home health.  She has not had home health assistance since 2/16. Noted to have extensive wound on the back, buttock and legs.  Per transfer paperwork, patient reported to have had atrial fibrillation with RVR with rate in 150s, improved to 130s with 1 L IVF bolus and given Cardizem 30 mg oral one-time.   Workup from [facility] included:  CBC: WBC 13.5, hemoglobin 9.7, platelet 689  CMP: Glucose 211, BUN 27, creatinine 1.0, sodium 135, potassium 4.6, chloride 100, bicarb 26, anion gap 9, calcium 9.2, total protein 7.4, albumin 3.4, total bilirubin 1.0, AST 18, ALT 17, alkaline phosphatase 169   Lactic acid 5.4 --> 1.8  Troponin I undetectable  proBNP 2360, CXR: Cardiomegaly with probable CHF   Patient received treatment with ceftriaxone 1 g IV, Cardizem 30 mg oral, NS to 250cc bolus x 3, Zofran 4 mg IV.   Due to concern of severe sepsis she was directly admitted to the IMCU.    Interval Problem Update  No acute events overnight.  On 40L HFNC, weaning as tolerated.  Continue IV  lasix diuresis.  Continue K dur, monitor daily BMP. K maintaining normal range.  LTAC referral placed given need for HFNC and prolonged weaning time.      I have discussed this patient's plan of care and discharge plan at IDT rounds today with Case Management, Nursing, Nursing leadership, and other members of the IDT team.    Consultants/Specialty  none    Code Status  Full Code    Disposition  The patient is not medically cleared for discharge to home or a post-acute facility.  Anticipate discharge to: a long-term acute care hospital    I have placed the appropriate orders for post-discharge needs.    Review of Systems  Review of Systems   Constitutional:  Negative for fever.   Cardiovascular:  Negative for leg swelling.   Gastrointestinal:         Eating well   Musculoskeletal:  Positive for back pain.        Foot pain   Neurological:  Negative for dizziness.   All other systems reviewed and are negative.       Physical Exam  Temp:  [36.3 °C (97.3 °F)-36.7 °C (98.1 °F)] 36.7 °C (98.1 °F)  Pulse:  [] 98  Resp:  [16-20] 20  BP: (111-150)/(72-84) 150/79  SpO2:  [91 %-94 %] 92 %    Physical Exam  Vitals and nursing note reviewed.   Constitutional:       General: She is not in acute distress.     Appearance: She is obese. She is ill-appearing. She is not toxic-appearing.   HENT:      Head:      Comments: Hirsutism      Mouth/Throat:      Mouth: Mucous membranes are dry.   Cardiovascular:      Rate and Rhythm: Tachycardia present. Rhythm irregular.   Pulmonary:      Comments: High flow oxygen  Poor air movement  No wheezing  Abdominal:      General: There is no distension.      Tenderness: There is no abdominal tenderness.      Comments: Pannus with intertrigo    Genitourinary:     Comments: floyd  Musculoskeletal:      Cervical back: Neck supple.      Right lower leg: No edema.      Left lower leg: No edema.      Comments: Right heel black eschar  Right posterior calf abrasions and exudate  Bilateral posterior  thigh ulcerations open wounds   Neurological:      General: No focal deficit present.      Mental Status: She is alert and oriented to person, place, and time.   Psychiatric:         Mood and Affect: Mood normal.         Behavior: Behavior normal.         Thought Content: Thought content normal.         Fluids    Intake/Output Summary (Last 24 hours) at 3/3/2024 1519  Last data filed at 3/3/2024 1000  Gross per 24 hour   Intake 480 ml   Output 3300 ml   Net -2820 ml       Laboratory        Recent Labs     03/01/24  0200 03/02/24  0121 03/03/24  0117   SODIUM 137 139 138   POTASSIUM 3.9 3.8 4.2   CHLORIDE 91* 93* 94*   CO2 35* 29 31   GLUCOSE 200* 184* 191*   BUN 39* 32* 29*   CREATININE 0.79 0.78 0.83   CALCIUM 9.2 8.8 9.0                     Imaging  EC-ECHOCARDIOGRAM COMPLETE W/O CONT   Final Result      US-RENU SINGLE LEVEL BILAT   Final Result      DX-CHEST-LIMITED (1 VIEW)   Final Result      1.  Cardiomegaly with mild pulmonary vascular congestion.           Assessment/Plan  Pulmonary hypertension (HCC)- (present on admission)  Assessment & Plan  RVSP 85 mmHg by echo while echo 2018 was 55-60 mmHg.  IV lasix 80 mg TID and potassium will be increased  Follow urine output  Fluid restriction 1.8 liters       Acute on chronic blood loss anemia- (present on admission)  Assessment & Plan  Hgb 9.7 (prior to transfer) --> 7.6 at [facility]  No evidence of blood loss  Iron 19 with %sat 9 for which IV iron was given  Minimize blood draws and she will require hemoglobin to be checked intermittently      Elevated troponin  Assessment & Plan  Probable demand ischemia    Mood disorder (HCC)- (present on admission)  Assessment & Plan  Cymbalta    RUTH (obstructive sleep apnea)- (present on admission)  Assessment & Plan  Hx of  Her CPAP machine broke and she has been off since October  Nightly BiPAP while inpatient    Morbid (severe) obesity with alveolar hypoventilation (HCC)- (present on admission)  Assessment & Plan  Diet and  lifestyle modification  Body mass index is 64.91 kg/m².      Atrial fibrillation with RVR (HCC)- (present on admission)  Assessment & Plan  Restarted home Cardizem 360 for rate control  Continue Xarelto for anticoagulation  Continuous tele monitoring      Severe sepsis (HCC)- (present on admission)  Assessment & Plan  This is Sepsis Present on admission  SIRS criteria identified on my evaluation include: Tachycardia, with heart rate greater than 90 BPM, Leukocytosis, with WBC greater than 12,000, and Bandemia, greater than 10% bands  Clinical indicators of end organ dysfunction include Lactic Acid greater than 2 and Toxic Metabolic Encephalopathy  Source is cellulitis leg, buttock  Sepsis protocol initiated  Crystalloid Fluid Administration: Fluid resuscitation ordered per standard protocol was given  IV antibiotics as appropriate for source of sepsis  Reassessment: I have reassessed the patient's hemodynamic status    Cellulitis of legs, buttock  WBC: 13.5, lactic acid 5.4 --> 1.8  S/p 1L IVF no further fluids  Antibiotic:  stopped  Wound care      Diabetes (HCC)- (present on admission)  Assessment & Plan  ISS  Statin    Essential hypertension- (present on admission)  Assessment & Plan  Restart Cardizem 360    Acute respiratory failure with hypoxia (HCC)- (present on admission)  Assessment & Plan  Acute on chronic  Multifactorial: OHS/RUTH noncompliant with CPAP, volume overload, HFpEF  She is on 4 liters oxygen at home (not due to COPD but due to CHF and obesity).  Pulmonary hypertension thus IV lasix 80 mg BID.   She is requiring high flow oxygen which is being titrated.   Increased Lasix to 80 mg TID with KCl 40 mEq TID and titrate up until she i is able to transition off high flow oxygen or until her creatinine starts to go up substantially  Nocturnal BiPAP   Incentive spirometry 10x/hour while awake  She may require pulmonology consultation         VTE prophylaxis: VTE Selection    Patient is critically ill.    The patient continues to have: acute hypoxic respiratory failure  The vital organ system that is affected is the: lungs  If untreated there is a high chance of deterioration into: worsening respiratory failure, cardiac arrest, and eventually death.   The critical care that I am providing today is: managing respiratory failure with high flow nasal cannula and IV lasix therapy  The critical that has been undertaken is medically complex.   There has been no overlap in critical care time.   Critical Care Time not including procedures: 32 minutes

## 2024-03-03 NOTE — PROGRESS NOTES
Patients family members brought patient INN Out. Educated family an patient on patient diet, re educated patient on the importance of CHO, 2 gram sodium diet, 1800 Fluid restriction.

## 2024-03-04 LAB
ANION GAP SERPL CALC-SCNC: 11 MMOL/L (ref 7–16)
BUN SERPL-MCNC: 28 MG/DL (ref 8–22)
CALCIUM SERPL-MCNC: 8.9 MG/DL (ref 8.5–10.5)
CHLORIDE SERPL-SCNC: 97 MMOL/L (ref 96–112)
CO2 SERPL-SCNC: 33 MMOL/L (ref 20–33)
CREAT SERPL-MCNC: 0.8 MG/DL (ref 0.5–1.4)
ERYTHROCYTE [DISTWIDTH] IN BLOOD BY AUTOMATED COUNT: 79.6 FL (ref 35.9–50)
GFR SERPLBLD CREATININE-BSD FMLA CKD-EPI: 83 ML/MIN/1.73 M 2
GLUCOSE BLD STRIP.AUTO-MCNC: 186 MG/DL (ref 65–99)
GLUCOSE BLD STRIP.AUTO-MCNC: 207 MG/DL (ref 65–99)
GLUCOSE BLD STRIP.AUTO-MCNC: 211 MG/DL (ref 65–99)
GLUCOSE BLD STRIP.AUTO-MCNC: 232 MG/DL (ref 65–99)
GLUCOSE BLD STRIP.AUTO-MCNC: 260 MG/DL (ref 65–99)
GLUCOSE SERPL-MCNC: 185 MG/DL (ref 65–99)
HCT VFR BLD AUTO: 38.7 % (ref 37–47)
HGB BLD-MCNC: 11.2 G/DL (ref 12–16)
MAGNESIUM SERPL-MCNC: 2 MG/DL (ref 1.5–2.5)
MCH RBC QN AUTO: 23.7 PG (ref 27–33)
MCHC RBC AUTO-ENTMCNC: 28.9 G/DL (ref 32.2–35.5)
MCV RBC AUTO: 81.8 FL (ref 81.4–97.8)
PLATELET # BLD AUTO: 307 K/UL (ref 164–446)
PMV BLD AUTO: 9.7 FL (ref 9–12.9)
POTASSIUM SERPL-SCNC: 3.9 MMOL/L (ref 3.6–5.5)
RBC # BLD AUTO: 4.73 M/UL (ref 4.2–5.4)
SODIUM SERPL-SCNC: 141 MMOL/L (ref 135–145)
WBC # BLD AUTO: 7.7 K/UL (ref 4.8–10.8)

## 2024-03-04 PROCEDURE — 700102 HCHG RX REV CODE 250 W/ 637 OVERRIDE(OP): Performed by: STUDENT IN AN ORGANIZED HEALTH CARE EDUCATION/TRAINING PROGRAM

## 2024-03-04 PROCEDURE — 85027 COMPLETE CBC AUTOMATED: CPT

## 2024-03-04 PROCEDURE — 82962 GLUCOSE BLOOD TEST: CPT

## 2024-03-04 PROCEDURE — 700111 HCHG RX REV CODE 636 W/ 250 OVERRIDE (IP): Performed by: STUDENT IN AN ORGANIZED HEALTH CARE EDUCATION/TRAINING PROGRAM

## 2024-03-04 PROCEDURE — 700102 HCHG RX REV CODE 250 W/ 637 OVERRIDE(OP): Mod: JZ | Performed by: HOSPITALIST

## 2024-03-04 PROCEDURE — 770020 HCHG ROOM/CARE - TELE (206)

## 2024-03-04 PROCEDURE — 99291 CRITICAL CARE FIRST HOUR: CPT | Performed by: STUDENT IN AN ORGANIZED HEALTH CARE EDUCATION/TRAINING PROGRAM

## 2024-03-04 PROCEDURE — A9270 NON-COVERED ITEM OR SERVICE: HCPCS | Performed by: STUDENT IN AN ORGANIZED HEALTH CARE EDUCATION/TRAINING PROGRAM

## 2024-03-04 PROCEDURE — 36415 COLL VENOUS BLD VENIPUNCTURE: CPT

## 2024-03-04 PROCEDURE — 80048 BASIC METABOLIC PNL TOTAL CA: CPT

## 2024-03-04 PROCEDURE — 700111 HCHG RX REV CODE 636 W/ 250 OVERRIDE (IP): Mod: JZ | Performed by: HOSPITALIST

## 2024-03-04 PROCEDURE — 83735 ASSAY OF MAGNESIUM: CPT

## 2024-03-04 PROCEDURE — 94640 AIRWAY INHALATION TREATMENT: CPT

## 2024-03-04 PROCEDURE — A9270 NON-COVERED ITEM OR SERVICE: HCPCS | Mod: JZ | Performed by: HOSPITALIST

## 2024-03-04 RX ADMIN — Medication 1000 UNITS: at 05:30

## 2024-03-04 RX ADMIN — OXYCODONE HYDROCHLORIDE 10 MG: 10 TABLET ORAL at 16:12

## 2024-03-04 RX ADMIN — DULOXETINE HYDROCHLORIDE 30 MG: 30 CAPSULE, DELAYED RELEASE ORAL at 05:30

## 2024-03-04 RX ADMIN — INSULIN HUMAN 4 UNITS: 100 INJECTION, SOLUTION PARENTERAL at 17:56

## 2024-03-04 RX ADMIN — DAKIN'S SOLUTION 0.125% (QUARTER STRENGTH) 2 ML: 0.12 SOLUTION at 17:44

## 2024-03-04 RX ADMIN — INSULIN HUMAN 3 UNITS: 100 INJECTION, SOLUTION PARENTERAL at 08:12

## 2024-03-04 RX ADMIN — POTASSIUM CHLORIDE 40 MEQ: 1500 TABLET, EXTENDED RELEASE ORAL at 11:47

## 2024-03-04 RX ADMIN — RIVAROXABAN 20 MG: 20 TABLET, FILM COATED ORAL at 17:43

## 2024-03-04 RX ADMIN — TOPIRAMATE 25 MG: 25 TABLET, FILM COATED ORAL at 17:44

## 2024-03-04 RX ADMIN — Medication 400 MG: at 05:30

## 2024-03-04 RX ADMIN — INSULIN HUMAN 7 UNITS: 100 INJECTION, SOLUTION PARENTERAL at 20:55

## 2024-03-04 RX ADMIN — NYSTATIN: 100000 POWDER TOPICAL at 11:47

## 2024-03-04 RX ADMIN — MOMETASONE FUROATE AND FORMOTEROL FUMARATE DIHYDRATE 2 PUFF: 200; 5 AEROSOL RESPIRATORY (INHALATION) at 18:02

## 2024-03-04 RX ADMIN — Medication 400 MG: at 17:44

## 2024-03-04 RX ADMIN — CYANOCOBALAMIN TAB 500 MCG 1000 MCG: 500 TAB at 05:30

## 2024-03-04 RX ADMIN — POTASSIUM CHLORIDE 40 MEQ: 1500 TABLET, EXTENDED RELEASE ORAL at 17:43

## 2024-03-04 RX ADMIN — HYDROMORPHONE HYDROCHLORIDE 1 MG: 1 INJECTION, SOLUTION INTRAMUSCULAR; INTRAVENOUS; SUBCUTANEOUS at 04:01

## 2024-03-04 RX ADMIN — OXYCODONE HYDROCHLORIDE AND ACETAMINOPHEN 500 MG: 500 TABLET ORAL at 05:30

## 2024-03-04 RX ADMIN — INSULIN HUMAN 4 UNITS: 100 INJECTION, SOLUTION PARENTERAL at 11:56

## 2024-03-04 RX ADMIN — FUROSEMIDE 80 MG: 10 INJECTION, SOLUTION INTRAMUSCULAR; INTRAVENOUS at 16:13

## 2024-03-04 RX ADMIN — MOMETASONE FUROATE AND FORMOTEROL FUMARATE DIHYDRATE 2 PUFF: 200; 5 AEROSOL RESPIRATORY (INHALATION) at 05:31

## 2024-03-04 RX ADMIN — HYDROMORPHONE HYDROCHLORIDE 1 MG: 1 INJECTION, SOLUTION INTRAMUSCULAR; INTRAVENOUS; SUBCUTANEOUS at 18:15

## 2024-03-04 RX ADMIN — POTASSIUM CHLORIDE 40 MEQ: 1500 TABLET, EXTENDED RELEASE ORAL at 05:30

## 2024-03-04 RX ADMIN — DAKIN'S SOLUTION 0.125% (QUARTER STRENGTH) 30 ML: 0.12 SOLUTION at 04:28

## 2024-03-04 RX ADMIN — NYSTATIN: 100000 POWDER TOPICAL at 17:44

## 2024-03-04 RX ADMIN — TIOTROPIUM BROMIDE INHALATION SPRAY 5 MCG: 3.12 SPRAY, METERED RESPIRATORY (INHALATION) at 05:31

## 2024-03-04 RX ADMIN — ATORVASTATIN CALCIUM 40 MG: 40 TABLET, FILM COATED ORAL at 17:43

## 2024-03-04 RX ADMIN — DIGOXIN 125 MCG: 0.12 TABLET ORAL at 17:44

## 2024-03-04 RX ADMIN — FUROSEMIDE 80 MG: 10 INJECTION, SOLUTION INTRAMUSCULAR; INTRAVENOUS at 11:47

## 2024-03-04 RX ADMIN — FUROSEMIDE 80 MG: 10 INJECTION, SOLUTION INTRAMUSCULAR; INTRAVENOUS at 05:31

## 2024-03-04 RX ADMIN — FOLIC ACID 1 MG: 1 TABLET ORAL at 17:43

## 2024-03-04 RX ADMIN — NYSTATIN: 100000 POWDER TOPICAL at 05:38

## 2024-03-04 RX ADMIN — FOLIC ACID 1 MG: 1 TABLET ORAL at 05:30

## 2024-03-04 RX ADMIN — TOPIRAMATE 25 MG: 25 TABLET, FILM COATED ORAL at 05:30

## 2024-03-04 ASSESSMENT — ENCOUNTER SYMPTOMS
BACK PAIN: 1
FEVER: 0
ROS GI COMMENTS: EATING WELL
DIZZINESS: 0

## 2024-03-04 ASSESSMENT — PAIN DESCRIPTION - PAIN TYPE: TYPE: ACUTE PAIN

## 2024-03-04 ASSESSMENT — FIBROSIS 4 INDEX: FIB4 SCORE: 0.5

## 2024-03-04 NOTE — PROGRESS NOTES
Bedside report received from day RN, pt care assumed, assessment completed. Pt is A&O4, pain 7/10, afib on the monitor. Updated on POC, questions answered. Bed in lowest, locked position, treaded socks on, call light and belongings within reach. Fall precautions in place.      Fall Risk Score: HIGH RISK  Fall risk interventions in place: Provide patient/family education based on risk assessment, Educate patient/family to call staff for assistance when getting out of bed, Place fall precaution signage outside patient door, and Utilize bed/chair fall alarm  Bed type: Bariatric (Kyle Score less than 17 interventions in place)  Patient on cardiac monitor: Yes  IVF/IV medications: Not Applicable   Oxygen: How many liters hi flow, 50%/40L, Traced the line to wall oxygen, and No oxygen tank in room  Bedside sitter: Not Applicable   Isolation: Not applicable

## 2024-03-04 NOTE — CARE PLAN
Problem: Humidified High Flow Nasal Cannula  Goal: Maintain adequate oxygenation dependent on patient condition  Description: Target End Date:  resolve prior to discharge or when underlying condition is resolved/stabilized    1.  Implement humidified high flow oxygen therapy  2.  Titrate high flow oxygen to maintain appropriate SpO2  Outcome: Progressing      Respiratory Update    Treatment modality: HHFNC   Frequency: Q4HRS  40/50%    Pt tolerating current treatments well with no adverse reactions.

## 2024-03-04 NOTE — DISCHARGE PLANNING
Agency/Facility Name: [facility]  Spoke To: Alverto  Outcome: DPA contacted facility inquiring about bed availability. DPA was informed that facility has no beds available for today, however they have scheduled discharges tomorrow. Pt could possibly go tomorrow depending on bed openings, and Wednesday will have more discharges as well. Pt can most likely be admitted to an open bed by Wednesday at the latest according to Alverto.     Agency/Facility Name: [facility]  Spoke To: Karrie  Outcome: JAYMIE inquired about bed availability. Karrie informed JAYMIE that she does not think they have beds today but she will keep CM/DPA updated. JAYMIE informed Karrie that pt cannot go to [facility] until Wednesday so [facility] is still an option if they can accept pt before then.

## 2024-03-04 NOTE — CARE PLAN
Problem: Knowledge Deficit - Standard  Goal: Patient and family/care givers will demonstrate understanding of plan of care, disease process/condition, diagnostic tests and medications  Outcome: Progressing     Problem: Nutrition - Advanced  Goal: Patient will display progressive weight gain toward goal have adequate food and fluid intake  Outcome: Progressing     Problem: Ineffective Airway Clearance  Goal: Patient will maintain patent airway with clear/clearing breath sounds  Outcome: Progressing     Problem: Hemodynamics  Goal: Patient's hemodynamics, fluid balance and neurologic status will be stable or improve  Outcome: Progressing     Problem: Skin Integrity  Goal: Skin integrity is maintained or improved  Outcome: Progressing   The patient is Stable - Low risk of patient condition declining or worsening    Shift Goals  Clinical Goals: wound care  Patient Goals: Pain management  Family Goals: ELDER    Progress made toward(s) clinical / shift goals:  progressing    Patient is not progressing towards the following goals:

## 2024-03-04 NOTE — PROGRESS NOTES
Monitor Summary:   Rhythm: afib  Rate:   Measurement: .-/.07/.-  Ectopy: pvc's, couplets, trigeminy

## 2024-03-05 LAB
ANION GAP SERPL CALC-SCNC: 13 MMOL/L (ref 7–16)
BUN SERPL-MCNC: 31 MG/DL (ref 8–22)
CALCIUM SERPL-MCNC: 9.1 MG/DL (ref 8.5–10.5)
CHLORIDE SERPL-SCNC: 96 MMOL/L (ref 96–112)
CO2 SERPL-SCNC: 31 MMOL/L (ref 20–33)
CREAT SERPL-MCNC: 0.97 MG/DL (ref 0.5–1.4)
GFR SERPLBLD CREATININE-BSD FMLA CKD-EPI: 66 ML/MIN/1.73 M 2
GLUCOSE BLD STRIP.AUTO-MCNC: 188 MG/DL (ref 65–99)
GLUCOSE BLD STRIP.AUTO-MCNC: 212 MG/DL (ref 65–99)
GLUCOSE BLD STRIP.AUTO-MCNC: 246 MG/DL (ref 65–99)
GLUCOSE BLD STRIP.AUTO-MCNC: 247 MG/DL (ref 65–99)
GLUCOSE SERPL-MCNC: 203 MG/DL (ref 65–99)
POTASSIUM SERPL-SCNC: 3.8 MMOL/L (ref 3.6–5.5)
SODIUM SERPL-SCNC: 140 MMOL/L (ref 135–145)

## 2024-03-05 PROCEDURE — 700102 HCHG RX REV CODE 250 W/ 637 OVERRIDE(OP): Performed by: STUDENT IN AN ORGANIZED HEALTH CARE EDUCATION/TRAINING PROGRAM

## 2024-03-05 PROCEDURE — 99291 CRITICAL CARE FIRST HOUR: CPT | Performed by: STUDENT IN AN ORGANIZED HEALTH CARE EDUCATION/TRAINING PROGRAM

## 2024-03-05 PROCEDURE — 700102 HCHG RX REV CODE 250 W/ 637 OVERRIDE(OP): Mod: JZ | Performed by: HOSPITALIST

## 2024-03-05 PROCEDURE — A9270 NON-COVERED ITEM OR SERVICE: HCPCS | Mod: JZ | Performed by: HOSPITALIST

## 2024-03-05 PROCEDURE — A9270 NON-COVERED ITEM OR SERVICE: HCPCS | Performed by: STUDENT IN AN ORGANIZED HEALTH CARE EDUCATION/TRAINING PROGRAM

## 2024-03-05 PROCEDURE — 770020 HCHG ROOM/CARE - TELE (206)

## 2024-03-05 PROCEDURE — A9270 NON-COVERED ITEM OR SERVICE: HCPCS

## 2024-03-05 PROCEDURE — 36415 COLL VENOUS BLD VENIPUNCTURE: CPT

## 2024-03-05 PROCEDURE — 82962 GLUCOSE BLOOD TEST: CPT | Mod: 91

## 2024-03-05 PROCEDURE — 700111 HCHG RX REV CODE 636 W/ 250 OVERRIDE (IP): Mod: JZ | Performed by: HOSPITALIST

## 2024-03-05 PROCEDURE — 94640 AIRWAY INHALATION TREATMENT: CPT

## 2024-03-05 PROCEDURE — 700102 HCHG RX REV CODE 250 W/ 637 OVERRIDE(OP)

## 2024-03-05 PROCEDURE — 700101 HCHG RX REV CODE 250: Performed by: STUDENT IN AN ORGANIZED HEALTH CARE EDUCATION/TRAINING PROGRAM

## 2024-03-05 PROCEDURE — 700111 HCHG RX REV CODE 636 W/ 250 OVERRIDE (IP): Performed by: STUDENT IN AN ORGANIZED HEALTH CARE EDUCATION/TRAINING PROGRAM

## 2024-03-05 PROCEDURE — 80048 BASIC METABOLIC PNL TOTAL CA: CPT

## 2024-03-05 RX ORDER — FERROUS SULFATE 325(65) MG
325 TABLET ORAL
Status: DISCONTINUED | OUTPATIENT
Start: 2024-03-06 | End: 2024-03-07 | Stop reason: HOSPADM

## 2024-03-05 RX ORDER — FUROSEMIDE 10 MG/ML
80 INJECTION INTRAMUSCULAR; INTRAVENOUS
Status: DISCONTINUED | OUTPATIENT
Start: 2024-03-06 | End: 2024-03-07 | Stop reason: HOSPADM

## 2024-03-05 RX ORDER — FUROSEMIDE 10 MG/ML
40 INJECTION INTRAMUSCULAR; INTRAVENOUS
Status: DISCONTINUED | OUTPATIENT
Start: 2024-03-06 | End: 2024-03-05

## 2024-03-05 RX ADMIN — FUROSEMIDE 80 MG: 10 INJECTION, SOLUTION INTRAMUSCULAR; INTRAVENOUS at 04:31

## 2024-03-05 RX ADMIN — INSULIN HUMAN 4 UNITS: 100 INJECTION, SOLUTION PARENTERAL at 16:36

## 2024-03-05 RX ADMIN — POTASSIUM CHLORIDE 40 MEQ: 1500 TABLET, EXTENDED RELEASE ORAL at 16:29

## 2024-03-05 RX ADMIN — ATORVASTATIN CALCIUM 40 MG: 40 TABLET, FILM COATED ORAL at 16:29

## 2024-03-05 RX ADMIN — DAKIN'S SOLUTION 0.125% (QUARTER STRENGTH) 2 ML: 0.12 SOLUTION at 16:06

## 2024-03-05 RX ADMIN — MOMETASONE FUROATE AND FORMOTEROL FUMARATE DIHYDRATE 2 PUFF: 200; 5 AEROSOL RESPIRATORY (INHALATION) at 16:30

## 2024-03-05 RX ADMIN — NYSTATIN: 100000 POWDER TOPICAL at 11:38

## 2024-03-05 RX ADMIN — Medication 1000 UNITS: at 04:33

## 2024-03-05 RX ADMIN — NYSTATIN: 100000 POWDER TOPICAL at 16:37

## 2024-03-05 RX ADMIN — POTASSIUM CHLORIDE 40 MEQ: 1500 TABLET, EXTENDED RELEASE ORAL at 11:38

## 2024-03-05 RX ADMIN — OXYCODONE HYDROCHLORIDE AND ACETAMINOPHEN 500 MG: 500 TABLET ORAL at 04:33

## 2024-03-05 RX ADMIN — FUROSEMIDE 80 MG: 10 INJECTION, SOLUTION INTRAMUSCULAR; INTRAVENOUS at 15:59

## 2024-03-05 RX ADMIN — FUROSEMIDE 80 MG: 10 INJECTION, SOLUTION INTRAMUSCULAR; INTRAVENOUS at 11:38

## 2024-03-05 RX ADMIN — TOPIRAMATE 25 MG: 25 TABLET, FILM COATED ORAL at 04:33

## 2024-03-05 RX ADMIN — DULOXETINE HYDROCHLORIDE 30 MG: 30 CAPSULE, DELAYED RELEASE ORAL at 04:32

## 2024-03-05 RX ADMIN — DOCUSATE SODIUM 50 MG AND SENNOSIDES 8.6 MG 2 TABLET: 8.6; 5 TABLET, FILM COATED ORAL at 16:29

## 2024-03-05 RX ADMIN — INSULIN HUMAN 4 UNITS: 100 INJECTION, SOLUTION PARENTERAL at 20:27

## 2024-03-05 RX ADMIN — INSULIN HUMAN 4 UNITS: 100 INJECTION, SOLUTION PARENTERAL at 11:45

## 2024-03-05 RX ADMIN — FOLIC ACID 1 MG: 1 TABLET ORAL at 04:33

## 2024-03-05 RX ADMIN — TIOTROPIUM BROMIDE INHALATION SPRAY 5 MCG: 3.12 SPRAY, METERED RESPIRATORY (INHALATION) at 04:31

## 2024-03-05 RX ADMIN — RIVAROXABAN 20 MG: 20 TABLET, FILM COATED ORAL at 16:29

## 2024-03-05 RX ADMIN — NYSTATIN: 100000 POWDER TOPICAL at 04:30

## 2024-03-05 RX ADMIN — Medication 400 MG: at 16:29

## 2024-03-05 RX ADMIN — HYDROMORPHONE HYDROCHLORIDE 1 MG: 1 INJECTION, SOLUTION INTRAMUSCULAR; INTRAVENOUS; SUBCUTANEOUS at 15:59

## 2024-03-05 RX ADMIN — DAKIN'S SOLUTION 0.125% (QUARTER STRENGTH) 473 ML: 0.12 SOLUTION at 04:30

## 2024-03-05 RX ADMIN — INSULIN HUMAN 3 UNITS: 100 INJECTION, SOLUTION PARENTERAL at 08:12

## 2024-03-05 RX ADMIN — FOLIC ACID 1 MG: 1 TABLET ORAL at 16:29

## 2024-03-05 RX ADMIN — DIGOXIN 125 MCG: 0.12 TABLET ORAL at 16:29

## 2024-03-05 RX ADMIN — HYDROMORPHONE HYDROCHLORIDE 1 MG: 1 INJECTION, SOLUTION INTRAMUSCULAR; INTRAVENOUS; SUBCUTANEOUS at 02:49

## 2024-03-05 RX ADMIN — CYANOCOBALAMIN TAB 500 MCG 1000 MCG: 500 TAB at 04:32

## 2024-03-05 RX ADMIN — POTASSIUM CHLORIDE 40 MEQ: 1500 TABLET, EXTENDED RELEASE ORAL at 04:32

## 2024-03-05 RX ADMIN — Medication 400 MG: at 04:32

## 2024-03-05 RX ADMIN — TOPIRAMATE 25 MG: 25 TABLET, FILM COATED ORAL at 16:29

## 2024-03-05 RX ADMIN — MOMETASONE FUROATE AND FORMOTEROL FUMARATE DIHYDRATE 2 PUFF: 200; 5 AEROSOL RESPIRATORY (INHALATION) at 04:31

## 2024-03-05 ASSESSMENT — PAIN DESCRIPTION - PAIN TYPE
TYPE: ACUTE PAIN

## 2024-03-05 ASSESSMENT — ENCOUNTER SYMPTOMS
SHORTNESS OF BREATH: 0
COUGH: 0

## 2024-03-05 ASSESSMENT — FIBROSIS 4 INDEX: FIB4 SCORE: 0.5

## 2024-03-05 NOTE — PROGRESS NOTES
Hospital Medicine Daily Progress Note    Date of Service  3/4/2024    Chief Complaint  Miroslava Matta is a 63 y.o. female admitted 2/21/2024 with failure to thrive.    Hospital Course  Sigrid is a 63 y.o. morbidly obese female with history of chronic A-fib on Xarelto, CHF, diabetes, hyperlipidemia, hypertension, COPD dependent on 4 L, RUTH/OHS noncompliant with CPAP, mood disorder who presented 2/21/2024 as a direct transfer from [facility] for higher level care.     It was reported that patient's son called for a welfare check.  She apparently was assisted by home health RN, but reported to have quit last Friday 2/16.  Apparently patient was found sitting in the chair soiled in stool and urine.  Patient reported she has had a fall since 1/25/2024.  She admits to having difficulty ambulating due to her weight and obesity.  She stated her home health RN is no longer able to care for her due to her large body habitus, and quit home health.  She has not had home health assistance since 2/16. Noted to have extensive wound on the back, buttock and legs.  Per transfer paperwork, patient reported to have had atrial fibrillation with RVR with rate in 150s. Patient also found to have leukocytosis, lactic acidosis, pulmonary edema. She was admitted to the IMCU due to concern for severe sepsis from cellulitis. She was treated with antibiotics and wound care for leg/buttock cellulitis. She was found to have acute hypoxic respiratory failure due to volume overload, HFpEF, and OHS/RUTH. She is requiring high flow nasal cannula oxygen. She is being diuresed with IV lasix with slow gradual improvement in her oxygenation. She remains on high flow nasal cannula oxygen, and is discharged to LTAC. Atrial fibrillation rate controlled on home diltiazem, tolerating eliquis.     Interval Problem Update  No acute events overnight.  On 40L HFNC, weaning as tolerated.  Continue IV lasix diuresis.  Continue K dur, monitor daily BMP.  K maintaining normal range.  Accepted to LTAC, no bed available today, possibly Wednesday.      I have discussed this patient's plan of care and discharge plan at IDT rounds today with Case Management, Nursing, Nursing leadership, and other members of the IDT team.    Consultants/Specialty  none    Code Status  Full Code    Disposition  Medically Cleared  I have placed the appropriate orders for post-discharge needs.    Review of Systems  Review of Systems   Constitutional:  Negative for fever.   Cardiovascular:  Negative for leg swelling.   Gastrointestinal:         Eating well   Musculoskeletal:  Positive for back pain.        Foot pain   Neurological:  Negative for dizziness.   All other systems reviewed and are negative.       Physical Exam  Temp:  [36.2 °C (97.2 °F)-36.7 °C (98 °F)] 36.4 °C (97.5 °F)  Pulse:  [] 107  Resp:  [18-20] 20  BP: ()/(54-93) 126/80  SpO2:  [91 %-96 %] 91 %    Physical Exam  Vitals and nursing note reviewed.   Constitutional:       General: She is not in acute distress.     Appearance: She is obese. She is ill-appearing. She is not toxic-appearing.   HENT:      Head:      Comments: Hirsutism      Mouth/Throat:      Mouth: Mucous membranes are dry.   Cardiovascular:      Rate and Rhythm: Tachycardia present. Rhythm irregular.   Pulmonary:      Comments: High flow oxygen  Poor air movement  No wheezing  Abdominal:      General: There is no distension.      Tenderness: There is no abdominal tenderness.      Comments: Pannus with intertrigo    Genitourinary:     Comments: floyd  Musculoskeletal:      Cervical back: Neck supple.      Right lower leg: No edema.      Left lower leg: No edema.      Comments: Right heel black eschar  Right posterior calf abrasions and exudate  Bilateral posterior thigh ulcerations open wounds   Neurological:      General: No focal deficit present.      Mental Status: She is alert and oriented to person, place, and time.   Psychiatric:         Mood  and Affect: Mood normal.         Behavior: Behavior normal.         Thought Content: Thought content normal.         Fluids    Intake/Output Summary (Last 24 hours) at 3/4/2024 1952  Last data filed at 3/4/2024 1907  Gross per 24 hour   Intake 2560 ml   Output 3050 ml   Net -490 ml       Laboratory  Recent Labs     03/04/24  0127   WBC 7.7   RBC 4.73   HEMOGLOBIN 11.2*   HEMATOCRIT 38.7   MCV 81.8   MCH 23.7*   MCHC 28.9*   RDW 79.6*   PLATELETCT 307   MPV 9.7       Recent Labs     03/02/24  0121 03/03/24  0117 03/04/24  0127   SODIUM 139 138 141   POTASSIUM 3.8 4.2 3.9   CHLORIDE 93* 94* 97   CO2 29 31 33   GLUCOSE 184* 191* 185*   BUN 32* 29* 28*   CREATININE 0.78 0.83 0.80   CALCIUM 8.8 9.0 8.9                     Imaging  EC-ECHOCARDIOGRAM COMPLETE W/O CONT   Final Result      US-RENU SINGLE LEVEL BILAT   Final Result      DX-CHEST-LIMITED (1 VIEW)   Final Result      1.  Cardiomegaly with mild pulmonary vascular congestion.           Assessment/Plan  Pulmonary hypertension (HCC)- (present on admission)  Assessment & Plan  RVSP 85 mmHg by echo while echo 2018 was 55-60 mmHg.  IV lasix 80 mg TID and potassium will be increased  Follow urine output  Fluid restriction 1.8 liters       Acute on chronic blood loss anemia- (present on admission)  Assessment & Plan  Hgb 9.7 (prior to transfer) --> 7.6 at [facility]  No evidence of blood loss  Iron 19 with %sat 9 for which IV iron was given  Minimize blood draws and she will require hemoglobin to be checked intermittently      Elevated troponin  Assessment & Plan  Probable demand ischemia    Mood disorder (HCC)- (present on admission)  Assessment & Plan  Cymbalta    RUTH (obstructive sleep apnea)- (present on admission)  Assessment & Plan  Hx of  Her CPAP machine broke and she has been off since October  Nightly BiPAP while inpatient    Morbid (severe) obesity with alveolar hypoventilation (HCC)- (present on admission)  Assessment & Plan  Diet and lifestyle modification  Body  mass index is 64.91 kg/m².      Atrial fibrillation with RVR (HCC)- (present on admission)  Assessment & Plan  Restarted home Cardizem 360 for rate control  Continue Xarelto for anticoagulation  Continuous tele monitoring      Severe sepsis (HCC)- (present on admission)  Assessment & Plan  This is Sepsis Present on admission  SIRS criteria identified on my evaluation include: Tachycardia, with heart rate greater than 90 BPM, Leukocytosis, with WBC greater than 12,000, and Bandemia, greater than 10% bands  Clinical indicators of end organ dysfunction include Lactic Acid greater than 2 and Toxic Metabolic Encephalopathy  Source is cellulitis leg, buttock  Sepsis protocol initiated  Crystalloid Fluid Administration: Fluid resuscitation ordered per standard protocol was given  IV antibiotics as appropriate for source of sepsis  Reassessment: I have reassessed the patient's hemodynamic status    Cellulitis of legs, buttock  WBC: 13.5, lactic acid 5.4 --> 1.8  S/p 1L IVF no further fluids  Antibiotic:  stopped  Wound care      Diabetes (HCC)- (present on admission)  Assessment & Plan  ISS  Statin    Essential hypertension- (present on admission)  Assessment & Plan  Restart Cardizem 360    Acute respiratory failure with hypoxia (HCC)- (present on admission)  Assessment & Plan  Acute on chronic  Multifactorial: OHS/RUTH noncompliant with CPAP, volume overload, HFpEF  She is on 4 liters oxygen at home (not due to COPD but due to CHF and obesity).  Pulmonary hypertension thus IV lasix 80 mg BID.   She is requiring high flow oxygen which is being titrated.   Increased Lasix to 80 mg TID with KCl 40 mEq TID and titrate up until she i is able to transition off high flow oxygen or until her creatinine starts to go up substantially  Nocturnal BiPAP   Incentive spirometry 10x/hour while awake  She may require pulmonology consultation         VTE prophylaxis: VTE Selection    Patient is critically ill.   The patient continues to  have: acute hypoxic respiratory failure  The vital organ system that is affected is the: lungs  If untreated there is a high chance of deterioration into: worsening respiratory failure, cardiac arrest, and eventually death.   The critical care that I am providing today is: managing respiratory failure with high flow nasal cannula and IV lasix therapy  The critical that has been undertaken is medically complex.   There has been no overlap in critical care time.   Critical Care Time not including procedures: 34 minutes

## 2024-03-05 NOTE — PROGRESS NOTES
Assumed care of pt, received bedside report from night RN. Pt A/Ox4. Fall and safety precautions in place. POC discussed with pt, pt verbalizes understanding.         Fall Risk Score: HIGH RISK  Fall risk interventions in place: Place yellow fall risk ID band on patient, Provide patient/family education based on risk assessment, Educate patient/family to call staff for assistance when getting out of bed, Place fall precaution signage outside patient door, Place patient in room close to nursing station, Notify charge of high risk for huddle, and Bed alarm connected correctly  Bed type: Bariatric (Kyle Score less than 17 interventions in place)  Patient on cardiac monitor: Yes  IVF/IV medications: Not Applicable   Oxygen: HFNC 35 L 40%  Bedside sitter: Not Applicable   Isolation: Not applicable

## 2024-03-05 NOTE — CARE PLAN
The patient is Stable - Low risk of patient condition declining or worsening    Shift Goals  Clinical Goals: wound care q shift, q2 turns, monitor resoiratory status, pain management, fall precautions  Patient Goals: pain relief  Family Goals: none present    Progress made toward(s) clinical / shift goals:    Problem: Knowledge Deficit - Standard  Goal: Patient and family/care givers will demonstrate understanding of plan of care, disease process/condition, diagnostic tests and medications  Outcome: Progressing  Note: Pt educated on plan of care, medications and disease process.     Problem: Self Care  Goal: Patient will have the ability to perform ADLs independently or with assistance (bathe, groom, dress, toilet and feed)  Outcome: Progressing  Note: Pt encouraged to do self care. Educated pt on PT and OT eval and treat. Pt reports she was able to sit at the edge of the bed with PT instructions.     Problem: Hemodynamics  Goal: Patient's hemodynamics, fluid balance and neurologic status will be stable or improve  Outcome: Progressing  Note: Pt educated on assessing and monitoring VS, strict I & O's, daily weight and mental status.     Problem: Pain - Standard  Goal: Alleviation of pain or a reduction in pain to the patient’s comfort goal  Outcome: Progressing  Note: Pt stated pain management before wound care.      Problem: Fall Risk  Goal: Patient will remain free from falls  Outcome: Progressing  Note: Pt educated on fall precautions. Pt instructed to use call light for assistance . Pt's bed locked and in lowest position. Call light and personal belongings within reach. Hourly rounding in place.       Patient is not progressing towards the following goals:

## 2024-03-05 NOTE — DISCHARGE PLANNING
Case Management Discharge Planning    Admission Date: 2/21/2024  GMLOS: 5.1  ALOS: 13    6-Clicks ADL Score: 12  6-Clicks Mobility Score: 8  PT and/or OT Eval ordered: Yes  Post-acute Referrals Ordered: Yes  Post-acute Choice Obtained: Yes  Has referral(s) been sent to post-acute provider:  Yes      Anticipated Discharge Dispo: Discharge Disposition: Disch to a long term care facility (64)  Discharge Contact Phone Number: Mynor Blackwell ()    DME Needed: No    Action(s) Taken: RN LOYDA called and spoke with Karrie with [facility] for bed availability, Karrie stated she will call back with an update in fifteen minutes.     RN LOYDA called and spoke with Yany [phone] with [facility] for update on bed availability. Per Yany, she cannot guarantee a bed for patient tomorrow. There may be a bed available tomorrow or Thursday. Yany will call back in the AM with update.    09:32- Karrie with [facility] called back with an update, they currently have a wait list for admissions. She will call back tomorrow if anything changes.     CM will continue to follow up with accepting LTACs for bed availability.     Escalations Completed: None    Medically Clear: No    Next Steps: F/U with accepting LTAC for bed.    Barriers to Discharge: Medical clearance and Pending Placement    Is the patient up for discharge tomorrow: No

## 2024-03-05 NOTE — PROGRESS NOTES
Hospital Medicine Daily Progress Note    Date of Service  3/5/2024    Chief Complaint  Miroslava Matta is a 63 y.o. female admitted 2/21/2024 with failure to thrive    Hospital Course    63 y.o. morbidly obese female with history of chronic A-fib on Xarelto, CHF, diabetes, hyperlipidemia, hypertension, COPD dependent on 4 L, RUTH/OHS noncompliant with CPAP, mood disorder who presented 2/21/2024 as a direct transfer from [facility] for higher level care.  Admitted for severe sepsis due to cellulitis, A-fib with RVR, volume overload due to HFpEF and severe pulmonary hypertension.  Echo noted with EF 50%, RVSP 85 mmHg.  Patient continued to require high flow nasal cannula.       Interval Problem Update  3/5/2024    Patient seen and examined at bedside in morning rounds  HFNC 30 L >40% fio2  She denies chest pain, shortness of breath, nausea/vomiting  Recent labs reviewed hemoglobin 11.2, normal white count, chemistry unremarkable BUN/creatinine 21/0.97, contaminated blood culture on 2/21    Telemetry monitoring  Continue to wean off oxygen  On IV Lasix  Decrease Lasix to 80 mg twice daily        Repeat BMP in a.m. to monitor electrolytes and toxicity while on  high-dose diuretics.   Repeat CBC in a.m. to monitor white count and hemoglobin    Need close monitoring of blood counts, electrolytes and renal function due to potential of organ dysfunction due to respiratory failure on high flow nasal cannula      High risk of deterioration .  Need close monitoring on telemetry    Patient has a high medical complexity, complex decision making and is at high risk of complication, morbidity and mortality     I have discussed this patient's plan of care and discharge plan at IDT rounds today with Case Management, Nursing, Nursing leadership, and other members of the IDT team.        Code Status  Full Code    Disposition  The patient is medically cleared for discharge to home or a post-acute facility.  Anticipate  discharge to: a long-term acute care hospital    I have placed the appropriate orders for post-discharge needs.    Review of Systems  Review of Systems   Respiratory:  Negative for cough and shortness of breath.    Cardiovascular:  Negative for chest pain.        Physical Exam  Temp:  [36.3 °C (97.3 °F)-36.8 °C (98.2 °F)] 36.4 °C (97.5 °F)  Pulse:  [] 117  Resp:  [18-20] 20  BP: (124-145)/(76-93) 137/79  SpO2:  [91 %-96 %] 96 %    Physical Exam  Constitutional:       Appearance: She is ill-appearing.   Cardiovascular:      Rate and Rhythm: Tachycardia present.      Pulses: Normal pulses.      Heart sounds: Normal heart sounds.   Pulmonary:      Effort: Pulmonary effort is normal.   Musculoskeletal:      Right lower leg: Edema present.      Left lower leg: Edema present.      Comments: Bilateral lower extremity wound   Neurological:      General: No focal deficit present.      Mental Status: She is oriented to person, place, and time.         Fluids    Intake/Output Summary (Last 24 hours) at 3/5/2024 1549  Last data filed at 3/5/2024 1138  Gross per 24 hour   Intake 1440 ml   Output 3500 ml   Net -2060 ml       Laboratory  Recent Labs     03/04/24  0127   WBC 7.7   RBC 4.73   HEMOGLOBIN 11.2*   HEMATOCRIT 38.7   MCV 81.8   MCH 23.7*   MCHC 28.9*   RDW 79.6*   PLATELETCT 307   MPV 9.7     Recent Labs     03/03/24  0117 03/04/24  0127 03/05/24  0153   SODIUM 138 141 140   POTASSIUM 4.2 3.9 3.8   CHLORIDE 94* 97 96   CO2 31 33 31   GLUCOSE 191* 185* 203*   BUN 29* 28* 31*   CREATININE 0.83 0.80 0.97   CALCIUM 9.0 8.9 9.1                   Imaging  EC-ECHOCARDIOGRAM COMPLETE W/O CONT   Final Result      US-RENU SINGLE LEVEL BILAT   Final Result      DX-CHEST-LIMITED (1 VIEW)   Final Result      1.  Cardiomegaly with mild pulmonary vascular congestion.           Assessment/Plan  Pulmonary hypertension (HCC)- (present on admission)  Assessment & Plan  RVSP 85 mmHg by echo while echo 2018 was 55-60 mmHg.  IV lasix 80  mg BID  and potassium will be increased  Follow urine output  Fluid restriction 1.8 liters       Acute on chronic blood loss anemia- (present on admission)  Assessment & Plan  H&H remained stable  Started on ferrous sulfate for iron deficiency anemia      Elevated troponin  Assessment & Plan  No concern for ACS      Mood disorder (HCC)- (present on admission)  Assessment & Plan  Cymbalta    RUTH (obstructive sleep apnea)- (present on admission)  Assessment & Plan  Hx of  Her CPAP machine broke and she has been off since October  Nightly BiPAP while inpatient    Morbid (severe) obesity with alveolar hypoventilation (HCC)- (present on admission)  Assessment & Plan  Diet and lifestyle modification  Body mass index is 64.91 kg/m².      Atrial fibrillation with RVR (HCC)- (present on admission)  Assessment & Plan  Restarted home Cardizem 360 for rate control  Continue Xarelto for anticoagulation  Continuous tele monitoring      Severe sepsis (HCC)- (present on admission)  Assessment & Plan  Sepsis resolved  Suspected source was cellulitis        Diabetes (HCC)- (present on admission)  Assessment & Plan  ISS  Statin    Essential hypertension- (present on admission)  Assessment & Plan  Restart Cardizem 360    Acute respiratory failure with hypoxia (HCC)- (present on admission)  Assessment & Plan  Acute on chronic  Multifactorial: OHS/RUTH noncompliant with CPAP, volume overload, HFpEF  She is on 4 liters oxygen at home (not due to COPD but due to CHF and obesity).  Pulmonary hypertension thus IV lasix 80 mg BID.   She is requiring high flow oxygen which is being titrated.   Decrease Lasix to 80 mg twice daily  She may require pulmonology consultation         VTE prophylaxis: xarelto     I have performed a physical exam and reviewed and updated ROS and Plan today (3/5/2024). In review of yesterday's note (3/4/2024), there are no changes except as documented above.    Patient is critically ill.   Continue to be on HFNC ,  high-dose of IV  Lasix  The vital organ system that is affected is the:   If untreated there is a high chance of deterioration into:   And eventually death.   The critical care that I am providing today is: Managing high flow nasal cannula, monitoring labs while in high dose of IV diuretics  The critical that has been undertaken is medically complex.   There has been no overlap in critical care time.   Critical Care Time not including procedures: 31 min

## 2024-03-05 NOTE — PROGRESS NOTES
Telemetry Report:    Rhythm: Afib  Heart Rate: 96 to 104  Ectopy: R) PVC    TX:  QRS: 0.08  QT:          Per telemetry room monitor

## 2024-03-05 NOTE — PROGRESS NOTES
Monitor Summary:   Rhythm: afib  Rate:   Measurement: .-/.08/.-  Ectopy: o pvc, rtrig, f pvc, o pvc

## 2024-03-05 NOTE — CARE PLAN
Problem: Humidified High Flow Nasal Cannula  Goal: Maintain adequate oxygenation dependent on patient condition  Description: Target End Date:  resolve prior to discharge or when underlying condition is resolved/stabilized    1.  Implement humidified high flow oxygen therapy  2.  Titrate high flow oxygen to maintain appropriate SpO2  3/4/2024 1643 by Divina Mcqueen, RRT  Outcome: Not Progressing   HFNC 40 L/M, 45% fio2

## 2024-03-05 NOTE — CARE PLAN
Problem: Humidified High Flow Nasal Cannula  Goal: Maintain adequate oxygenation dependent on patient condition  Description: Target End Date:  resolve prior to discharge or when underlying condition is resolved/stabilized    1.  Implement humidified high flow oxygen therapy  2.  Titrate high flow oxygen to maintain appropriate SpO2  Outcome: Progressing   40L 45%

## 2024-03-06 LAB
ANION GAP SERPL CALC-SCNC: 10 MMOL/L (ref 7–16)
ANISOCYTOSIS BLD QL SMEAR: ABNORMAL
BASOPHILS # BLD AUTO: 0.7 % (ref 0–1.8)
BASOPHILS # BLD: 0.06 K/UL (ref 0–0.12)
BUN SERPL-MCNC: 30 MG/DL (ref 8–22)
CALCIUM SERPL-MCNC: 9.1 MG/DL (ref 8.5–10.5)
CHLORIDE SERPL-SCNC: 98 MMOL/L (ref 96–112)
CO2 SERPL-SCNC: 32 MMOL/L (ref 20–33)
COMMENT 1642: NORMAL
CREAT SERPL-MCNC: 0.93 MG/DL (ref 0.5–1.4)
EOSINOPHIL # BLD AUTO: 0.38 K/UL (ref 0–0.51)
EOSINOPHIL NFR BLD: 4.4 % (ref 0–6.9)
ERYTHROCYTE [DISTWIDTH] IN BLOOD BY AUTOMATED COUNT: 78.7 FL (ref 35.9–50)
GFR SERPLBLD CREATININE-BSD FMLA CKD-EPI: 69 ML/MIN/1.73 M 2
GLUCOSE BLD STRIP.AUTO-MCNC: 196 MG/DL (ref 65–99)
GLUCOSE BLD STRIP.AUTO-MCNC: 246 MG/DL (ref 65–99)
GLUCOSE BLD STRIP.AUTO-MCNC: 269 MG/DL (ref 65–99)
GLUCOSE BLD STRIP.AUTO-MCNC: 289 MG/DL (ref 65–99)
GLUCOSE SERPL-MCNC: 197 MG/DL (ref 65–99)
HCT VFR BLD AUTO: 38.8 % (ref 37–47)
HGB BLD-MCNC: 11.4 G/DL (ref 12–16)
HYPOCHROMIA BLD QL SMEAR: ABNORMAL
IMM GRANULOCYTES # BLD AUTO: 0.03 K/UL (ref 0–0.11)
IMM GRANULOCYTES NFR BLD AUTO: 0.3 % (ref 0–0.9)
LYMPHOCYTES # BLD AUTO: 2.82 K/UL (ref 1–4.8)
LYMPHOCYTES NFR BLD: 32.3 % (ref 22–41)
MCH RBC QN AUTO: 23.9 PG (ref 27–33)
MCHC RBC AUTO-ENTMCNC: 29.4 G/DL (ref 32.2–35.5)
MCV RBC AUTO: 81.3 FL (ref 81.4–97.8)
MICROCYTES BLD QL SMEAR: ABNORMAL
MONOCYTES # BLD AUTO: 0.67 K/UL (ref 0–0.85)
MONOCYTES NFR BLD AUTO: 7.7 % (ref 0–13.4)
MORPHOLOGY BLD-IMP: NORMAL
NEUTROPHILS # BLD AUTO: 4.77 K/UL (ref 1.82–7.42)
NEUTROPHILS NFR BLD: 54.6 % (ref 44–72)
NRBC # BLD AUTO: 0 K/UL
NRBC BLD-RTO: 0 /100 WBC (ref 0–0.2)
PLATELET # BLD AUTO: 325 K/UL (ref 164–446)
PLATELET BLD QL SMEAR: NORMAL
PMV BLD AUTO: 10 FL (ref 9–12.9)
POTASSIUM SERPL-SCNC: 4 MMOL/L (ref 3.6–5.5)
RBC # BLD AUTO: 4.77 M/UL (ref 4.2–5.4)
RBC BLD AUTO: PRESENT
SODIUM SERPL-SCNC: 140 MMOL/L (ref 135–145)
WBC # BLD AUTO: 8.7 K/UL (ref 4.8–10.8)

## 2024-03-06 PROCEDURE — 700102 HCHG RX REV CODE 250 W/ 637 OVERRIDE(OP): Mod: JZ | Performed by: HOSPITALIST

## 2024-03-06 PROCEDURE — A9270 NON-COVERED ITEM OR SERVICE: HCPCS | Performed by: STUDENT IN AN ORGANIZED HEALTH CARE EDUCATION/TRAINING PROGRAM

## 2024-03-06 PROCEDURE — 700102 HCHG RX REV CODE 250 W/ 637 OVERRIDE(OP): Performed by: STUDENT IN AN ORGANIZED HEALTH CARE EDUCATION/TRAINING PROGRAM

## 2024-03-06 PROCEDURE — 80048 BASIC METABOLIC PNL TOTAL CA: CPT

## 2024-03-06 PROCEDURE — 99233 SBSQ HOSP IP/OBS HIGH 50: CPT | Performed by: STUDENT IN AN ORGANIZED HEALTH CARE EDUCATION/TRAINING PROGRAM

## 2024-03-06 PROCEDURE — 36415 COLL VENOUS BLD VENIPUNCTURE: CPT

## 2024-03-06 PROCEDURE — 97530 THERAPEUTIC ACTIVITIES: CPT

## 2024-03-06 PROCEDURE — 82962 GLUCOSE BLOOD TEST: CPT | Mod: 91

## 2024-03-06 PROCEDURE — 700111 HCHG RX REV CODE 636 W/ 250 OVERRIDE (IP): Mod: JZ | Performed by: STUDENT IN AN ORGANIZED HEALTH CARE EDUCATION/TRAINING PROGRAM

## 2024-03-06 PROCEDURE — 97535 SELF CARE MNGMENT TRAINING: CPT

## 2024-03-06 PROCEDURE — 85025 COMPLETE CBC W/AUTO DIFF WBC: CPT

## 2024-03-06 PROCEDURE — 700111 HCHG RX REV CODE 636 W/ 250 OVERRIDE (IP): Performed by: STUDENT IN AN ORGANIZED HEALTH CARE EDUCATION/TRAINING PROGRAM

## 2024-03-06 PROCEDURE — A9270 NON-COVERED ITEM OR SERVICE: HCPCS | Mod: JZ | Performed by: HOSPITALIST

## 2024-03-06 PROCEDURE — 770020 HCHG ROOM/CARE - TELE (206)

## 2024-03-06 RX ORDER — DILTIAZEM HYDROCHLORIDE 240 MG/1
240 CAPSULE, COATED, EXTENDED RELEASE ORAL
Status: DISCONTINUED | OUTPATIENT
Start: 2024-03-06 | End: 2024-03-07 | Stop reason: HOSPADM

## 2024-03-06 RX ADMIN — HYDROMORPHONE HYDROCHLORIDE 1 MG: 1 INJECTION, SOLUTION INTRAMUSCULAR; INTRAVENOUS; SUBCUTANEOUS at 05:22

## 2024-03-06 RX ADMIN — CYCLOBENZAPRINE 10 MG: 10 TABLET, FILM COATED ORAL at 16:55

## 2024-03-06 RX ADMIN — OXYCODONE HYDROCHLORIDE 10 MG: 10 TABLET ORAL at 17:08

## 2024-03-06 RX ADMIN — INSULIN HUMAN 4 UNITS: 100 INJECTION, SOLUTION PARENTERAL at 16:58

## 2024-03-06 RX ADMIN — DIGOXIN 125 MCG: 0.12 TABLET ORAL at 17:02

## 2024-03-06 RX ADMIN — NYSTATIN: 100000 POWDER TOPICAL at 05:32

## 2024-03-06 RX ADMIN — INSULIN HUMAN 7 UNITS: 100 INJECTION, SOLUTION PARENTERAL at 21:24

## 2024-03-06 RX ADMIN — ACETAMINOPHEN 650 MG: 325 TABLET, FILM COATED ORAL at 16:55

## 2024-03-06 RX ADMIN — FOLIC ACID 1 MG: 1 TABLET ORAL at 05:27

## 2024-03-06 RX ADMIN — DULOXETINE HYDROCHLORIDE 30 MG: 30 CAPSULE, DELAYED RELEASE ORAL at 05:27

## 2024-03-06 RX ADMIN — MOMETASONE FUROATE AND FORMOTEROL FUMARATE DIHYDRATE 2 PUFF: 200; 5 AEROSOL RESPIRATORY (INHALATION) at 17:05

## 2024-03-06 RX ADMIN — DILTIAZEM HYDROCHLORIDE 240 MG: 240 CAPSULE, COATED, EXTENDED RELEASE ORAL at 16:47

## 2024-03-06 RX ADMIN — ATORVASTATIN CALCIUM 40 MG: 40 TABLET, FILM COATED ORAL at 17:02

## 2024-03-06 RX ADMIN — POTASSIUM CHLORIDE 40 MEQ: 1500 TABLET, EXTENDED RELEASE ORAL at 05:28

## 2024-03-06 RX ADMIN — FOLIC ACID 1 MG: 1 TABLET ORAL at 17:02

## 2024-03-06 RX ADMIN — FUROSEMIDE 80 MG: 10 INJECTION INTRAMUSCULAR; INTRAVENOUS at 05:25

## 2024-03-06 RX ADMIN — Medication 400 MG: at 17:02

## 2024-03-06 RX ADMIN — POTASSIUM CHLORIDE 40 MEQ: 1500 TABLET, EXTENDED RELEASE ORAL at 11:23

## 2024-03-06 RX ADMIN — DAKIN'S SOLUTION 0.125% (QUARTER STRENGTH) 1 ML: 0.12 SOLUTION at 17:07

## 2024-03-06 RX ADMIN — DAKIN'S SOLUTION 0.125% (QUARTER STRENGTH) 473 ML: 0.12 SOLUTION at 05:32

## 2024-03-06 RX ADMIN — INSULIN HUMAN 7 UNITS: 100 INJECTION, SOLUTION PARENTERAL at 11:23

## 2024-03-06 RX ADMIN — HYDROMORPHONE HYDROCHLORIDE 1 MG: 1 INJECTION, SOLUTION INTRAMUSCULAR; INTRAVENOUS; SUBCUTANEOUS at 17:45

## 2024-03-06 RX ADMIN — Medication 1000 UNITS: at 05:26

## 2024-03-06 RX ADMIN — FERROUS SULFATE TAB 325 MG (65 MG ELEMENTAL FE) 325 MG: 325 (65 FE) TAB at 08:32

## 2024-03-06 RX ADMIN — RIVAROXABAN 20 MG: 20 TABLET, FILM COATED ORAL at 17:02

## 2024-03-06 RX ADMIN — CYANOCOBALAMIN TAB 500 MCG 1000 MCG: 500 TAB at 05:26

## 2024-03-06 RX ADMIN — INSULIN HUMAN 3 UNITS: 100 INJECTION, SOLUTION PARENTERAL at 08:34

## 2024-03-06 RX ADMIN — NYSTATIN: 100000 POWDER TOPICAL at 11:26

## 2024-03-06 RX ADMIN — POTASSIUM CHLORIDE 40 MEQ: 1500 TABLET, EXTENDED RELEASE ORAL at 17:02

## 2024-03-06 RX ADMIN — TOPIRAMATE 25 MG: 25 TABLET, FILM COATED ORAL at 17:02

## 2024-03-06 RX ADMIN — FUROSEMIDE 80 MG: 10 INJECTION INTRAMUSCULAR; INTRAVENOUS at 16:47

## 2024-03-06 RX ADMIN — OXYCODONE HYDROCHLORIDE AND ACETAMINOPHEN 500 MG: 500 TABLET ORAL at 05:26

## 2024-03-06 RX ADMIN — TIOTROPIUM BROMIDE INHALATION SPRAY 5 MCG: 3.12 SPRAY, METERED RESPIRATORY (INHALATION) at 05:30

## 2024-03-06 RX ADMIN — Medication 400 MG: at 05:27

## 2024-03-06 RX ADMIN — TOPIRAMATE 25 MG: 25 TABLET, FILM COATED ORAL at 05:27

## 2024-03-06 ASSESSMENT — COGNITIVE AND FUNCTIONAL STATUS - GENERAL
STANDING UP FROM CHAIR USING ARMS: A LOT
MOBILITY SCORE: 10
DAILY ACTIVITIY SCORE: 12
WALKING IN HOSPITAL ROOM: TOTAL
HELP NEEDED FOR BATHING: TOTAL
CLIMB 3 TO 5 STEPS WITH RAILING: TOTAL
DRESSING REGULAR UPPER BODY CLOTHING: A LOT
PERSONAL GROOMING: A LITTLE
SUGGESTED CMS G CODE MODIFIER DAILY ACTIVITY: CL
TURNING FROM BACK TO SIDE WHILE IN FLAT BAD: A LOT
MOVING TO AND FROM BED TO CHAIR: A LOT
MOVING FROM LYING ON BACK TO SITTING ON SIDE OF FLAT BED: A LOT
SUGGESTED CMS G CODE MODIFIER MOBILITY: CL
TOILETING: TOTAL
DRESSING REGULAR LOWER BODY CLOTHING: TOTAL

## 2024-03-06 ASSESSMENT — PAIN DESCRIPTION - PAIN TYPE
TYPE: ACUTE PAIN

## 2024-03-06 ASSESSMENT — FIBROSIS 4 INDEX: FIB4 SCORE: 0.5

## 2024-03-06 ASSESSMENT — ENCOUNTER SYMPTOMS
SHORTNESS OF BREATH: 0
COUGH: 0

## 2024-03-06 ASSESSMENT — GAIT ASSESSMENTS: GAIT LEVEL OF ASSIST: UNABLE TO PARTICIPATE

## 2024-03-06 NOTE — PROGRESS NOTES
Hospital Medicine Daily Progress Note    Date of Service  3/6/2024    Chief Complaint  Miroslava Matta is a 63 y.o. female admitted 2/21/2024 with failure to thrive    Hospital Course    63 y.o. morbidly obese female with history of chronic A-fib on Xarelto, CHF, diabetes, hyperlipidemia, hypertension, COPD dependent on 4 L, RUTH/OHS noncompliant with CPAP, mood disorder who presented 2/21/2024 as a direct transfer from [facility] for higher level care.  Admitted for severe sepsis due to cellulitis, A-fib with RVR, volume overload due to HFpEF and severe pulmonary hypertension.  Echo noted with EF 50%, RVSP 85 mmHg.  Patient continued to require high flow nasal cannula.  I have consulted pulmonology Dr. Reyes for evaluation for severe pulmonary hypertension      Interval Problem Update    3/6/2024    Remained tachycardic  Denies any discomfort  Significant improvement in oxygen requirement  Now on 6 L oxygen saturating over 90%    Labs reviewed, noted to have normal white count, hemoglobin 11.4, BMP unremarkable, glucose 269    Case discussed with pulmonology Dr. Reyes for evaluation for severe pulmonary hypertension.        Telemetry monitoring  Continue to wean off oxygen  Continue on IV Lasix , Total negative fluid balance around 32 L since admission  Continue on Lipitor, digoxin, Xarelto  Started on diltiazem    Repeat BMP in a.m. to monitor electrolytes and toxicity while on  high-dose diuretics.       High risk of deterioration .  Need close monitoring on telemetry    Patient has a high medical complexity, complex decision making and is at high risk of complication, morbidity and mortality     I have discussed this patient's plan of care and discharge plan at IDT rounds today with Case Management, Nursing, Nursing leadership, and other members of the IDT team.        Code Status  Full Code    Disposition  The patient is medically cleared for discharge to home or a post-acute  facility.  Anticipate discharge to: skilled nursing facility    I have placed the appropriate orders for post-discharge needs.    Review of Systems  Review of Systems   Respiratory:  Negative for cough and shortness of breath.    Cardiovascular:  Negative for chest pain.        Physical Exam  Temp:  [36.2 °C (97.2 °F)-36.9 °C (98.5 °F)] 36.9 °C (98.5 °F)  Pulse:  [] 162  Resp:  [16-18] 17  BP: (133-153)/(77-90) 137/90  SpO2:  [94 %-99 %] 95 %    Physical Exam  Constitutional:       Appearance: She is ill-appearing.   Cardiovascular:      Rate and Rhythm: Tachycardia present.      Pulses: Normal pulses.      Heart sounds: Normal heart sounds.   Pulmonary:      Effort: Pulmonary effort is normal.   Musculoskeletal:      Right lower leg: Edema present.      Left lower leg: Edema present.      Comments: Bilateral lower extremity wound   Neurological:      General: No focal deficit present.      Mental Status: She is oriented to person, place, and time.         Fluids    Intake/Output Summary (Last 24 hours) at 3/6/2024 1456  Last data filed at 3/6/2024 1100  Gross per 24 hour   Intake 1560 ml   Output 2500 ml   Net -940 ml       Laboratory  Recent Labs     03/04/24  0127 03/06/24  0133   WBC 7.7 8.7   RBC 4.73 4.77   HEMOGLOBIN 11.2* 11.4*   HEMATOCRIT 38.7 38.8   MCV 81.8 81.3*   MCH 23.7* 23.9*   MCHC 28.9* 29.4*   RDW 79.6* 78.7*   PLATELETCT 307 325   MPV 9.7 10.0     Recent Labs     03/04/24  0127 03/05/24  0153 03/06/24  0133   SODIUM 141 140 140   POTASSIUM 3.9 3.8 4.0   CHLORIDE 97 96 98   CO2 33 31 32   GLUCOSE 185* 203* 197*   BUN 28* 31* 30*   CREATININE 0.80 0.97 0.93   CALCIUM 8.9 9.1 9.1                   Imaging  EC-ECHOCARDIOGRAM COMPLETE W/O CONT   Final Result      US-RENU SINGLE LEVEL BILAT   Final Result      DX-CHEST-LIMITED (1 VIEW)   Final Result      1.  Cardiomegaly with mild pulmonary vascular congestion.           Assessment/Plan  Pulmonary hypertension (HCC)- (present on  admission)  Assessment & Plan  RVSP 85 mmHg by echo while echo 2018 was 55-60 mmHg.  IV lasix 80 mg BID  and potassium will be increased  Follow urine output      3/6/2024    I have consulted pulmonology Dr. Reyes for evaluation for severe pulmonary hypertension      Acute on chronic blood loss anemia- (present on admission)  Assessment & Plan  H&H remained stable  Started on ferrous sulfate for iron deficiency anemia      Elevated troponin  Assessment & Plan  No concern for ACS      Mood disorder (HCC)- (present on admission)  Assessment & Plan  Cymbalta    RUTH (obstructive sleep apnea)- (present on admission)  Assessment & Plan  Hx of  Her CPAP machine broke and she has been off since October  Nightly BiPAP while inpatient    Morbid (severe) obesity with alveolar hypoventilation (HCC)- (present on admission)  Assessment & Plan  Diet and lifestyle modification  Body mass index is 64.91 kg/m².      Atrial fibrillation with RVR (HCC)- (present on admission)  Assessment & Plan  Restarted home Cardizem 360 for rate control  Continue Xarelto for anticoagulation  Continuous tele monitoring      Severe sepsis (HCC)- (present on admission)  Assessment & Plan  Sepsis resolved  Suspected source was cellulitis        Diabetes (HCC)- (present on admission)  Assessment & Plan  ISS  Statin    Essential hypertension- (present on admission)  Assessment & Plan  Restart Cardizem 360    Acute respiratory failure with hypoxia (HCC)- (present on admission)  Assessment & Plan  Acute on chronic  Multifactorial: OHS/RUTH noncompliant with CPAP, volume overload, HFpEF  She is on 4 liters oxygen at home (not due to COPD but due to CHF and obesity).  Pulmonary hypertension thus IV lasix 80 mg BID.   She is requiring high flow oxygen which is being titrated.   Decrease Lasix to 80 mg twice daily  She may require pulmonology consultation         VTE prophylaxis: xarelto     I have performed a physical exam and reviewed and updated ROS and  Plan today (3/6/2024). In review of yesterday's note (3/5/2024), there are no changes except as documented above.

## 2024-03-06 NOTE — CARE PLAN
Problem: Knowledge Deficit - Standard  Goal: Patient and family/care givers will demonstrate understanding of plan of care, disease process/condition, diagnostic tests and medications  Outcome: Progressing     Problem: Knowledge Deficit - COPD  Goal: Patient/significant other demonstrates understanding of disease process, utilization of the Action Plan, medications and discharge instruction  Outcome: Progressing     Problem: Nutrition - Advanced  Goal: Patient will display progressive weight gain toward goal have adequate food and fluid intake  Outcome: Progressing   The patient is Stable - Low risk of patient condition declining or worsening    Shift Goals  Clinical Goals: wean O2  Patient Goals: rest  Family Goals: na    Progress made toward(s) clinical / shift goals:  Plan of care discussed with patient.     Patient is not progressing towards the following goals:

## 2024-03-06 NOTE — THERAPY
"Physical Therapy   Daily Treatment     Patient Name: Miroslava Matta  Age:  63 y.o., Sex:  female  Medical Record #: [id number]  Today's Date: 3/6/2024     Precautions  Precautions: Fall Risk;Swallow Precautions  Comments: multiple wounds    Assessment    Pt progressing with mobility and towards functional goals. Pt mobilized as detailed below. Pt continues to present with pain, weakness, impaired balance, activity tolerance, endurance. Pt with c/o B foot pain in standing, then progressed to nausea with near emesis, pt returned to supine with some relief and RN made aware. Continue to recommend placement, will continue to follow while in house.    Plan    Treatment Plan Status: Continue Current Treatment Plan  Type of Treatment: Bed Mobility, Equipment, Gait Training, Manual Therapy, Neuro Re-Education / Balance, Self Care / Home Evaluation, Therapeutic Activities, Therapeutic Exercise, Stair Training  Treatment Frequency: 3 Times per Week  Treatment Duration: Until Therapy Goals Met    DC Equipment Recommendations: Unable to determine at this time  Discharge Recommendations: Recommend post-acute placement for additional physical therapy services prior to discharge home      Subjective    \"I thought I got to the edge of the bed better.\" Regarding bed mobility compared to last PT session     Objective     03/06/24 1105   Vitals   Blood Pressure   (130's/70's seated EOB, c/o dizziness and nausea)   Pulse Oximetry 95 %   O2 (LPM) 6   O2 Delivery Device Nasal Cannula   Pain 0 - 10 Group   Therapist Pain Assessment Post Activity Pain Same as Prior to Activity;Nurse Notified  (c/o B foot pain)   Cognition    Cognition / Consciousness WDL   Level of Consciousness Alert   Comments pleasant and cooperative   Active ROM Lower Body    Comments required assist to bring legs back in bed, able to bring to EOB on own, limited by weakness and body habitus   Strength Lower Body   Comments BLE grossly 2+ to 3+/5   Balance   Sitting " Balance (Static) Fair   Sitting Balance (Dynamic) Fair   Standing Balance (Static) Trace   Standing Balance (Dynamic) Dependent   Weight Shift Sitting Poor   Weight Shift Standing Poor   Skilled Intervention Verbal Cuing;Tactile Cuing;Sequencing;Compensatory Strategies   Comments w/ bariatric  FWW   Bed Mobility    Supine to Sit Moderate Assist   Sit to Supine Maximal Assist   Scooting Moderate Assist   Skilled Intervention Verbal Cuing;Compensatory Strategies   Comments HOB raised, use of rail   Gait Analysis   Gait Level Of Assist Unable to Participate   Comments unable to weight shift in standing   Functional Mobility   Sit to Stand Moderate Assist   Bed, Chair, Wheelchair Transfer Unable to Participate   Mobility EOB, STS, EOB, return supine   Skilled Intervention Verbal Cuing;Sequencing;Tactile Cuing;Compensatory Strategies   Comments w/ ender FWW. Tolerated standing 2-3 minutes before requesting to sit due to BLE pain and nausea   6 Clicks Assessment - How much HELP from from another person do you currently need... (If the patient hasn't done an activity recently, how much help from another person do you think he/she would need if he/she tried?)   Turning from your back to your side while in a flat bed without using bedrails? 2   Moving from lying on your back to sitting on the side of a flat bed without using bedrails? 2   Moving to and from a bed to a chair (including a wheelchair)? 2   Standing up from a chair using your arms (e.g., wheelchair, or bedside chair)? 2   Walking in hospital room? 1   Climbing 3-5 steps with a railing? 1   6 clicks Mobility Score 10   Activity Tolerance   Comments limited by fatigue, weakness, pain, nausea, dizziness   Patient / Family Goals    Patient / Family Goal #1 none stated   Short Term Goals    Short Term Goal # 1 Patient will move supine <> sitting EOB with mod A within 6tx in order to get in/out of bed   Goal Outcome # 1 goal not met   Short Term Goal # 2 Patient will  maintain sitting balance at EOB > 5 min with SBA within 6tx in order to progress independence and perform functional task   Goal Outcome # 2 Goal met   Short Term Goal # 3 Patient will move sitting <> standing with LRAD and mod A within 6tx in order to initiate transfers and gait   Goal Outcome # 3 Goal met, new goal added   Short Term Goal # 3 B Patient will move sitting <> standing with LRAD and CGA within 6tx in order to prepare for transfers and gait  (added 3/6)   Short Term Goal # 4 Pt will perform stand step transfers with FWW and Grant in 6 visits to get in/out of chair  (added 3/6)   Education Group   Education Provided Role of Physical Therapist   Role of Physical Therapist Patient Response Patient;Acceptance;Explanation;Verbal Demonstration   Additional Comments Importance of continued EOB/OOB mobility daily with RN staff   Physical Therapy Treatment Plan   Physical Therapy Treatment Plan Continue Current Treatment Plan   Anticipated Discharge Equipment and Recommendations   DC Equipment Recommendations Unable to determine at this time   Discharge Recommendations Recommend post-acute placement for additional physical therapy services prior to discharge home   Interdisciplinary Plan of Care Collaboration   IDT Collaboration with  Nursing;Occupational Therapist  Patient benefited from co-treatment with Occupational Therapist for the following reason(s):  Patient required 2 person assistance for safety and to provide effective interventions. Each discipline assisted patient with appropriate and separate goals. Due to the medical complexity, the skill of both practitioners is needed to monitor vitals, patient status, and adjust the intervention to fit the patient's needs. Therapy sessions needed to be done by a certain time period for both disciplines.   Patient Position at End of Therapy In Bed;Call Light within Reach;Tray Table within Reach;Phone within Reach   Collaboration Comments RN updated   Session  Information   Date / Session Number  3/6- 3 (2/3, 3/6)

## 2024-03-06 NOTE — THERAPY
"Occupational Therapy  Daily Treatment     Patient Name: Miroslava Matta  Age:  63 y.o., Sex:  female  Medical Record #: [id number]  Today's Date: 3/6/2024     Precautions  Precautions: (P) Fall Risk, Swallow Precautions  Comments: multiple wounds    Assessment    Pt seen for follow up OT tx session, pt making steady progress with functional mobility and ADLs motivated to participate and wants to get stronger in order to be out of bed and be able to use bedside commode with nursing staff. Will continue to see for skilled therapy while admitted as well as recommend post-acute placement.    Plan    Treatment Plan Status: (P) Continue Current Treatment Plan  Type of Treatment: Self Care / Activities of Daily Living, Adaptive Equipment, Manual Therapy Techniques, Neuro Re-Education / Balance, Therapeutic Exercises, Therapeutic Activity  Treatment Frequency: 3 Times per Week  Treatment Duration: Until Therapy Goals Met    DC Equipment Recommendations: (P) Unable to determine at this time  Discharge Recommendations: (P) Recommend post-acute placement for additional occupational therapy services prior to discharge home    Subjective    \"I wanted to get up yesterday\"     Objective       03/06/24 1108   Precautions   Precautions Fall Risk;Swallow Precautions   Cognition    Cognition / Consciousness WDL   Strength Upper Body   Upper Body Strength  X   Gross Strength Generalized Weakness, Equal Bilaterally.    Other Treatments   Other Treatments Provided Patient required 2 person assistance for safety and to provide effective interventions. Each discipline assisted patient with appropriate and separate goals. Due to the medical complexity, the skill of both practitioners is needed to monitor vitals, patient status, and adjust the intervention to fit the patient's needs. Therapy sessions needed to be done by a certain time period for both disciplines.   Balance   Sitting Balance (Static) Fair   Sitting Balance (Dynamic) Fair "   Standing Balance (Static) Trace   Standing Balance (Dynamic) Dependent   Weight Shift Sitting Poor   Weight Shift Standing Poor   Skilled Intervention Verbal Cuing;Facilitation   Comments w/ ender FWW   Bed Mobility    Supine to Sit Moderate Assist   Sit to Supine Maximal Assist   Scooting Moderate Assist   Rolling Maximum Assist to Lt.   Skilled Intervention Verbal Cuing   Comments HOB raised, use of trapeze   Activities of Daily Living   Grooming Supervision;Seated   Upper Body Dressing Supervision   Lower Body Dressing Total Assist   Toileting Total Assist   Skilled Intervention Verbal Cuing;Facilitation   How much help from another person does the patient currently need...   Putting on and taking off regular lower body clothing? 1   Bathing (including washing, rinsing, and drying)? 1   Toileting, which includes using a toilet, bedpan, or urinal? 1   Putting on and taking off regular upper body clothing? 2   Taking care of personal grooming such as brushing teeth? 3   Eating meals? 4   6 Clicks Daily Activity Score 12   Functional Mobility   Sit to Stand Moderate Assist   Mobility bed mobility, STS at EOB, returned to supine   Skilled Intervention Verbal Cuing;Facilitation   Comments w/ ender FWW   Activity Tolerance   Sitting Edge of Bed 15 min   Standing 5 min   Short Term Goals   Short Term Goal # 1 Pt will complete ADL transfers with maxA   Goal Outcome # 1 Goal met, new goal added   Short Term Goal # 1 B  Pt will complete ADL transfers with Lynette   Short Term Goal # 2 Pt will complete LB dressing with maxA   Goal Outcome # 2 Progressing slower than expected   Short Term Goal # 3 Pt will complete toileting with maxA   Goal Outcome # 3 Progressing slower than expected   Education Group   Education Provided Role of Occupational Therapist   Role of Occupational Therapist Patient Response Patient;Acceptance;Explanation;Reinforcement Needed   Occupational Therapy Treatment Plan    O.T. Treatment Plan Continue  Current Treatment Plan   Anticipated Discharge Equipment and Recommendations   DC Equipment Recommendations Unable to determine at this time   Discharge Recommendations Recommend post-acute placement for additional occupational therapy services prior to discharge home   Interdisciplinary Plan of Care Collaboration   IDT Collaboration with  Nursing;Physical Therapist   Patient Position at End of Therapy In Bed;Bed Alarm On;Call Light within Reach;Tray Table within Reach;Phone within Reach   Collaboration Comments RN updated

## 2024-03-06 NOTE — PROGRESS NOTES
Monitor Summary:   Rhythm: AFIB   Rate:   Measurement: .-/.10/.-  Ectopy: o pvc, r coup, f pvc, o pvc  R trip

## 2024-03-06 NOTE — CARE PLAN
The patient is Watcher - Medium risk of patient condition declining or worsening    Shift Goals  Clinical Goals: Monitor and wean O2, wound care and pain control  Patient Goals: Sleep  Family Goals: na    Progress made toward(s) clinical / shift goals:    Problem: Knowledge Deficit - Standard  Goal: Patient and family/care givers will demonstrate understanding of plan of care, disease process/condition, diagnostic tests and medications  Outcome: Progressing  Note: POC discussed with patient.     Problem: Ineffective Airway Clearance  Goal: Patient will maintain patent airway with clear/clearing breath sounds  Outcome: Progressing  Note: Assessed patient's breath sounds. Monitoring and weaning oxygen as needed.     Problem: Pain - Standard  Goal: Alleviation of pain or a reduction in pain to the patient’s comfort goal  Outcome: Progressing     Problem: Skin Integrity  Goal: Skin integrity is maintained or improved  Outcome: Progressing       Patient is not progressing towards the following goals:

## 2024-03-06 NOTE — PROGRESS NOTES
Bedside report received. Patient A/Ox 4. VS -140's. Oxygen requirements 8L. Telemetry monitoring Afib. POC discussed with patient. Pt verbalizes understanding. Call light and belongings within reach. Bed locked and lowest position, alarm and fall precautions in place.    Bedside report received from off going RN/tech: JOSEPH Mendoza assumed care of patient.     Fall Risk Score: HIGH RISK  Fall risk interventions in place: Place yellow fall risk ID band on patient, Provide patient/family education based on risk assessment, Educate patient/family to call staff for assistance when getting out of bed, Place fall precaution signage outside patient door, and Place patient in room close to nursing station  Bed type: Bariatric (Kyle Score less than 17 interventions in place)  Patient on cardiac monitor: Yes  IVF/IV medications: Not Applicable   Oxygen: How many liters 8L  Bedside sitter: Not Applicable   Isolation: Not applicable

## 2024-03-06 NOTE — PROGRESS NOTES
Bedside report received from off going RN/tech: Georgette, assumed care of patient.     Fall Risk Score: HIGH RISK  Fall risk interventions in place: Place yellow fall risk ID band on patient, Provide patient/family education based on risk assessment, Educate patient/family to call staff for assistance when getting out of bed, Place fall precaution signage outside patient door, Place patient in room close to nursing station, Utilize bed/chair fall alarm, and Notify charge of high risk for huddle  Bed type: Bariatric (Kyle Score less than 17 interventions in place)  Patient on cardiac monitor: Yes  IVF/IV medications: Not Applicable   Oxygen: How many liters 5L  Bedside sitter: Not Applicable   Isolation: Not applicable

## 2024-03-07 ENCOUNTER — APPOINTMENT (OUTPATIENT)
Dept: RADIOLOGY | Facility: MEDICAL CENTER | Age: 64
DRG: 871 | End: 2024-03-07
Attending: STUDENT IN AN ORGANIZED HEALTH CARE EDUCATION/TRAINING PROGRAM
Payer: MEDICARE

## 2024-03-07 ENCOUNTER — PATIENT OUTREACH (OUTPATIENT)
Dept: SCHEDULING | Facility: IMAGING CENTER | Age: 64
End: 2024-03-07
Payer: MEDICARE

## 2024-03-07 VITALS
WEIGHT: 293 LBS | RESPIRATION RATE: 18 BRPM | TEMPERATURE: 97.7 F | DIASTOLIC BLOOD PRESSURE: 84 MMHG | BODY MASS INDEX: 45.99 KG/M2 | SYSTOLIC BLOOD PRESSURE: 144 MMHG | OXYGEN SATURATION: 92 % | HEIGHT: 67 IN | HEART RATE: 93 BPM

## 2024-03-07 LAB
ANION GAP SERPL CALC-SCNC: 12 MMOL/L (ref 7–16)
BUN SERPL-MCNC: 34 MG/DL (ref 8–22)
CALCIUM SERPL-MCNC: 9.4 MG/DL (ref 8.5–10.5)
CHLORIDE SERPL-SCNC: 96 MMOL/L (ref 96–112)
CO2 SERPL-SCNC: 29 MMOL/L (ref 20–33)
CREAT SERPL-MCNC: 0.77 MG/DL (ref 0.5–1.4)
GFR SERPLBLD CREATININE-BSD FMLA CKD-EPI: 87 ML/MIN/1.73 M 2
GLUCOSE BLD STRIP.AUTO-MCNC: 202 MG/DL (ref 65–99)
GLUCOSE BLD STRIP.AUTO-MCNC: 218 MG/DL (ref 65–99)
GLUCOSE SERPL-MCNC: 202 MG/DL (ref 65–99)
MAGNESIUM SERPL-MCNC: 2 MG/DL (ref 1.5–2.5)
POTASSIUM SERPL-SCNC: 4.2 MMOL/L (ref 3.6–5.5)
SODIUM SERPL-SCNC: 137 MMOL/L (ref 135–145)

## 2024-03-07 PROCEDURE — 90686 IIV4 VACC NO PRSV 0.5 ML IM: CPT | Performed by: STUDENT IN AN ORGANIZED HEALTH CARE EDUCATION/TRAINING PROGRAM

## 2024-03-07 PROCEDURE — A9270 NON-COVERED ITEM OR SERVICE: HCPCS | Performed by: STUDENT IN AN ORGANIZED HEALTH CARE EDUCATION/TRAINING PROGRAM

## 2024-03-07 PROCEDURE — 3E02340 INTRODUCTION OF INFLUENZA VACCINE INTO MUSCLE, PERCUTANEOUS APPROACH: ICD-10-PCS | Performed by: STUDENT IN AN ORGANIZED HEALTH CARE EDUCATION/TRAINING PROGRAM

## 2024-03-07 PROCEDURE — 71045 X-RAY EXAM CHEST 1 VIEW: CPT

## 2024-03-07 PROCEDURE — A9270 NON-COVERED ITEM OR SERVICE: HCPCS

## 2024-03-07 PROCEDURE — 83735 ASSAY OF MAGNESIUM: CPT

## 2024-03-07 PROCEDURE — 90471 IMMUNIZATION ADMIN: CPT

## 2024-03-07 PROCEDURE — 36415 COLL VENOUS BLD VENIPUNCTURE: CPT

## 2024-03-07 PROCEDURE — 700102 HCHG RX REV CODE 250 W/ 637 OVERRIDE(OP): Performed by: STUDENT IN AN ORGANIZED HEALTH CARE EDUCATION/TRAINING PROGRAM

## 2024-03-07 PROCEDURE — 80048 BASIC METABOLIC PNL TOTAL CA: CPT

## 2024-03-07 PROCEDURE — 700102 HCHG RX REV CODE 250 W/ 637 OVERRIDE(OP)

## 2024-03-07 PROCEDURE — 99239 HOSP IP/OBS DSCHRG MGMT >30: CPT | Performed by: STUDENT IN AN ORGANIZED HEALTH CARE EDUCATION/TRAINING PROGRAM

## 2024-03-07 PROCEDURE — 82962 GLUCOSE BLOOD TEST: CPT | Mod: 91

## 2024-03-07 PROCEDURE — 700111 HCHG RX REV CODE 636 W/ 250 OVERRIDE (IP)

## 2024-03-07 PROCEDURE — 700111 HCHG RX REV CODE 636 W/ 250 OVERRIDE (IP): Performed by: STUDENT IN AN ORGANIZED HEALTH CARE EDUCATION/TRAINING PROGRAM

## 2024-03-07 PROCEDURE — 700102 HCHG RX REV CODE 250 W/ 637 OVERRIDE(OP): Mod: JZ | Performed by: HOSPITALIST

## 2024-03-07 PROCEDURE — 700111 HCHG RX REV CODE 636 W/ 250 OVERRIDE (IP): Mod: JZ | Performed by: STUDENT IN AN ORGANIZED HEALTH CARE EDUCATION/TRAINING PROGRAM

## 2024-03-07 PROCEDURE — 99222 1ST HOSP IP/OBS MODERATE 55: CPT | Performed by: STUDENT IN AN ORGANIZED HEALTH CARE EDUCATION/TRAINING PROGRAM

## 2024-03-07 PROCEDURE — A9270 NON-COVERED ITEM OR SERVICE: HCPCS | Mod: JZ | Performed by: HOSPITALIST

## 2024-03-07 RX ORDER — ATORVASTATIN CALCIUM 40 MG/1
40 TABLET, FILM COATED ORAL EVERY EVENING
Qty: 30 TABLET | Refills: 0 | Status: SHIPPED | OUTPATIENT
Start: 2024-03-07 | End: 2024-03-16

## 2024-03-07 RX ORDER — FERROUS SULFATE 325(65) MG
325 TABLET ORAL
Qty: 30 TABLET | Refills: 0 | Status: SHIPPED | OUTPATIENT
Start: 2024-03-08 | End: 2024-03-26

## 2024-03-07 RX ORDER — FUROSEMIDE 40 MG/1
40 TABLET ORAL EVERY 12 HOURS
Qty: 60 TABLET | Refills: 0 | Status: ON HOLD | OUTPATIENT
Start: 2024-03-07 | End: 2024-03-22

## 2024-03-07 RX ORDER — FUROSEMIDE 40 MG/1
40 TABLET ORAL DAILY
Qty: 30 TABLET | Refills: 3 | Status: SHIPPED | OUTPATIENT
Start: 2024-03-07 | End: 2024-03-07

## 2024-03-07 RX ORDER — OXYCODONE HYDROCHLORIDE 5 MG/1
5 TABLET ORAL EVERY 8 HOURS PRN
Qty: 5 TABLET | Refills: 0 | Status: SHIPPED | OUTPATIENT
Start: 2024-03-07 | End: 2024-03-12

## 2024-03-07 RX ADMIN — FUROSEMIDE 80 MG: 10 INJECTION INTRAMUSCULAR; INTRAVENOUS at 04:53

## 2024-03-07 RX ADMIN — HYDROMORPHONE HYDROCHLORIDE 1 MG: 1 INJECTION, SOLUTION INTRAMUSCULAR; INTRAVENOUS; SUBCUTANEOUS at 04:55

## 2024-03-07 RX ADMIN — INSULIN HUMAN 4 UNITS: 100 INJECTION, SOLUTION PARENTERAL at 12:06

## 2024-03-07 RX ADMIN — DULOXETINE HYDROCHLORIDE 30 MG: 30 CAPSULE, DELAYED RELEASE ORAL at 05:02

## 2024-03-07 RX ADMIN — DAKIN'S SOLUTION 0.125% (QUARTER STRENGTH) 20 ML: 0.12 SOLUTION at 06:14

## 2024-03-07 RX ADMIN — OXYCODONE HYDROCHLORIDE AND ACETAMINOPHEN 500 MG: 500 TABLET ORAL at 05:00

## 2024-03-07 RX ADMIN — FOLIC ACID 1 MG: 1 TABLET ORAL at 05:02

## 2024-03-07 RX ADMIN — CYANOCOBALAMIN TAB 500 MCG 1000 MCG: 500 TAB at 05:03

## 2024-03-07 RX ADMIN — POTASSIUM CHLORIDE 40 MEQ: 1500 TABLET, EXTENDED RELEASE ORAL at 12:00

## 2024-03-07 RX ADMIN — Medication 1000 UNITS: at 05:01

## 2024-03-07 RX ADMIN — FERROUS SULFATE TAB 325 MG (65 MG ELEMENTAL FE) 325 MG: 325 (65 FE) TAB at 07:40

## 2024-03-07 RX ADMIN — HYDROMORPHONE HYDROCHLORIDE 0.5 MG: 1 INJECTION, SOLUTION INTRAMUSCULAR; INTRAVENOUS; SUBCUTANEOUS at 02:50

## 2024-03-07 RX ADMIN — TOPIRAMATE 25 MG: 25 TABLET, FILM COATED ORAL at 05:00

## 2024-03-07 RX ADMIN — Medication 400 MG: at 05:03

## 2024-03-07 RX ADMIN — INSULIN HUMAN 4 UNITS: 100 INJECTION, SOLUTION PARENTERAL at 07:41

## 2024-03-07 RX ADMIN — DILTIAZEM HYDROCHLORIDE 240 MG: 240 CAPSULE, COATED, EXTENDED RELEASE ORAL at 05:02

## 2024-03-07 RX ADMIN — INFLUENZA A VIRUS A/VICTORIA/4897/2022 IVR-238 (H1N1) ANTIGEN (FORMALDEHYDE INACTIVATED), INFLUENZA A VIRUS A/DARWIN/9/2021 SAN-010 (H3N2) ANTIGEN (FORMALDEHYDE INACTIVATED), INFLUENZA B VIRUS B/PHUKET/3073/2013 ANTIGEN (FORMALDEHYDE INACTIVATED), AND INFLUENZA B VIRUS B/MICHIGAN/01/2021 ANTIGEN (FORMALDEHYDE INACTIVATED) 0.5 ML: 15; 15; 15; 15 INJECTION, SUSPENSION INTRAMUSCULAR at 12:00

## 2024-03-07 RX ADMIN — DOCUSATE SODIUM 50 MG AND SENNOSIDES 8.6 MG 2 TABLET: 8.6; 5 TABLET, FILM COATED ORAL at 05:03

## 2024-03-07 RX ADMIN — POTASSIUM CHLORIDE 40 MEQ: 1500 TABLET, EXTENDED RELEASE ORAL at 05:03

## 2024-03-07 ASSESSMENT — ENCOUNTER SYMPTOMS
NAUSEA: 0
SHORTNESS OF BREATH: 0
COUGH: 0
CHILLS: 0
DEPRESSION: 0
DIZZINESS: 0
HEADACHES: 0
MYALGIAS: 0
BLURRED VISION: 0
FEVER: 0
VOMITING: 0

## 2024-03-07 ASSESSMENT — FIBROSIS 4 INDEX: FIB4 SCORE: 0.48

## 2024-03-07 ASSESSMENT — PAIN DESCRIPTION - PAIN TYPE: TYPE: ACUTE PAIN

## 2024-03-07 NOTE — CONSULTS
"Pulmonology Consultation:     Date of service: 3/7/2024    Reason for Referral: severe pulmonary hypertension    Referring Physician: Andrzej Adhikari MD    Hospital Course: \"Miroslava Matta is a 63 y.o. morbidly obese female with history of chronic A-fib on Xarelto, CHF, diabetes, hyperlipidemia, hypertension, COPD dependent on 4 L, RUTH/OHS noncompliant with CPAP, mood disorder who presented 2/21/2024 as a direct transfer from [facility] for higher level care.     It was reported that patient's son called for a welfare check.  She apparently was assisted by home health RN, but reported to have quit last Friday 2/16.  Apparently patient was found sitting in the chair soiled in stool and urine.  Patient reported she has had a fall since 1/25/2024.  She admits to having difficulty ambulating due to her weight and obesity.  She stated her home health RN is no longer able to care for her due to her large body habitus, and quit home health.  She has not had home health assistance since 2/16. Noted to have extensive wound on the back, buttock and legs.  Per transfer paperwork, patient reported to have had atrial fibrillation with RVR with rate in 150s, improved to 130s with 1 L IVF bolus and given Cardizem 30 mg oral one-time.     Workup from [facility] included:  CBC: WBC 13.5, hemoglobin 9.7, platelet 689  CMP: Glucose 211, BUN 27, creatinine 1.0, sodium 135, potassium 4.6, chloride 100, bicarb 26, anion gap 9, calcium 9.2, total protein 7.4, albumin 3.4, total bilirubin 1.0, AST 18, ALT 17, alkaline phosphatase 169     Lactic acid 5.4 --> 1.8  Troponin I undetectable  proBNP 2360     CXR: Cardiomegaly with probable CHF     Patient received treatment with ceftriaxone 1 g IV, Cardizem 30 mg oral, NS to 250cc bolus x 3, Zofran 4 mg IV.     Due to concern of severe sepsis, case was discussed with [facility] ICU attending.  ICU recommended IMCU service transfer.  Patient was seen by me at [facility] arrival, " "admitted to medicine service for further evaluation treatment.\"    Pt was transferred from [place] ED as direct admit to [facility] on 2/20 for treatment of sepsis 2/2 cellulitis. Pt noted to be in Afib with RVR which was medically managed. On anticoagulation. ECHO showed RSVP 85 mmHg. Pt had required HFNC. And now on 6 LPM NC. Pt has noted to have significant pulmonary hypertension which has been treated with IV diuresis since admission.  Procal on 2/21 was 0.17. NT-proBNP upon admission was 1131 (baseline not known).       Interval History:  Net output since admission -51357.6. 24-hr I/O = 1680/1800.  Aferile. HR  (afib on tele). RR 17-18. //103. 93-96% on 4-6 LPM NC. WBC 8.7. BMP showed . BUN 34. Cr 0.77.     Subjective     Subjective: No acute complaints.      Past medical History:    Past Medical History:   Diagnosis Date    Arthritis     Back pain     COPD (chronic obstructive pulmonary disease) ([facility]) 5/16/2018    Diabetes ([facility])     Fall     Fungal rash of torso 5/15/2018    Hypertension     Morbid obesity with BMI of 60.0-69.9, adult ([facility]) 5/17/2018    New onset atrial fibrillation ([facility]) 5/15/2018    Psychiatric problem     Urinary incontinence         Past Surgical History: Total knee replacement b/l    History reviewed. No pertinent surgical history.     Social History: Smoked [age] yo at 1-2 ppd. Quit since then. Denies EtOH. Denies street drugs.    Family History:  Non-contributory    Allergy:    Allergies   Allergen Reactions    Gabapentin Unspecified     Dizziness      Metformin Shortness of Breath and Rash     To face    Codeine Nausea      Review of Systems   Constitutional:  Negative for chills and fever.   HENT:  Negative for hearing loss and tinnitus.    Eyes:  Negative for blurred vision.   Respiratory:  Negative for cough and shortness of breath.    Cardiovascular:  Negative for chest pain.   Gastrointestinal:  Negative for nausea and vomiting.   Genitourinary:  Negative for " dysuria and urgency.   Musculoskeletal:  Negative for myalgias.   Skin:  Negative for rash.   Neurological:  Negative for dizziness and headaches.   Psychiatric/Behavioral:  Negative for depression and suicidal ideas.         Objective:       Intake/Output                   03/07/24 0700 - 03/08/24 0659     [id number] 4819-1178 Total              Intake    Total Intake -- -- --       Output    Urine  400  -- 400    Output (mL) (Urethral Catheter) 400 -- 400    Total Output 400 -- 400       Net I/O     -400 -- -400            Vitals:  Vitals:    03/07/24 0739   BP: (!) 168/90   Pulse: (!) 110   Resp: 18   Temp: 36.6 °C (97.9 °F)   SpO2: 96%        Physical exam:    General:Well-appearing. in no acute distress  HEENT: Normal conjunctiva, anicteric. Moist mucous membranes.  Heart: RRR. no m/r/g  Lungs: CTAB  Abdomen: soft,  nontender, nondistended  Extremities: No BLE edema  Skin: Warm and dry.   Neuro: A&Ox3  Psych: Appropriate mood and affect        Labs:     Recent Labs     03/06/24 0133   WBC 8.7   RBC 4.77   HEMOGLOBIN 11.4*   HEMATOCRIT 38.8   MCV 81.3*   MCH 23.9*   RDW 78.7*   PLATELETCT 325   MPV 10.0   NEUTSPOLYS 54.60   LYMPHOCYTES 32.30   MONOCYTES 7.70   EOSINOPHILS 4.40   BASOPHILS 0.70   RBCMORPHOLO Present         Recent Labs     03/05/24 0153 03/06/24 0133 03/07/24  0339   SODIUM 140 140 137   POTASSIUM 3.8 4.0 4.2   CHLORIDE 96 98 96   CO2 31 32 29   GLUCOSE 203* 197* 202*   BUN 31* 30* 34*        Recent Labs     03/05/24 0153 03/06/24  0133 03/07/24  0339   CREATININE 0.97 0.93 0.77      Glycohemoglobin   Date Value Ref Range Status   02/21/2024 6.8 (H) 4.0 - 5.6 % Final               Procalcitonin   Date Value Ref Range Status   02/21/2024 0.17 <0.25 ng/mL Final               NT-proBNP   Date Value Ref Range Status   02/21/2024 1131 (H) 0 - 125 pg/mL Final     POC Glucose, Blood   Date Value Ref Range Status   03/07/2024 202 (H) 65 - 99 mg/dL Final   03/06/2024 289 (H) 65 - 99 mg/dL Final  "  03/06/2024 246 (H) 65 - 99 mg/dL Final   03/06/2024 269 (H) 65 - 99 mg/dL Final     Troponin T   Date Value Ref Range Status   02/21/2024 27 (H) 6 - 19 ng/L Final             02/21/2024 26 (H) 6 - 19 ng/L Final             02/21/2024 24 (H) 6 - 19 ng/L Final               Sed Rate Westergren   Date Value Ref Range Status   02/21/2024 97 (H) 0 - 25 mm/hour Final     Occult Blood   Date Value Ref Range Status   02/21/2024 Negative Negative Final               Imaging:       DX-CHEST-PORTABLE (1 VIEW)   Final Result      Resolved pulmonary edema.      EC-ECHOCARDIOGRAM COMPLETE W/O CONT   Final Result      US-RENU SINGLE LEVEL BILAT   Final Result      DX-CHEST-LIMITED (1 VIEW)   Final Result      1.  Cardiomegaly with mild pulmonary vascular congestion.         ECHO from 2/21:  \"Grossly normal left ventricular systolic function. The left ventricular   ejection fraction is visually estimated to be 50%.   Severely dilated right ventricle. Reduced right ventricular systolic   function.  Mild aortic valve stenosis. Mild aortic insufficiency.   Mild to moderate mitral regurgitation.  Severe tricuspid regurgitation.  Estimated right ventricular systolic pressure is 85 mmHg. Right heart   pressures are consistent with severe pulmonary hypertension.  No recent study is available for comparison.   Primary team is notified of findings. \"    Medications:     Home meds:    Home Medications    Medication Sig Taking? Last Dose Authorizing Provider   tizanidine (ZANAFLEX) 4 MG Tab 1 tablet as needed Orally twice a day as needed for 30 days   Physician Outpatient   losartan-hydrochlorothiazide (HYZAAR) 100-25 MG per tablet Take 1 Tablet by mouth every day. FOR 90 DAYS   Physician Outpatient   insulin glargine 100 UNIT/ML Solution Pen-injector injection 10 units Subcutaneous daily for 90 days   Physician Outpatient   ibuprofen (MOTRIN) 600 MG Tab 1 tablet with food or milk as needed Orally once daily AS NEEDED for severe pain for 30 " days   Physician Outpatient   glucose blood (FREESTYLE LITE) strip check BG In Vitro three times a day for 90 days   Physician Outpatient   rivaroxaban (XARELTO) 20 MG Tab tablet Take 1 Tab by mouth with dinner.   Elidia Rondon M.D.   glyBURIDE (DIABETA) 5 MG Tab Take 1 Tab by mouth every morning with breakfast.   Elidia Rondon M.D.   DILTIAZem CD (CARDIZEM CD) 360 MG CAPSULE SR 24 HR Take 1 Cap by mouth every day.   Elidia Rondon M.D.   digoxin (LANOXIN) 125 MCG Tab Take 1 Tab by mouth every day at 6 PM.   Elidia Rondon M.D.   furosemide (LASIX) 40 MG Tab Take 1 Tab by mouth every day.   Elidia Rondon M.D.   potassium chloride SA (KDUR) 20 MEQ Tab CR Take 1 Tab by mouth every day.   Elidia Rondon M.D.   magnesium oxide (MAG-OX) 400 (241.3 Mg) MG Tab tablet Take 1 Tab by mouth 2 Times a Day.   Elidia Rondon M.D.   nystatin (MYCOSTATIN) powder Apply to moist skin areas daily.   Elidia Rondon M.D.   vitamin D (CHOLECALCIFEROL) 1000 UNIT Tab Take 1,000 Units by mouth every day.   Physician Outpatient   ascorbic acid (ASCORBIC ACID) 500 MG Tab Take 500 mg by mouth every day.   Physician Outpatient   cyclobenzaprine (FLEXERIL) 10 MG Tab Take 10 mg by mouth 3 times a day as needed for Mild Pain or Muscle Spasms.   Physician Outpatient   topiramate (TOPAMAX) 25 MG Tab Take 25 mg by mouth 2 times a day.   Physician Outpatient   DULoxetine (CYMBALTA) 30 MG Cap DR Particles Take 30 mg by mouth every day.   Physician Outpatient        Scheduled Medications PRN Medications IV Medications   dilTIAZem CD, 240 mg, Q DAY  insulin GLARGINE, 15 Units, Q EVENING  furosemide, 80 mg, BID DIURETIC  ferrous sulfate, 325 mg, QDAY with Breakfast  potassium chloride SA, 40 mEq, TID  mometasone-formoterol, 2 Puff, BID  tiotropium, 5 mcg, DAILY  ascorbic acid, 500 mg, DAILY  digoxin, 125 mcg, DAILY AT 1800  DULoxetine, 30 mg, DAILY  topiramate, 25 mg, BID  vitamin D3, 1,000 Units, DAILY  insulin regular, 3-14 Units, 4X/DAY ACHS  atorvastatin,  40 mg, Q EVENING  rivaroxaban, 20 mg, PM MEAL  magnesium oxide, 400 mg, BID  cyanocobalamin, 1,000 mcg, DAILY  folic acid, 1 mg, BID  dakins 0.125% (1/4 strength), , BID      HYDROmorphone, 0.5 mg, Q3HRS PRN  HYDROmorphone, 1 mg, PRN  senna-docusate, 2 Tablet, BID PRN   And  polyethylene glycol/lytes, 1 Packet, QDAY PRN  oxyCODONE immediate-release, 10 mg, Q8HRS PRN  labetalol, 10 mg, Q4HRS PRN  Metoprolol Tartrate, 5 mg, Q5 MIN PRN  nitroglycerin, 0.4 mg, Q5 MIN PRN  ondansetron, 4 mg, Q4HRS PRN  ondansetron, 4 mg, Q4HRS PRN  promethazine, 12.5-25 mg, Q4HRS PRN  promethazine, 12.5-25 mg, Q4HRS PRN  prochlorperazine, 5-10 mg, Q4HRS PRN  cyclobenzaprine, 10 mg, TID PRN  acetaminophen, 650 mg, Q6HRS PRN  Respiratory Therapy Consult, , Continuous RT  ipratropium-albuterol, 3 mL, Q2HRS PRN (RT)  dextrose bolus, 25 g, Q15 MIN PRN             Allergies   Allergen Reactions    Gabapentin Unspecified     Dizziness      Metformin Shortness of Breath and Rash     To face    Codeine Nausea        Assessment/Plan:     #Acute on chronic hypoxic respiratory failure  #Pulmonary hypertension - RSVP 85 mmHg on ECHO form 2018.  Treated with IV lasix.   #Elevated troponin 2/2 demand ischemia   #COPD on 4 LPM  #RUTH - CPAP broke in 10/2023. Pt was on nightly BIPAP in [facility].  #Morbid Obesity with alveolar hypoventilation  #Afib with RVR- rate-controlled with cardizem on xarelto. On digoxin.   #HFpEF exacerbation -   #Sepsis 2/2 cellulitis- resolved  *CPAP broke in 10/2023 and has not used since. TTE on 2/21 showed RSVP 95 LVEF 50% with dilated RV w reduced systolic function. Pt hsa been on aggressive diuresis since.  PH likely group 2 or group 3 due to left heart failure and RUTH. No pedal edema noted on exam. Lung CTA.  - optimize diuretic regiment (consider maintenance regimen)  - urgent sleep referral to sleep for RUTH upon discharge (Dr. Amy rivers send messge to staff for appt for CPAP titration)  - once pt gets CPAP, repeat TTE in  3 months after to reevaluated RV pressures in consideration for need of possible RHC to be determined on outpatient basis  - please place referral to outpatient pulmonary hypertension clinic  - please place referral to outpatient cardiology        Jarad Lopez  Resident  [phone]    Discussed with my attending physician,  Maria Luisa Reyes MD

## 2024-03-07 NOTE — DISCHARGE PLANNING
DC Transport Scheduled    Transport Company Scheduled:  [employer]  Spoke with Yany at [facility] to schedule transport.    Scheduled Date: 3/7/2024  Scheduled Time: 1400    Destination: [facility]   Destination address: [place] [address]    Notified care team of scheduled transport via [employer].     If there are any changes needed to the DC transportation scheduled, please contact [facility] at ext. [phone] between the hours of 0909-3643 Mon-Fri. If outside those hours, contact the ED Case Manager at ext. [phone].

## 2024-03-07 NOTE — DISCHARGE PLANNING
Case Management Discharge Planning    Admission Date: 2/21/2024  GMLOS: 5.1  ALOS: 15    6-Clicks ADL Score: 12  6-Clicks Mobility Score: 10  PT and/or OT Eval ordered: Yes  Post-acute Referrals Ordered: Yes  Post-acute Choice Obtained: Yes  Has referral(s) been sent to post-acute provider:  Yes    Anticipated Discharge Dispo: Discharge Disposition: D/T to SNF with Medicare cert in anticipation of skilled care (03)  Discharge Contact Phone Number: Mynor Blackwell ()    DME Needed: No    Action(s) Taken: RN CM called and spoke with Yany with [facility] regarding bed availability. Per Yany, [facility] not have a bed today.     Patient discussed in rounds, medically cleared for SNF. DPA called and spoke with admissions at [facility], they can take patient today.     RN CM met with patient at bedside to inform her of DCP, pt is agreeable with plan. RN CM requested transport from ride line at 1400 today.     Confirmation received from ride line. [employer] packet completed and given to bedside nurse.    Escalations Completed: None    Medically Clear: Yes    Next Steps: CM will continue to assist with DCP.    Barriers to Discharge: Pick-up by [facility].    Is the patient up for discharge tomorrow: No

## 2024-03-07 NOTE — CARE PLAN
The patient is Watcher - Medium risk of patient condition declining or worsening    Shift Goals  Clinical Goals: Wound care, Monitor oxygen demand  Patient Goals: Sleep  Family Goals: na    Progress made toward(s) clinical / shift goals:    Problem: Knowledge Deficit - Standard  Goal: Patient and family/care givers will demonstrate understanding of plan of care, disease process/condition, diagnostic tests and medications  Outcome: Progressing  Note: POC discussed. Patient board updated.     Problem: Ineffective Airway Clearance  Goal: Patient will maintain patent airway with clear/clearing breath sounds  Outcome: Progressing  Note: Breath sounds assessed. Per patient back to baseline oxygen demand. Monitoring and weaning oxygen as needed.     Problem: Skin Integrity  Goal: Skin integrity is maintained or improved  Outcome: Progressing  Note: Wound care provided every shift. Patient premedicated for wound dressing changes. Encouraging and educating patient on the importance of turning with pillows/wedges.       Patient is not progressing towards the following goals:

## 2024-03-07 NOTE — DISCHARGE INSTRUCTIONS
Please call [facility] Records after discharge to request a full set of medical records for your recent hospitalization: [phone]

## 2024-03-07 NOTE — DISCHARGE PLANNING
Agency/Facility Name: [facility]  Spoke To: Marialuisa  Outcome: JAYMIE asked marialuisa to look over referral again after PT saw pt yesterday. Marialuisa will look over pt's chart, DPA to follow up.     -0932  Call received from Marialuisa @ [facility], facility can accept pt and has bed available today. JAYMIE updated RN LOYDA, JAYMIE to follow up with [facility] with transport time.     -0950  DPA informed Marialuisa @ [facility] that pt will be ready to transport to facility at 1400. Pt is in [facility] queue now.

## 2024-03-07 NOTE — PROGRESS NOTES
Bedside report received from off going RN/tech: Georgette, assumed care of patient.     Fall Risk Score: HIGH RISK  Fall risk interventions in place: Place yellow fall risk ID band on patient, Provide patient/family education based on risk assessment, Educate patient/family to call staff for assistance when getting out of bed, Place fall precaution signage outside patient door, Place patient in room close to nursing station, Utilize bed/chair fall alarm, and Notify charge of high risk for huddle  Bed type: Bariatric (Kyle Score less than 17 interventions in place)  Patient on cardiac monitor: Yes  IVF/IV medications: Not Applicable   Oxygen: How many liters 4L  Bedside sitter: Not Applicable   Isolation: Not applicable

## 2024-03-07 NOTE — PROGRESS NOTES
Bedside report received. Patient A/Ox 4. VS -150's. Oxygen requirements 4L. Telemetry monitoring Afib. Complains of pain 6/10, patient recently medicated and repositioned. POC discussed with patient. Pt verbalizes understanding. Call light and belongings within reach. Bed locked and lowest position, alarm and fall precautions in place.    Bedside report received from off going RN/tech: Suha RN assumed care of patient.     Fall Risk Score: HIGH RISK  Fall risk interventions in place: Provide patient/family education based on risk assessment, Educate patient/family to call staff for assistance when getting out of bed, Place fall precaution signage outside patient door, and Place patient in room close to nursing station  Bed type: Bariatric (Kyle Score less than 17 interventions in place)  Patient on cardiac monitor: Yes  IVF/IV medications: Not Applicable   Oxygen: How many liters 4L  Bedside sitter: Not Applicable   Isolation: Not applicable

## 2024-03-07 NOTE — DISCHARGE SUMMARY
Discharge Summary    CHIEF COMPLAINT ON ADMISSION  No chief complaint on file.      Reason for Admission  Sepsis, severe skin breakdown, FTT*     Admission Date  2/21/2024    CODE STATUS  Full Code    HPI & HOSPITAL COURSE  63 y.o. morbidly obese female with history of chronic A-fib on Xarelto, CHF, diabetes, hyperlipidemia, hypertension, COPD dependent on 4 L, RUTH/OHS noncompliant with CPAP, mood disorder who presented 2/21/2024 as a direct transfer from [facility] for higher level care.  Admitted for severe sepsis due to cellulitis, A-fib with RVR, volume overload due to HFpEF and severe pulmonary hypertension.  Echo noted with EF 50%, RVSP 85 mmHg.  She required aggressive diuresis during hospitalization with net negative fluid balance around 3 2 L.  Initially required HFNC and was able to wean off  to 4-l nasal cannula.     Patient seen and examined at bedside on day of discharge.  Her vitals remained stable, remained asymptomatic.  Repeat chest x-ray with no pulmonary edema.  Euvolemic on exam.  She will be  discharged on Lasix 40 mg twice daily.    Case discussed with pulmonology Dr. Reyes recommended outpatient follow-up with pulmonology for sleep studies and CPAP, plan to repeat echocardiogram once he started on CPAP and need to follow-up with cardiology for possible right heart cath.  Discussed with patient in details she verbalized good understanding.      At the time of discharge patient remain hemodynamically stable ,asymptomatic ,labs remain unremarkable .  Patient will be discharged with close follow up with PCP, pulmonology, cardiology.Discharge plan was discussed with patient in details .  Patient agreed with discharge plan  and  all questions answered.      Therefore, she is discharged in good and stable condition to skilled nursing facility.    The patient met 2-midnight criteria for an inpatient stay at the time of discharge.    Discharge Date  3/7/2024'      DISCHARGE  DIAGNOSES  Active Problems:    Acute respiratory failure with hypoxia (HCC) (POA: Yes)    Essential hypertension (POA: Yes)    Diabetes (HCC) (POA: Yes)    Severe sepsis (HCC) (POA: Yes)    Atrial fibrillation with RVR (HCC) (POA: Yes)    Morbid (severe) obesity with alveolar hypoventilation (HCC) (POA: Yes)    RUTH (obstructive sleep apnea) (POA: Yes)    Mood disorder (HCC) (POA: Yes)    Elevated troponin (POA: Unknown)    Acute on chronic blood loss anemia (POA: Yes)    Pulmonary hypertension (HCC) (POA: Yes)  Resolved Problems:    Moderate to severe pulmonary hypertension (HCC) (POA: Yes)    COPD (chronic obstructive pulmonary disease) (HCC) (POA: Yes)    Cellulitis (POA: Unknown)    ACP (advance care planning) (POA: Unknown)      FOLLOW UP  No future appointments.  [address]  [facility] [address]  [phone]          MEDICATIONS ON DISCHARGE     Medication List        START taking these medications        Instructions   atorvastatin 40 MG Tabs  Commonly known as: Lipitor   Take 1 Tablet by mouth every evening for 30 days.  Dose: 40 mg     FeroSul 325 (65 Fe) MG tablet  Start taking on: March 8, 2024  Generic drug: ferrous sulfate   Take 1 Tablet by mouth every morning with breakfast for 30 days.  Dose: 325 mg     oxyCODONE immediate-release 5 MG Tabs  Commonly known as: Roxicodone   Take 1 Tablet by mouth every 8 hours as needed for Severe Pain for up to 5 days.  Dose: 5 mg            CONTINUE taking these medications        Instructions   ascorbic acid 500 MG Tabs  Commonly known as: Ascorbic Acid   Take 500 mg by mouth every day.  Dose: 500 mg     cyclobenzaprine 10 mg Tabs  Commonly known as: Flexeril   Take 10 mg by mouth 3 times a day as needed for Mild Pain or Muscle Spasms.  Dose: 10 mg     digoxin 125 MCG Tabs  Commonly known as: Lanoxin   Take 1 Tab by mouth every day at 6 PM.  Dose: 125 mcg     dilTIAZem  MG Cp24  Commonly known as: Cardizem CD    Take 1 Cap by mouth every day.  Dose: 360 mg     DULoxetine 30 MG Cpep  Commonly known as: Cymbalta   Take 30 mg by mouth every day.  Dose: 30 mg     FREESTYLE LITE strip  Generic drug: glucose blood   check BG In Vitro three times a day for 90 days     furosemide 40 MG Tabs  Commonly known as: Lasix   Take 1 Tablet by mouth every day.  Dose: 40 mg     glyBURIDE 5 MG Tabs  Commonly known as: Diabeta   Take 1 Tab by mouth every morning with breakfast.  Dose: 5 mg     insulin glargine 100 UNIT/ML injection PEN  Generic drug: insulin glargine   10 units Subcutaneous daily for 90 days     losartan-hydrochlorothiazide 100-25 MG per tablet  Commonly known as: Hyzaar   Take 1 Tablet by mouth every day. FOR 90 DAYS  Dose: 1 Tablet     magnesium oxide 400 MG Tabs tablet  Commonly known as: MAG-OX   Take 1 Tab by mouth 2 Times a Day.  Dose: 400 mg     nystatin powder  Commonly known as: Mycostatin   Apply to moist skin areas daily.     potassium chloride SA 20 MEQ Tbcr  Commonly known as: Kdur   Take 1 Tab by mouth every day.  Dose: 20 mEq     rivaroxaban 20 MG Tabs tablet  Commonly known as: Xarelto   Take 1 Tab by mouth with dinner.  Dose: 20 mg     tizanidine 4 MG Tabs  Commonly known as: Zanaflex   1 tablet as needed Orally twice a day as needed for 30 days     topiramate 25 MG Tabs  Commonly known as: Topamax   Take 25 mg by mouth 2 times a day.  Dose: 25 mg     vitamin D 1000 Unit (25 mcg) Tabs  Commonly known as: cholecalciferol   Take 1,000 Units by mouth every day.  Dose: 1,000 Units            STOP taking these medications      ibuprofen 600 MG Tabs  Commonly known as: Motrin              Allergies  Allergies   Allergen Reactions    Gabapentin Unspecified     Dizziness      Metformin Shortness of Breath and Rash     To face    Codeine Nausea       DIET  Orders Placed This Encounter   Procedures    Diet Order Diet: Consistent CHO (Diabetic); Second Modifier: (optional): 2 Gram Sodium; Fluid modifications: (optional):  1800 ml Fluid Restriction     Standing Status:   Standing     Number of Occurrences:   1     Order Specific Question:   Diet:     Answer:   Consistent CHO (Diabetic) [4]     Order Specific Question:   Second Modifier: (optional)     Answer:   2 Gram Sodium [7]     Order Specific Question:   Fluid modifications: (optional)     Answer:   1800 ml Fluid Restriction [10]       ACTIVITY  As tolerated.  Weight bearing as tolerated    CONSULTATIONS  Pulmonology     PROCEDURES  DX-CHEST-PORTABLE (1 VIEW)   Final Result      Resolved pulmonary edema.      EC-ECHOCARDIOGRAM COMPLETE W/O CONT   Final Result      US-RENU SINGLE LEVEL BILAT   Final Result      DX-CHEST-LIMITED (1 VIEW)   Final Result      1.  Cardiomegaly with mild pulmonary vascular congestion.      EC-ECHOCARDIOGRAM COMPLETE W/O CONT    (Results Pending)         LABORATORY  Lab Results   Component Value Date    SODIUM 137 03/07/2024    POTASSIUM 4.2 03/07/2024    CHLORIDE 96 03/07/2024    CO2 29 03/07/2024    GLUCOSE 202 (H) 03/07/2024    BUN 34 (H) 03/07/2024    CREATININE 0.77 03/07/2024        Lab Results   Component Value Date    WBC 8.7 03/06/2024    HEMOGLOBIN 11.4 (L) 03/06/2024    HEMATOCRIT 38.8 03/06/2024    PLATELETCT 325 03/06/2024        Total time of the discharge process exceeds 37  minutes.

## 2024-03-08 NOTE — DISCHARGE PLANNING
Percocet prescription was sent without a signuture.  Informed MD he is not available to print and sign another,  Patient will be sent to ER if requires pain meds.

## 2024-03-12 ENCOUNTER — TELEPHONE (OUTPATIENT)
Dept: HEALTH INFORMATION MANAGEMENT | Facility: OTHER | Age: 64
End: 2024-03-12
Payer: MEDICARE

## 2024-03-16 ENCOUNTER — HOSPITAL ENCOUNTER (INPATIENT)
Facility: MEDICAL CENTER | Age: 64
LOS: 6 days | DRG: 871 | End: 2024-03-22
Attending: EMERGENCY MEDICINE | Admitting: INTERNAL MEDICINE
Payer: MEDICARE

## 2024-03-16 ENCOUNTER — APPOINTMENT (OUTPATIENT)
Dept: RADIOLOGY | Facility: MEDICAL CENTER | Age: 64
DRG: 871 | End: 2024-03-16
Attending: EMERGENCY MEDICINE
Payer: MEDICARE

## 2024-03-16 ENCOUNTER — APPOINTMENT (OUTPATIENT)
Dept: RADIOLOGY | Facility: MEDICAL CENTER | Age: 64
DRG: 871 | End: 2024-03-16
Attending: INTERNAL MEDICINE
Payer: MEDICARE

## 2024-03-16 DIAGNOSIS — E87.1 HYPONATREMIA: ICD-10-CM

## 2024-03-16 DIAGNOSIS — I51.7 CARDIOMEGALY: ICD-10-CM

## 2024-03-16 DIAGNOSIS — B96.20 E COLI BACTEREMIA: ICD-10-CM

## 2024-03-16 DIAGNOSIS — R78.81 E COLI BACTEREMIA: ICD-10-CM

## 2024-03-16 DIAGNOSIS — G89.29 OTHER CHRONIC PAIN: ICD-10-CM

## 2024-03-16 DIAGNOSIS — I48.19 PERSISTENT ATRIAL FIBRILLATION (HCC): ICD-10-CM

## 2024-03-16 DIAGNOSIS — J18.9 PNEUMONIA DUE TO INFECTIOUS ORGANISM, UNSPECIFIED LATERALITY, UNSPECIFIED PART OF LUNG: ICD-10-CM

## 2024-03-16 PROBLEM — J96.20 ACUTE ON CHRONIC RESPIRATORY FAILURE (HCC): Status: ACTIVE | Noted: 2024-03-16

## 2024-03-16 PROBLEM — R65.21 SEPTIC SHOCK (HCC): Status: ACTIVE | Noted: 2024-02-20

## 2024-03-16 PROBLEM — E87.20 LACTIC ACIDOSIS: Status: ACTIVE | Noted: 2024-03-16

## 2024-03-16 PROBLEM — D72.829 LEUKOCYTOSIS: Status: ACTIVE | Noted: 2024-03-16

## 2024-03-16 LAB
ALBUMIN SERPL BCP-MCNC: 2.9 G/DL (ref 3.2–4.9)
ALBUMIN SERPL BCP-MCNC: 3.2 G/DL (ref 3.2–4.9)
ALBUMIN/GLOB SERPL: 0.9 G/DL
ALBUMIN/GLOB SERPL: 0.9 G/DL
ALP SERPL-CCNC: 110 U/L (ref 30–99)
ALP SERPL-CCNC: 83 U/L (ref 30–99)
ALT SERPL-CCNC: 10 U/L (ref 2–50)
ALT SERPL-CCNC: 9 U/L (ref 2–50)
ANION GAP SERPL CALC-SCNC: 12 MMOL/L (ref 7–16)
ANION GAP SERPL CALC-SCNC: 14 MMOL/L (ref 7–16)
ANISOCYTOSIS BLD QL SMEAR: ABNORMAL
ANISOCYTOSIS BLD QL SMEAR: ABNORMAL
ARTERIAL PATENCY WRIST A: ABNORMAL
AST SERPL-CCNC: 12 U/L (ref 12–45)
AST SERPL-CCNC: 24 U/L (ref 12–45)
BASE EXCESS BLDA CALC-SCNC: 2 MMOL/L (ref -4–3)
BASOPHILS # BLD AUTO: 0.9 % (ref 0–1.8)
BASOPHILS # BLD AUTO: 2.5 % (ref 0–1.8)
BASOPHILS # BLD: 0.08 K/UL (ref 0–0.12)
BASOPHILS # BLD: 0.15 K/UL (ref 0–0.12)
BILIRUB SERPL-MCNC: 0.9 MG/DL (ref 0.1–1.5)
BILIRUB SERPL-MCNC: 1 MG/DL (ref 0.1–1.5)
BODY TEMPERATURE: ABNORMAL DEGREES
BUN SERPL-MCNC: 26 MG/DL (ref 8–22)
BUN SERPL-MCNC: 27 MG/DL (ref 8–22)
CALCIUM ALBUM COR SERPL-MCNC: 9 MG/DL (ref 8.5–10.5)
CALCIUM ALBUM COR SERPL-MCNC: 9.3 MG/DL (ref 8.5–10.5)
CALCIUM SERPL-MCNC: 8.4 MG/DL (ref 8.5–10.5)
CALCIUM SERPL-MCNC: 8.4 MG/DL (ref 8.5–10.5)
CHLORIDE SERPL-SCNC: 93 MMOL/L (ref 96–112)
CHLORIDE SERPL-SCNC: 93 MMOL/L (ref 96–112)
CO2 BLDA-SCNC: 31 MMOL/L (ref 20–33)
CO2 SERPL-SCNC: 27 MMOL/L (ref 20–33)
CO2 SERPL-SCNC: 30 MMOL/L (ref 20–33)
COMMENT NL1176: NORMAL
CREAT SERPL-MCNC: 1.34 MG/DL (ref 0.5–1.4)
CREAT SERPL-MCNC: 1.38 MG/DL (ref 0.5–1.4)
DELSYS IDSYS: ABNORMAL
DIGOXIN SERPL-MCNC: 0.8 NG/ML (ref 0.8–2)
EOSINOPHIL # BLD AUTO: 0 K/UL (ref 0–0.51)
EOSINOPHIL # BLD AUTO: 0.03 K/UL (ref 0–0.51)
EOSINOPHIL NFR BLD: 0 % (ref 0–6.9)
EOSINOPHIL NFR BLD: 0.9 % (ref 0–6.9)
ERYTHROCYTE [DISTWIDTH] IN BLOOD BY AUTOMATED COUNT: 78.8 FL (ref 35.9–50)
ERYTHROCYTE [DISTWIDTH] IN BLOOD BY AUTOMATED COUNT: 79.2 FL (ref 35.9–50)
FLUAV RNA SPEC QL NAA+PROBE: NEGATIVE
FLUBV RNA SPEC QL NAA+PROBE: NEGATIVE
GFR SERPLBLD CREATININE-BSD FMLA CKD-EPI: 43 ML/MIN/1.73 M 2
GFR SERPLBLD CREATININE-BSD FMLA CKD-EPI: 45 ML/MIN/1.73 M 2
GLOBULIN SER CALC-MCNC: 3.4 G/DL (ref 1.9–3.5)
GLOBULIN SER CALC-MCNC: 3.4 G/DL (ref 1.9–3.5)
GLUCOSE BLD STRIP.AUTO-MCNC: 131 MG/DL (ref 65–99)
GLUCOSE BLD STRIP.AUTO-MCNC: 194 MG/DL (ref 65–99)
GLUCOSE SERPL-MCNC: 139 MG/DL (ref 65–99)
GLUCOSE SERPL-MCNC: 161 MG/DL (ref 65–99)
HCO3 BLDA-SCNC: 29.5 MMOL/L (ref 17–25)
HCT VFR BLD AUTO: 37 % (ref 37–47)
HCT VFR BLD AUTO: 37.5 % (ref 37–47)
HGB BLD-MCNC: 10.8 G/DL (ref 12–16)
HGB BLD-MCNC: 11.1 G/DL (ref 12–16)
HYPOCHROMIA BLD QL SMEAR: ABNORMAL
HYPOCHROMIA BLD QL SMEAR: ABNORMAL
LACTATE BLD-SCNC: 1.8 MMOL/L (ref 0.5–2)
LACTATE SERPL-SCNC: 1.9 MMOL/L (ref 0.5–2)
LACTATE SERPL-SCNC: 2 MMOL/L (ref 0.5–2)
LACTATE SERPL-SCNC: 2.8 MMOL/L (ref 0.5–2)
LACTATE SERPL-SCNC: 3.7 MMOL/L (ref 0.5–2)
LPM ILPM: 13 LPM
LYMPHOCYTES # BLD AUTO: 1.46 K/UL (ref 1–4.8)
LYMPHOCYTES # BLD AUTO: 1.52 K/UL (ref 1–4.8)
LYMPHOCYTES NFR BLD: 47.5 % (ref 22–41)
LYMPHOCYTES NFR BLD: 8.6 % (ref 22–41)
MAGNESIUM SERPL-MCNC: 1.6 MG/DL (ref 1.5–2.5)
MANUAL DIFF BLD: NORMAL
MANUAL DIFF BLD: NORMAL
MCH RBC QN AUTO: 25.2 PG (ref 27–33)
MCH RBC QN AUTO: 25.2 PG (ref 27–33)
MCHC RBC AUTO-ENTMCNC: 29.2 G/DL (ref 32.2–35.5)
MCHC RBC AUTO-ENTMCNC: 29.6 G/DL (ref 32.2–35.5)
MCV RBC AUTO: 85 FL (ref 81.4–97.8)
MCV RBC AUTO: 86.4 FL (ref 81.4–97.8)
METAMYELOCYTES NFR BLD MANUAL: 0.8 %
METAMYELOCYTES NFR BLD MANUAL: 0.9 %
MICROCYTES BLD QL SMEAR: ABNORMAL
MICROCYTES BLD QL SMEAR: ABNORMAL
MONOCYTES # BLD AUTO: 0 K/UL (ref 0–0.85)
MONOCYTES # BLD AUTO: 0.73 K/UL (ref 0–0.85)
MONOCYTES NFR BLD AUTO: 0 % (ref 0–13.4)
MONOCYTES NFR BLD AUTO: 4.3 % (ref 0–13.4)
MORPHOLOGY BLD-IMP: NORMAL
MORPHOLOGY BLD-IMP: NORMAL
MYELOCYTES NFR BLD MANUAL: 0.8 %
NEUTROPHILS # BLD AUTO: 1.52 K/UL (ref 1.82–7.42)
NEUTROPHILS # BLD AUTO: 14.5 K/UL (ref 1.82–7.42)
NEUTROPHILS NFR BLD: 44.1 % (ref 44–72)
NEUTROPHILS NFR BLD: 79.3 % (ref 44–72)
NEUTS BAND NFR BLD MANUAL: 3.4 % (ref 0–10)
NEUTS BAND NFR BLD MANUAL: 6 % (ref 0–10)
NRBC # BLD AUTO: 0 K/UL
NRBC # BLD AUTO: 0.02 K/UL
NRBC BLD-RTO: 0 /100 WBC (ref 0–0.2)
NRBC BLD-RTO: 0.6 /100 WBC (ref 0–0.2)
PCO2 BLDA: 59.7 MMHG (ref 26–37)
PH BLDA: 7.3 [PH] (ref 7.4–7.5)
PHOSPHATE SERPL-MCNC: 4.4 MG/DL (ref 2.5–4.5)
PLATELET # BLD AUTO: 247 K/UL (ref 164–446)
PLATELET # BLD AUTO: 294 K/UL (ref 164–446)
PLATELET BLD QL SMEAR: NORMAL
PLATELET BLD QL SMEAR: NORMAL
PMV BLD AUTO: 9.4 FL (ref 9–12.9)
PMV BLD AUTO: 9.6 FL (ref 9–12.9)
PO2 BLDA: 69 MMHG (ref 64–87)
POIKILOCYTOSIS BLD QL SMEAR: NORMAL
POTASSIUM SERPL-SCNC: 3.6 MMOL/L (ref 3.6–5.5)
POTASSIUM SERPL-SCNC: 4 MMOL/L (ref 3.6–5.5)
PROT SERPL-MCNC: 6.3 G/DL (ref 6–8.2)
PROT SERPL-MCNC: 6.6 G/DL (ref 6–8.2)
RBC # BLD AUTO: 4.28 M/UL (ref 4.2–5.4)
RBC # BLD AUTO: 4.41 M/UL (ref 4.2–5.4)
RBC BLD AUTO: PRESENT
RBC BLD AUTO: PRESENT
RSV RNA SPEC QL NAA+PROBE: NEGATIVE
SAO2 % BLDA: 91 % (ref 93–99)
SARS-COV-2 RNA RESP QL NAA+PROBE: NOTDETECTED
SCCMEC + MECA PNL NOSE NAA+PROBE: NEGATIVE
SODIUM SERPL-SCNC: 134 MMOL/L (ref 135–145)
SODIUM SERPL-SCNC: 135 MMOL/L (ref 135–145)
SPECIMEN DRAWN FROM PATIENT: ABNORMAL
STOMATOCYTES BLD QL SMEAR: NORMAL
WBC # BLD AUTO: 17 K/UL (ref 4.8–10.8)
WBC # BLD AUTO: 3.2 K/UL (ref 4.8–10.8)
WBC TOXIC VACUOLES BLD QL SMEAR: NORMAL

## 2024-03-16 PROCEDURE — 71045 X-RAY EXAM CHEST 1 VIEW: CPT

## 2024-03-16 PROCEDURE — 84100 ASSAY OF PHOSPHORUS: CPT

## 2024-03-16 PROCEDURE — 96375 TX/PRO/DX INJ NEW DRUG ADDON: CPT

## 2024-03-16 PROCEDURE — 99285 EMERGENCY DEPT VISIT HI MDM: CPT

## 2024-03-16 PROCEDURE — 87015 SPECIMEN INFECT AGNT CONCNTJ: CPT

## 2024-03-16 PROCEDURE — 700105 HCHG RX REV CODE 258: Performed by: INTERNAL MEDICINE

## 2024-03-16 PROCEDURE — 87641 MR-STAPH DNA AMP PROBE: CPT

## 2024-03-16 PROCEDURE — 700111 HCHG RX REV CODE 636 W/ 250 OVERRIDE (IP): Mod: JZ | Performed by: EMERGENCY MEDICINE

## 2024-03-16 PROCEDURE — 0241U HCHG SARS-COV-2 COVID-19 NFCT DS RESP RNA 4 TRGT ED POC: CPT

## 2024-03-16 PROCEDURE — 700111 HCHG RX REV CODE 636 W/ 250 OVERRIDE (IP): Performed by: INTERNAL MEDICINE

## 2024-03-16 PROCEDURE — 96365 THER/PROPH/DIAG IV INF INIT: CPT

## 2024-03-16 PROCEDURE — 96367 TX/PROPH/DG ADDL SEQ IV INF: CPT

## 2024-03-16 PROCEDURE — 99291 CRITICAL CARE FIRST HOUR: CPT | Performed by: INTERNAL MEDICINE

## 2024-03-16 PROCEDURE — 700102 HCHG RX REV CODE 250 W/ 637 OVERRIDE(OP): Performed by: INTERNAL MEDICINE

## 2024-03-16 PROCEDURE — 80162 ASSAY OF DIGOXIN TOTAL: CPT

## 2024-03-16 PROCEDURE — 83735 ASSAY OF MAGNESIUM: CPT

## 2024-03-16 PROCEDURE — A9270 NON-COVERED ITEM OR SERVICE: HCPCS | Performed by: INTERNAL MEDICINE

## 2024-03-16 PROCEDURE — 700105 HCHG RX REV CODE 258: Performed by: EMERGENCY MEDICINE

## 2024-03-16 PROCEDURE — 770000 HCHG ROOM/CARE - INTERMEDIATE ICU *

## 2024-03-16 PROCEDURE — 36415 COLL VENOUS BLD VENIPUNCTURE: CPT

## 2024-03-16 PROCEDURE — 85007 BL SMEAR W/DIFF WBC COUNT: CPT

## 2024-03-16 PROCEDURE — 80053 COMPREHEN METABOLIC PANEL: CPT

## 2024-03-16 PROCEDURE — 82803 BLOOD GASES ANY COMBINATION: CPT

## 2024-03-16 PROCEDURE — 85027 COMPLETE CBC AUTOMATED: CPT

## 2024-03-16 PROCEDURE — 87186 SC STD MICRODIL/AGAR DIL: CPT

## 2024-03-16 PROCEDURE — 87040 BLOOD CULTURE FOR BACTERIA: CPT

## 2024-03-16 PROCEDURE — 83605 ASSAY OF LACTIC ACID: CPT

## 2024-03-16 PROCEDURE — 87077 CULTURE AEROBIC IDENTIFY: CPT

## 2024-03-16 PROCEDURE — 82962 GLUCOSE BLOOD TEST: CPT

## 2024-03-16 RX ORDER — OXYCODONE HYDROCHLORIDE 5 MG/1
5 TABLET ORAL EVERY 8 HOURS PRN
Status: ON HOLD | COMMUNITY
End: 2024-03-22

## 2024-03-16 RX ORDER — LINEZOLID 2 MG/ML
600 INJECTION, SOLUTION INTRAVENOUS ONCE
Status: COMPLETED | OUTPATIENT
Start: 2024-03-16 | End: 2024-03-16

## 2024-03-16 RX ORDER — ONDANSETRON 4 MG/1
4 TABLET, ORALLY DISINTEGRATING ORAL EVERY 4 HOURS PRN
Status: DISCONTINUED | OUTPATIENT
Start: 2024-03-16 | End: 2024-03-22 | Stop reason: HOSPADM

## 2024-03-16 RX ORDER — DILTIAZEM HYDROCHLORIDE EXTENDED-RELEASE TABLETS 360 MG/1
360 TABLET, EXTENDED RELEASE ORAL DAILY
Status: ON HOLD | COMMUNITY
End: 2024-03-22

## 2024-03-16 RX ORDER — ACETAMINOPHEN 325 MG/1
650 TABLET ORAL EVERY 6 HOURS PRN
Status: DISCONTINUED | OUTPATIENT
Start: 2024-03-16 | End: 2024-03-22 | Stop reason: HOSPADM

## 2024-03-16 RX ORDER — DEXTROSE MONOHYDRATE 25 G/50ML
25 INJECTION, SOLUTION INTRAVENOUS
Status: DISCONTINUED | OUTPATIENT
Start: 2024-03-16 | End: 2024-03-19

## 2024-03-16 RX ORDER — CEFTRIAXONE 2 G/1
2000 INJECTION, POWDER, FOR SOLUTION INTRAMUSCULAR; INTRAVENOUS ONCE
Status: COMPLETED | OUTPATIENT
Start: 2024-03-16 | End: 2024-03-16

## 2024-03-16 RX ORDER — HYDROMORPHONE HYDROCHLORIDE 1 MG/ML
0.25 INJECTION, SOLUTION INTRAMUSCULAR; INTRAVENOUS; SUBCUTANEOUS
Status: DISCONTINUED | OUTPATIENT
Start: 2024-03-16 | End: 2024-03-22 | Stop reason: HOSPADM

## 2024-03-16 RX ORDER — PREGABALIN 25 MG/1
25 CAPSULE ORAL 3 TIMES DAILY
Status: DISCONTINUED | OUTPATIENT
Start: 2024-03-16 | End: 2024-03-22 | Stop reason: HOSPADM

## 2024-03-16 RX ORDER — PROMETHAZINE HYDROCHLORIDE 25 MG/1
12.5-25 SUPPOSITORY RECTAL EVERY 4 HOURS PRN
Status: DISCONTINUED | OUTPATIENT
Start: 2024-03-16 | End: 2024-03-22 | Stop reason: HOSPADM

## 2024-03-16 RX ORDER — OXYCODONE HYDROCHLORIDE 5 MG/1
2.5 TABLET ORAL
Status: DISCONTINUED | OUTPATIENT
Start: 2024-03-16 | End: 2024-03-22 | Stop reason: HOSPADM

## 2024-03-16 RX ORDER — OXYCODONE HYDROCHLORIDE 5 MG/1
5 TABLET ORAL
Status: DISCONTINUED | OUTPATIENT
Start: 2024-03-16 | End: 2024-03-22 | Stop reason: HOSPADM

## 2024-03-16 RX ORDER — SODIUM CHLORIDE, SODIUM LACTATE, POTASSIUM CHLORIDE, AND CALCIUM CHLORIDE .6; .31; .03; .02 G/100ML; G/100ML; G/100ML; G/100ML
500 INJECTION, SOLUTION INTRAVENOUS
Status: DISCONTINUED | OUTPATIENT
Start: 2024-03-16 | End: 2024-03-22 | Stop reason: HOSPADM

## 2024-03-16 RX ORDER — PREGABALIN 25 MG/1
25 CAPSULE ORAL 3 TIMES DAILY
COMMUNITY

## 2024-03-16 RX ORDER — DEXTROSE MONOHYDRATE 50 MG/ML
INJECTION, SOLUTION INTRAVENOUS CONTINUOUS
Status: DISCONTINUED | OUTPATIENT
Start: 2024-03-16 | End: 2024-03-17

## 2024-03-16 RX ORDER — LINEZOLID 2 MG/ML
600 INJECTION, SOLUTION INTRAVENOUS EVERY 12 HOURS
Qty: 3600 ML | Refills: 0 | Status: DISCONTINUED | OUTPATIENT
Start: 2024-03-16 | End: 2024-03-16

## 2024-03-16 RX ORDER — ONDANSETRON 2 MG/ML
4 INJECTION INTRAMUSCULAR; INTRAVENOUS EVERY 4 HOURS PRN
Status: DISCONTINUED | OUTPATIENT
Start: 2024-03-16 | End: 2024-03-22 | Stop reason: HOSPADM

## 2024-03-16 RX ORDER — DILTIAZEM HYDROCHLORIDE EXTENDED-RELEASE TABLETS 360 MG/1
360 TABLET, EXTENDED RELEASE ORAL DAILY
Status: SHIPPED | COMMUNITY
End: 2024-03-16

## 2024-03-16 RX ORDER — NOREPINEPHRINE BITARTRATE 0.03 MG/ML
0-1 INJECTION, SOLUTION INTRAVENOUS CONTINUOUS
Status: DISCONTINUED | OUTPATIENT
Start: 2024-03-16 | End: 2024-03-16

## 2024-03-16 RX ORDER — SODIUM CHLORIDE, SODIUM LACTATE, POTASSIUM CHLORIDE, CALCIUM CHLORIDE 600; 310; 30; 20 MG/100ML; MG/100ML; MG/100ML; MG/100ML
INJECTION, SOLUTION INTRAVENOUS CONTINUOUS
Status: DISCONTINUED | OUTPATIENT
Start: 2024-03-16 | End: 2024-03-16

## 2024-03-16 RX ORDER — PROMETHAZINE HYDROCHLORIDE 25 MG/1
12.5-25 TABLET ORAL EVERY 4 HOURS PRN
Status: DISCONTINUED | OUTPATIENT
Start: 2024-03-16 | End: 2024-03-22 | Stop reason: HOSPADM

## 2024-03-16 RX ORDER — DULOXETIN HYDROCHLORIDE 30 MG/1
30 CAPSULE, DELAYED RELEASE ORAL DAILY
Status: DISCONTINUED | OUTPATIENT
Start: 2024-03-16 | End: 2024-03-22 | Stop reason: HOSPADM

## 2024-03-16 RX ORDER — SODIUM CHLORIDE 9 MG/ML
30 INJECTION, SOLUTION INTRAVENOUS ONCE
Status: COMPLETED | OUTPATIENT
Start: 2024-03-16 | End: 2024-03-16

## 2024-03-16 RX ORDER — PROCHLORPERAZINE EDISYLATE 5 MG/ML
5-10 INJECTION INTRAMUSCULAR; INTRAVENOUS EVERY 4 HOURS PRN
Status: DISCONTINUED | OUTPATIENT
Start: 2024-03-16 | End: 2024-03-22 | Stop reason: HOSPADM

## 2024-03-16 RX ORDER — SODIUM CHLORIDE, SODIUM LACTATE, POTASSIUM CHLORIDE, CALCIUM CHLORIDE 600; 310; 30; 20 MG/100ML; MG/100ML; MG/100ML; MG/100ML
INJECTION, SOLUTION INTRAVENOUS CONTINUOUS
Status: DISCONTINUED | OUTPATIENT
Start: 2024-03-16 | End: 2024-03-17

## 2024-03-16 RX ORDER — ACETAMINOPHEN 500 MG
1000 TABLET ORAL EVERY 6 HOURS PRN
COMMUNITY
End: 2024-03-26

## 2024-03-16 RX ORDER — CYCLOBENZAPRINE HCL 10 MG
10 TABLET ORAL 3 TIMES DAILY PRN
Status: DISCONTINUED | OUTPATIENT
Start: 2024-03-16 | End: 2024-03-22 | Stop reason: HOSPADM

## 2024-03-16 RX ORDER — DIGOXIN 125 MCG
125 TABLET ORAL DAILY
Status: DISCONTINUED | OUTPATIENT
Start: 2024-03-16 | End: 2024-03-20

## 2024-03-16 RX ORDER — DILTIAZEM HYDROCHLORIDE 360 MG/1
360 CAPSULE, EXTENDED RELEASE ORAL DAILY
Status: DISCONTINUED | OUTPATIENT
Start: 2024-03-16 | End: 2024-03-17

## 2024-03-16 RX ORDER — DIGOXIN 125 MCG
125 TABLET ORAL DAILY
Status: DISCONTINUED | OUTPATIENT
Start: 2024-03-16 | End: 2024-03-16

## 2024-03-16 RX ADMIN — PREGABALIN 25 MG: 25 CAPSULE ORAL at 17:10

## 2024-03-16 RX ADMIN — SODIUM CHLORIDE, POTASSIUM CHLORIDE, SODIUM LACTATE AND CALCIUM CHLORIDE: 600; 310; 30; 20 INJECTION, SOLUTION INTRAVENOUS at 13:02

## 2024-03-16 RX ADMIN — LINEZOLID 600 MG: 600 INJECTION, SOLUTION INTRAVENOUS at 11:25

## 2024-03-16 RX ADMIN — AMIODARONE HYDROCHLORIDE 150 MG: 1.5 INJECTION, SOLUTION INTRAVENOUS at 17:43

## 2024-03-16 RX ADMIN — AMPICILLIN AND SULBACTAM 3 G: 1; 2 INJECTION, POWDER, FOR SOLUTION INTRAMUSCULAR; INTRAVENOUS at 17:11

## 2024-03-16 RX ADMIN — AMPICILLIN AND SULBACTAM 3 G: 1; 2 INJECTION, POWDER, FOR SOLUTION INTRAMUSCULAR; INTRAVENOUS at 12:57

## 2024-03-16 RX ADMIN — CEFTRIAXONE SODIUM 2000 MG: 2 INJECTION, POWDER, FOR SOLUTION INTRAMUSCULAR; INTRAVENOUS at 09:35

## 2024-03-16 RX ADMIN — RIVAROXABAN 20 MG: 20 TABLET, FILM COATED ORAL at 17:10

## 2024-03-16 RX ADMIN — ONDANSETRON 4 MG: 2 INJECTION INTRAMUSCULAR; INTRAVENOUS at 15:51

## 2024-03-16 RX ADMIN — DILTIAZEM HYDROCHLORIDE 360 MG: 360 CAPSULE, EXTENDED RELEASE ORAL at 16:42

## 2024-03-16 RX ADMIN — DEXTROSE MONOHYDRATE: 50 INJECTION, SOLUTION INTRAVENOUS at 17:44

## 2024-03-16 RX ADMIN — INSULIN HUMAN 1 UNITS: 100 INJECTION, SOLUTION PARENTERAL at 21:33

## 2024-03-16 RX ADMIN — SODIUM CHLORIDE 1848 ML: 9 INJECTION, SOLUTION INTRAVENOUS at 09:12

## 2024-03-16 RX ADMIN — CYCLOBENZAPRINE 10 MG: 10 TABLET, FILM COATED ORAL at 17:35

## 2024-03-16 RX ADMIN — AMIODARONE HYDROCHLORIDE 1 MG/MIN: 1.8 INJECTION, SOLUTION INTRAVENOUS at 18:29

## 2024-03-16 RX ADMIN — OXYCODONE 5 MG: 5 TABLET ORAL at 15:52

## 2024-03-16 RX ADMIN — OXYCODONE 5 MG: 5 TABLET ORAL at 20:10

## 2024-03-16 RX ADMIN — DULOXETINE HYDROCHLORIDE 30 MG: 30 CAPSULE, DELAYED RELEASE ORAL at 12:56

## 2024-03-16 RX ADMIN — PIPERACILLIN AND TAZOBACTAM 3.38 G: 3; .375 INJECTION, POWDER, FOR SOLUTION INTRAVENOUS at 10:51

## 2024-03-16 RX ADMIN — PREGABALIN 25 MG: 25 CAPSULE ORAL at 12:56

## 2024-03-16 RX ADMIN — DIGOXIN 125 MCG: 0.12 TABLET ORAL at 16:17

## 2024-03-16 RX ADMIN — SODIUM CHLORIDE, POTASSIUM CHLORIDE, SODIUM LACTATE AND CALCIUM CHLORIDE: 600; 310; 30; 20 INJECTION, SOLUTION INTRAVENOUS at 17:46

## 2024-03-16 ASSESSMENT — ENCOUNTER SYMPTOMS
FOCAL WEAKNESS: 0
NAUSEA: 1
PHOTOPHOBIA: 0
CONSTIPATION: 0
FEVER: 0
BACK PAIN: 0
DOUBLE VISION: 0
ORTHOPNEA: 0
NECK PAIN: 0
TREMORS: 0
MYALGIAS: 0
COUGH: 0
DIZZINESS: 0
DIARRHEA: 0
CHILLS: 0
SENSORY CHANGE: 0
SHORTNESS OF BREATH: 0
VOMITING: 1
TINGLING: 0
EYE PAIN: 0
WEIGHT LOSS: 0
HEADACHES: 1
SPUTUM PRODUCTION: 0
SPEECH CHANGE: 0
HALLUCINATIONS: 0
ABDOMINAL PAIN: 1
BLURRED VISION: 0
PALPITATIONS: 0

## 2024-03-16 ASSESSMENT — FIBROSIS 4 INDEX
FIB4 SCORE: 0.48
FIB4 SCORE: 0.8
FIB4 SCORE: 2.01

## 2024-03-16 ASSESSMENT — PAIN DESCRIPTION - PAIN TYPE
TYPE: ACUTE PAIN

## 2024-03-16 ASSESSMENT — LIFESTYLE VARIABLES: SUBSTANCE_ABUSE: 0

## 2024-03-16 NOTE — PROGRESS NOTES
Pt arrived to unit via gurney at 1415. Pt is A&0x4, pt is on 10 L oxymask, SPO2% 96%. Pt required 4 RN's to turn patient. 4 eyes skin assessment was completed upon arrival. Pt was oriented to room, unit, and plan of care. Tele-monitor placed and monitor room notified. All questions answered at this time. Call light within reach; fall precautions in place.     Gamaliel bed was ordered but none were available.

## 2024-03-16 NOTE — ED NOTES
Rounded on patient. She remains alert and oriented. She complains of no pain at this time. Remains on 8L oxymask.

## 2024-03-16 NOTE — H&P
Hospital Medicine History & Physical Note    Date of Service  3/16/2024    Primary Care Physician  Mihcael Melvin P.A.-C.    Consultants  None    Specialist Names: N/A    Code Status  Prior    Chief Complaint  Chief Complaint   Patient presents with    Nausea/Vomiting/Diarrhea     BIB EMS from [facility]. Staff worried about recent episodes of N/V. Patient states she had 2 episodes of vomiting. She states it has been mostly food and water.     Shortness of Breath     Patient is on 2L baseline. Patient hypoxic at that level. Needs 6L to maintain SPO2 >90%       History of Presenting Illness    Miroslava Matta is a 63 y.o. female with past medical history of chronic atrial fibrillation on Xarelto, congestive heart failure, diabetes, hyperlipidemia, COPD on 4 L of oxygen, hypertension, RUTH and mood disorder who presented to the hospital on 3/16/2024 with complaint of nausea and vomiting and shortness of breath.  She reported that she had she had 1 episode of vomiting last night and 1 episode of vomiting this morning.  She is currently resident at [facility] and they checked her pulse ox and she found to hypoxia and patient transferred to [facility].  On my evaluation she expressed that she is feeling better but she has been having nausea and vomiting and overall she is not feeling well.  There is no specific aggravating or elevating factors.  She reported associated symptoms of headache and mild abdominal pain.  There is no other associated symptoms or any other acute complaints.  She has been taking her medications as prescribed.      ER course: Patient found to have significant hypotension and she was diagnosed with septic shock secondary to pneumonia.  ER physician consulted with intensivist Dr. Daniels and he recommended IMCU admission.  She found to have significant hypotension, lactic acidosis and acute on chronic hypoxia.    I discussed plan of care with the ER physician Dr. Verma.    I  discussed the plan of care with patient and bedside RN.    Review of Systems  Review of Systems   Constitutional:  Positive for malaise/fatigue. Negative for chills, fever and weight loss.   HENT:  Negative for hearing loss and tinnitus.    Eyes:  Negative for blurred vision, double vision, photophobia and pain.   Respiratory:  Negative for cough, sputum production and shortness of breath.    Cardiovascular:  Negative for chest pain, palpitations, orthopnea and leg swelling.   Gastrointestinal:  Positive for abdominal pain, nausea and vomiting. Negative for constipation and diarrhea.   Genitourinary:  Negative for dysuria, frequency and urgency.   Musculoskeletal:  Negative for back pain, joint pain, myalgias and neck pain.   Skin:  Negative for rash.   Neurological:  Positive for headaches. Negative for dizziness, tingling, tremors, sensory change, speech change and focal weakness.   Psychiatric/Behavioral:  Negative for hallucinations and substance abuse.    All other systems reviewed and are negative.      Past Medical History   has a past medical history of Arthritis, Back pain, COPD (chronic obstructive pulmonary disease) ([facility]) (5/16/2018), Diabetes ([facility]), Fall, Fungal rash of torso (5/15/2018), Hypertension, Morbid obesity with BMI of 60.0-69.9, adult ([facility]) (5/17/2018), New onset atrial fibrillation ([facility]) (5/15/2018), Psychiatric problem, and Urinary incontinence.    Surgical History   has no past surgical history on file.     Family History  family history is not on file.   Family history reviewed with patient. There is no family history that is pertinent to the chief complaint.     Social History   reports that she has quit smoking. She has never used smokeless tobacco. She reports current alcohol use. She reports that she does not use drugs.    Allergies  Allergies   Allergen Reactions    Gabapentin Unspecified     Dizziness      Metformin Shortness of Breath and Rash     To face    Codeine Nausea        Medications  Prior to Admission Medications   Prescriptions Last Dose Informant Patient Reported? Taking?   DILTIAZem CD (CARDIZEM CD) 360 MG CAPSULE SR 24 HR   No No   Sig: Take 1 Cap by mouth every day.   DULoxetine (CYMBALTA) 30 MG Cap DR Particles  Patient Yes No   Sig: Take 30 mg by mouth every day.   ascorbic acid (ASCORBIC ACID) 500 MG Tab   Yes No   Sig: Take 500 mg by mouth every day.   atorvastatin (LIPITOR) 40 MG Tab   No No   Sig: Take 1 Tablet by mouth every evening for 30 days.   cyclobenzaprine (FLEXERIL) 10 MG Tab  Patient Yes No   Sig: Take 10 mg by mouth 3 times a day as needed for Mild Pain or Muscle Spasms.   digoxin (LANOXIN) 125 MCG Tab   No No   Sig: Take 1 Tab by mouth every day at 6 PM.   ferrous sulfate 325 (65 Fe) MG tablet   No No   Sig: Take 1 Tablet by mouth every morning with breakfast for 30 days.   furosemide (LASIX) 40 MG Tab   No No   Sig: Take 1 Tablet by mouth every 12 hours for 30 days.   glucose blood (FREESTYLE LITE) strip   Yes No   Sig: check BG In Vitro three times a day for 90 days   glyBURIDE (DIABETA) 5 MG Tab   No No   Sig: Take 1 Tab by mouth every morning with breakfast.   insulin glargine 100 UNIT/ML Solution Pen-injector injection   Yes No   Sig: 10 units Subcutaneous daily for 90 days   losartan-hydrochlorothiazide (HYZAAR) 100-25 MG per tablet   Yes No   Sig: Take 1 Tablet by mouth every day. FOR 90 DAYS   magnesium oxide (MAG-OX) 400 (241.3 Mg) MG Tab tablet   No No   Sig: Take 1 Tab by mouth 2 Times a Day.   nystatin (MYCOSTATIN) powder   No No   Sig: Apply to moist skin areas daily.   potassium chloride SA (KDUR) 20 MEQ Tab CR   No No   Sig: Take 1 Tab by mouth every day.   rivaroxaban (XARELTO) 20 MG Tab tablet   No No   Sig: Take 1 Tab by mouth with dinner.   tizanidine (ZANAFLEX) 4 MG Tab   Yes No   Si tablet as needed Orally twice a day as needed for 30 days   topiramate (TOPAMAX) 25 MG Tab  Patient Yes No   Sig: Take 25 mg by mouth 2 times a day.    vitamin D (CHOLECALCIFEROL) 1000 UNIT Tab   Yes No   Sig: Take 1,000 Units by mouth every day.      Facility-Administered Medications: None       Physical Exam  Pulse:  [108-138] 111  Resp:  [14-21] 16  BP: ()/(30-60) 116/57  SpO2:  [85 %-95 %] 94 %  Blood Pressure: 116/57       Pulse: (!) 111   Respiration: 16   Pulse Oximetry: 94 %       Physical Exam  Vitals reviewed.   Constitutional:       General: She is not in acute distress.     Appearance: Normal appearance. She is obese. She is not ill-appearing.   HENT:      Head: Normocephalic and atraumatic.      Nose: No congestion.      Mouth/Throat:      Comments: OxyMask  Eyes:      General:         Right eye: No discharge.         Left eye: No discharge.      Pupils: Pupils are equal, round, and reactive to light.   Cardiovascular:      Rate and Rhythm: Tachycardia present. Rhythm irregular.      Pulses: Normal pulses.      Heart sounds: Normal heart sounds. No murmur heard.  Pulmonary:      Effort: Respiratory distress present.      Breath sounds: No stridor. Rhonchi present.   Abdominal:      General: Bowel sounds are normal. There is no distension.      Palpations: Abdomen is soft.      Tenderness: There is no abdominal tenderness.   Musculoskeletal:         General: No swelling or tenderness. Normal range of motion.      Cervical back: Normal range of motion. No rigidity.   Skin:     General: Skin is warm.      Capillary Refill: Capillary refill takes less than 2 seconds.      Coloration: Skin is not jaundiced or pale.      Findings: No bruising.   Neurological:      General: No focal deficit present.      Mental Status: She is alert and oriented to person, place, and time.      Cranial Nerves: No cranial nerve deficit.      Comments: Decreased strength in bilateral extremities.   Psychiatric:         Mood and Affect: Mood normal.         Behavior: Behavior normal.         Laboratory:  Recent Labs     03/16/24  0841   WBC 17.0*   RBC 4.28   HEMOGLOBIN  "10.8*   HEMATOCRIT 37.0   MCV 86.4   MCH 25.2*   MCHC 29.2*   RDW 79.2*   PLATELETCT 294   MPV 9.4     Recent Labs     03/16/24  0841   SODIUM 135   POTASSIUM 3.6   CHLORIDE 93*   CO2 30   GLUCOSE 161*   BUN 26*   CREATININE 1.38   CALCIUM 8.4*     Recent Labs     03/16/24  0841   ALTSGPT 10   ASTSGOT 12   ALKPHOSPHAT 83   TBILIRUBIN 0.9   GLUCOSE 161*         No results for input(s): \"NTPROBNP\" in the last 72 hours.      No results for input(s): \"TROPONINT\" in the last 72 hours.    Imaging:  DX-CHEST-PORTABLE (1 VIEW)   Final Result      1.  Patchy RIGHT lung base infiltrate concerning for developing pneumonia.   2.  Slight increased inflation from prior exam.   3.  No other significant change.             X-Ray:  My impression is: Chest x-ray showed right lung base infiltrate.  No significant pleural effusion.    Assessment/Plan:  Justification for Admission Status  I anticipate this patient will require at least two midnights for appropriate medical management, necessitating inpatient admission because septic shock that required administration of IV antibiotics follow-up on culture results and close monitoring for blood pressure.    Patient will need a ICU (Adult and Pediatrics) bed on EMERGENCY service .  The need is secondary to septic shock.    * Acute on chronic respiratory failure (HCC)- (present on admission)  Assessment & Plan  Acute on chronic respiratory failure with hypoxia most likely secondary to pneumonia.  Patient baseline oxygen is 4 L and she was placed on 6 L of OxyMask due to hypoxia  She is on Xarelto and she has been taking her medications as prescribed.  I am not ordering CTA pulmonary as she is on full anticoagulation and she is finding of infiltrate on her chest x-ray.  Plan is to admit her to IMCU.    Leukocytosis  Assessment & Plan  Secondary to pneumonia.  Ordered CBC for tomorrow.    Lactic acidosis  Assessment & Plan  Secondary to sepsis.    Advance care planning- (present on " admission)  Assessment & Plan  I discussed CODE STATUS with her and after discussion she would like to be full code.  As per patient wishes I placed full code orders.    Septic shock (HCC)- (present on admission)  Assessment & Plan  This is Septic shock Present on admission  SIRS criteria identified on my evaluation include: Tachycardia, with heart rate greater than 90 BPM, Tachypnea, with respirations greater than 20 per minute, and Leukocytosis, with WBC greater than 12,000  Clinical indicators of end organ dysfunction include Hypotension with systolic blood pressure less than 90 or MAP less than 65 and Lactic Acid greater than 2  Indicators of septic shock include: Sepsis present and persistent hypotension despite fluid resuscitation   Sources is: Pulmonary  Sepsis protocol initiated  Crystalloid Fluid Administration: Fluid resuscitation ordered per standard protocol - 30 mL/kg per current or ideal body weight  IV antibiotics as appropriate for source of sepsis  Reassessment: I have reassessed the patient's hemodynamic status      She received total of 3 L of IV fluid bolus in the ER and her blood pressure started to improve.  She was also started on Levophed for ongoing hypotension.  Plan is to admit her to IMCU for close monitoring.    Diabetes (HCC)- (present on admission)  Assessment & Plan  Continue insulin sliding scale with hypoglycemia protocol.  I started her on diabetic and cardiac diet.      Persistent atrial fibrillation (HCC)- (present on admission)  Assessment & Plan  Continue outpatient anticoagulation with  I am continuing digoxin.  Holding Cardizem right now due to hypotension.        Addendum: Later this afternoon patient developed significant A-fib with RVR along with that she developed severe hypotension.  I emergently evaluated her at the bedside.  I ordered stat ABG, CMP, CBC, magnesium, phosphorus, lactic acid and digoxin level.  I ordered stat chest x-ray.  I reviewed patient ABGs finding.   I discussed plan of care with RT.  I discussed with intensivist Dr. Ulloa regarding Ms. Matta's current medical condition and plan of care.  I resume her outpatient Cardizem and digoxin.    Addendum: Due to her A-fib with RVR I started her on amiodarone bolus and IV amiodarone.  She reported earlier that she has been taking her Xarelto regularly.    I personally discussed case with ER physician Dr. Taylor regarding Ms. Matta current medical condition and plan of care.    I reviewed patient last discharge summary from March 7, 2024.    Patient is critically ill.   The patient continues to have: Hypotension, lactic acidosis and septic shock  The vital organ system that is affected is the: Pulmonary and cardiovascular system  If untreated there is a high chance of deterioration into: Pulmonary and cardiovascular collapse  And eventually death.   The critical care that I am providing today is: I am giving her IV fluid and trending lactic acid.  Also I placed order for Levophed if she will not respond to fluid to maintain MAP of greater than 65 mmHg.  She has multiorgan involvement including cardiovascular and pulmonary.  Plan is to admitted to [facility] for close monitoring.  The critical that has been undertaken is medically complex.   There has been no overlap in critical care time.   Critical Care Time not including procedures: 43 minutes      VTE prophylaxis: therapeutic anticoagulation with Xarelto

## 2024-03-16 NOTE — ED NOTES
Patient remains hypotensive after first liter NS bolus. ERP notified. Second Liter started on pressure bag.

## 2024-03-16 NOTE — ED NOTES
Telephone report given to JOSEPH Zaragoza on [facility] bed [phone]. All questions answered. Patient AOX4, and aware of POC.

## 2024-03-16 NOTE — PROGRESS NOTES
I have been contacted regarding appropriate disposition of Miroslava Matta. I have reviewed the patient's case with the ERP including chart review, conversation with the provider and physically examining and discussing with the patient. This patient is deemed safe to be admitted to the IMCU for sepsis/dehydration management from presumed PNA vs persistent cellulitis in R inner thigh and remains under the care of hospitalist (who all questions and concerns should be relayed to).  While a critical care consultation has not been requested, we are immediately available if the patient requires critical care in the future.

## 2024-03-16 NOTE — ED NOTES
Med rec PARTIALLY completed per med list from [facility].   Contacted [facility], awaiting MAR fax.     Medina Burroughs, Pharmacy Intern

## 2024-03-16 NOTE — ED TRIAGE NOTES
Chief Complaint   Patient presents with    Nausea/Vomiting/Diarrhea     BIB EMS from [facility]. Staff worried about recent episodes of N/V. Patient states she had 2 episodes of vomiting. She states it has been mostly food and water.     Shortness of Breath     Patient is on 2L baseline. Patient hypoxic at that level. Needs 6L to maintain SPO2 >90%   Patient hypoxic on baseline 2L. Doers not complain of any acute distress. Hx of a-fib on xeralto   EMS noticed soft pressure. Gave 1L LR. Last known pressure 94/60

## 2024-03-16 NOTE — PROGRESS NOTES
BP was 220/84 and Pt is in A-fib  with . Pt is on 13 L nasal cannula, on the high flow nc. RT is at bedside drawing ABG. Dr. Swan was updated when this RN was at lunch regarding BP and HR. Dr. Swan ordered PO digoxin. Pt had emesis episode also. BP did drop to 167/72. .  Pt had chills/fatigue was shaking. Pt's face appeared pale.   [place] labs were drawn  BG was checked and was 131   ABG was taken by RT

## 2024-03-16 NOTE — ED NOTES
Patient used bed pan for successful BM. Patient cleaned post BM. Remains resting in bed. Oral fluids given per request.

## 2024-03-16 NOTE — ASSESSMENT & PLAN NOTE
3/18/2024   White blood cell count is trending down.  Secondary to pneumonia.  Ordered CBC for tomorrow.

## 2024-03-16 NOTE — PROGRESS NOTES
Unable to open up LDA's for wounds at this time or complete wound care orders, Pt is very sick, will defer to noc shift

## 2024-03-16 NOTE — ASSESSMENT & PLAN NOTE
This is Septic shock Present on admission  SIRS criteria identified on my evaluation include: Tachycardia, with heart rate greater than 90 BPM, Tachypnea, with respirations greater than 20 per minute, and Leukocytosis, with WBC greater than 12,000  Clinical indicators of end organ dysfunction include Hypotension with systolic blood pressure less than 90 or MAP less than 65 and Lactic Acid greater than 2  Indicators of septic shock include: Sepsis present and persistent hypotension despite fluid resuscitation   Sources is: Pulmonary  Sepsis protocol initiated  Crystalloid Fluid Administration: Fluid resuscitation ordered per standard protocol - 30 mL/kg per current or ideal body weight  IV antibiotics as appropriate for source of sepsis  Reassessment: I have reassessed the patient's hemodynamic status      She received total of 3 L of IV fluid bolus in the ER and her blood pressure started to improve.    Hypotension now resolved.  I switch her antibiotic from ceftriaxone to Ancef after discussing it with pharmacist.

## 2024-03-16 NOTE — ED NOTES
Rounded on patient who remains sleeping in [facility]. Patient currently on 8L Oxymask. Standard safety precautions in place. No immediate needs  noticed at this time.

## 2024-03-16 NOTE — ASSESSMENT & PLAN NOTE
Acute on chronic respiratory failure with hypoxia most likely secondary to pneumonia.  Patient baseline oxygen is 4 L   Currently she is requiring 6 L of oxygen to maintain oxygen saturation.  Less concern for pulmonary embolism as patient is already on Xarelto.  Continue monitor closely in IMCU.  Continue antibiotics.

## 2024-03-16 NOTE — ASSESSMENT & PLAN NOTE
I discussed CODE STATUS with her and after discussion she would like to be full code.  As per patient wishes I placed full code orders.

## 2024-03-16 NOTE — ASSESSMENT & PLAN NOTE
3/18/2024   Continue insulin sliding scale with hypoglycemia protocol.  I started her on diabetic and cardiac diet.  Continue monitor

## 2024-03-16 NOTE — ASSESSMENT & PLAN NOTE
3/18/2024   Continue outpatient anticoagulation with  I am continuing digoxin.  Now overall significantly rate control.  Plan is to transfer to telemetry.  Continue to monitor her colitis and replace as needed.

## 2024-03-16 NOTE — ED PROVIDER NOTES
ED Provider Note    CHIEF COMPLAINT  Chief Complaint   Patient presents with    Nausea/Vomiting/Diarrhea     BIB EMS from [facility]. Staff worried about recent episodes of N/V. Patient states she had 2 episodes of vomiting. She states it has been mostly food and water.     Shortness of Breath     Patient is on 2L baseline. Patient hypoxic at that level. Needs 6L to maintain SPO2 >90%       EXTERNAL RECORDS REVIEWED  Outpatient Notes   population health, pulmonary sleep medicine    HPI/ROS  LIMITATION TO HISTORY   Select: : None  OUTSIDE HISTORIAN(S):  None    Miroslava Matta is a 63 y.o. female who presents here for evaluation of nausea vomiting and diarrhea.  The patient came from [facility], and pet staff was worried that she was having trouble breathing.  She has baseline on 2 L, but when she came in here she was in the 6 high 70s low 80s.  She is now on 6 L.  Patient has a productive cough, but no chest pain.  She has did have some vomiting last night and earlier today.  Patient denies any current diarrhea.  He did have some loose stools yesterday.    PAST MEDICAL HISTORY   has a past medical history of Arthritis, Back pain, COPD (chronic obstructive pulmonary disease) ([facility]) (5/16/2018), Diabetes ([facility]), Fall, Fungal rash of torso (5/15/2018), Hypertension, Morbid obesity with BMI of 60.0-69.9, adult ([facility]) (5/17/2018), New onset atrial fibrillation ([facility]) (5/15/2018), Psychiatric problem, and Urinary incontinence.    SURGICAL HISTORY  patient denies any surgical history    FAMILY HISTORY  No family history on file.    SOCIAL HISTORY  Social History     Tobacco Use    Smoking status: Former    Smokeless tobacco: Never   Substance and Sexual Activity    Alcohol use: Yes    Drug use: No    Sexual activity: Not on file       CURRENT MEDICATIONS  Home Medications    **Home medications have not yet been reviewed for this encounter**         ALLERGIES  Allergies   Allergen Reactions    Gabapentin Unspecified      "Dizziness      Metformin Shortness of Breath and Rash     To face    Codeine Nausea       PHYSICAL EXAM  VITAL SIGNS: BP 96/54   Pulse (!) 118   Resp 16   Ht 1.702 m (5' 7\")   Wt (!) 164 kg (361 lb)   SpO2 (!) 85%   BMI 56.54 kg/m²    Constitutional: Well developed, well nourished.  Mild acute distress.  HEENT: Normocephalic, atraumatic. Posterior pharynx clear and dry  Eyes:  EOMI. Normal sclera.  Neck: Supple, Full range of motion, nontender.  Chest/Pulmonary: clear to ausculation. Symmetrical expansion.   Cardio: Regular rate and rhythm with no murmur.   Abdomen: Soft, nontender. No peritoneal signs. No guarding.  Musculoskeletal: No deformity, no edema, neurovascular intact.   Neuro: Clear speech, appropriate, cooperative, cranial nerves II-XII grossly intact.  Psych: Flat mood and affect      DIAGNOSTIC STUDIES / PROCEDURES  Results for orders placed or performed during the hospital encounter of 03/16/24   Lactic Acid   Result Value Ref Range    Lactic Acid 2.8 (H) 0.5 - 2.0 mmol/L   CBC with Differential   Result Value Ref Range    WBC 17.0 (H) 4.8 - 10.8 K/uL    RBC 4.28 4.20 - 5.40 M/uL    Hemoglobin 10.8 (L) 12.0 - 16.0 g/dL    Hematocrit 37.0 37.0 - 47.0 %    MCV 86.4 81.4 - 97.8 fL    MCH 25.2 (L) 27.0 - 33.0 pg    MCHC 29.2 (L) 32.2 - 35.5 g/dL    RDW 79.2 (H) 35.9 - 50.0 fL    Platelet Count 294 164 - 446 K/uL    MPV 9.4 9.0 - 12.9 fL    Neutrophils-Polys 79.30 (H) 44.00 - 72.00 %    Lymphocytes 8.60 (L) 22.00 - 41.00 %    Monocytes 4.30 0.00 - 13.40 %    Eosinophils 0.00 0.00 - 6.90 %    Basophils 0.90 0.00 - 1.80 %    Nucleated RBC 0.00 0.00 - 0.20 /100 WBC    Neutrophils (Absolute) 14.50 (H) 1.82 - 7.42 K/uL    Lymphs (Absolute) 1.46 1.00 - 4.80 K/uL    Monos (Absolute) 0.73 0.00 - 0.85 K/uL    Eos (Absolute) 0.00 0.00 - 0.51 K/uL    Baso (Absolute) 0.15 (H) 0.00 - 0.12 K/uL    NRBC (Absolute) 0.00 K/uL    Hypochromia 1+     Anisocytosis 2+ (A)     Microcytosis 2+ (A)    Complete Metabolic " Panel   Result Value Ref Range    Sodium 135 135 - 145 mmol/L    Potassium 3.6 3.6 - 5.5 mmol/L    Chloride 93 (L) 96 - 112 mmol/L    Co2 30 20 - 33 mmol/L    Anion Gap 12.0 7.0 - 16.0    Glucose 161 (H) 65 - 99 mg/dL    Bun 26 (H) 8 - 22 mg/dL    Creatinine 1.38 0.50 - 1.40 mg/dL    Calcium 8.4 (L) 8.5 - 10.5 mg/dL    Correct Calcium 9.3 8.5 - 10.5 mg/dL    AST(SGOT) 12 12 - 45 U/L    ALT(SGPT) 10 2 - 50 U/L    Alkaline Phosphatase 83 30 - 99 U/L    Total Bilirubin 0.9 0.1 - 1.5 mg/dL    Albumin 2.9 (L) 3.2 - 4.9 g/dL    Total Protein 6.3 6.0 - 8.2 g/dL    Globulin 3.4 1.9 - 3.5 g/dL    A-G Ratio 0.9 g/dL   ESTIMATED GFR   Result Value Ref Range    GFR (CKD-EPI) 43 (A) >60 mL/min/1.73 m 2   DIFFERENTIAL MANUAL   Result Value Ref Range    Bands-Stabs 6.00 0.00 - 10.00 %    Metamyelocytes 0.90 %    Manual Diff Status PERFORMED    PERIPHERAL SMEAR REVIEW   Result Value Ref Range    Peripheral Smear Review see below    PLATELET ESTIMATE   Result Value Ref Range    Plt Estimation Normal    MORPHOLOGY   Result Value Ref Range    RBC Morphology Present     Poikilocytosis 1+     Stomatocytes 1+          RADIOLOGY  I have independently interpreted the diagnostic imaging associated with this visit and am waiting the final reading from the radiologist.   My preliminary interpretation is as follows: See below  Radiologist interpretation:   DX-CHEST-PORTABLE (1 VIEW)   Final Result      1.  Patchy RIGHT lung base infiltrate concerning for developing pneumonia.   2.  Slight increased inflation from prior exam.   3.  No other significant change.               CRITICAL CARE  The very real possibility of a deterioration of this patient's condition required the highest level of my preparedness for sudden, emergent intervention.  I provided critical care services, which included medication orders, frequent reevaluations of the patient's condition and response to treatment, ordering and reviewing test results, and discussing the case  with various consultants.  The critical care time associated with the care of the patient was 60 minutes. Review chart for interventions. This time is exclusive of any other billable procedures.   Frequent blood pressure monitoring checks, mentation evaluation.      10:10 AM  Manual bp 90/60.     10:20 AM  96/54 bp    11:47 AM  Pt will be admitted to the [facility], under Dr. Swan.      COURSE & MEDICAL DECISION MAKING    Patient will be admitted to ICU service.    INITIAL ASSESSMENT, COURSE AND PLAN  Care Narrative: This is a 63-year-old female here for evaluation of hypoxia and likely sepsis.  Patient was sent over for [facility], for her hypoxia.  It was noted that she was in the high 70s and low 80s.  She has no reported fevers, but did have some chills.  She has been having issues with nausea and vomiting as well.  Patient while here was given total of 3 L of IV fluid, her pressures did elevate to the 90s over 50s.  1 of these was a manual. Considered levophed, but the pt responded to iv fluid bolus after three.   Patient was initially started on Rocephin when she arrived, and we will add Vanco and Zosyn per ICU. Dr. Gonda will admit or follow.    DISPOSITION AND DISCUSSIONS  I have discussed management of the patient with the following physicians and MARIETTA's:  one      FINAL DIAGNOSIS  1. Pneumonia due to infectious organism, unspecified laterality, unspecified part of lung    2.      Sepsis.   3.       Hypoxia   4.       Critical are time 60 minutes.        Electronically signed by: Mick Verma D.O., 3/16/2024 9:18 AM

## 2024-03-16 NOTE — ED NOTES
Fluids given per patient request and MD approval. Patient also repositioned to right side. 3 pillows placed.

## 2024-03-17 ENCOUNTER — APPOINTMENT (OUTPATIENT)
Dept: RADIOLOGY | Facility: MEDICAL CENTER | Age: 64
DRG: 871 | End: 2024-03-17
Attending: INTERNAL MEDICINE
Payer: MEDICARE

## 2024-03-17 PROBLEM — R78.81 E COLI BACTEREMIA: Status: ACTIVE | Noted: 2024-03-17

## 2024-03-17 PROBLEM — B96.20 E COLI BACTEREMIA: Status: ACTIVE | Noted: 2024-03-17

## 2024-03-17 LAB
ALBUMIN SERPL BCP-MCNC: 2.6 G/DL (ref 3.2–4.9)
ALBUMIN/GLOB SERPL: 0.8 G/DL
ALP SERPL-CCNC: 73 U/L (ref 30–99)
ALT SERPL-CCNC: 8 U/L (ref 2–50)
ANION GAP SERPL CALC-SCNC: 12 MMOL/L (ref 7–16)
ANISOCYTOSIS BLD QL SMEAR: ABNORMAL
AST SERPL-CCNC: 12 U/L (ref 12–45)
BASOPHILS # BLD AUTO: 0 % (ref 0–1.8)
BASOPHILS # BLD: 0 K/UL (ref 0–0.12)
BILIRUB SERPL-MCNC: 0.8 MG/DL (ref 0.1–1.5)
BUN SERPL-MCNC: 32 MG/DL (ref 8–22)
CALCIUM ALBUM COR SERPL-MCNC: 9 MG/DL (ref 8.5–10.5)
CALCIUM SERPL-MCNC: 7.9 MG/DL (ref 8.5–10.5)
CHLORIDE SERPL-SCNC: 91 MMOL/L (ref 96–112)
CO2 SERPL-SCNC: 27 MMOL/L (ref 20–33)
CREAT SERPL-MCNC: 2.08 MG/DL (ref 0.5–1.4)
EOSINOPHIL # BLD AUTO: 0.21 K/UL (ref 0–0.51)
EOSINOPHIL NFR BLD: 0.9 % (ref 0–6.9)
ERYTHROCYTE [DISTWIDTH] IN BLOOD BY AUTOMATED COUNT: 79.7 FL (ref 35.9–50)
GFR SERPLBLD CREATININE-BSD FMLA CKD-EPI: 26 ML/MIN/1.73 M 2
GLOBULIN SER CALC-MCNC: 3.2 G/DL (ref 1.9–3.5)
GLUCOSE BLD STRIP.AUTO-MCNC: 116 MG/DL (ref 65–99)
GLUCOSE BLD STRIP.AUTO-MCNC: 131 MG/DL (ref 65–99)
GLUCOSE BLD STRIP.AUTO-MCNC: 143 MG/DL (ref 65–99)
GLUCOSE BLD STRIP.AUTO-MCNC: 158 MG/DL (ref 65–99)
GLUCOSE SERPL-MCNC: 203 MG/DL (ref 65–99)
HCT VFR BLD AUTO: 29.7 % (ref 37–47)
HGB BLD-MCNC: 8.9 G/DL (ref 12–16)
HYPOCHROMIA BLD QL SMEAR: ABNORMAL
LACTATE SERPL-SCNC: 1.4 MMOL/L (ref 0.5–2)
LACTATE SERPL-SCNC: 1.7 MMOL/L (ref 0.5–2)
LG PLATELETS BLD QL SMEAR: NORMAL
LYMPHOCYTES # BLD AUTO: 3.23 K/UL (ref 1–4.8)
LYMPHOCYTES NFR BLD: 13.8 % (ref 22–41)
MAGNESIUM SERPL-MCNC: 1.7 MG/DL (ref 1.5–2.5)
MANUAL DIFF BLD: NORMAL
MCH RBC QN AUTO: 25.4 PG (ref 27–33)
MCHC RBC AUTO-ENTMCNC: 30 G/DL (ref 32.2–35.5)
MCV RBC AUTO: 84.9 FL (ref 81.4–97.8)
MICROCYTES BLD QL SMEAR: ABNORMAL
MONOCYTES # BLD AUTO: 0.8 K/UL (ref 0–0.85)
MONOCYTES NFR BLD AUTO: 3.4 % (ref 0–13.4)
MORPHOLOGY BLD-IMP: NORMAL
NEUTROPHILS # BLD AUTO: 19.16 K/UL (ref 1.82–7.42)
NEUTROPHILS NFR BLD: 81 % (ref 44–72)
NEUTS BAND NFR BLD MANUAL: 0.9 % (ref 0–10)
NRBC # BLD AUTO: 0 K/UL
NRBC BLD-RTO: 0 /100 WBC (ref 0–0.2)
PHOSPHATE SERPL-MCNC: 5.2 MG/DL (ref 2.5–4.5)
PLATELET # BLD AUTO: 247 K/UL (ref 164–446)
PLATELET BLD QL SMEAR: NORMAL
PMV BLD AUTO: 9.7 FL (ref 9–12.9)
POLYCHROMASIA BLD QL SMEAR: NORMAL
POTASSIUM SERPL-SCNC: 3.9 MMOL/L (ref 3.6–5.5)
PROCALCITONIN SERPL-MCNC: 53 NG/ML
PROT SERPL-MCNC: 5.8 G/DL (ref 6–8.2)
RBC # BLD AUTO: 3.5 M/UL (ref 4.2–5.4)
RBC BLD AUTO: PRESENT
SODIUM SERPL-SCNC: 130 MMOL/L (ref 135–145)
WBC # BLD AUTO: 23.4 K/UL (ref 4.8–10.8)

## 2024-03-17 PROCEDURE — 97597 DBRDMT OPN WND 1ST 20 CM/<: CPT

## 2024-03-17 PROCEDURE — 85027 COMPLETE CBC AUTOMATED: CPT

## 2024-03-17 PROCEDURE — 82962 GLUCOSE BLOOD TEST: CPT | Mod: 91

## 2024-03-17 PROCEDURE — 770000 HCHG ROOM/CARE - INTERMEDIATE ICU *

## 2024-03-17 PROCEDURE — 83605 ASSAY OF LACTIC ACID: CPT

## 2024-03-17 PROCEDURE — 85007 BL SMEAR W/DIFF WBC COUNT: CPT

## 2024-03-17 PROCEDURE — 84145 PROCALCITONIN (PCT): CPT

## 2024-03-17 PROCEDURE — 700105 HCHG RX REV CODE 258: Performed by: INTERNAL MEDICINE

## 2024-03-17 PROCEDURE — 76775 US EXAM ABDO BACK WALL LIM: CPT

## 2024-03-17 PROCEDURE — A9270 NON-COVERED ITEM OR SERVICE: HCPCS | Performed by: INTERNAL MEDICINE

## 2024-03-17 PROCEDURE — 84100 ASSAY OF PHOSPHORUS: CPT

## 2024-03-17 PROCEDURE — 83735 ASSAY OF MAGNESIUM: CPT

## 2024-03-17 PROCEDURE — 99233 SBSQ HOSP IP/OBS HIGH 50: CPT | Performed by: INTERNAL MEDICINE

## 2024-03-17 PROCEDURE — 80053 COMPREHEN METABOLIC PANEL: CPT

## 2024-03-17 PROCEDURE — 700111 HCHG RX REV CODE 636 W/ 250 OVERRIDE (IP): Performed by: INTERNAL MEDICINE

## 2024-03-17 PROCEDURE — 700102 HCHG RX REV CODE 250 W/ 637 OVERRIDE(OP): Performed by: INTERNAL MEDICINE

## 2024-03-17 RX ORDER — MIDODRINE HYDROCHLORIDE 5 MG/1
5 TABLET ORAL
Status: DISCONTINUED | OUTPATIENT
Start: 2024-03-17 | End: 2024-03-21

## 2024-03-17 RX ORDER — DILTIAZEM HYDROCHLORIDE 180 MG/1
180 CAPSULE, COATED, EXTENDED RELEASE ORAL
Status: DISCONTINUED | OUTPATIENT
Start: 2024-03-18 | End: 2024-03-22 | Stop reason: HOSPADM

## 2024-03-17 RX ADMIN — DILTIAZEM HYDROCHLORIDE 360 MG: 360 CAPSULE, EXTENDED RELEASE ORAL at 06:03

## 2024-03-17 RX ADMIN — OXYCODONE 5 MG: 5 TABLET ORAL at 21:18

## 2024-03-17 RX ADMIN — SODIUM CHLORIDE, POTASSIUM CHLORIDE, SODIUM LACTATE AND CALCIUM CHLORIDE: 600; 310; 30; 20 INJECTION, SOLUTION INTRAVENOUS at 03:27

## 2024-03-17 RX ADMIN — DULOXETINE HYDROCHLORIDE 30 MG: 30 CAPSULE, DELAYED RELEASE ORAL at 05:21

## 2024-03-17 RX ADMIN — DIGOXIN 125 MCG: 0.12 TABLET ORAL at 17:17

## 2024-03-17 RX ADMIN — PREGABALIN 25 MG: 25 CAPSULE ORAL at 17:17

## 2024-03-17 RX ADMIN — AMIODARONE HYDROCHLORIDE 0.5 MG/MIN: 1.8 INJECTION, SOLUTION INTRAVENOUS at 00:41

## 2024-03-17 RX ADMIN — DEXTROSE MONOHYDRATE: 50 INJECTION, SOLUTION INTRAVENOUS at 10:00

## 2024-03-17 RX ADMIN — ACETAMINOPHEN 650 MG: 325 TABLET, FILM COATED ORAL at 01:20

## 2024-03-17 RX ADMIN — CEFTRIAXONE SODIUM 2000 MG: 10 INJECTION, POWDER, FOR SOLUTION INTRAVENOUS at 10:41

## 2024-03-17 RX ADMIN — MIDODRINE HYDROCHLORIDE 5 MG: 5 TABLET ORAL at 08:08

## 2024-03-17 RX ADMIN — MIDODRINE HYDROCHLORIDE 5 MG: 5 TABLET ORAL at 12:55

## 2024-03-17 RX ADMIN — AMPICILLIN AND SULBACTAM 3 G: 1; 2 INJECTION, POWDER, FOR SOLUTION INTRAMUSCULAR; INTRAVENOUS at 06:08

## 2024-03-17 RX ADMIN — OXYCODONE 5 MG: 5 TABLET ORAL at 15:39

## 2024-03-17 RX ADMIN — PREGABALIN 25 MG: 25 CAPSULE ORAL at 12:55

## 2024-03-17 RX ADMIN — RIVAROXABAN 20 MG: 20 TABLET, FILM COATED ORAL at 17:17

## 2024-03-17 RX ADMIN — AMPICILLIN AND SULBACTAM 3 G: 1; 2 INJECTION, POWDER, FOR SOLUTION INTRAMUSCULAR; INTRAVENOUS at 00:46

## 2024-03-17 RX ADMIN — INSULIN GLARGINE-YFGN 10 UNITS: 100 INJECTION, SOLUTION SUBCUTANEOUS at 10:14

## 2024-03-17 RX ADMIN — PREGABALIN 25 MG: 25 CAPSULE ORAL at 05:21

## 2024-03-17 ASSESSMENT — ENCOUNTER SYMPTOMS
TINGLING: 0
SPUTUM PRODUCTION: 0
DIZZINESS: 0
BACK PAIN: 0
CHILLS: 0
NECK PAIN: 0
ORTHOPNEA: 0
PALPITATIONS: 0
DOUBLE VISION: 0
PHOTOPHOBIA: 0
SENSORY CHANGE: 0
TREMORS: 0
EYE PAIN: 0
ABDOMINAL PAIN: 0
NAUSEA: 0
SHORTNESS OF BREATH: 0
SPEECH CHANGE: 0
DIARRHEA: 0
HALLUCINATIONS: 0
WEIGHT LOSS: 0
VOMITING: 0
FOCAL WEAKNESS: 0
BLURRED VISION: 0
MYALGIAS: 0
FEVER: 0
CONSTIPATION: 0
HEADACHES: 0
COUGH: 0

## 2024-03-17 ASSESSMENT — PAIN DESCRIPTION - PAIN TYPE
TYPE: ACUTE PAIN
TYPE: ACUTE PAIN;CHRONIC PAIN
TYPE: ACUTE PAIN
TYPE: ACUTE PAIN;CHRONIC PAIN
TYPE: ACUTE PAIN
TYPE: ACUTE PAIN;CHRONIC PAIN
TYPE: ACUTE PAIN

## 2024-03-17 ASSESSMENT — COGNITIVE AND FUNCTIONAL STATUS - GENERAL
DRESSING REGULAR LOWER BODY CLOTHING: TOTAL
MOVING TO AND FROM BED TO CHAIR: TOTAL
DRESSING REGULAR UPPER BODY CLOTHING: A LOT
DAILY ACTIVITIY SCORE: 13
SUGGESTED CMS G CODE MODIFIER MOBILITY: CM
MOBILITY SCORE: 8
WALKING IN HOSPITAL ROOM: TOTAL
SUGGESTED CMS G CODE MODIFIER DAILY ACTIVITY: CL
CLIMB 3 TO 5 STEPS WITH RAILING: TOTAL
MOVING FROM LYING ON BACK TO SITTING ON SIDE OF FLAT BED: A LOT
TOILETING: TOTAL
TURNING FROM BACK TO SIDE WHILE IN FLAT BAD: A LOT
STANDING UP FROM CHAIR USING ARMS: TOTAL
HELP NEEDED FOR BATHING: TOTAL

## 2024-03-17 ASSESSMENT — LIFESTYLE VARIABLES: SUBSTANCE_ABUSE: 0

## 2024-03-17 NOTE — PROGRESS NOTES
4 Eyes Skin Assessment Completed by JOSEPH Astudillo and JOSEPH Maldonado.     Head WDL  Ears Redness and Non-Blanching right ear red and non-blanching left ear discolored and blanching  Nose WDL  Mouth WDL  Neck WDL  Breast/Chest Discoloration and Excoriation disocloration and moisture under Right and Left breast,   Shoulder Blades WDL  Spine WDL  (R) Arm/Elbow/Hand Redness and Blanching  (L) Arm/Elbow/Hand Redness and Blanching  Abdomen Redness discoloration  Groin Redness and Excoriation  Scrotum/Coccyx/Buttocks Non-Blanching, Excoriation, and Discoloration  (R) Leg Redness and Abrasion redness, open wounds scabs on right back of leg. Right back of thigh draining large, red, pink purple tunneling seriousangunous wounds several wounds.   Right back of lower leg wounds, redness open wounds.    Right butt cheek red small circular open wound  (L) Leg Non-Blanching, Bruising, Swelling, and Edema left leg wounds back of left leg,  (R) Heel/Foot/Toe Non-Blanching and Ulcer(s) large black Right heel ulcer,  Right toe ulcer/wound  (L) Heel/Foot/Toe Redness and Non-Blanching left leg has wounds     Devices In Places ECG, Tele Box, Blood Pressure Cuff, Pulse Ox, and Nasal Cannula, wounds dressings.         Interventions In Place Sacral Mepilex, Heel Float Boots, TAP System, Pillows, Q2 Turns, and Low Air Loss Mattress     Gamaliel bed was ordered, and wounds cleansed with wound cleanser and mepilex placed over all wounds     Possible Skin Injury Yes     Pictures Uploaded Into Epic Yes  Wound Consult Placed Yes  RN Wound Prevention Protocol Ordered No

## 2024-03-17 NOTE — PROGRESS NOTES
4 Eyes Skin Assessment Completed by Jonas RN and JOSEPH Zamarripa.     Head WDL  Ears Redness and Non-Blanching right ear red and non-blanching left ear discolored and blanching  Nose WDL  Mouth WDL  Neck WDL  Breast/Chest Discoloration and Excoriation disocloration and moisture under Right and Left breast,   Shoulder Blades WDL  Spine WDL  (R) Arm/Elbow/Hand Redness and Blanching  (L) Arm/Elbow/Hand Redness and Blanching  Abdomen Redness discoloration  Groin Redness and Excoriation  Scrotum/Coccyx/Buttocks Non-Blanching, Excoriation, and Discoloration  (R) Leg Redness and Abrasion redness, open wounds scabs on right back of leg. Right back of thigh draining large, red, pink purple tunneling seriousangunous wounds several wounds.   Right back of lower leg wounds, redness open wounds.    Right butt cheek red small circular open wound  (L) Leg Non-Blanching, Bruising, Swelling, and Edema left leg wounds back of left leg,  (R) Heel/Foot/Toe Non-Blanching and Ulcer(s) large black Right heel ulcer,  Right toe ulcer/wound  (L) Heel/Foot/Toe Redness and Non-Blanching left leg has wounds     Devices In Places ECG, Tele Box, Blood Pressure Cuff, Pulse Ox, and Nasal Cannula, wounds dressings.         Interventions In Place Sacral Mepilex, Heel Float Boots, TAP System, Pillows, Q2 Turns, and Low Air Loss Mattress     Gamaliel bed was ordered, and wounds cleansed with wound cleanser and mepilex placed over all wounds     Possible Skin Injury Yes     Pictures Uploaded Into Epic Yes  Wound Consult Placed Yes  RN Wound Prevention Protocol Ordered No

## 2024-03-17 NOTE — ASSESSMENT & PLAN NOTE
She found to have atrial fibrillation with rapid ventricular response.  I ordered amiodarone bolus on March 16,024 and started on amiodarone gtt.  She has a persistent atrial fibrillation and rhythm control would be very difficult to achieve.  Now rate controlled.  I am continuing Xarelto now I adjusted the dose of Xarelto to 15 mg instead of 20 mg due to worsening renal functions.  Continue to monitor electrolytes and replace as needed.  Plan is to transfer out to telemetry floor.

## 2024-03-17 NOTE — PROGRESS NOTES
Hospital Medicine Daily Progress Note    Date of Service  3/17/2024    Chief Complaint  Miroslava Matta is a 63 y.o. female admitted 3/16/2024 with nausea vomiting and shortness of breath    Hospital Course    Miroslava Matta is a 63 y.o. female with past medical history of chronic atrial fibrillation on Xarelto, congestive heart failure, diabetes, hyperlipidemia, COPD on 4 L of oxygen, hypertension, RUTH and mood disorder who presented to the hospital on 3/16/2024 with complaint of nausea and vomiting and shortness of breath.  She reported that she had she had 1 episode of vomiting last night and 1 episode of vomiting this morning.  She is currently resident at [facility] and they checked her pulse ox and she found to hypoxia and patient transferred to [facility].  On my evaluation she expressed that she is feeling better but she has been having nausea and vomiting and overall she is not feeling well.  There is no specific aggravating or elevating factors.  She reported associated symptoms of headache and mild abdominal pain.  There is no other associated symptoms or any other acute complaints.  She has been taking her medications as prescribed.     After admission she developed A-fib with RVR.  I ordered bolus for amiodarone and later started on amiodarone infusion.      Interval Problem Update    03/17/24    I evaluated and examined her at the bedside.  She reported that she is feeling better.  Her A-fib with RVR resolved she remains in A-fib she has persistent atrial fibrillation.  Now plan is to discontinue her amiodarone due to persistent A-fib and now its rate controlled.  I am continuing Cardizem with reduced dose due to hypotension and I am continuing digoxin.  Patient requested that she would like to talk to case management regarding her belonging at [facility].  I requested bedside RN to contact case management.  Continue Xarelto.  She found to have bacteremia I switch her  antibiotic to ceftriaxone.  I will repeat blood cultures.  She does not have any abdominal pain or any acute pain on my evaluation.  Lactic acidosis trended down.    I have discussed this patient's plan of care and discharge plan at IDT rounds today with Case Management, Nursing, Nursing leadership, and other members of the IDT team.    Consultants/Specialty  None    Code Status  Full Code    Disposition  The patient is not medically cleared for discharge to home or a post-acute facility.      I have placed the appropriate orders for post-discharge needs.    Review of Systems  Review of Systems   Constitutional:  Positive for malaise/fatigue. Negative for chills, fever and weight loss.   HENT:  Negative for hearing loss and tinnitus.    Eyes:  Negative for blurred vision, double vision, photophobia and pain.   Respiratory:  Negative for cough, sputum production and shortness of breath.    Cardiovascular:  Negative for chest pain, palpitations, orthopnea and leg swelling.   Gastrointestinal:  Negative for abdominal pain, constipation, diarrhea, nausea and vomiting.   Genitourinary:  Negative for dysuria, frequency and urgency.   Musculoskeletal:  Negative for back pain, joint pain, myalgias and neck pain.   Skin:  Negative for rash.   Neurological:  Negative for dizziness, tingling, tremors, sensory change, speech change, focal weakness and headaches.   Psychiatric/Behavioral:  Negative for hallucinations and substance abuse.    All other systems reviewed and are negative.       Physical Exam  Temp:  [36.8 °C (98.3 °F)-37 °C (98.6 °F)] 36.9 °C (98.5 °F)  Pulse:  [] 74  Resp:  [10-41] 37  BP: ()/(30-84) 110/60  SpO2:  [85 %-99 %] 95 %    Physical Exam  Vitals reviewed.   Constitutional:       General: She is not in acute distress.     Appearance: Normal appearance. She is obese. She is ill-appearing.   HENT:      Head: Normocephalic and atraumatic.      Nose: No congestion.      Comments: High flow nasal  cannula  Eyes:      General:         Right eye: No discharge.         Left eye: No discharge.      Pupils: Pupils are equal, round, and reactive to light.   Cardiovascular:      Rate and Rhythm: Normal rate and regular rhythm.      Pulses: Normal pulses.      Heart sounds: Normal heart sounds. No murmur heard.  Pulmonary:      Effort: Pulmonary effort is normal. No respiratory distress.      Breath sounds: Normal breath sounds. No stridor.   Abdominal:      General: Bowel sounds are normal. There is no distension.      Palpations: Abdomen is soft.      Tenderness: There is no abdominal tenderness.   Musculoskeletal:         General: No swelling or tenderness. Normal range of motion.      Cervical back: Normal range of motion. No rigidity.   Skin:     General: Skin is warm.      Capillary Refill: Capillary refill takes less than 2 seconds.      Coloration: Skin is not jaundiced or pale.      Findings: No bruising.   Neurological:      General: No focal deficit present.      Mental Status: She is alert and oriented to person, place, and time.      Cranial Nerves: No cranial nerve deficit.      Comments: She is following commands  Decreased range of motion on lower extremities   Psychiatric:         Mood and Affect: Mood normal.         Behavior: Behavior normal.         Fluids    Intake/Output Summary (Last 24 hours) at 3/17/2024 0807  Last data filed at 3/16/2024 1829  Gross per 24 hour   Intake 3181.45 ml   Output --   Net 3181.45 ml       Laboratory  Recent Labs     03/16/24  0841 03/16/24  1628 03/17/24  0231   WBC 17.0* 3.2* 23.4*   RBC 4.28 4.41 3.50*   HEMOGLOBIN 10.8* 11.1* 8.9*   HEMATOCRIT 37.0 37.5 29.7*   MCV 86.4 85.0 84.9   MCH 25.2* 25.2* 25.4*   MCHC 29.2* 29.6* 30.0*   RDW 79.2* 78.8* 79.7*   PLATELETCT 294 247 247   MPV 9.4 9.6 9.7     Recent Labs     03/16/24  0841 03/16/24  1628 03/17/24  0231   SODIUM 135 134* 130*   POTASSIUM 3.6 4.0 3.9   CHLORIDE 93* 93* 91*   CO2 30 27 27   GLUCOSE 161* 139*  203*   BUN 26* 27* 32*   CREATININE 1.38 1.34 2.08*   CALCIUM 8.4* 8.4* 7.9*                   Imaging  US-RENAL   Final Result      Unremarkable kidneys.  No hydronephrosis.      DX-CHEST-LIMITED (1 VIEW)   Final Result      1.  Mild pulmonary edema.   2.  No pneumonia or pneumothorax.   3.  Stable moderate cardiomegaly.      DX-CHEST-PORTABLE (1 VIEW)   Final Result      1.  Patchy RIGHT lung base infiltrate concerning for developing pneumonia.   2.  Slight increased inflation from prior exam.   3.  No other significant change.              Assessment/Plan  * Acute on chronic respiratory failure (HCC)- (present on admission)  Assessment & Plan  Acute on chronic respiratory failure with hypoxia most likely secondary to pneumonia.  Patient baseline oxygen is 4 L   Currently she is requiring 8 L of oxygen to maintain oxygen saturation.  She is on Xarelto and she has been taking her medications as prescribed.  I am not ordering CTA pulmonary as she is on full anticoagulation and she is finding of infiltrate on her chest x-ray.  Continue monitor closely in IMCU.  Continue antibiotics.    E coli bacteremia  Assessment & Plan  She found to have E. coli bacteremia.  Sensitivities are in progress.  I change antibiotic to ceftriaxone on March 17, 2024.    Leukocytosis  Assessment & Plan  3/17/2024   Secondary to pneumonia.  Ordered CBC for tomorrow.    Lactic acidosis  Assessment & Plan  Secondary to sepsis.  Lactic acid is trending down.    Advance care planning- (present on admission)  Assessment & Plan  I discussed CODE STATUS with her and after discussion she would like to be full code.  As per patient wishes I placed full code orders.    Atrial fibrillation with RVR ([facility])- (present on admission)  Assessment & Plan  She found to have atrial fibrillation with rapid ventricular response.  I ordered amiodarone bolus on March 16,024 and started on amiodarone gtt.  She has a persistent atrial fibrillation and rhythm control  would be very difficult to achieve.  Currently she is rate controlled.  I am continuing her outpatient Xarelto.  Discontinued amiodarone to avoid adverse effect.    Septic shock (HCC)- (present on admission)  Assessment & Plan  This is Septic shock Present on admission  SIRS criteria identified on my evaluation include: Tachycardia, with heart rate greater than 90 BPM, Tachypnea, with respirations greater than 20 per minute, and Leukocytosis, with WBC greater than 12,000  Clinical indicators of end organ dysfunction include Hypotension with systolic blood pressure less than 90 or MAP less than 65 and Lactic Acid greater than 2  Indicators of septic shock include: Sepsis present and persistent hypotension despite fluid resuscitation   Sources is: Pulmonary  Sepsis protocol initiated  Crystalloid Fluid Administration: Fluid resuscitation ordered per standard protocol - 30 mL/kg per current or ideal body weight  IV antibiotics as appropriate for source of sepsis  Reassessment: I have reassessed the patient's hemodynamic status      She received total of 3 L of IV fluid bolus in the ER and her blood pressure started to improve.  She was also started on Levophed for ongoing hypotension.  Plan is to admit her to IMCU for close monitoring.    Diabetes (HCC)- (present on admission)  Assessment & Plan  Continue insulin sliding scale with hypoglycemia protocol.  I started her on diabetic and cardiac diet.  Continue monitor      Persistent atrial fibrillation (HCC)- (present on admission)  Assessment & Plan  Continue outpatient anticoagulation with  I am continuing digoxin.  I reduce the dose of Cardizem hypotension.        I discussed plan of care during multidisciplinary rounds regarding patient's current medical condition and plan of care.       VTE prophylaxis:    therapeutic anticoagulation with xarelto 20 mg daily witih meals      I have performed a physical exam and reviewed and updated ROS and Plan today (3/17/2024).  In review of yesterday's note (3/16/2024), there are no changes except as documented above.

## 2024-03-17 NOTE — WOUND TEAM
[facility]  Inpatient Services  Initial Wound and Skin Care Evaluation    Admission Date: 3/16/2024     Last order of IP CONSULT TO WOUND CARE was found on 3/16/2024 from Hospital Encounter on 3/16/2024     HPI, PMH, SH: Reviewed    No past surgical history on file.  Social History     Tobacco Use    Smoking status: Former    Smokeless tobacco: Never   Substance Use Topics    Alcohol use: Yes     Chief Complaint   Patient presents with    Nausea/Vomiting/Diarrhea     BIB EMS from [facility]. Staff worried about recent episodes of N/V. Patient states she had 2 episodes of vomiting. She states it has been mostly food and water.     Shortness of Breath     Patient is on 2L baseline. Patient hypoxic at that level. Needs 6L to maintain SPO2 >90%     Diagnosis: Acute on chronic respiratory failure (HCC) [J96.20]    Unit where seen by Wound Team: [id number]     WOUND CONSULT RELATED TO:  BLE, sacral/coccyx area.      WOUND TEAM PLAN OF CARE - Frequency of Follow-up:   Nursing to follow dressing orders written for wound care. Contact wound team if area fails to progress, deteriorates or with any questions/concerns if something comes up before next scheduled follow up (See below as to whether wound is following and frequency of wound follow up)   Not following, consult as needed  - all areas    WOUND HISTORY:   Pt is a 63yr old morbidly obese woman with history of A. Fib, CHF, Diabetes, HTN and COPD on 4L at baseline. Patient known to wound team from recent admit, discharging on 03/07.  Pt was a transfer for higher level of care from [facility] after pts son called for a welfare check. Pt apparently did have home health but for unknown reasons she quit on Friday 2/16/24. PT was found sitting in a chair soiled in stool and urine. Pt reported she had not been out of chair since January 25, 2024. Wound team was consulted regarding extensive wounds to BLE.  Patient was transferred to [facility] 03/07 and  re-admitted with pneumonia.          WOUND ASSESSMENT/LDA  Wound 02/21/24 Pressure Injury Thigh Posterior Bilateral (Active)   Date First Assessed/Time First Assessed: 02/21/24 0200   Present on Original Admission: Yes  Hand Hygiene Completed: Yes  Primary Wound Type: Pressure Injury  Location: Thigh  Wound Orientation: Posterior  Laterality: Bilateral      Assessments 3/17/2024  2:00 PM   Wound Image      Site Assessment Red;Pink   Periwound Assessment Scar tissue   Margins Attached edges   Closure None   Drainage Amount Scant   Drainage Description Serosanguineous   Treatments Cleansed;Site care;Offloading   Offloading/DME Heel float boot   Wound Cleansing Approved Wound Cleanser   Periwound Protectant No-sting Skin Prep   Dressing Status Clean;Dry;Intact   Dressing Changed Changed   Dressing Cleansing/Solutions Not Applicable   Dressing Options Petrolatum Gauze (yellow);Offloading Dressing - Sacral   Dressing Change/Treatment Frequency Daily, and As Needed   NEXT Dressing Change/Treatment Date 03/18/24   NEXT Weekly Photo (Inpatient Only) 03/24/24   Wound Team Following Not following   Non-staged Wound Description Full thickness   WOUND NURSE ONLY - Time Spent with Patient (mins) 60       Wound 02/21/24 Pressure Injury Heel Plantar;Lateral Right POA Unstageable (Active)   Date First Assessed/Time First Assessed: 02/21/24 0200   Present on Original Admission: Yes  Hand Hygiene Completed: Yes  Primary Wound Type: Pressure Injury  Location: Heel  Wound Orientation: Plantar;Lateral  Laterality: Right  Wound Description (Co...      Assessments 3/17/2024  2:00 PM   Wound Image       Site Assessment Black;Brown   Periwound Assessment Intact   Margins Attached edges   Closure Open to air   Drainage Amount None   Treatments Offloading   Offloading/DME Heel float boot   Wound Cleansing Povidone-Iodine   Dressing Status Open to Air   Dressing Cleansing/Solutions Not Applicable   Dressing Options Open to Air   Dressing  Change/Treatment Frequency Every Shift, and As Needed   NEXT Dressing Change/Treatment Date 03/17/24   NEXT Weekly Photo (Inpatient Only) 03/24/24   Wound Team Following Not following   Pressure Injury Stage Unstageable   Wound Length (cm) 7 cm   Wound Width (cm) 5 cm   Wound Surface Area (cm^2) 35 cm^2       Wound 02/21/24 Full Thickness Wound Leg Posterior;Lower Right (Active)   Date First Assessed/Time First Assessed: 02/21/24 0200   Present on Original Admission: Yes  Hand Hygiene Completed: Yes  Primary Wound Type: Full Thickness Wound  Location: Leg  Wound Orientation: Posterior;Lower  Laterality: Right      Assessments 3/17/2024  2:00 PM   Wound Image     Site Assessment Pink;Red   Periwound Assessment Intact   Margins Attached edges   Closure None   Drainage Amount Scant   Drainage Description Serosanguineous   Treatments Cleansed;Site care;Offloading   Offloading/DME Heel float boot   Wound Cleansing Approved Wound Cleanser   Periwound Protectant No-sting Skin Prep   Dressing Status Clean;Dry;Intact   Dressing Changed Changed   Dressing Cleansing/Solutions Not Applicable   Dressing Options Petrolatum Gauze (yellow);Offloading Dressing - Sacral   Dressing Change/Treatment Frequency Daily, and As Needed   NEXT Dressing Change/Treatment Date 03/18/24   NEXT Weekly Photo (Inpatient Only) 03/24/24   Wound Team Following Not following       Moisture Associated Skin Damage 02/21/24 Buttock;Groin;Perineum;Other (comment) (Active)   First Observed Date/First Observed Time: 02/21/24 1557   Present on Original Admission: Yes  Laterality: Bilateral  Wound Location : (c) Buttock;Groin;Perineum;Other (comment)      Assessments 3/17/2024  2:00 PM   Wound Image      NEXT Weekly Photo (Inpatient Only) 03/24/24   Drainage Amount None   Periwound Assessment Intact   IAD Cleansing Foam Cleanser/Washcloth   Periwound Protectant Barrier Paste   IAD Containment Device Purewick        Vascular:    RENU:   RENU Results, Last 30 Days  US-RENU SINGLE LEVEL BILAT    Result Date: 2024  Narrative  Vascular Laboratory  Conclusions  Normal ankle brachial indices.  SHAQUILLE CANDELARIA  Age:    63    Gender:     F  MRN:    [id number]  :    1960      BSA:  Exam Date:     2024 07:50  Room #:     Inpatient  Priority:     Routine  Ht (in):             Wt (lb):  Ordering Physician:        POLLY DUTTON  Referring Physician:       [id number] Coto  Sonographer:               Dudley Riley RDMS, RVT  Study Type:                Complete Bilateral  Technical Quality:         Adequate  Indications:     Pain in leg, unspecified  CPT Codes:       85357  ICD Codes:       M79.606  History:         Nonhealing wound. No prior study.  Limitations:                 RIGHT  Waveform            Systolic BPs (mmHg)                             121           Brachial                                           Common Femoral                                           Popliteal  Hyperemic                  119           Posterior Tibial  Hyperemic                  108           Dorsalis Pedis                                           Digit                             0.97          RENU                                           TBI                       LEFT  Waveform        Systolic BPs (mmHg)                             123           Brachial                                           Common Femoral                                           Popliteal  Triphasic                  125           Posterior Tibial  Triphasic                  120           Dorsalis Pedis                                           Digit                             1.02          RENU                                           TBI  Findings  Right.  Doppler waveforms of the common femoral and popliteal arteries could not be  insonated due to patient body habitus.  Doppler waveforms of the posterior tibial and dorsalis pedis arteries are   hyperemic.  The ankle-brachial index is normal.  Left.  Doppler waveforms of the common femoral and popliteal arteries could not be  insonated due to patient body habitus.  Doppler waveforms of the posterior tibial and dorsalis pedis arteries are  triphasic.  The ankle-brachial index is normal.  Padmaja Alicia MD  (Electronically Signed)  Final Date:      21 February 2024                   13:41      Lab Values:    Lab Results   Component Value Date/Time    WBC 23.4 (H) 03/17/2024 02:31 AM    RBC 3.50 (L) 03/17/2024 02:31 AM    HEMOGLOBIN 8.9 (L) 03/17/2024 02:31 AM    HEMATOCRIT 29.7 (L) 03/17/2024 02:31 AM    CREACTPROT 8.31 (H) 02/21/2024 02:11 AM    SEDRATEWES 97 (H) 02/21/2024 02:20 AM    HBA1C 6.8 (H) 02/21/2024 02:20 AM         Culture Results show:  No results found for this or any previous visit (from the past 720 hour(s)).    Pain Level/Medicated:  None, Tolerated without pain medication       INTERVENTIONS BY WOUND TEAM:  Chart and images reviewed. Discussed with bedside RN. All areas of concern (based on picture review, LDA review and discussion with bedside RN) have been thoroughly assessed. Documentation of areas based on significant findings. This RN in to assess patient. Performed standard wound care which includes appropriate positioning, dressing removal and non-selective debridement. Pictures and measurements obtained weekly if/when required.    Wound:  Rambo post thighs, R post calf  Cleansed/Non-selectively Debrided with:  Wound cleanser and Gauze  Primary Dressing:  xeroform guaze  Secondary (Outer) Dressing: sacral offloading dressing     Wound:  Right lateral heel  Paint with betadine, leave  MICHAEL in heel float boot.    Advanced Wound Care Discharge Planning  Number of Clinicians necessary to complete wound care: 2 for turns   Is patient requiring IV pain medications for dressing changes:  No   Length of time for dressing change 45 min. (This does not include chart review, pre-medication  time, set up, clean up or time spent charting.)    Interdisciplinary consultation: Patient, Bedside RN, Kranthi LOCKHART (Wound RN).  Pressure injury and staging reviewed with Kranthi LOCKHART (Wound RN).    EVALUATION / RATIONALE FOR TREATMENT:     Date:  03/17/24  Wound Status:  Wound improving    Wounds to robbie post thighs healing well with current dressing selection.  Likely transfer back to SNF.  Wound to left calf with scarring, ok to leave open to air.           Goals: Steady decrease in wound area and depth weekly.    NURSING PLAN OF CARE ORDERS:  Dressing changes: See Dressing Care orders  RN Prevention Protocol    NUTRITION RECOMMENDATIONS   Wound Team Recommendations:  N/A    DIET ORDERS (From admission to next 24h)       Start     Ordered    03/16/24 1142  Diet Order Diet: Cardiac; Second Modifier: (optional): Consistent CHO (Diabetic)  ALL MEALS        Question Answer Comment   Diet: Cardiac    Second Modifier: (optional) Consistent CHO (Diabetic)        03/16/24 1142                    PREVENTATIVE INTERVENTIONS:    Q shift Kyle - performed per nursing policy  Q shift pressure point assessments - performed per nursing policy    Surface/Positioning  ICU Low Airloss - Currently in Place  Reposition q 2 hours - Currently in Place  Move and Transfer System (MATs) - Currently in Place  TAPs Turning system - Currently in Place    Offloading/Redistribution  Sacral offloading dressing (Silicone dressing) - Currently in Place  Heel float boots (Prevalon boot) - Currently in Place      Respiratory  Silicone O2 tubing - Currently in Place  Gray Foam Ear protectors - Applied this Visit    Containment/Moisture Prevention    Dri-milad pad - Currently in Place  Purwick/Condom Cath - Currently in Place    Mobilization      Not Mobilizing      Anticipated discharge plans:  Skilled Nursing        Vac Discharge Needs:  Vac Discharge plan is purely a recommendation from wound team and not a requirement for discharge unless otherwise stated  by physician.  Not Applicable Pt not on a wound vac

## 2024-03-17 NOTE — CARE PLAN
Problem: Knowledge Deficit - Standard  Goal: Patient and family/care givers will demonstrate understanding of plan of care, disease process/condition, diagnostic tests and medications  Outcome: Progressing  Note: Discussed POC and medications with patient.  Patient verbalized understanding.       Problem: Pain - Standard  Goal: Alleviation of pain or a reduction in pain to the patient’s comfort goal  Outcome: Progressing  Note: POC regarding pain management discussed with pt.  Pt will be medicated for pain per MAR     The patient is Stable - Low risk of patient condition declining or worsening    Shift Goals  Clinical Goals: SPO2%  will remain abovre 90% during shift

## 2024-03-17 NOTE — ASSESSMENT & PLAN NOTE
3/18/2024   She found to have E. coli bacteremia.  Pansensitive  Most likely urinary source.  Patient does not have abdominal pain or right upper quadrant tenderness.  I switch antibiotic to Ancef on March 18, 2024.

## 2024-03-17 NOTE — PROGRESS NOTES
Patient hypotensive, systolic BP's on the 80's 88/54 with a MAP of 64. HR 60-70's Dr. Swan notified via voalte. Received order for midodrine, antibiotic change and fluid DC. Will continue to monitor.

## 2024-03-17 NOTE — CARE PLAN
The patient is Stable - Low risk of patient condition declining or worsening    Shift Goals  Clinical Goals: wean o2, improve skin integrity  Patient Goals: rest    Progress made toward(s) clinical / shift goals:   in use, decreasing oxygen usage as tolerated, encouraged deep breathing and coughing, educated on use of IS.   Q2 turns in place, mepelex over bony prominences, dressing change protocols in place and skin protectant in use      Patient is not progressing towards the following goals:

## 2024-03-18 PROBLEM — N17.9 AKI (ACUTE KIDNEY INJURY) (HCC): Status: ACTIVE | Noted: 2024-03-18

## 2024-03-18 LAB
ALBUMIN SERPL BCP-MCNC: 2.6 G/DL (ref 3.2–4.9)
ALBUMIN/GLOB SERPL: 0.8 G/DL
ALP SERPL-CCNC: 77 U/L (ref 30–99)
ALT SERPL-CCNC: 7 U/L (ref 2–50)
ANION GAP SERPL CALC-SCNC: 11 MMOL/L (ref 7–16)
AST SERPL-CCNC: 13 U/L (ref 12–45)
BACTERIA BLD CULT: ABNORMAL
BILIRUB SERPL-MCNC: 0.4 MG/DL (ref 0.1–1.5)
BUN SERPL-MCNC: 42 MG/DL (ref 8–22)
CALCIUM ALBUM COR SERPL-MCNC: 9.3 MG/DL (ref 8.5–10.5)
CALCIUM SERPL-MCNC: 8.2 MG/DL (ref 8.5–10.5)
CHLORIDE SERPL-SCNC: 87 MMOL/L (ref 96–112)
CHLORIDE UR-SCNC: <20 MMOL/L
CO2 SERPL-SCNC: 28 MMOL/L (ref 20–33)
CREAT SERPL-MCNC: 2.9 MG/DL (ref 0.5–1.4)
CREAT UR-MCNC: 76.9 MG/DL
ERYTHROCYTE [DISTWIDTH] IN BLOOD BY AUTOMATED COUNT: 77 FL (ref 35.9–50)
GFR SERPLBLD CREATININE-BSD FMLA CKD-EPI: 18 ML/MIN/1.73 M 2
GLOBULIN SER CALC-MCNC: 3.1 G/DL (ref 1.9–3.5)
GLUCOSE BLD STRIP.AUTO-MCNC: 126 MG/DL (ref 65–99)
GLUCOSE BLD STRIP.AUTO-MCNC: 127 MG/DL (ref 65–99)
GLUCOSE BLD STRIP.AUTO-MCNC: 154 MG/DL (ref 65–99)
GLUCOSE BLD STRIP.AUTO-MCNC: 164 MG/DL (ref 65–99)
GLUCOSE SERPL-MCNC: 140 MG/DL (ref 65–99)
HCT VFR BLD AUTO: 29 % (ref 37–47)
HGB BLD-MCNC: 8.7 G/DL (ref 12–16)
MAGNESIUM SERPL-MCNC: 2 MG/DL (ref 1.5–2.5)
MCH RBC QN AUTO: 25.4 PG (ref 27–33)
MCHC RBC AUTO-ENTMCNC: 30 G/DL (ref 32.2–35.5)
MCV RBC AUTO: 84.5 FL (ref 81.4–97.8)
NT-PROBNP SERPL IA-MCNC: 9457 PG/ML (ref 0–125)
PHOSPHATE SERPL-MCNC: 6 MG/DL (ref 2.5–4.5)
PLATELET # BLD AUTO: 237 K/UL (ref 164–446)
PMV BLD AUTO: 10.2 FL (ref 9–12.9)
POTASSIUM SERPL-SCNC: 3.5 MMOL/L (ref 3.6–5.5)
POTASSIUM UR-SCNC: 33 MMOL/L
PROT SERPL-MCNC: 5.7 G/DL (ref 6–8.2)
PROT UR-MCNC: 140 MG/DL (ref 0–15)
RBC # BLD AUTO: 3.43 M/UL (ref 4.2–5.4)
SIGNIFICANT IND 70042: ABNORMAL
SIGNIFICANT IND 70042: ABNORMAL
SITE SITE: ABNORMAL
SITE SITE: ABNORMAL
SODIUM SERPL-SCNC: 126 MMOL/L (ref 135–145)
SODIUM UR-SCNC: 22 MMOL/L
SOURCE SOURCE: ABNORMAL
SOURCE SOURCE: ABNORMAL
WBC # BLD AUTO: 15.9 K/UL (ref 4.8–10.8)

## 2024-03-18 PROCEDURE — 83735 ASSAY OF MAGNESIUM: CPT

## 2024-03-18 PROCEDURE — 99233 SBSQ HOSP IP/OBS HIGH 50: CPT | Performed by: INTERNAL MEDICINE

## 2024-03-18 PROCEDURE — 84133 ASSAY OF URINE POTASSIUM: CPT

## 2024-03-18 PROCEDURE — 82962 GLUCOSE BLOOD TEST: CPT

## 2024-03-18 PROCEDURE — 84156 ASSAY OF PROTEIN URINE: CPT

## 2024-03-18 PROCEDURE — 700102 HCHG RX REV CODE 250 W/ 637 OVERRIDE(OP): Performed by: INTERNAL MEDICINE

## 2024-03-18 PROCEDURE — 87040 BLOOD CULTURE FOR BACTERIA: CPT

## 2024-03-18 PROCEDURE — 36415 COLL VENOUS BLD VENIPUNCTURE: CPT

## 2024-03-18 PROCEDURE — 700111 HCHG RX REV CODE 636 W/ 250 OVERRIDE (IP): Performed by: INTERNAL MEDICINE

## 2024-03-18 PROCEDURE — 84300 ASSAY OF URINE SODIUM: CPT

## 2024-03-18 PROCEDURE — 82436 ASSAY OF URINE CHLORIDE: CPT

## 2024-03-18 PROCEDURE — 84100 ASSAY OF PHOSPHORUS: CPT

## 2024-03-18 PROCEDURE — 85027 COMPLETE CBC AUTOMATED: CPT

## 2024-03-18 PROCEDURE — A9270 NON-COVERED ITEM OR SERVICE: HCPCS | Performed by: INTERNAL MEDICINE

## 2024-03-18 PROCEDURE — 80053 COMPREHEN METABOLIC PANEL: CPT

## 2024-03-18 PROCEDURE — 87150 DNA/RNA AMPLIFIED PROBE: CPT

## 2024-03-18 PROCEDURE — 83880 ASSAY OF NATRIURETIC PEPTIDE: CPT

## 2024-03-18 PROCEDURE — 87077 CULTURE AEROBIC IDENTIFY: CPT

## 2024-03-18 PROCEDURE — 770020 HCHG ROOM/CARE - TELE (206)

## 2024-03-18 PROCEDURE — 82570 ASSAY OF URINE CREATININE: CPT

## 2024-03-18 RX ORDER — POTASSIUM CHLORIDE 20 MEQ/1
40 TABLET, EXTENDED RELEASE ORAL ONCE
Status: DISCONTINUED | OUTPATIENT
Start: 2024-03-18 | End: 2024-03-18

## 2024-03-18 RX ORDER — POTASSIUM CHLORIDE 20 MEQ/1
20 TABLET, EXTENDED RELEASE ORAL ONCE
Status: COMPLETED | OUTPATIENT
Start: 2024-03-18 | End: 2024-03-18

## 2024-03-18 RX ADMIN — INSULIN HUMAN 1 UNITS: 100 INJECTION, SOLUTION PARENTERAL at 12:24

## 2024-03-18 RX ADMIN — DILTIAZEM HYDROCHLORIDE 180 MG: 180 CAPSULE, COATED, EXTENDED RELEASE ORAL at 05:13

## 2024-03-18 RX ADMIN — RIVAROXABAN 15 MG: 15 TABLET, FILM COATED ORAL at 17:02

## 2024-03-18 RX ADMIN — INSULIN GLARGINE-YFGN 10 UNITS: 100 INJECTION, SOLUTION SUBCUTANEOUS at 09:31

## 2024-03-18 RX ADMIN — POTASSIUM CHLORIDE 20 MEQ: 1500 TABLET, EXTENDED RELEASE ORAL at 08:35

## 2024-03-18 RX ADMIN — PREGABALIN 25 MG: 25 CAPSULE ORAL at 17:02

## 2024-03-18 RX ADMIN — INSULIN HUMAN 1 UNITS: 100 INJECTION, SOLUTION PARENTERAL at 17:02

## 2024-03-18 RX ADMIN — PREGABALIN 25 MG: 25 CAPSULE ORAL at 05:13

## 2024-03-18 RX ADMIN — DIGOXIN 125 MCG: 0.12 TABLET ORAL at 17:02

## 2024-03-18 RX ADMIN — MIDODRINE HYDROCHLORIDE 5 MG: 5 TABLET ORAL at 17:02

## 2024-03-18 RX ADMIN — PREGABALIN 25 MG: 25 CAPSULE ORAL at 11:24

## 2024-03-18 RX ADMIN — CEFTRIAXONE SODIUM 2000 MG: 10 INJECTION, POWDER, FOR SOLUTION INTRAVENOUS at 05:13

## 2024-03-18 RX ADMIN — OXYCODONE 5 MG: 5 TABLET ORAL at 20:31

## 2024-03-18 RX ADMIN — DULOXETINE HYDROCHLORIDE 30 MG: 30 CAPSULE, DELAYED RELEASE ORAL at 05:13

## 2024-03-18 ASSESSMENT — ENCOUNTER SYMPTOMS
PHOTOPHOBIA: 0
FEVER: 0
WEIGHT LOSS: 0
SHORTNESS OF BREATH: 0
COUGH: 0
HEADACHES: 0
SPUTUM PRODUCTION: 0
BACK PAIN: 0
TREMORS: 0
NAUSEA: 0
TINGLING: 0
DIZZINESS: 0
SENSORY CHANGE: 0
SPEECH CHANGE: 0
NECK PAIN: 0
DIARRHEA: 0
CONSTIPATION: 0
MYALGIAS: 0
DOUBLE VISION: 0
CHILLS: 0
HALLUCINATIONS: 0
ABDOMINAL PAIN: 0
ORTHOPNEA: 0
FOCAL WEAKNESS: 0
VOMITING: 0
BLURRED VISION: 0
PALPITATIONS: 0
EYE PAIN: 0

## 2024-03-18 ASSESSMENT — COGNITIVE AND FUNCTIONAL STATUS - GENERAL
TOILETING: TOTAL
TURNING FROM BACK TO SIDE WHILE IN FLAT BAD: A LOT
MOVING TO AND FROM BED TO CHAIR: TOTAL
CLIMB 3 TO 5 STEPS WITH RAILING: TOTAL
WALKING IN HOSPITAL ROOM: TOTAL
SUGGESTED CMS G CODE MODIFIER MOBILITY: CM
SUGGESTED CMS G CODE MODIFIER DAILY ACTIVITY: CL
DRESSING REGULAR UPPER BODY CLOTHING: A LOT
DRESSING REGULAR LOWER BODY CLOTHING: TOTAL
HELP NEEDED FOR BATHING: TOTAL
STANDING UP FROM CHAIR USING ARMS: TOTAL
MOBILITY SCORE: 8
MOVING FROM LYING ON BACK TO SITTING ON SIDE OF FLAT BED: A LOT
DAILY ACTIVITIY SCORE: 13

## 2024-03-18 ASSESSMENT — PATIENT HEALTH QUESTIONNAIRE - PHQ9
8. MOVING OR SPEAKING SO SLOWLY THAT OTHER PEOPLE COULD HAVE NOTICED. OR THE OPPOSITE, BEING SO FIGETY OR RESTLESS THAT YOU HAVE BEEN MOVING AROUND A LOT MORE THAN USUAL: NOT AT ALL
SUM OF ALL RESPONSES TO PHQ QUESTIONS 1-9: 3
7. TROUBLE CONCENTRATING ON THINGS, SUCH AS READING THE NEWSPAPER OR WATCHING TELEVISION: NOT AT ALL
3. TROUBLE FALLING OR STAYING ASLEEP OR SLEEPING TOO MUCH: NOT AT ALL
6. FEELING BAD ABOUT YOURSELF - OR THAT YOU ARE A FAILURE OR HAVE LET YOURSELF OR YOUR FAMILY DOWN: NOT AL ALL
2. FEELING DOWN, DEPRESSED, IRRITABLE, OR HOPELESS: NEARLY EVERY DAY
SUM OF ALL RESPONSES TO PHQ9 QUESTIONS 1 AND 2: 3
9. THOUGHTS THAT YOU WOULD BE BETTER OFF DEAD, OR OF HURTING YOURSELF: NOT AT ALL
1. LITTLE INTEREST OR PLEASURE IN DOING THINGS: NOT AT ALL
5. POOR APPETITE OR OVEREATING: NOT AT ALL
4. FEELING TIRED OR HAVING LITTLE ENERGY: NOT AT ALL

## 2024-03-18 ASSESSMENT — LIFESTYLE VARIABLES
TOTAL SCORE: 0
TOTAL SCORE: 0
AVERAGE NUMBER OF DAYS PER WEEK YOU HAVE A DRINK CONTAINING ALCOHOL: 0
HOW MANY TIMES IN THE PAST YEAR HAVE YOU HAD 5 OR MORE DRINKS IN A DAY: 0
SUBSTANCE_ABUSE: 0
TOTAL SCORE: 0
EVER HAD A DRINK FIRST THING IN THE MORNING TO STEADY YOUR NERVES TO GET RID OF A HANGOVER: NO
ALCOHOL_USE: NO
CONSUMPTION TOTAL: NEGATIVE
ON A TYPICAL DAY WHEN YOU DRINK ALCOHOL HOW MANY DRINKS DO YOU HAVE: 0
HAVE YOU EVER FELT YOU SHOULD CUT DOWN ON YOUR DRINKING: NO
HAVE PEOPLE ANNOYED YOU BY CRITICIZING YOUR DRINKING: NO
EVER FELT BAD OR GUILTY ABOUT YOUR DRINKING: NO

## 2024-03-18 ASSESSMENT — PAIN DESCRIPTION - PAIN TYPE
TYPE: ACUTE PAIN

## 2024-03-18 ASSESSMENT — FIBROSIS 4 INDEX: FIB4 SCORE: 1.29

## 2024-03-18 NOTE — CONSULTS
[facility] -  CONSULTATION NOTE               Author: Arron Bettencourt M.D. Date & Time: 3/18/2024  11:30 AM       REASON FOR CONSULTATION:   CARMELO    CHIEF COMPLAINT:   CARMELO    HISTORY OF PRESENT ILLNESS:    Miroslava Matta is a 63 y.o. female with past medical history of chronic atrial fibrillation on Xarelto, congestive heart failure, diabetes, hyperlipidemia, COPD on 4 L of oxygen, hypertension, RUTH and mood disorder who presented to the hospital with complaint of nausea and vomiting and shortness of breath.  She is a resident at [facility] and they checked her pulse ox and she found to hypoxia and patient transferred to [facility].        During this admission she developed A-fib with RVR.  She was started on  amiodarone infusion and admitted to [facility].    She was admitted on 3/16 with a Scr of 1.38 which bridget to 2.08 to n ow 2.90 prompting Nephrology consultation. His baseline Scr was normal prior to this admission on 3/7/24 of 0.77 mg/dL. Nephrology was asked to consult for CARMELO.    She did have several episodes of hypotension during the course of her stay with systolics in the mid 70-80s and at one point as low as 50s. Currently her BP has been stable. Her kidney US revealed Unremarkable kidneys. No hydronephrosis. Her ECHO from Feb/24 revealed Grossly normal left ventricular systolic function. The left ventricular ejection fraction is visually estimated to be 50%. Severely dilated right ventricle. Reduced right ventricular systolic function. Mild aortic valve stenosis. Mild aortic insufficiency. Mild to moderate mitral regurgitation. Severe tricuspid regurgitation. Estimated right ventricular systolic pressure is 85 mmHg. Right heart   pressures are consistent with severe pulmonary hypertension.    No F/C/CP/SOB.  No melena, hematochezia, hematemesis.  No HA, visual changes, but had mild abdominal pain.    REVIEW OF SYSTEMS:    10 point ROS was performed and is as  "per HPI or otherwise negative    PAST MEDICAL HISTORY:   Past Medical History:   Diagnosis Date    Arthritis     Back pain     COPD (chronic obstructive pulmonary disease) ([facility]) 5/16/2018    Diabetes ([facility])     Fall     Fungal rash of torso 5/15/2018    Hypertension     Morbid obesity with BMI of 60.0-69.9, adult ([facility]) 5/17/2018    New onset atrial fibrillation ([facility]) 5/15/2018    Psychiatric problem     Urinary incontinence        PAST SURGICAL HISTORY:   No past surgical history on file.    FAMILY HISTORY:   No family history on file.    SOCIAL HISTORY:   Social History     Tobacco Use   Smoking Status Former   Smokeless Tobacco Never     Social History     Substance and Sexual Activity   Alcohol Use Yes     Social History     Substance and Sexual Activity   Drug Use No       HOME MEDICATIONS:   Reviewed and documented in chart    LABORATORY STUDIES:   Recent Labs     03/16/24  1628 03/17/24  0231 03/18/24  0220   SODIUM 134* 130* 126*   POTASSIUM 4.0 3.9 3.5*   CHLORIDE 93* 91* 87*   CO2 27 27 28   GLUCOSE 139* 203* 140*   BUN 27* 32* 42*   CREATININE 1.34 2.08* 2.90*   CALCIUM 8.4* 7.9* 8.2*       ALLERGIES:  Gabapentin, Metformin, and Codeine    VS:  /71   Pulse 72   Temp 36.4 °C (97.5 °F) (Temporal)   Resp 18   Ht 1.702 m (5' 7\")   Wt (!) 167 kg (368 lb 2.7 oz)   SpO2 94%   BMI 57.66 kg/m²     Physical Exam  Vitals and nursing note reviewed.   Constitutional:       General: She is not in acute distress.  HENT:      Head: Normocephalic and atraumatic.      Nose: Nose normal.      Mouth/Throat:      Pharynx: Oropharynx is clear.   Eyes:      Extraocular Movements: Extraocular movements intact.      Conjunctiva/sclera: Conjunctivae normal.      Pupils: Pupils are equal, round, and reactive to light.   Cardiovascular:      Rate and Rhythm: Normal rate and regular rhythm.      Pulses: Normal pulses.      Heart sounds: Normal heart sounds.   Pulmonary:      Effort: Pulmonary effort is normal.      Breath " sounds: Normal breath sounds.   Abdominal:      General: Abdomen is flat. Bowel sounds are normal.      Palpations: Abdomen is soft.   Musculoskeletal:         General: Normal range of motion.      Cervical back: Normal range of motion and neck supple.   Skin:     General: Skin is warm.   Neurological:      General: No focal deficit present.   Psychiatric:         Mood and Affect: Mood normal.         Behavior: Behavior normal.         Thought Content: Thought content normal.       FLUID BALANCE:  In: 2000 [P.O.:2000]  Out: 250     IMAGING:  All imaging reviewed from admission to present day    IMPRESSION:  # CARMELO/ATN - likely sec to hypoperfusion  # Hyponatremia  # N/V  # Acidosis  # Atrial fibrillation  # E. Coli bacteremia  # Acute respiratory failure resolved  # Sepsis  # DM    PLAN:  - No compelling indication for RRT. Scr will continue to rise before it plateau's  - Continue Abx  - Keep MAP >/= to 65  - Monitor UOP  - Daily evaluation for RRT needs  - Dose all meds per eGFR < 15    Thank you for the consultation!

## 2024-03-18 NOTE — PROGRESS NOTES
4 Eyes Skin Assessment Completed by JOSEPH Hope and JOSEPH Raymundo.    Head WDL  Ears Redness, Blanching, Non-Blanching, and Discoloration  Nose WDL  Mouth WDL  Neck WDL  Breast/Chest Discoloration and Excoriation moisture under breasts  Shoulder Blades WDL  Spine WDL  (R) Arm/Elbow/Hand Redness and Blanching  (L) Arm/Elbow/Hand Redness and Blanching  Abdomen Redness and Bruising, discoloration   Groin Redness and Excoriation  Scrotum/Coccyx/Buttocks Redness, Blanching, Excoriation, and Discoloration  (R) Leg Redness, Scab, Abrasion, and Edema right back of leg: open wound. Right back of thigh large red pink purple tunneling wounds. Drainage serosanguinous from wounds   (L) Leg Redness, Non-Blanching, Bruising, Swelling, and Edema  (R) Heel/Foot/Toe Non-Blanching and Ulcer(s) right heel large black ulcer right toe ulcer/wound   (L) Heel/Foot/Toe Redness and Non-Blanching          Devices In Places ECG, Blood Pressure Cuff, Pulse Ox, and Nasal Cannula      Interventions In Place Gray Ear Foams, Sacral Mepilex, TAP System, Pillows, Elbow Mepilex, Q2 Turns, and Low Air Loss Mattress    Possible Skin Injury Yes    Pictures Uploaded Into Epic Yes  Wound Consult Placed Yes  RN Wound Prevention Protocol Ordered Yes

## 2024-03-18 NOTE — CARE PLAN
The patient is Watcher - Medium risk of patient condition declining or worsening    Shift Goals  Clinical Goals: wean down o2, q2 hr turns, wound care  Patient Goals: rest  Family Goals: ELDER    Progress made toward(s) clinical / shift goals:        Problem: Knowledge Deficit - Standard  Goal: Patient and family/care givers will demonstrate understanding of plan of care, disease process/condition, diagnostic tests and medications  Outcome: Progressing     Problem: Hemodynamics  Goal: Patient's hemodynamics, fluid balance and neurologic status will be stable or improve  Outcome: Progressing     Problem: Fluid Volume  Goal: Fluid volume balance will be maintained  Outcome: Progressing     Problem: Urinary - Renal Perfusion  Goal: Ability to achieve and maintain adequate renal perfusion and functioning will improve  Outcome: Progressing     Problem: Respiratory  Goal: Patient will achieve/maintain optimum respiratory ventilation and gas exchange  Outcome: Progressing     Problem: Mechanical Ventilation  Goal: Patient will be able to express needs and understand communication  Outcome: Progressing     Problem: Physical Regulation  Goal: Diagnostic test results will improve  Outcome: Progressing  Goal: Signs and symptoms of infection will decrease  Outcome: Progressing     Problem: Pain - Standard  Goal: Alleviation of pain or a reduction in pain to the patient’s comfort goal  Outcome: Progressing     Problem: Skin Integrity  Goal: Skin integrity is maintained or improved  Outcome: Progressing     Problem: Fall Risk  Goal: Patient will remain free from falls  Outcome: Progressing       Patient is not progressing towards the following goals:

## 2024-03-18 NOTE — PROGRESS NOTES
4 Eyes Skin Assessment Completed by JOSEPH Gipson and JOSEPH Garcia.    Head WDL  Ears redness, blanching, discoloration   Nose WDL  Mouth WDL  Neck WDL  Breast/Chest Discoloration moisture   Shoulder Blades WDL  Spine WDL  (R) Arm/Elbow/Hand Redness and Blanching  (L) Arm/Elbow/Hand Redness and Blanching  Abdomen Scar healed wound    Groin Redness and Excoriation moisture- applied interdry   Scrotum/Coccyx/Buttocks Redness, Blanching, and Discoloration applied barrier paste   (R) Leg Redness, Non-Blanching, and Swelling wound posterior upper and lower leg  (L) Leg Redness, Non-Blanching, and Swelling wound posterior upper leg  (L) Heel/Foot/Toe Redness and Non-Blanching  (R) Heel/Foot/Toe non-blanching, ulcer, DTI heel/toe        Devices In Places Tele Box and Nasal Cannula      Interventions In Place Gray Ear Foams, InterDry, Sacral Mepilex, Pillows, Q2 Turns, Low Air Loss Mattress, and Barrier Cream    Possible Skin Injury Yes    Pictures Uploaded Into Epic Yes  Wound Consult Placed Yes  RN Wound Prevention Protocol Ordered Yes

## 2024-03-18 NOTE — CARE PLAN
The patient is Stable - Low risk of patient condition declining or worsening    Shift Goals  Clinical Goals: wean o2  Patient Goals: comfort  Family Goals: ELDER    Progress made toward(s) clinical / shift goals:      Patient is not progressing towards the following goals:      Problem: Knowledge Deficit - Standard  Goal: Patient and family/care givers will demonstrate understanding of plan of care, disease process/condition, diagnostic tests and medications  Outcome: Progressing     Problem: Hemodynamics  Goal: Patient's hemodynamics, fluid balance and neurologic status will be stable or improve  Outcome: Progressing     Problem: Fluid Volume  Goal: Fluid volume balance will be maintained  Outcome: Progressing     Problem: Urinary - Renal Perfusion  Goal: Ability to achieve and maintain adequate renal perfusion and functioning will improve  Outcome: Progressing     Problem: Respiratory  Goal: Patient will achieve/maintain optimum respiratory ventilation and gas exchange  Outcome: Progressing     Problem: Mechanical Ventilation  Goal: Safe management of artificial airway and ventilation  Outcome: Progressing  Goal: Successful weaning off mechanical ventilator, spontaneously maintains adequate gas exchange  Outcome: Progressing  Goal: Patient will be able to express needs and understand communication  Outcome: Progressing     Problem: Physical Regulation  Goal: Diagnostic test results will improve  Outcome: Progressing  Goal: Signs and symptoms of infection will decrease  Outcome: Progressing     Problem: Pain - Standard  Goal: Alleviation of pain or a reduction in pain to the patient’s comfort goal  Outcome: Progressing     Problem: Skin Integrity  Goal: Skin integrity is maintained or improved  Outcome: Progressing     Problem: Fall Risk  Goal: Patient will remain free from falls  Outcome: Progressing

## 2024-03-18 NOTE — RESPIRATORY CARE
COPD EDUCATION by COPD CLINICAL EDUCATOR  3/18/2024 at 7:25 AM by Lulu Herrera, RRT     Patient reviewed by COPD education team. Patient denies a history or diagnosis of COPD and is a non-smoker.  Therefore, patient does not qualify for the COPD program.

## 2024-03-18 NOTE — DIETARY
NUTRITION SERVICES: BMI - Pt with BMI >40 (=Body mass index is 57.66 kg/m².), Class III obesity. Weight loss counseling not appropriate in acute care setting. RECOMMEND - If appropriate at DC please refer to outpatient nutrition services for weight management.

## 2024-03-18 NOTE — PROGRESS NOTES
Report given to Brandan RUIZ. Time made available for questions.     1105: Pt transported off unit on monitor to T715/2

## 2024-03-18 NOTE — DOCUMENTATION QUERY
[facility]                                                                       Query Response Note      PATIENT:               SHAQUILLE CANDELARIA  ACCT #:                  [id number]  MRN:                     [id number]  :                      1960  ADMIT DATE:       3/16/2024 8:04 AM  DISCH DATE:          RESPONDING  PROVIDER #:        [id number]           QUERY TEXT:    The following pressure injuries are noted in the Wound Team Eval:     1) POA pressure injury posterior bilateral thigh  2) POA unstageable pressure injury right lateral heel     Can you clarify if these are relevant diagnoses?    The patient's Clinical Indicators include:  3/17 Wound Note:  Pressure injury thigh posterior bilateral POA, Pressure injury heel plantar; lateral right POA unstageable   Risk Factors: obesity, DM, SNF resident  Treatment: wound team eval, reposition q 2 h, move and transfer system, TAPs turning system, heel float boots    Thank you,  Humberto Kendrick RN, BSN, CCDS  Clinical   Connect via [employer]  Options provided:   -- POA pressure injuries of bilateral thighs and right heel ruled in   -- Other explanation of clinical findings, please specify   -- Findings of no clinical significance      Query created by: Humberto Kendrick on 3/18/2024 7:24 AM    RESPONSE TEXT:    POA pressure injuries of bilateral thighs and right heel ruled in          Electronically signed by:  WAQAR KEVIN MD 3/18/2024 7:25 AM

## 2024-03-19 LAB
ALBUMIN SERPL BCP-MCNC: 2.6 G/DL (ref 3.2–4.9)
ALBUMIN SERPL BCP-MCNC: 2.6 G/DL (ref 3.2–4.9)
ALBUMIN/GLOB SERPL: 0.7 G/DL
ALBUMIN/GLOB SERPL: 0.7 G/DL
ALP SERPL-CCNC: 92 U/L (ref 30–99)
ALP SERPL-CCNC: 95 U/L (ref 30–99)
ALT SERPL-CCNC: 6 U/L (ref 2–50)
ALT SERPL-CCNC: 8 U/L (ref 2–50)
ANION GAP SERPL CALC-SCNC: 11 MMOL/L (ref 7–16)
ANION GAP SERPL CALC-SCNC: 12 MMOL/L (ref 7–16)
AST SERPL-CCNC: 11 U/L (ref 12–45)
AST SERPL-CCNC: 9 U/L (ref 12–45)
BASOPHILS # BLD AUTO: 0.4 % (ref 0–1.8)
BASOPHILS # BLD: 0.05 K/UL (ref 0–0.12)
BILIRUB SERPL-MCNC: 0.3 MG/DL (ref 0.1–1.5)
BILIRUB SERPL-MCNC: 0.3 MG/DL (ref 0.1–1.5)
BUN SERPL-MCNC: 44 MG/DL (ref 8–22)
BUN SERPL-MCNC: 45 MG/DL (ref 8–22)
CALCIUM ALBUM COR SERPL-MCNC: 9.3 MG/DL (ref 8.5–10.5)
CALCIUM ALBUM COR SERPL-MCNC: 9.4 MG/DL (ref 8.5–10.5)
CALCIUM SERPL-MCNC: 8.2 MG/DL (ref 8.5–10.5)
CALCIUM SERPL-MCNC: 8.3 MG/DL (ref 8.5–10.5)
CHLORIDE SERPL-SCNC: 84 MMOL/L (ref 96–112)
CHLORIDE SERPL-SCNC: 84 MMOL/L (ref 96–112)
CO2 SERPL-SCNC: 25 MMOL/L (ref 20–33)
CO2 SERPL-SCNC: 26 MMOL/L (ref 20–33)
CREAT SERPL-MCNC: 2.7 MG/DL (ref 0.5–1.4)
CREAT SERPL-MCNC: 2.77 MG/DL (ref 0.5–1.4)
DIGOXIN SERPL-MCNC: 1.4 NG/ML (ref 0.8–2)
EOSINOPHIL # BLD AUTO: 0.32 K/UL (ref 0–0.51)
EOSINOPHIL NFR BLD: 2.9 % (ref 0–6.9)
ERYTHROCYTE [DISTWIDTH] IN BLOOD BY AUTOMATED COUNT: 68.6 FL (ref 35.9–50)
GFR SERPLBLD CREATININE-BSD FMLA CKD-EPI: 19 ML/MIN/1.73 M 2
GFR SERPLBLD CREATININE-BSD FMLA CKD-EPI: 19 ML/MIN/1.73 M 2
GLOBULIN SER CALC-MCNC: 3.5 G/DL (ref 1.9–3.5)
GLOBULIN SER CALC-MCNC: 3.7 G/DL (ref 1.9–3.5)
GLUCOSE BLD STRIP.AUTO-MCNC: 103 MG/DL (ref 65–99)
GLUCOSE BLD STRIP.AUTO-MCNC: 134 MG/DL (ref 65–99)
GLUCOSE BLD STRIP.AUTO-MCNC: 138 MG/DL (ref 65–99)
GLUCOSE BLD STRIP.AUTO-MCNC: 157 MG/DL (ref 65–99)
GLUCOSE SERPL-MCNC: 129 MG/DL (ref 65–99)
GLUCOSE SERPL-MCNC: 139 MG/DL (ref 65–99)
HCT VFR BLD AUTO: 31.5 % (ref 37–47)
HGB BLD-MCNC: 9.9 G/DL (ref 12–16)
IMM GRANULOCYTES # BLD AUTO: 0.19 K/UL (ref 0–0.11)
IMM GRANULOCYTES NFR BLD AUTO: 1.7 % (ref 0–0.9)
LYMPHOCYTES # BLD AUTO: 2.19 K/UL (ref 1–4.8)
LYMPHOCYTES NFR BLD: 19.6 % (ref 22–41)
MAGNESIUM SERPL-MCNC: 2.1 MG/DL (ref 1.5–2.5)
MCH RBC QN AUTO: 25.2 PG (ref 27–33)
MCHC RBC AUTO-ENTMCNC: 31.4 G/DL (ref 32.2–35.5)
MCV RBC AUTO: 80.2 FL (ref 81.4–97.8)
MONOCYTES # BLD AUTO: 0.58 K/UL (ref 0–0.85)
MONOCYTES NFR BLD AUTO: 5.2 % (ref 0–13.4)
NEUTROPHILS # BLD AUTO: 7.85 K/UL (ref 1.82–7.42)
NEUTROPHILS NFR BLD: 70.2 % (ref 44–72)
NRBC # BLD AUTO: 0 K/UL
NRBC BLD-RTO: 0 /100 WBC (ref 0–0.2)
PHOSPHATE SERPL-MCNC: 6 MG/DL (ref 2.5–4.5)
PLATELET # BLD AUTO: 220 K/UL (ref 164–446)
PMV BLD AUTO: 9.6 FL (ref 9–12.9)
POTASSIUM SERPL-SCNC: 4.2 MMOL/L (ref 3.6–5.5)
POTASSIUM SERPL-SCNC: 4.3 MMOL/L (ref 3.6–5.5)
PROCALCITONIN SERPL-MCNC: 12 NG/ML
PROT SERPL-MCNC: 6.1 G/DL (ref 6–8.2)
PROT SERPL-MCNC: 6.3 G/DL (ref 6–8.2)
RBC # BLD AUTO: 3.93 M/UL (ref 4.2–5.4)
SODIUM SERPL-SCNC: 121 MMOL/L (ref 135–145)
SODIUM SERPL-SCNC: 121 MMOL/L (ref 135–145)
WBC # BLD AUTO: 11.2 K/UL (ref 4.8–10.8)

## 2024-03-19 PROCEDURE — 85025 COMPLETE CBC W/AUTO DIFF WBC: CPT

## 2024-03-19 PROCEDURE — A9270 NON-COVERED ITEM OR SERVICE: HCPCS | Performed by: INTERNAL MEDICINE

## 2024-03-19 PROCEDURE — 36415 COLL VENOUS BLD VENIPUNCTURE: CPT

## 2024-03-19 PROCEDURE — 700102 HCHG RX REV CODE 250 W/ 637 OVERRIDE(OP): Performed by: INTERNAL MEDICINE

## 2024-03-19 PROCEDURE — 700101 HCHG RX REV CODE 250: Performed by: INTERNAL MEDICINE

## 2024-03-19 PROCEDURE — 97163 PT EVAL HIGH COMPLEX 45 MIN: CPT

## 2024-03-19 PROCEDURE — 83735 ASSAY OF MAGNESIUM: CPT

## 2024-03-19 PROCEDURE — 80162 ASSAY OF DIGOXIN TOTAL: CPT

## 2024-03-19 PROCEDURE — 700111 HCHG RX REV CODE 636 W/ 250 OVERRIDE (IP): Performed by: INTERNAL MEDICINE

## 2024-03-19 PROCEDURE — 84145 PROCALCITONIN (PCT): CPT

## 2024-03-19 PROCEDURE — 82962 GLUCOSE BLOOD TEST: CPT | Mod: 91

## 2024-03-19 PROCEDURE — 770020 HCHG ROOM/CARE - TELE (206)

## 2024-03-19 PROCEDURE — 87040 BLOOD CULTURE FOR BACTERIA: CPT

## 2024-03-19 PROCEDURE — 84100 ASSAY OF PHOSPHORUS: CPT

## 2024-03-19 PROCEDURE — 99233 SBSQ HOSP IP/OBS HIGH 50: CPT | Performed by: INTERNAL MEDICINE

## 2024-03-19 PROCEDURE — 97166 OT EVAL MOD COMPLEX 45 MIN: CPT

## 2024-03-19 PROCEDURE — 80053 COMPREHEN METABOLIC PANEL: CPT

## 2024-03-19 RX ADMIN — DIGOXIN 125 MCG: 0.12 TABLET ORAL at 16:33

## 2024-03-19 RX ADMIN — PREGABALIN 25 MG: 25 CAPSULE ORAL at 06:16

## 2024-03-19 RX ADMIN — INSULIN GLARGINE-YFGN 10 UNITS: 100 INJECTION, SOLUTION SUBCUTANEOUS at 07:21

## 2024-03-19 RX ADMIN — CEFAZOLIN 2 G: 10 INJECTION, POWDER, FOR SOLUTION INTRAVENOUS at 12:17

## 2024-03-19 RX ADMIN — DILTIAZEM HYDROCHLORIDE 180 MG: 180 CAPSULE, COATED, EXTENDED RELEASE ORAL at 06:16

## 2024-03-19 RX ADMIN — PREGABALIN 25 MG: 25 CAPSULE ORAL at 11:44

## 2024-03-19 RX ADMIN — OXYCODONE 5 MG: 5 TABLET ORAL at 20:59

## 2024-03-19 RX ADMIN — DULOXETINE HYDROCHLORIDE 30 MG: 30 CAPSULE, DELAYED RELEASE ORAL at 06:16

## 2024-03-19 RX ADMIN — CEFAZOLIN 2 G: 10 INJECTION, POWDER, FOR SOLUTION INTRAVENOUS at 21:19

## 2024-03-19 RX ADMIN — PREGABALIN 25 MG: 25 CAPSULE ORAL at 16:32

## 2024-03-19 RX ADMIN — CEFAZOLIN 2 G: 10 INJECTION, POWDER, FOR SOLUTION INTRAVENOUS at 06:16

## 2024-03-19 RX ADMIN — RIVAROXABAN 15 MG: 15 TABLET, FILM COATED ORAL at 16:32

## 2024-03-19 ASSESSMENT — ENCOUNTER SYMPTOMS
SPEECH CHANGE: 0
SHORTNESS OF BREATH: 0
FEVER: 0
WEIGHT LOSS: 0
ORTHOPNEA: 0
BACK PAIN: 0
SPUTUM PRODUCTION: 0
EYE PAIN: 0
VOMITING: 0
NECK PAIN: 0
SENSORY CHANGE: 0
CONSTIPATION: 0
NAUSEA: 0
FOCAL WEAKNESS: 0
DOUBLE VISION: 0
TREMORS: 0
HALLUCINATIONS: 0
PHOTOPHOBIA: 0
BLURRED VISION: 0
TINGLING: 0
CHILLS: 0
PALPITATIONS: 0
ABDOMINAL PAIN: 0
MYALGIAS: 0
DIZZINESS: 0
COUGH: 0
DIARRHEA: 0
HEADACHES: 0

## 2024-03-19 ASSESSMENT — LIFESTYLE VARIABLES: SUBSTANCE_ABUSE: 0

## 2024-03-19 ASSESSMENT — COGNITIVE AND FUNCTIONAL STATUS - GENERAL
DAILY ACTIVITIY SCORE: 16
TURNING FROM BACK TO SIDE WHILE IN FLAT BAD: A LOT
SUGGESTED CMS G CODE MODIFIER MOBILITY: CM
TOILETING: A LOT
MOVING FROM LYING ON BACK TO SITTING ON SIDE OF FLAT BED: A LOT
MOVING TO AND FROM BED TO CHAIR: A LOT
HELP NEEDED FOR BATHING: A LOT
DRESSING REGULAR LOWER BODY CLOTHING: A LOT
CLIMB 3 TO 5 STEPS WITH RAILING: TOTAL
MOBILITY SCORE: 9
WALKING IN HOSPITAL ROOM: TOTAL
SUGGESTED CMS G CODE MODIFIER DAILY ACTIVITY: CK
PERSONAL GROOMING: A LITTLE
STANDING UP FROM CHAIR USING ARMS: TOTAL
DRESSING REGULAR UPPER BODY CLOTHING: A LITTLE

## 2024-03-19 ASSESSMENT — GAIT ASSESSMENTS: GAIT LEVEL OF ASSIST: UNABLE TO PARTICIPATE

## 2024-03-19 ASSESSMENT — PAIN DESCRIPTION - PAIN TYPE
TYPE: ACUTE PAIN

## 2024-03-19 ASSESSMENT — FIBROSIS 4 INDEX: FIB4 SCORE: 1.29

## 2024-03-19 ASSESSMENT — ACTIVITIES OF DAILY LIVING (ADL): TOILETING: INDEPENDENT

## 2024-03-19 NOTE — CARE PLAN
The patient is Stable - Low risk of patient condition declining or worsening    Shift Goals  Clinical Goals: Pain control, Monitor labs, Q2 turns, VSS  Patient Goals: Pain control, comfort, sleep  Family Goals: ELDER    Progress made toward(s) clinical / shift goals:      Patient is not progressing towards the following goals:      Problem: Knowledge Deficit - Standard  Goal: Patient and family/care givers will demonstrate understanding of plan of care, disease process/condition, diagnostic tests and medications  Outcome: Progressing     Problem: Hemodynamics  Goal: Patient's hemodynamics, fluid balance and neurologic status will be stable or improve  Outcome: Progressing     Problem: Fluid Volume  Goal: Fluid volume balance will be maintained  Outcome: Progressing     Problem: Urinary - Renal Perfusion  Goal: Ability to achieve and maintain adequate renal perfusion and functioning will improve  Outcome: Progressing     Problem: Respiratory  Goal: Patient will achieve/maintain optimum respiratory ventilation and gas exchange  Outcome: Progressing     Problem: Mechanical Ventilation  Goal: Safe management of artificial airway and ventilation  Outcome: Progressing  Goal: Successful weaning off mechanical ventilator, spontaneously maintains adequate gas exchange  Outcome: Progressing  Goal: Patient will be able to express needs and understand communication  Outcome: Progressing     Problem: Physical Regulation  Goal: Diagnostic test results will improve  Outcome: Progressing  Goal: Signs and symptoms of infection will decrease  Outcome: Progressing     Problem: Pain - Standard  Goal: Alleviation of pain or a reduction in pain to the patient’s comfort goal  Outcome: Progressing     Problem: Skin Integrity  Goal: Skin integrity is maintained or improved  Outcome: Progressing     Problem: Fall Risk  Goal: Patient will remain free from falls  Outcome: Progressing

## 2024-03-19 NOTE — PROGRESS NOTES
Hospital Medicine Daily Progress Note    Date of Service  3/19/2024    Chief Complaint  Miroslava Matta is a 63 y.o. female admitted 3/16/2024 with nausea vomiting and shortness of breath    Hospital Course    Miroslava Matta is a 63 y.o. female with past medical history of chronic atrial fibrillation on Xarelto, congestive heart failure, diabetes, hyperlipidemia, COPD on 4 L of oxygen, hypertension, RUTH and mood disorder who presented to the hospital on 3/16/2024 with complaint of nausea and vomiting and shortness of breath.  She reported that she had she had 1 episode of vomiting last night and 1 episode of vomiting this morning.  [facility] is [facility] Health system and they checked her pulse ox and she found to hypoxia and patient transferred to [facility].  On my evaluation she expressed that she is feeling better but she has been having nausea and vomiting and overall she is not feeling well.  There is no specific aggravating or elevating factors.  She reported associated symptoms of headache and mild abdominal pain.  There is no other associated symptoms or any other acute complaints.  She has been taking her medications as prescribed.     After admission she developed A-fib with RVR.  I ordered bolus for amiodarone and later started on amiodarone infusion.      Interval Problem Update    03/17/24    I evaluated and examined her at the bedside.  She reported that she is feeling better.  Her A-fib with RVR resolved she remains in A-fib she has persistent atrial fibrillation.  Now plan is to discontinue her amiodarone due to persistent A-fib and now its rate controlled.  I am continuing Cardizem with reduced dose due to hypotension and I am continuing digoxin.  Patient requested that she would like to talk to case management regarding her belonging at [facility].  I requested bedside RN to contact case management.  Continue Xarelto.  She found to have bacteremia I switch her  antibiotic to ceftriaxone.  I will repeat blood cultures.  She does not have any abdominal pain or any acute pain on my evaluation.  Lactic acidosis trended down.    03/18/24    I evaluated and examined her at the bedside.  She expressed that she is feeling better.  Her son brought her cell phone and other belongings from [facility].  Renal function has been worsening.  I reviewed ultrasound renal that did not show any acute abnormalities.  I requested consultation with nephrology and discussed Ms. Malone current medical condition with Dr. Bettencourt  Currently she is rate controlled  Oxygen requirement is currently 5 L and she is maintaining oxygen saturation.  Due to her worsening renal function I adjusted her dose of Xarelto from 20 mg to 15 mg.  I discussed plan of care with patient and answered all questions.  3/19: Patient seen and examined, having nausea, no vomiting, cont on IV ancef for her Bacteremia will repeat Bcx  Wean off O2 as tolerated   Regarding her CARMELO nephrology following appreciate rec.   I have discussed this patient's plan of care and discharge plan at IDT rounds today with Case Management, Nursing, Nursing leadership, and other members of the IDT team.    Consultants/Specialty  None    Code Status  Full Code    Disposition  The patient is not medically cleared for discharge to home or a post-acute facility.      I have placed the appropriate orders for post-discharge needs.    Review of Systems  Review of Systems   Constitutional:  Positive for malaise/fatigue. Negative for chills, fever and weight loss.   HENT:  Negative for hearing loss and tinnitus.    Eyes:  Negative for blurred vision, double vision, photophobia and pain.   Respiratory:  Negative for cough, sputum production and shortness of breath.    Cardiovascular:  Negative for chest pain, palpitations, orthopnea and leg swelling.   Gastrointestinal:  Negative for abdominal pain, constipation, diarrhea, nausea and vomiting.   Genitourinary:   Negative for dysuria, frequency and urgency.   Musculoskeletal:  Negative for back pain, joint pain, myalgias and neck pain.   Skin:  Negative for rash.   Neurological:  Negative for dizziness, tingling, tremors, sensory change, speech change, focal weakness and headaches.   Psychiatric/Behavioral:  Negative for hallucinations and substance abuse.    All other systems reviewed and are negative.       Physical Exam  Temp:  [36.4 °C (97.5 °F)-36.7 °C (98.1 °F)] 36.7 °C (98.1 °F)  Pulse:  [74-85] 75  Resp:  [18-20] 18  BP: (123-163)/(63-93) 161/82  SpO2:  [90 %-93 %] 92 %    Physical Exam  Vitals reviewed.   Constitutional:       General: She is not in acute distress.     Appearance: Normal appearance. She is obese. She is ill-appearing.   HENT:      Head: Normocephalic and atraumatic.      Nose: No congestion.      Comments: Oxygen nasal cannula  Eyes:      General:         Right eye: No discharge.         Left eye: No discharge.      Pupils: Pupils are equal, round, and reactive to light.   Cardiovascular:      Rate and Rhythm: Normal rate. Rhythm irregular.      Pulses: Normal pulses.      Heart sounds: Normal heart sounds. No murmur heard.  Pulmonary:      Effort: Pulmonary effort is normal. No respiratory distress.      Breath sounds: Normal breath sounds. No stridor.   Abdominal:      General: Bowel sounds are normal. There is no distension.      Palpations: Abdomen is soft.      Tenderness: There is no abdominal tenderness.   Musculoskeletal:         General: No swelling or tenderness. Normal range of motion.      Cervical back: Normal range of motion. No rigidity.   Skin:     General: Skin is warm.      Capillary Refill: Capillary refill takes less than 2 seconds.      Coloration: Skin is not jaundiced or pale.      Findings: No bruising.   Neurological:      General: No focal deficit present.      Mental Status: She is alert and oriented to person, place, and time.      Cranial Nerves: No cranial nerve  deficit.      Comments: She is following commands  Decreased range of motion on lower extremities   Psychiatric:         Mood and Affect: Mood normal.         Behavior: Behavior normal.         Fluids    Intake/Output Summary (Last 24 hours) at 3/19/2024 1528  Last data filed at 3/19/2024 0700  Gross per 24 hour   Intake 0 ml   Output 550 ml   Net -550 ml       Laboratory  Recent Labs     03/17/24  0231 03/18/24  0220 03/19/24  0943   WBC 23.4* 15.9* 11.2*   RBC 3.50* 3.43* 3.93*   HEMOGLOBIN 8.9* 8.7* 9.9*   HEMATOCRIT 29.7* 29.0* 31.5*   MCV 84.9 84.5 80.2*   MCH 25.4* 25.4* 25.2*   MCHC 30.0* 30.0* 31.4*   RDW 79.7* 77.0* 68.6*   PLATELETCT 247 237 220   MPV 9.7 10.2 9.6     Recent Labs     03/18/24  0220 03/19/24  0943 03/19/24  1106   SODIUM 126* 121* 121*   POTASSIUM 3.5* 4.2 4.3   CHLORIDE 87* 84* 84*   CO2 28 25 26   GLUCOSE 140* 129* 139*   BUN 42* 45* 44*   CREATININE 2.90* 2.77* 2.70*   CALCIUM 8.2* 8.2* 8.3*                   Imaging  US-RENAL   Final Result      Unremarkable kidneys.  No hydronephrosis.      DX-CHEST-LIMITED (1 VIEW)   Final Result      1.  Mild pulmonary edema.   2.  No pneumonia or pneumothorax.   3.  Stable moderate cardiomegaly.      DX-CHEST-PORTABLE (1 VIEW)   Final Result      1.  Patchy RIGHT lung base infiltrate concerning for developing pneumonia.   2.  Slight increased inflation from prior exam.   3.  No other significant change.              Assessment/Plan  * Acute on chronic respiratory failure (HCC)- (present on admission)  Assessment & Plan  Acute on chronic respiratory failure with hypoxia most likely secondary to pneumonia.  Patient baseline oxygen is 4 L   Currently she is requiring 6 L of oxygen to maintain oxygen saturation.  Less concern for pulmonary embolism as patient is already on Xarelto.  Continue monitor closely in IMCU.  Continue antibiotics.    [facility]ELO (acute kidney injury) ([facility])  Assessment & Plan  Renal function has been worsening.  I reviewed ultrasound  daily that did not show any acute abnormalities per  I requested consultation with nephrology and I discussed with Dr. Bettencourt regarding patient current medical condition and plan of care  I ordered urine electrolytes.      E coli bacteremia  Assessment & Plan  3/18/2024   She found to have E. coli bacteremia.  Pansensitive  Most likely urinary source.  Patient does not have abdominal pain or right upper quadrant tenderness.  I switch antibiotic to Ancef on March 18, 2024.    Leukocytosis  Assessment & Plan  3/18/2024   White blood cell count is trending down.  Secondary to pneumonia.  Ordered CBC for tomorrow.    Lactic acidosis  Assessment & Plan  Secondary to sepsis.  Now resolved.    Advance care planning- (present on admission)  Assessment & Plan  I discussed CODE STATUS with her and after discussion she would like to be full code.  As per patient wishes I placed full code orders.    Atrial fibrillation with RVR (HCC)- (present on admission)  Assessment & Plan  She found to have atrial fibrillation with rapid ventricular response.  I ordered amiodarone bolus on March 16,024 and started on amiodarone gtt.  She has a persistent atrial fibrillation and rhythm control would be very difficult to achieve.  Now rate controlled.  I am continuing Xarelto now I adjusted the dose of Xarelto to 15 mg instead of 20 mg due to worsening renal functions.  Continue to monitor electrolytes and replace as needed.  Plan is to transfer out to telemetry floor.      Septic shock (HCC)- (present on admission)  Assessment & Plan  This is Septic shock Present on admission  SIRS criteria identified on my evaluation include: Tachycardia, with heart rate greater than 90 BPM, Tachypnea, with respirations greater than 20 per minute, and Leukocytosis, with WBC greater than 12,000  Clinical indicators of end organ dysfunction include Hypotension with systolic blood pressure less than 90 or MAP less than 65 and Lactic Acid greater than  2  Indicators of septic shock include: Sepsis present and persistent hypotension despite fluid resuscitation   Sources is: Pulmonary  Sepsis protocol initiated  Crystalloid Fluid Administration: Fluid resuscitation ordered per standard protocol - 30 mL/kg per current or ideal body weight  IV antibiotics as appropriate for source of sepsis  Reassessment: I have reassessed the patient's hemodynamic status      She received total of 3 L of IV fluid bolus in the ER and her blood pressure started to improve.    Hypotension now resolved.  I switch her antibiotic from ceftriaxone to Ancef after discussing it with pharmacist.    Diabetes (HCC)- (present on admission)  Assessment & Plan  3/18/2024   Continue insulin sliding scale with hypoglycemia protocol.  I started her on diabetic and cardiac diet.  Continue monitor      Persistent atrial fibrillation (HCC)- (present on admission)  Assessment & Plan  3/18/2024   Continue outpatient anticoagulation with  I am continuing digoxin.  Now overall significantly rate control.  Plan is to transfer to telemetry.  Continue to monitor her colitis and replace as needed.           VTE prophylaxis: xarelto     Greater than 51 minutes spent prepping to see patient (e.g. review of tests) obtaining and/or reviewing separately obtained history. Performing a medically appropriate examination and/ evaluation.  Counseling and educating the patient/family/caregiver.  Ordering medications, tests, or procedures.  Referring and communicating with other health care professionals.  Documenting clinical information in EPIC.  Independently interpreting results and communicating results to patient/family/caregiver.  Care coordination.      I have performed a physical exam and reviewed and updated ROS and Plan today (3/19/2024). In review of yesterday's note (3/18/2024), there are no changes except as documented above.

## 2024-03-19 NOTE — THERAPY
Physical Therapy   Initial Evaluation     Patient Name: Miroslava Matta  Age:  63 y.o., Sex:  female  Medical Record #: [id number]  Today's Date: 3/19/2024     Precautions  Comments: multiple wounds    Assessment  Patient is 63 y.o. female with sepsis, PNA, with past medical history of chronic atrial fibrillation on Xarelto, congestive heart failure, diabetes, hyperlipidemia, COPD on 4 L of oxygen, hypertension, RUTH and mood disorder who presented to the hospital on 3/16/2024 with complaint of nausea and vomiting and shortness of breath. .  Additional factors influencing patient status / progress Today, pt needs encouragement, but willing to participate. Pt needed total assist to come to EOB and was unable to attempt standing due to c/o B foot pain from wounds on feet. Pt tolerated sitting x 15 minutes, but unable to perform seated side scoot due to foot pain. PT to follow, see details below. .      Plan    Physical Therapy Initial Treatment Plan   Treatment Plan : Bed Mobility, Gait Training, Neuro Re-Education / Balance, Therapeutic Activities, Therapeutic Exercise  Treatment Frequency: 3 Times per Week  Duration: Until Therapy Goals Met    DC Equipment Recommendations: Unable to determine at this time  Discharge Recommendations: Recommend post-acute placement for additional physical therapy services prior to discharge home          Objective       03/19/24 1432   Charge Group   PT Evaluation PT Evaluation High   Total Time Spent   PT Total Time Yes   PT Evaluation Time Spent (Mins) 35   PT Total Time Spent (Calculated) 35   Initial Contact Note    Initial Contact Note Order Received and Verified, Physical Therapy Evaluation in Progress with Full Report to Follow.   Precautions   Comments multiple wounds   Vitals   O2 (LPM) 5   O2 Delivery Device Silicone Nasal Cannula   Pain 0 - 10 Group   Therapist Pain Assessment During Activity;Nurse Notified  (itchy back)   Prior Living Situation   Prior Services Continuous  (24 Hour) Care Giving Per Service   Housing / Facility   (admitted from [facility])   Comments reports previously living in 1 story apt alone with social work assist 2x/week x 3 hrs for grocery shop, but per chart, this person quit, no assist.   Prior Level of Functional Mobility   Bed Mobility Required Assist   Transfer Status Required Assist   Ambulation Required Assist  (little/none at [facility], need to verify)   History of Falls   History of Falls Yes   Date of Last Fall   (3 in last 7 months per chart)   Cognition    Level of Consciousness Alert   Comments flat affect, needs encouragement to participate but willing.   Active ROM Lower Body    Active ROM Lower Body  X   Comments full motion limited by body habitus   Strength Lower Body   Comments able to move legs across mattress without assist. wounds on heels   Balance Assessment   Sitting Balance (Static) Fair   Sitting Balance (Dynamic) Fair   Bed Mobility    Supine to Sit Total Assist   Sit to Supine Total Assist   Scooting Moderate Assist  (to scoot to EOB in sitting, unable to side scoot on her own.)   Gait Analysis   Gait Level Of Assist Unable to Participate   Comments pt refusing to attempt standing due to foot pain B.   Functional Mobility   Sit to Stand Unable to Participate   6 Clicks Assessment - How much HELP from from another person do you currently need... (If the patient hasn't done an activity recently, how much help from another person do you think he/she would need if he/she tried?)   Turning from your back to your side while in a flat bed without using bedrails? 2   Moving from lying on your back to sitting on the side of a flat bed without using bedrails? 2   Moving to and from a bed to a chair (including a wheelchair)? 2   Standing up from a chair using your arms (e.g., wheelchair, or bedside chair)? 1   Walking in hospital room? 1   Climbing 3-5 steps with a railing? 1   6 clicks Mobility Score 9   Activity Tolerance   Sitting Edge of  Bed 15   Short Term Goals    Short Term Goal # 1 Pt will perform supine to sit with min A in 6 visits   Short Term Goal # 2 Pt will transfer with mod A in 6 visits to improve functional indep.   Short Term Goal # 3 Pt will ambulate x 25 feet using FWW with mod A in 6 visits to improve functional indep.   Education Group   Role of Physical Therapist Patient Response Patient;Eager;Explanation;Verbal Demonstration   Physical Therapy Initial Treatment Plan    Treatment Plan  Bed Mobility;Gait Training;Neuro Re-Education / Balance;Therapeutic Activities;Therapeutic Exercise   Treatment Frequency 3 Times per Week   Duration Until Therapy Goals Met   Problem List    Problems Impaired Bed Mobility;Impaired Transfers;Impaired Ambulation;Functional Strength Deficit;Impaired Balance;Decreased Activity Tolerance   Anticipated Discharge Equipment and Recommendations   DC Equipment Recommendations Unable to determine at this time   Discharge Recommendations Recommend post-acute placement for additional physical therapy services prior to discharge home   Interdisciplinary Plan of Care Collaboration   IDT Collaboration with  Nursing   Patient Position at End of Therapy In Bed;Call Light within Reach;Tray Table within Reach;Phone within Reach   Collaboration Comments nsg updated   Session Information   Date / Session Number  3/19-1 (1/3, 3/25)

## 2024-03-19 NOTE — PROGRESS NOTES
Hospital Medicine Daily Progress Note    Date of Service  3/18/2024    Chief Complaint  Miroslava Matta is a 63 y.o. female admitted 3/16/2024 with nausea vomiting and shortness of breath    Hospital Course    Miroslava Matta is a 63 y.o. female with past medical history of chronic atrial fibrillation on Xarelto, congestive heart failure, diabetes, hyperlipidemia, COPD on 4 L of oxygen, hypertension, RUTH and mood disorder who presented to the hospital on 3/16/2024 with complaint of nausea and vomiting and shortness of breath.  She reported that she had she had 1 episode of vomiting last night and 1 episode of vomiting this morning.  She is currently resident at [facility] and they checked her pulse ox and she found to hypoxia and patient transferred to [facility].  On my evaluation she expressed that she is feeling better but she has been having nausea and vomiting and overall she is not feeling well.  There is no specific aggravating or elevating factors.  She reported associated symptoms of headache and mild abdominal pain.  There is no other associated symptoms or any other acute complaints.  She has been taking her medications as prescribed.     After admission she developed A-fib with RVR.  I ordered bolus for amiodarone and later started on amiodarone infusion.      Interval Problem Update    03/17/24    I evaluated and examined her at the bedside.  She reported that she is feeling better.  Her A-fib with RVR resolved she remains in A-fib she has persistent atrial fibrillation.  Now plan is to discontinue her amiodarone due to persistent A-fib and now its rate controlled.  I am continuing Cardizem with reduced dose due to hypotension and I am continuing digoxin.  Patient requested that she would like to talk to case management regarding her belonging at [facility].  I requested bedside RN to contact case management.  Continue Xarelto.  She found to have bacteremia I switch her  antibiotic to ceftriaxone.  I will repeat blood cultures.  She does not have any abdominal pain or any acute pain on my evaluation.  Lactic acidosis trended down.    03/18/24    I evaluated and examined her at the bedside.  She expressed that she is feeling better.  Her son brought her cell phone and other belongings from SNF.  Renal function has been worsening.  I reviewed ultrasound renal that did not show any acute abnormalities.  I requested consultation with nephrology and discussed Ms. Malone current medical condition with Dr. Bettencourt  Currently she is rate controlled  Oxygen requirement is currently 5 L and she is maintaining oxygen saturation.  Due to her worsening renal function I adjusted her dose of Xarelto from 20 mg to 15 mg.  I discussed plan of care with patient and answered all questions.    I have discussed this patient's plan of care and discharge plan at IDT rounds today with Case Management, Nursing, Nursing leadership, and other members of the IDT team.    Consultants/Specialty  None    Code Status  Full Code    Disposition  Medically Cleared  I have placed the appropriate orders for post-discharge needs.    Review of Systems  Review of Systems   Constitutional:  Positive for malaise/fatigue. Negative for chills, fever and weight loss.   HENT:  Negative for hearing loss and tinnitus.    Eyes:  Negative for blurred vision, double vision, photophobia and pain.   Respiratory:  Negative for cough, sputum production and shortness of breath.    Cardiovascular:  Negative for chest pain, palpitations, orthopnea and leg swelling.   Gastrointestinal:  Negative for abdominal pain, constipation, diarrhea, nausea and vomiting.   Genitourinary:  Negative for dysuria, frequency and urgency.   Musculoskeletal:  Negative for back pain, joint pain, myalgias and neck pain.   Skin:  Negative for rash.   Neurological:  Negative for dizziness, tingling, tremors, sensory change, speech change, focal weakness and  headaches.   Psychiatric/Behavioral:  Negative for hallucinations and substance abuse.    All other systems reviewed and are negative.       Physical Exam  Temp:  [36.4 °C (97.5 °F)-37 °C (98.6 °F)] 36.4 °C (97.5 °F)  Pulse:  [64-76] 75  Resp:  [11-23] 19  BP: (105-135)/(53-78) 123/63  SpO2:  [93 %-97 %] 93 %    Physical Exam  Vitals reviewed.   Constitutional:       General: She is not in acute distress.     Appearance: Normal appearance. She is obese. She is ill-appearing.   HENT:      Head: Normocephalic and atraumatic.      Nose: No congestion.      Comments: Oxygen nasal cannula  Eyes:      General:         Right eye: No discharge.         Left eye: No discharge.      Pupils: Pupils are equal, round, and reactive to light.   Cardiovascular:      Rate and Rhythm: Normal rate. Rhythm irregular.      Pulses: Normal pulses.      Heart sounds: Normal heart sounds. No murmur heard.  Pulmonary:      Effort: Pulmonary effort is normal. No respiratory distress.      Breath sounds: Normal breath sounds. No stridor.   Abdominal:      General: Bowel sounds are normal. There is no distension.      Palpations: Abdomen is soft.      Tenderness: There is no abdominal tenderness.   Musculoskeletal:         General: No swelling or tenderness. Normal range of motion.      Cervical back: Normal range of motion. No rigidity.   Skin:     General: Skin is warm.      Capillary Refill: Capillary refill takes less than 2 seconds.      Coloration: Skin is not jaundiced or pale.      Findings: No bruising.   Neurological:      General: No focal deficit present.      Mental Status: She is alert and oriented to person, place, and time.      Cranial Nerves: No cranial nerve deficit.      Comments: She is following commands  Decreased range of motion on lower extremities   Psychiatric:         Mood and Affect: Mood normal.         Behavior: Behavior normal.         Fluids    Intake/Output Summary (Last 24 hours) at 3/18/2024 2776  Last data  filed at 3/18/2024 1939  Gross per 24 hour   Intake 0 ml   Output 0 ml   Net 0 ml       Laboratory  Recent Labs     03/16/24  1628 03/17/24  0231 03/18/24  0220   WBC 3.2* 23.4* 15.9*   RBC 4.41 3.50* 3.43*   HEMOGLOBIN 11.1* 8.9* 8.7*   HEMATOCRIT 37.5 29.7* 29.0*   MCV 85.0 84.9 84.5   MCH 25.2* 25.4* 25.4*   MCHC 29.6* 30.0* 30.0*   RDW 78.8* 79.7* 77.0*   PLATELETCT 247 247 237   MPV 9.6 9.7 10.2     Recent Labs     03/16/24 1628 03/17/24 0231 03/18/24  0220   SODIUM 134* 130* 126*   POTASSIUM 4.0 3.9 3.5*   CHLORIDE 93* 91* 87*   CO2 27 27 28   GLUCOSE 139* 203* 140*   BUN 27* 32* 42*   CREATININE 1.34 2.08* 2.90*   CALCIUM 8.4* 7.9* 8.2*                   Imaging  US-RENAL   Final Result      Unremarkable kidneys.  No hydronephrosis.      DX-CHEST-LIMITED (1 VIEW)   Final Result      1.  Mild pulmonary edema.   2.  No pneumonia or pneumothorax.   3.  Stable moderate cardiomegaly.      DX-CHEST-PORTABLE (1 VIEW)   Final Result      1.  Patchy RIGHT lung base infiltrate concerning for developing pneumonia.   2.  Slight increased inflation from prior exam.   3.  No other significant change.              Assessment/Plan  * Acute on chronic respiratory failure (HCC)- (present on admission)  Assessment & Plan  Acute on chronic respiratory failure with hypoxia most likely secondary to pneumonia.  Patient baseline oxygen is 4 L   Currently she is requiring 6 L of oxygen to maintain oxygen saturation.  Less concern for pulmonary embolism as patient is already on Xarelto.  Continue monitor closely in IMCU.  Continue antibiotics.    CARMELO (acute kidney injury) ([facility])  Assessment & Plan  Renal function has been worsening.  I reviewed ultrasound daily that did not show any acute abnormalities per  I requested consultation with nephrology and I discussed with Dr. Bettencourt regarding patient current medical condition and plan of care  I ordered urine electrolytes.      E coli bacteremia  Assessment & Plan  3/18/2024   She found  to have E. coli bacteremia.  Pansensitive  Most likely urinary source.  Patient does not have abdominal pain or right upper quadrant tenderness.  I switch antibiotic to Ancef on March 18, 2024.    Leukocytosis  Assessment & Plan  3/18/2024   White blood cell count is trending down.  Secondary to pneumonia.  Ordered CBC for tomorrow.    Lactic acidosis  Assessment & Plan  Secondary to sepsis.  Now resolved.    Advance care planning- (present on admission)  Assessment & Plan  I discussed CODE STATUS with her and after discussion she would like to be full code.  As per patient wishes I placed full code orders.    Atrial fibrillation with RVR ([facility])- (present on admission)  Assessment & Plan  She found to have atrial fibrillation with rapid ventricular response.  I ordered amiodarone bolus on March 16,024 and started on amiodarone gtt.  She has a persistent atrial fibrillation and rhythm control would be very difficult to achieve.  Now rate controlled.  I am continuing Xarelto now I adjusted the dose of Xarelto to 15 mg instead of 20 mg due to worsening renal functions.  Continue to monitor electrolytes and replace as needed.  Plan is to transfer out to telemetry floor.      Septic shock (HCC)- (present on admission)  Assessment & Plan  This is Septic shock Present on admission  SIRS criteria identified on my evaluation include: Tachycardia, with heart rate greater than 90 BPM, Tachypnea, with respirations greater than 20 per minute, and Leukocytosis, with WBC greater than 12,000  Clinical indicators of end organ dysfunction include Hypotension with systolic blood pressure less than 90 or MAP less than 65 and Lactic Acid greater than 2  Indicators of septic shock include: Sepsis present and persistent hypotension despite fluid resuscitation   Sources is: Pulmonary  Sepsis protocol initiated  Crystalloid Fluid Administration: Fluid resuscitation ordered per standard protocol - 30 mL/kg per current or ideal body weight  IV  antibiotics as appropriate for source of sepsis  Reassessment: I have reassessed the patient's hemodynamic status      She received total of 3 L of IV fluid bolus in the ER and her blood pressure started to improve.    Hypotension now resolved.  I switch her antibiotic from ceftriaxone to Ancef after discussing it with pharmacist.    Diabetes (HCC)- (present on admission)  Assessment & Plan  3/18/2024   Continue insulin sliding scale with hypoglycemia protocol.  I started her on diabetic and cardiac diet.  Continue monitor      Persistent atrial fibrillation (HCC)- (present on admission)  Assessment & Plan  3/18/2024   Continue outpatient anticoagulation with  I am continuing digoxin.  Now overall significantly rate control.  Plan is to transfer to telemetry.  Continue to monitor her colitis and replace as needed.      I discussed plan of care during multidisciplinary rounds regarding patient's current medical condition and plan of care.    I requested consultation with nephrology and I discussed plan of care with Dr. Bettencourt.       VTE prophylaxis:    therapeutic anticoagulation with xarelto 15 mg daily with meals      I have performed a physical exam and reviewed and updated ROS and Plan today (3/18/2024). In review of yesterday's note (3/17/2024), there are no changes except as documented above.

## 2024-03-19 NOTE — PROGRESS NOTES
Received bedside report from RN. Patient is A&Ox4. Physical assessment completed. Patient is updated on plan of care and verbalizes an understanding. All questions are addressed. Belongings and call light are within reach. Plan of care is ongoing.

## 2024-03-19 NOTE — PROGRESS NOTES
Monitor Summary  Rhythm: AFib  Rate: 76-80  Ectopy: (R) PVC  Measurements: -/0.1/-  ---12 hr Chart Review---

## 2024-03-19 NOTE — PROGRESS NOTES
[facility] -  PROGRESS NOTE               Author: Arron Bettencourt M.D. Date & Time: 3/19/2024  10:18 AM     HPI:  Miroslava Matta is a 63 y.o. female with past medical history of chronic atrial fibrillation on Xarelto, congestive heart failure, diabetes, hyperlipidemia, COPD on 4 L of oxygen, hypertension, RUTH and mood disorder who presented to the hospital with complaint of nausea and vomiting and shortness of breath.  She is a resident at [facility] and they checked her pulse ox and she found to hypoxia and patient transferred to [facility].        During this admission she developed A-fib with RVR.  She was started on  amiodarone infusion and admitted to [facility].     She was admitted on 3/16 with a Scr of 1.38 which bridget to 2.08 to n ow 2.90 prompting Nephrology consultation. His baseline Scr was normal prior to this admission on 3/7/24 of 0.77 mg/dL. Nephrology was asked to consult for CARMELO.     She did have several episodes of hypotension during the course of her stay with systolics in the mid 70-80s and at one point as low as 50s. Currently her BP has been stable. Her kidney US revealed Unremarkable kidneys. No hydronephrosis. Her ECHO from Feb/24 revealed Grossly normal left ventricular systolic function. The left ventricular ejection fraction is visually estimated to be 50%. Severely dilated right ventricle. Reduced right ventricular systolic function. Mild aortic valve stenosis. Mild aortic insufficiency. Mild to moderate mitral regurgitation. Severe tricuspid regurgitation. Estimated right ventricular systolic pressure is 85 mmHg. Right heart   pressures are consistent with severe pulmonary hypertension.     No F/C/CP/SOB.  No melena, hematochezia, hematemesis.  No HA, visual changes, but had mild abdominal pain    DAILY NEPHROLOGY SUMMARY:  3/19 - There are no labs today. Patient is feeling fine. No uremic symptoms. BP is higher.     REVIEW OF SYSTEMS:    10  "point ROS reviewed and is as per HPI/daily summary or otherwise negative    PMH/PSH/SH/FH:   Reviewed and unchanged since admission note    CURRENT MEDICATIONS:   Reviewed from admission to present day    VS:  BP (!) 162/88   Pulse 85   Temp 36.6 °C (97.9 °F) (Temporal)   Resp 18   Ht 1.702 m (5' 7\")   Wt (!) 170 kg (373 lb 10.9 oz)   SpO2 90%   BMI 58.53 kg/m²     Physical Exam  Vitals and nursing note reviewed.     Constitutional:       General: She is not in acute distress.  HENT:      Head: Normocephalic and atraumatic.      Nose: Nose normal.      Mouth/Throat:      Pharynx: Oropharynx is clear.   Eyes:      Extraocular Movements: Extraocular movements intact.      Conjunctiva/sclera: Conjunctivae normal.      Pupils: Pupils are equal, round, and reactive to light.   Cardiovascular:      Rate and Rhythm: Normal rate and regular rhythm.      Pulses: Normal pulses.      Heart sounds: Normal heart sounds.   Pulmonary:      Effort: Pulmonary effort is normal.      Breath sounds: Normal breath sounds.   Abdominal:      General: Abdomen is flat. Bowel sounds are normal.      Palpations: Abdomen is soft.   Musculoskeletal:         General: Normal range of motion.      Cervical back: Normal range of motion and neck supple.   Skin:     General: Skin is warm.   Neurological:      General: No focal deficit present.   Psychiatric:         Mood and Affect: Mood normal.         Behavior: Behavior normal.         Thought Content: Thought content normal.     Fluids:  No intake/output data recorded.    LABS:  Recent Labs     03/16/24  1628 03/17/24  0231 03/18/24  0220   SODIUM 134* 130* 126*   POTASSIUM 4.0 3.9 3.5*   CHLORIDE 93* 91* 87*   CO2 27 27 28   GLUCOSE 139* 203* 140*   BUN 27* 32* 42*   CREATININE 1.34 2.08* 2.90*   CALCIUM 8.4* 7.9* 8.2*       IMAGING:   All imaging reviewed from admission to present day    IMPRESSION:  # CARMELO/ATN - likely sec to hypoperfusion  # Hyponatremia  # N/V  # Acidosis  # Atrial " fibrillation  # E. Coli bacteremia  # Acute respiratory failure resolved  # Sepsis  # DM  # HTN     PLAN:  - No compelling indication for RRT. We will need daily labs to assess renal function  - Continue Abx - renal dose  - Keep MAP >/= to 65  - Monitor UOP  - Daily evaluation for RRT needs  - Dose all meds per eGFR < 15

## 2024-03-19 NOTE — DISCHARGE PLANNING
1423  [facility]  Spoke To: Cat  Outcome: DPA called to verify if pt is at [facility] for LT or Skilled, per Cat pt was at [facility] for Skilled and can accept pt back. Cat notified DPA that prior will need to be submitted.     0761  Received Choice form at 1541  [facility]   Referral sent per Choice form @ [phone]

## 2024-03-19 NOTE — DISCHARGE PLANNING
Care Transition Team Assessment    LMSW met with pt at bedside to complete assessment. Pt A&Ox4 and able to verify the information on the face sheet. PTA, pt was at [facility]. Pt states physical address is [address] [place] [address] in [place], [place]. Pt reported that her family is good support for her. Pt denies any SA or MH concerns. Pt reports she currently has no PCP, had an appointment on March 25th but canceled it d/t hospitalization. Previous PCP has retired. Pt's phone number is [phone]. Pt is agreeable to returning to [facility] at discharge.    Information Source  Orientation Level: Oriented X4  Information Given By: Patient  Who is responsible for making decisions for patient? : Patient    Readmission Evaluation  Is this a readmission?: Yes - unplanned readmission  Did you have enough support after your last discharge?: Yes    Elopement Risk  Legal Hold: No  Ambulatory or Self Mobile in Wheelchair: No-Not an Elopement Risk  Disoriented: No  Psychiatric Symptoms: None  History of Wandering: No  Elopement this Admit: No  Vocalizing Wanting to Leave: No  Displays Behaviors, Body Language Wanting to Leave: No-Not at Risk for Elopement  Elopement Risk: Not at Risk for Elopement    Interdisciplinary Discharge Planning  Lives with - Patient's Self Care Capacity: Alone and Able to Care For Self  Patient or legal guardian wants to designate a caregiver: No  Support Systems: Family Member(s)  Housing / Facility: [address] [facility]  Prior Services: Intermittent Physical Support for ADL Per Service  Durable Medical Equipment: Home Oxygen    Discharge Preparedness  What is your plan after discharge?: Skilled nursing facility    Vision / Hearing Impairment  Vision Impairment : Yes  Hearing Impairment : No  Hearing Impairment: Both Ears, Hearing Device Not Available    Domestic Abuse  Have you ever been the victim of abuse or violence?: No  Physical Abuse or Sexual Abuse: No  Verbal Abuse or Emotional Abuse:  No  Possible Abuse/Neglect Reported to:: Not Applicable    Anticipated Discharge Information  Discharge Disposition: D/T to SNF with Medicare cert in anticipation of skilled care (03)

## 2024-03-19 NOTE — THERAPY
Occupational Therapy   Initial Evaluation     Patient Name: Miroslava Matta  Age:  63 y.o., Sex:  female  Medical Record #: [id number]  Today's Date: 3/19/2024     Precautions  Precautions: Fall Risk, Swallow Precautions  Comments: multiple wounds    Assessment    Patient is 63 y.o. female admitted with sepsis and PNA. Other pertinent medical history includes a-fib, CHF, DM, HLD, COPD, HTN, RUTH, and mood disorder. Pt seen for OT evaluation. Pt required max-total A for bed mobility, max A for sock adjustment, min A to brush hair while seated EOB, SBA to wash face while seated EOB, and mod a to wash back while seated EOB. Pt was a questionable historian with mixed timelines. Pt reported that she lives alone in a house with assist from  for IADLs. Per chart, pt was admitted from [facility], where she is a resident. Information will need to be confirmed. Pt educated regarding the role of OT and the pathology of bedrest. Pt current functional performance limited by impaired activity tolerance, impaired balance, generalized weakness, pain, fear of falling, and impaired cognition. Pt will benefit from skilled OT while admitted to acute care.     Plan    Occupational Therapy Initial Treatment Plan   Treatment Interventions: Self Care / Activities of Daily Living, Adaptive Equipment, Neuro Re-Education / Balance, Therapeutic Exercises, Therapeutic Activity  Treatment Frequency: 3 Times per Week  Duration: Until Therapy Goals Met    DC Equipment Recommendations: Unable to determine at this time  Discharge Recommendations: Recommend post-acute placement for additional occupational therapy services prior to discharge home      Objective     03/19/24 1433   Prior Living Situation   Prior Services Intermittent Physical Support for ADL Per Service   Housing / Facility 1 Story House   Steps Into Home 1   Steps In Home 0   Bathroom Set up Walk In Shower;Grab Bars;Shower Chair  (reported the grab bar is small)  "  Equipment Owned Tub / Shower Seat;Grab Bar(s) In Tub / Shower   Lives with - Patient's Self Care Capacity Alone and Able to Care For Self   Comments Pt has a \"\" who assists with IADLs 2x a week for 3 hours a visit.   Prior Level of ADL Function   Self Feeding Independent   Grooming / Hygiene Independent   Bathing Independent   Dressing Independent   Toileting Independent   Comments Pt was a questionable historian.   Prior Level of IADL Function   Medication Management Independent   Laundry Requires Assist   Kitchen Mobility Requires Assist   Home Management Requires Assist   Shopping Requires Assist   Prior Level Of Mobility Independent Without Device in Community  (if she does go to the grocery store, uses scooter at store)   Driving / Transportation Relatives / Others Provide Transportation   History of Falls   History of Falls Yes   Date of Last Fall   (Pt mentioned a fall, mechanism and date not specified)   Precautions   Precautions Fall Risk;Swallow Precautions   Pain   Pain Scales 0 to 10 Scale    Pain 0 - 10 Group   Therapist Pain Assessment Post Activity Pain Same as Prior to Activity;Nurse Notified  (not rated, pain in feet)   Non Verbal Descriptors   Non Verbal Scale  Calm   Cognition    Cognition / Consciousness WDL   Level of Consciousness Alert   Attention Impaired   Initiation Impaired   Comments Pleasant and cooperative, required encouragement to participate in eval, pt demonstrated some fear of falling   Active ROM Upper Body   Active ROM Upper Body  WDL   Strength Upper Body   Upper Body Strength  X   Comments biceps grossly 3+/5, triceps 4/5,  3+/5 bilaterally   Sensation Upper Body   Upper Extremity Sensation  WDL   Upper Body Muscle Tone   Upper Body Muscle Tone  WDL   Coordination Upper Body   Comments WFL from observation   Balance Assessment   Sitting Balance (Static) Fair -   Sitting Balance (Dynamic) Fair -   Weight Shift Sitting Poor   Comments EOB only, no stand " due to pt pain and declination   Bed Mobility    Supine to Sit Maximal Assist   Sit to Supine Maximal Assist   Scooting Moderate Assist   Comments HOB elevated, use of rails/therapist hand, cues for sequencing   ADL Assessment   Grooming Seated;Minimal Assist  (washed face with supv, brushed hair with min A (limited by tangles and activity tolerance))   Bathing Moderate Assist  (washed back while seated, difficulty reaching some areas)   Lower Body Dressing Maximal Assist  (adjust socks)   Functional Mobility   Comments EOB only   Visual Perception   Visual Perception  Not Tested   Activity Tolerance   Sitting in Chair NT   Sitting Edge of Bed >10 min   Standing NT   Comments limited by fatigue, weakness, pain, fear of falling   Patient / Family Goals   Patient / Family Goal #1 to get better   Short Term Goals   Short Term Goal # 1 Pt will perform ADL transfer w/ min A   Short Term Goal # 2 Pt will perform LB dressing w/ min A and AE PRN   Short Term Goal # 3 Pt will perform toilet hygiene w/ min A   Education Group   Education Provided Role of Occupational Therapist;Activities of Daily Living;Pathology of bedrest   Role of Occupational Therapist Patient Response Patient;Acceptance;Explanation;Verbal Demonstration   ADL Patient Response Patient;Acceptance;Explanation;Demonstration;Verbal Demonstration;Action Demonstration   Pathology of Bedrest Patient Response Patient;Acceptance;Explanation;Verbal Demonstration   Occupational Therapy Initial Treatment Plan    Treatment Interventions Self Care / Activities of Daily Living;Adaptive Equipment;Neuro Re-Education / Balance;Therapeutic Exercises;Therapeutic Activity   Treatment Frequency 3 Times per Week   Duration Until Therapy Goals Met   Problem List   Problem List Decreased Active Daily Living Skills;Decreased Homemaking Skills;Decreased Upper Extremity Strength Right;Decreased Upper Extremity Strength Left;Decreased Functional Mobility;Decreased Activity  Tolerance;Impaired Posture / Trunk Alignment;Safety Awareness Deficits / Cognition;Impaired Postural Control / Balance;Impaired Cognitive Function

## 2024-03-19 NOTE — ASSESSMENT & PLAN NOTE
Renal function has been worsening.  I reviewed ultrasound daily that did not show any acute abnormalities per  I requested consultation with nephrology and I discussed with Dr. Bettencourt regarding patient current medical condition and plan of care  I ordered urine electrolytes.

## 2024-03-19 NOTE — CARE PLAN
The patient is Stable - Low risk of patient condition declining or worsening    Shift Goals  Clinical Goals: Pain control, Monitor labs, Q2 turns, VSS  Patient Goals: Pain control, comfort, sleep  Family Goals: ELDER    Progress made toward(s) clinical / shift goals:    Problem: Knowledge Deficit - Standard  Goal: Patient and family/care givers will demonstrate understanding of plan of care, disease process/condition, diagnostic tests and medications  Outcome: Progressing     Problem: Hemodynamics  Goal: Patient's hemodynamics, fluid balance and neurologic status will be stable or improve  Outcome: Progressing     Problem: Fluid Volume  Goal: Fluid volume balance will be maintained  Outcome: Progressing     Problem: Pain - Standard  Goal: Alleviation of pain or a reduction in pain to the patient’s comfort goal  Outcome: Progressing     Problem: Skin Integrity  Goal: Skin integrity is maintained or improved  Outcome: Progressing       Patient is not progressing towards the following goals:

## 2024-03-20 LAB
ALBUMIN SERPL BCP-MCNC: 2.7 G/DL (ref 3.2–4.9)
ALBUMIN/GLOB SERPL: 0.8 G/DL
ALP SERPL-CCNC: 89 U/L (ref 30–99)
ALT SERPL-CCNC: 6 U/L (ref 2–50)
ANION GAP SERPL CALC-SCNC: 11 MMOL/L (ref 7–16)
AST SERPL-CCNC: 9 U/L (ref 12–45)
BACTERIA BLD CULT: ABNORMAL
BACTERIA BLD CULT: ABNORMAL
BILIRUB SERPL-MCNC: 0.3 MG/DL (ref 0.1–1.5)
BUN SERPL-MCNC: 42 MG/DL (ref 8–22)
CALCIUM ALBUM COR SERPL-MCNC: 9.3 MG/DL (ref 8.5–10.5)
CALCIUM SERPL-MCNC: 8.3 MG/DL (ref 8.5–10.5)
CHLORIDE SERPL-SCNC: 86 MMOL/L (ref 96–112)
CO2 SERPL-SCNC: 26 MMOL/L (ref 20–33)
CREAT SERPL-MCNC: 2.44 MG/DL (ref 0.5–1.4)
ERYTHROCYTE [DISTWIDTH] IN BLOOD BY AUTOMATED COUNT: 67.9 FL (ref 35.9–50)
GFR SERPLBLD CREATININE-BSD FMLA CKD-EPI: 22 ML/MIN/1.73 M 2
GLOBULIN SER CALC-MCNC: 3.4 G/DL (ref 1.9–3.5)
GLUCOSE BLD STRIP.AUTO-MCNC: 114 MG/DL (ref 65–99)
GLUCOSE BLD STRIP.AUTO-MCNC: 122 MG/DL (ref 65–99)
GLUCOSE BLD STRIP.AUTO-MCNC: 133 MG/DL (ref 65–99)
GLUCOSE BLD STRIP.AUTO-MCNC: 158 MG/DL (ref 65–99)
GLUCOSE BLD STRIP.AUTO-MCNC: 158 MG/DL (ref 65–99)
GLUCOSE SERPL-MCNC: 99 MG/DL (ref 65–99)
HCT VFR BLD AUTO: 30.5 % (ref 37–47)
HGB BLD-MCNC: 9.7 G/DL (ref 12–16)
MCH RBC QN AUTO: 25.5 PG (ref 27–33)
MCHC RBC AUTO-ENTMCNC: 31.8 G/DL (ref 32.2–35.5)
MCV RBC AUTO: 80.3 FL (ref 81.4–97.8)
PLATELET # BLD AUTO: 214 K/UL (ref 164–446)
PMV BLD AUTO: 9.5 FL (ref 9–12.9)
POTASSIUM SERPL-SCNC: 3.7 MMOL/L (ref 3.6–5.5)
PROT SERPL-MCNC: 6.1 G/DL (ref 6–8.2)
RBC # BLD AUTO: 3.8 M/UL (ref 4.2–5.4)
SIGNIFICANT IND 70042: ABNORMAL
SITE SITE: ABNORMAL
SODIUM SERPL-SCNC: 123 MMOL/L (ref 135–145)
SOURCE SOURCE: ABNORMAL
WBC # BLD AUTO: 9.6 K/UL (ref 4.8–10.8)

## 2024-03-20 PROCEDURE — A9270 NON-COVERED ITEM OR SERVICE: HCPCS | Performed by: INTERNAL MEDICINE

## 2024-03-20 PROCEDURE — 700111 HCHG RX REV CODE 636 W/ 250 OVERRIDE (IP): Performed by: INTERNAL MEDICINE

## 2024-03-20 PROCEDURE — 700111 HCHG RX REV CODE 636 W/ 250 OVERRIDE (IP): Mod: JZ | Performed by: INTERNAL MEDICINE

## 2024-03-20 PROCEDURE — 700102 HCHG RX REV CODE 250 W/ 637 OVERRIDE(OP): Performed by: INTERNAL MEDICINE

## 2024-03-20 PROCEDURE — 80053 COMPREHEN METABOLIC PANEL: CPT

## 2024-03-20 PROCEDURE — 36415 COLL VENOUS BLD VENIPUNCTURE: CPT

## 2024-03-20 PROCEDURE — 700101 HCHG RX REV CODE 250: Performed by: INTERNAL MEDICINE

## 2024-03-20 PROCEDURE — 82962 GLUCOSE BLOOD TEST: CPT | Mod: 91

## 2024-03-20 PROCEDURE — 99233 SBSQ HOSP IP/OBS HIGH 50: CPT | Performed by: INTERNAL MEDICINE

## 2024-03-20 PROCEDURE — 770020 HCHG ROOM/CARE - TELE (206)

## 2024-03-20 PROCEDURE — 85027 COMPLETE CBC AUTOMATED: CPT

## 2024-03-20 RX ORDER — FUROSEMIDE 10 MG/ML
80 INJECTION INTRAMUSCULAR; INTRAVENOUS
Status: DISCONTINUED | OUTPATIENT
Start: 2024-03-20 | End: 2024-03-22

## 2024-03-20 RX ORDER — CARVEDILOL 6.25 MG/1
6.25 TABLET ORAL 2 TIMES DAILY WITH MEALS
Status: DISCONTINUED | OUTPATIENT
Start: 2024-03-20 | End: 2024-03-22 | Stop reason: HOSPADM

## 2024-03-20 RX ADMIN — PREGABALIN 25 MG: 25 CAPSULE ORAL at 05:57

## 2024-03-20 RX ADMIN — RIVAROXABAN 15 MG: 15 TABLET, FILM COATED ORAL at 18:03

## 2024-03-20 RX ADMIN — CEFAZOLIN 2 G: 10 INJECTION, POWDER, FOR SOLUTION INTRAVENOUS at 21:51

## 2024-03-20 RX ADMIN — CEFAZOLIN 2 G: 10 INJECTION, POWDER, FOR SOLUTION INTRAVENOUS at 14:15

## 2024-03-20 RX ADMIN — PREGABALIN 25 MG: 25 CAPSULE ORAL at 18:03

## 2024-03-20 RX ADMIN — OXYCODONE 5 MG: 5 TABLET ORAL at 12:24

## 2024-03-20 RX ADMIN — FUROSEMIDE 80 MG: 10 INJECTION INTRAMUSCULAR; INTRAVENOUS at 12:27

## 2024-03-20 RX ADMIN — INSULIN GLARGINE-YFGN 10 UNITS: 100 INJECTION, SOLUTION SUBCUTANEOUS at 08:51

## 2024-03-20 RX ADMIN — CEFAZOLIN 2 G: 10 INJECTION, POWDER, FOR SOLUTION INTRAVENOUS at 05:57

## 2024-03-20 RX ADMIN — PREGABALIN 25 MG: 25 CAPSULE ORAL at 12:25

## 2024-03-20 RX ADMIN — DILTIAZEM HYDROCHLORIDE 180 MG: 180 CAPSULE, COATED, EXTENDED RELEASE ORAL at 05:57

## 2024-03-20 RX ADMIN — CARVEDILOL 6.25 MG: 6.25 TABLET, FILM COATED ORAL at 12:25

## 2024-03-20 RX ADMIN — CARVEDILOL 6.25 MG: 6.25 TABLET, FILM COATED ORAL at 18:04

## 2024-03-20 RX ADMIN — DULOXETINE HYDROCHLORIDE 30 MG: 30 CAPSULE, DELAYED RELEASE ORAL at 05:57

## 2024-03-20 ASSESSMENT — ENCOUNTER SYMPTOMS
TREMORS: 0
DIARRHEA: 0
NAUSEA: 0
FEVER: 0
EYE PAIN: 0
PALPITATIONS: 0
SPUTUM PRODUCTION: 0
COUGH: 0
CHILLS: 0
SHORTNESS OF BREATH: 0
MYALGIAS: 0
BACK PAIN: 0
FOCAL WEAKNESS: 0
SPEECH CHANGE: 0
VOMITING: 0
HEADACHES: 0
ORTHOPNEA: 0
HALLUCINATIONS: 0
CONSTIPATION: 0
ABDOMINAL PAIN: 0
WEIGHT LOSS: 0
DOUBLE VISION: 0
TINGLING: 0
DIZZINESS: 0
PHOTOPHOBIA: 0
BLURRED VISION: 0
NECK PAIN: 0
SENSORY CHANGE: 0

## 2024-03-20 ASSESSMENT — LIFESTYLE VARIABLES: SUBSTANCE_ABUSE: 0

## 2024-03-20 ASSESSMENT — PAIN DESCRIPTION - PAIN TYPE
TYPE: ACUTE PAIN
TYPE: ACUTE PAIN

## 2024-03-20 NOTE — PROGRESS NOTES
Received bedside report from RN. Patient is A&Ox4.Physical assessment completed. Patient belongings and call light are within reach. Plan of care is ongoing.

## 2024-03-20 NOTE — PROGRESS NOTES
Hospital Medicine Daily Progress Note    Date of Service  3/20/2024    Chief Complaint  Miroslava Matta is a 63 y.o. female admitted 3/16/2024 with nausea vomiting and shortness of breath    Hospital Course    Miroslava Matta is a 63 y.o. female with past medical history of chronic atrial fibrillation on Xarelto, congestive heart failure, diabetes, hyperlipidemia, COPD on 4 L of oxygen, hypertension, RUTH and mood disorder who presented to the hospital on 3/16/2024 with complaint of nausea and vomiting and shortness of breath.  She reported that she had she had 1 episode of vomiting last night and 1 episode of vomiting this morning.  She is currently resident at [facility] and they checked her pulse ox and she found to hypoxia and patient transferred to [facility].  On my evaluation she expressed that she is feeling better but she has been having nausea and vomiting and overall she is not feeling well.  There is no specific aggravating or elevating factors.  She reported associated symptoms of headache and mild abdominal pain.  There is no other associated symptoms or any other acute complaints.  She has been taking her medications as prescribed.     After admission she developed A-fib with RVR.  I ordered bolus for amiodarone and later started on amiodarone infusion.      Interval Problem Update    03/17/24    I evaluated and examined her at the bedside.  She reported that she is feeling better.  Her A-fib with RVR resolved she remains in A-fib she has persistent atrial fibrillation.  Now plan is to discontinue her amiodarone due to persistent A-fib and now its rate controlled.  I am continuing Cardizem with reduced dose due to hypotension and I am continuing digoxin.  Patient requested that she would like to talk to case management regarding her belonging at [facility].  I requested bedside RN to contact case management.  Continue Xarelto.  She found to have bacteremia I switch her  antibiotic to ceftriaxone.  I will repeat blood cultures.  She does not have any abdominal pain or any acute pain on my evaluation.  Lactic acidosis trended down.    03/18/24    I evaluated and examined her at the bedside.  She expressed that she is feeling better.  Her son brought her cell phone and other belongings from [facility].  Renal function has been worsening.  I reviewed ultrasound renal that did not show any acute abnormalities.  I requested consultation with nephrology and discussed Ms. Malone current medical condition with Dr. Bettencourt  Currently she is rate controlled  Oxygen requirement is currently 5 L and she is maintaining oxygen saturation.  Due to her worsening renal function I adjusted her dose of Xarelto from 20 mg to 15 mg.  I discussed plan of care with patient and answered all questions.  3/19: Patient seen and examined, having nausea, no vomiting, cont on IV ancef for her Bacteremia will repeat Bcx  Wean off O2 as tolerated   Regarding her CARMELO nephrology following appreciate rec.   3/20: Patient resting in bed on 5l of oxygen  Regarding her CARMELO nephrology following received a dose of IV lasix   Repeat blood culture so far negative   I have discussed this patient's plan of care and discharge plan at IDT rounds today with Case Management, Nursing, Nursing leadership, and other members of the IDT team.    Consultants/Specialty  None    Code Status  Full Code    Disposition  The patient is not medically cleared for discharge to home or a post-acute facility.      I have placed the appropriate orders for post-discharge needs.    Review of Systems  Review of Systems   Constitutional:  Positive for malaise/fatigue. Negative for chills, fever and weight loss.   HENT:  Negative for hearing loss and tinnitus.    Eyes:  Negative for blurred vision, double vision, photophobia and pain.   Respiratory:  Negative for cough, sputum production and shortness of breath.    Cardiovascular:  Negative for chest pain,  palpitations, orthopnea and leg swelling.   Gastrointestinal:  Negative for abdominal pain, constipation, diarrhea, nausea and vomiting.   Genitourinary:  Negative for dysuria, frequency and urgency.   Musculoskeletal:  Negative for back pain, joint pain, myalgias and neck pain.   Skin:  Negative for rash.   Neurological:  Negative for dizziness, tingling, tremors, sensory change, speech change, focal weakness and headaches.   Psychiatric/Behavioral:  Negative for hallucinations and substance abuse.    All other systems reviewed and are negative.       Physical Exam  Temp:  [36.5 °C (97.7 °F)-36.7 °C (98.1 °F)] 36.5 °C (97.7 °F)  Pulse:  [72-90] 74  Resp:  [16-20] 20  BP: (135-164)/(71-85) 147/75  SpO2:  [90 %-96 %] 92 %    Physical Exam  Vitals reviewed.   Constitutional:       General: She is not in acute distress.     Appearance: Normal appearance. She is obese. She is ill-appearing.   HENT:      Head: Normocephalic and atraumatic.      Nose: No congestion.      Comments: Oxygen nasal cannula  Eyes:      General:         Right eye: No discharge.         Left eye: No discharge.      Pupils: Pupils are equal, round, and reactive to light.   Cardiovascular:      Rate and Rhythm: Normal rate. Rhythm irregular.      Pulses: Normal pulses.      Heart sounds: Normal heart sounds. No murmur heard.  Pulmonary:      Effort: Pulmonary effort is normal. No respiratory distress.      Breath sounds: Normal breath sounds. No stridor.   Abdominal:      General: Bowel sounds are normal. There is no distension.      Palpations: Abdomen is soft.      Tenderness: There is no abdominal tenderness.   Musculoskeletal:         General: No swelling or tenderness. Normal range of motion.      Cervical back: Normal range of motion. No rigidity.   Skin:     General: Skin is warm.      Capillary Refill: Capillary refill takes less than 2 seconds.      Coloration: Skin is not jaundiced or pale.      Findings: No bruising.   Neurological:       General: No focal deficit present.      Mental Status: She is alert and oriented to person, place, and time.      Cranial Nerves: No cranial nerve deficit.      Comments: She is following commands  Decreased range of motion on lower extremities   Psychiatric:         Mood and Affect: Mood normal.         Behavior: Behavior normal.         Fluids    Intake/Output Summary (Last 24 hours) at 3/20/2024 1535  Last data filed at 3/20/2024 1415  Gross per 24 hour   Intake 0 ml   Output 4550 ml   Net -4550 ml       Laboratory  Recent Labs     03/18/24  0220 03/19/24  0943 03/20/24  0252   WBC 15.9* 11.2* 9.6   RBC 3.43* 3.93* 3.80*   HEMOGLOBIN 8.7* 9.9* 9.7*   HEMATOCRIT 29.0* 31.5* 30.5*   MCV 84.5 80.2* 80.3*   MCH 25.4* 25.2* 25.5*   MCHC 30.0* 31.4* 31.8*   RDW 77.0* 68.6* 67.9*   PLATELETCT 237 220 214   MPV 10.2 9.6 9.5     Recent Labs     03/19/24  0943 03/19/24  1106 03/20/24  0252   SODIUM 121* 121* 123*   POTASSIUM 4.2 4.3 3.7   CHLORIDE 84* 84* 86*   CO2 25 26 26   GLUCOSE 129* 139* 99   BUN 45* 44* 42*   CREATININE 2.77* 2.70* 2.44*   CALCIUM 8.2* 8.3* 8.3*                   Imaging  US-RENAL   Final Result      Unremarkable kidneys.  No hydronephrosis.      DX-CHEST-LIMITED (1 VIEW)   Final Result      1.  Mild pulmonary edema.   2.  No pneumonia or pneumothorax.   3.  Stable moderate cardiomegaly.      DX-CHEST-PORTABLE (1 VIEW)   Final Result      1.  Patchy RIGHT lung base infiltrate concerning for developing pneumonia.   2.  Slight increased inflation from prior exam.   3.  No other significant change.              Assessment/Plan  * Acute on chronic respiratory failure (HCC)- (present on admission)  Assessment & Plan  Acute on chronic respiratory failure with hypoxia most likely secondary to pneumonia.  Patient baseline oxygen is 4 L   Currently she is requiring 6 L of oxygen to maintain oxygen saturation.  Less concern for pulmonary embolism as patient is already on Xarelto.  Continue monitor closely in  [facility].  Continue antibiotics.    CARMELO (acute kidney injury) ([facility])  Assessment & Plan  Renal function has been worsening.  I reviewed ultrasound daily that did not show any acute abnormalities per  I requested consultation with nephrology and I discussed with Dr. Bettencourt regarding patient current medical condition and plan of care  I ordered urine electrolytes.      E coli bacteremia  Assessment & Plan  3/18/2024   She found to have E. coli bacteremia.  Pansensitive  Most likely urinary source.  Patient does not have abdominal pain or right upper quadrant tenderness.  I switch antibiotic to Ancef on March 18, 2024.    Leukocytosis  Assessment & Plan  3/18/2024   White blood cell count is trending down.  Secondary to pneumonia.  Ordered CBC for tomorrow.    Lactic acidosis  Assessment & Plan  Secondary to sepsis.  Now resolved.    Advance care planning- (present on admission)  Assessment & Plan  I discussed CODE STATUS with her and after discussion she would like to be full code.  As per patient wishes I placed full code orders.    Atrial fibrillation with RVR ([facility])- (present on admission)  Assessment & Plan  She found to have atrial fibrillation with rapid ventricular response.  I ordered amiodarone bolus on March 16,024 and started on amiodarone gtt.  She has a persistent atrial fibrillation and rhythm control would be very difficult to achieve.  Now rate controlled.  I am continuing Xarelto now I adjusted the dose of Xarelto to 15 mg instead of 20 mg due to worsening renal functions.  Continue to monitor electrolytes and replace as needed.  Plan is to transfer out to telemetry floor.      Septic shock ([facility])- (present on admission)  Assessment & Plan  This is Septic shock Present on admission  SIRS criteria identified on my evaluation include: Tachycardia, with heart rate greater than 90 BPM, Tachypnea, with respirations greater than 20 per minute, and Leukocytosis, with WBC greater than 12,000  Clinical indicators of  end organ dysfunction include Hypotension with systolic blood pressure less than 90 or MAP less than 65 and Lactic Acid greater than 2  Indicators of septic shock include: Sepsis present and persistent hypotension despite fluid resuscitation   Sources is: Pulmonary  Sepsis protocol initiated  Crystalloid Fluid Administration: Fluid resuscitation ordered per standard protocol - 30 mL/kg per current or ideal body weight  IV antibiotics as appropriate for source of sepsis  Reassessment: I have reassessed the patient's hemodynamic status      She received total of 3 L of IV fluid bolus in the ER and her blood pressure started to improve.    Hypotension now resolved.  I switch her antibiotic from ceftriaxone to Ancef after discussing it with pharmacist.    Diabetes (HCC)- (present on admission)  Assessment & Plan  3/18/2024   Continue insulin sliding scale with hypoglycemia protocol.  I started her on diabetic and cardiac diet.  Continue monitor      Persistent atrial fibrillation (HCC)- (present on admission)  Assessment & Plan  3/18/2024   Continue outpatient anticoagulation with  I am continuing digoxin.  Now overall significantly rate control.  Plan is to transfer to telemetry.  Continue to monitor her colitis and replace as needed.         Greater than 51 minutes spent prepping to see patient (e.g. review of tests) obtaining and/or reviewing separately obtained history. Performing a medically appropriate examination and/ evaluation.  Counseling and educating the patient/family/caregiver.  Ordering medications, tests, or procedures.  Referring and communicating with other health care professionals.  Documenting clinical information in EPIC.  Independently interpreting results and communicating results to patient/family/caregiver.  Care coordination.     VTE prophylaxis: xarelto   I have performed a physical exam and reviewed and updated ROS and Plan today (3/20/2024). In review of yesterday's note (3/19/2024), there  are no changes except as documented above.

## 2024-03-20 NOTE — PROGRESS NOTES
[facility] -  PROGRESS NOTE               Author: Arron Bettencourt M.D. Date & Time: 3/20/2024  10:39 AM     HPI:  Miroslava Matta is a 63 y.o. female with past medical history of chronic atrial fibrillation on Xarelto, congestive heart failure, diabetes, hyperlipidemia, COPD on 4 L of oxygen, hypertension, RUTH and mood disorder who presented to the hospital with complaint of nausea and vomiting and shortness of breath.  She is a resident at [facility] and they checked her pulse ox and she found to hypoxia and patient transferred to [facility].        During this admission she developed A-fib with RVR.  She was started on  amiodarone infusion and admitted to [facility].     She was admitted on 3/16 with a Scr of 1.38 which bridget to 2.08 to n ow 2.90 prompting Nephrology consultation. His baseline Scr was normal prior to this admission on 3/7/24 of 0.77 mg/dL. Nephrology was asked to consult for CARMELO.     She did have several episodes of hypotension during the course of her stay with systolics in the mid 70-80s and at one point as low as 50s. Currently her BP has been stable. Her kidney US revealed Unremarkable kidneys. No hydronephrosis. Her ECHO from Feb/24 revealed Grossly normal left ventricular systolic function. The left ventricular ejection fraction is visually estimated to be 50%. Severely dilated right ventricle. Reduced right ventricular systolic function. Mild aortic valve stenosis. Mild aortic insufficiency. Mild to moderate mitral regurgitation. Severe tricuspid regurgitation. Estimated right ventricular systolic pressure is 85 mmHg. Right heart   pressures are consistent with severe pulmonary hypertension.     No F/C/CP/SOB.  No melena, hematochezia, hematemesis.  No HA, visual changes, but had mild abdominal pain    DAILY NEPHROLOGY SUMMARY:  3/19 - There are no labs today. Patient is feeling fine. No uremic symptoms. BP is higher.   3/20 - No new overnight  "renal events. Scr is trending lower. 2.44 today. SNa 123. BP is still high.    REVIEW OF SYSTEMS:    10 point ROS reviewed and is as per HPI/daily summary or otherwise negative    PMH/PSH/SH/FH:   Reviewed and unchanged since admission note    CURRENT MEDICATIONS:   Reviewed from admission to present day    VS:  BP (!) 164/85   Pulse 77   Temp 36.7 °C (98.1 °F) (Temporal)   Resp 16   Ht 1.702 m (5' 7\")   Wt (!) 170 kg (373 lb 10.9 oz)   SpO2 96%   BMI 58.53 kg/m²     Physical Exam  Vitals and nursing note reviewed.     Constitutional:       General: She is not in acute distress.  HENT:      Head: Normocephalic and atraumatic.      Nose: Nose normal.      Mouth/Throat:      Pharynx: Oropharynx is clear.   Eyes:      Extraocular Movements: Extraocular movements intact.      Conjunctiva/sclera: Conjunctivae normal.      Pupils: Pupils are equal, round, and reactive to light.   Cardiovascular:      Rate and Rhythm: Normal rate and regular rhythm.      Pulses: Normal pulses.      Heart sounds: Normal heart sounds.   Pulmonary:      Effort: Pulmonary effort is normal.      Breath sounds: Normal breath sounds.   Abdominal:      General: Abdomen is flat. Bowel sounds are normal.      Palpations: Abdomen is soft.   Musculoskeletal:         General: Normal range of motion.      Cervical back: Normal range of motion and neck supple.   Skin:     General: Skin is warm.   Neurological:      General: No focal deficit present.   Psychiatric:         Mood and Affect: Mood normal.         Behavior: Behavior normal.         Thought Content: Thought content normal.     Fluids:  In: 0   Out: 3900     LABS:  Recent Labs     03/19/24  0943 03/19/24  1106 03/20/24  0252   SODIUM 121* 121* 123*   POTASSIUM 4.2 4.3 3.7   CHLORIDE 84* 84* 86*   CO2 25 26 26   GLUCOSE 129* 139* 99   BUN 45* 44* 42*   CREATININE 2.77* 2.70* 2.44*   CALCIUM 8.2* 8.3* 8.3*       IMAGING:   All imaging reviewed from admission to present " day    IMPRESSION:  # CARMELO/ATN - likely sec to hypoperfusion  # Hyponatremia  # N/V  # Acidosis  # Atrial fibrillation  # E. Coli bacteremia  # Acute respiratory failure resolved  # Sepsis  # DM  # HTN     PLAN:  - No compelling indication for RRT. Scr is trending lower  - Give one dose of Lasix 80mg IVP x 1 today  - Adding Coreg to regimen for HTN  - Continue Abx - renal dose  - Keep MAP >/= to 65  - Monitor UOP  - Daily evaluation for RRT needs  - Dose all meds per eGFR < 15

## 2024-03-20 NOTE — CARE PLAN
The patient is Stable - Low risk of patient condition declining or worsening    Shift Goals  Clinical Goals: Pain control, Q2 turns, saftey, monitor labls, VSS  Patient Goals: Sleep  Family Goals: ELDER    Progress made toward(s) clinical / shift goals:    Problem: Knowledge Deficit - Standard  Goal: Patient and family/care givers will demonstrate understanding of plan of care, disease process/condition, diagnostic tests and medications  Outcome: Progressing     Problem: Pain - Standard  Goal: Alleviation of pain or a reduction in pain to the patient’s comfort goal  Outcome: Progressing     Problem: Skin Integrity  Goal: Skin integrity is maintained or improved  Outcome: Progressing     Problem: Fall Risk  Goal: Patient will remain free from falls  Outcome: Progressing       Patient is not progressing towards the following goals:

## 2024-03-20 NOTE — PROGRESS NOTES
Monitor Summary  Rhythm: Afib  Rate: 71-80  Ectopy: (O) PVC  Measurements: -/0.11/-  ---12 hr Chart Review---

## 2024-03-21 LAB
ALBUMIN SERPL BCP-MCNC: 2.5 G/DL (ref 3.2–4.9)
ALBUMIN/GLOB SERPL: 0.8 G/DL
ALP SERPL-CCNC: 76 U/L (ref 30–99)
ALT SERPL-CCNC: <5 U/L (ref 2–50)
ANION GAP SERPL CALC-SCNC: 10 MMOL/L (ref 7–16)
AST SERPL-CCNC: 10 U/L (ref 12–45)
BILIRUB SERPL-MCNC: 0.3 MG/DL (ref 0.1–1.5)
BUN SERPL-MCNC: 32 MG/DL (ref 8–22)
CALCIUM ALBUM COR SERPL-MCNC: 9.8 MG/DL (ref 8.5–10.5)
CALCIUM SERPL-MCNC: 8.6 MG/DL (ref 8.5–10.5)
CHLORIDE SERPL-SCNC: 88 MMOL/L (ref 96–112)
CO2 SERPL-SCNC: 31 MMOL/L (ref 20–33)
CREAT SERPL-MCNC: 1.47 MG/DL (ref 0.5–1.4)
GFR SERPLBLD CREATININE-BSD FMLA CKD-EPI: 40 ML/MIN/1.73 M 2
GLOBULIN SER CALC-MCNC: 3.2 G/DL (ref 1.9–3.5)
GLUCOSE BLD STRIP.AUTO-MCNC: 110 MG/DL (ref 65–99)
GLUCOSE BLD STRIP.AUTO-MCNC: 153 MG/DL (ref 65–99)
GLUCOSE BLD STRIP.AUTO-MCNC: 169 MG/DL (ref 65–99)
GLUCOSE BLD STRIP.AUTO-MCNC: 178 MG/DL (ref 65–99)
GLUCOSE BLD STRIP.AUTO-MCNC: 99 MG/DL (ref 65–99)
GLUCOSE SERPL-MCNC: 147 MG/DL (ref 65–99)
POTASSIUM SERPL-SCNC: 3.7 MMOL/L (ref 3.6–5.5)
PROT SERPL-MCNC: 5.7 G/DL (ref 6–8.2)
SODIUM SERPL-SCNC: 129 MMOL/L (ref 135–145)

## 2024-03-21 PROCEDURE — A9270 NON-COVERED ITEM OR SERVICE: HCPCS | Performed by: INTERNAL MEDICINE

## 2024-03-21 PROCEDURE — 82962 GLUCOSE BLOOD TEST: CPT

## 2024-03-21 PROCEDURE — 770020 HCHG ROOM/CARE - TELE (206)

## 2024-03-21 PROCEDURE — 700101 HCHG RX REV CODE 250: Performed by: INTERNAL MEDICINE

## 2024-03-21 PROCEDURE — 99233 SBSQ HOSP IP/OBS HIGH 50: CPT | Performed by: INTERNAL MEDICINE

## 2024-03-21 PROCEDURE — 700111 HCHG RX REV CODE 636 W/ 250 OVERRIDE (IP): Mod: JZ | Performed by: INTERNAL MEDICINE

## 2024-03-21 PROCEDURE — 80053 COMPREHEN METABOLIC PANEL: CPT

## 2024-03-21 PROCEDURE — 700102 HCHG RX REV CODE 250 W/ 637 OVERRIDE(OP): Performed by: INTERNAL MEDICINE

## 2024-03-21 PROCEDURE — 700111 HCHG RX REV CODE 636 W/ 250 OVERRIDE (IP): Performed by: INTERNAL MEDICINE

## 2024-03-21 PROCEDURE — 36415 COLL VENOUS BLD VENIPUNCTURE: CPT

## 2024-03-21 RX ORDER — AMOXICILLIN 250 MG
1 CAPSULE ORAL DAILY
Status: DISCONTINUED | OUTPATIENT
Start: 2024-03-21 | End: 2024-03-22 | Stop reason: HOSPADM

## 2024-03-21 RX ADMIN — PREGABALIN 25 MG: 25 CAPSULE ORAL at 11:53

## 2024-03-21 RX ADMIN — CEFAZOLIN 2 G: 10 INJECTION, POWDER, FOR SOLUTION INTRAVENOUS at 13:25

## 2024-03-21 RX ADMIN — DULOXETINE HYDROCHLORIDE 30 MG: 30 CAPSULE, DELAYED RELEASE ORAL at 04:43

## 2024-03-21 RX ADMIN — FUROSEMIDE 80 MG: 10 INJECTION INTRAMUSCULAR; INTRAVENOUS at 04:43

## 2024-03-21 RX ADMIN — CEFAZOLIN 2 G: 10 INJECTION, POWDER, FOR SOLUTION INTRAVENOUS at 21:20

## 2024-03-21 RX ADMIN — PREGABALIN 25 MG: 25 CAPSULE ORAL at 17:27

## 2024-03-21 RX ADMIN — CEFAZOLIN 2 G: 10 INJECTION, POWDER, FOR SOLUTION INTRAVENOUS at 04:44

## 2024-03-21 RX ADMIN — DILTIAZEM HYDROCHLORIDE 180 MG: 180 CAPSULE, COATED, EXTENDED RELEASE ORAL at 04:43

## 2024-03-21 RX ADMIN — CARVEDILOL 6.25 MG: 6.25 TABLET, FILM COATED ORAL at 17:27

## 2024-03-21 RX ADMIN — PREGABALIN 25 MG: 25 CAPSULE ORAL at 04:43

## 2024-03-21 RX ADMIN — CARVEDILOL 6.25 MG: 6.25 TABLET, FILM COATED ORAL at 08:22

## 2024-03-21 RX ADMIN — INSULIN GLARGINE-YFGN 10 UNITS: 100 INJECTION, SOLUTION SUBCUTANEOUS at 04:50

## 2024-03-21 RX ADMIN — ACETAMINOPHEN 650 MG: 325 TABLET, FILM COATED ORAL at 11:53

## 2024-03-21 RX ADMIN — OXYCODONE 5 MG: 5 TABLET ORAL at 19:34

## 2024-03-21 RX ADMIN — OXYCODONE 5 MG: 5 TABLET ORAL at 03:55

## 2024-03-21 RX ADMIN — RIVAROXABAN 15 MG: 15 TABLET, FILM COATED ORAL at 17:27

## 2024-03-21 ASSESSMENT — ENCOUNTER SYMPTOMS
COUGH: 0
TREMORS: 0
FOCAL WEAKNESS: 0
FEVER: 0
PALPITATIONS: 0
HEADACHES: 0
ORTHOPNEA: 0
WEIGHT LOSS: 0
SPUTUM PRODUCTION: 0
DIZZINESS: 0
CHILLS: 0
NAUSEA: 0
DOUBLE VISION: 0
EYE PAIN: 0
DIARRHEA: 0
MYALGIAS: 0
SPEECH CHANGE: 0
ABDOMINAL PAIN: 0
SENSORY CHANGE: 0
CONSTIPATION: 0
BLURRED VISION: 0
SHORTNESS OF BREATH: 0
BACK PAIN: 0
PHOTOPHOBIA: 0
VOMITING: 0
TINGLING: 0
NECK PAIN: 0
HALLUCINATIONS: 0

## 2024-03-21 ASSESSMENT — PAIN DESCRIPTION - PAIN TYPE
TYPE: ACUTE PAIN
TYPE: ACUTE PAIN

## 2024-03-21 ASSESSMENT — FIBROSIS 4 INDEX: FIB4 SCORE: 1.06

## 2024-03-21 ASSESSMENT — LIFESTYLE VARIABLES: SUBSTANCE_ABUSE: 0

## 2024-03-21 NOTE — PROGRESS NOTES
Bedside report received and patient care assumed. Pt is resting in bed, A&O4, with no complaints of pain, and is on 5.5 L NC. Tele box on. All fall precautions are in place, belongings at bedside table.  Pt was updated on POC, no questions or concerns. Pt educated on use of call light for assistance.

## 2024-03-21 NOTE — CARE PLAN
The patient is Stable - Low risk of patient condition declining or worsening    Shift Goals  Clinical Goals: Pain management, Q 2hr turns  Patient Goals: Pain control  Family Goals: ELDER    Progress made toward(s) clinical / shift goals:    Problem: Pain - Standard  Goal: Alleviation of pain or a reduction in pain to the patient’s comfort goal  Outcome: Progressing     Problem: Skin Integrity  Goal: Skin integrity is maintained or improved  Outcome: Progressing   Changed to bariatric low air loss bed.    Patient is not progressing towards the following goals:

## 2024-03-21 NOTE — DISCHARGE PLANNING
DC Transport Scheduled    Transport Company Scheduled:  [facility]  Spoke with Kira at [facility] to schedule transport.    Scheduled Date: 3/22/2024  Scheduled Time: 1300    Destination: [facility]  [facility] [place] [address]    Notified care team of scheduled transport via [employer].     If there are any changes needed to the DC transportation scheduled, please contact [facility] at ext. [phone] between the hours of 4073-5369 Mon-Fri. If outside those hours, contact the ED Case Manager at ext. [phone].

## 2024-03-21 NOTE — PROGRESS NOTES
[facility] -  PROGRESS NOTE               Author: Arron Bettencourt M.D. Date & Time: 3/21/2024  10:28 AM     HPI:  Miroslava Matta is a 63 y.o. female with past medical history of chronic atrial fibrillation on Xarelto, congestive heart failure, diabetes, hyperlipidemia, COPD on 4 L of oxygen, hypertension, RUTH and mood disorder who presented to the hospital with complaint of nausea and vomiting and shortness of breath.  She is a resident at [facility] and they checked her pulse ox and she found to hypoxia and patient transferred to [facility].        During this admission she developed A-fib with RVR.  She was started on  amiodarone infusion and admitted to [facility].     She was admitted on 3/16 with a Scr of 1.38 which bridget to 2.08 to n ow 2.90 prompting Nephrology consultation. His baseline Scr was normal prior to this admission on 3/7/24 of 0.77 mg/dL. Nephrology was asked to consult for CARMELO.     She did have several episodes of hypotension during the course of her stay with systolics in the mid 70-80s and at one point as low as 50s. Currently her BP has been stable. Her kidney US revealed Unremarkable kidneys. No hydronephrosis. Her ECHO from Feb/24 revealed Grossly normal left ventricular systolic function. The left ventricular ejection fraction is visually estimated to be 50%. Severely dilated right ventricle. Reduced right ventricular systolic function. Mild aortic valve stenosis. Mild aortic insufficiency. Mild to moderate mitral regurgitation. Severe tricuspid regurgitation. Estimated right ventricular systolic pressure is 85 mmHg. Right heart   pressures are consistent with severe pulmonary hypertension.     No F/C/CP/SOB.  No melena, hematochezia, hematemesis.  No HA, visual changes, but had mild abdominal pain    DAILY NEPHROLOGY SUMMARY:  3/19 - There are no labs today. Patient is feeling fine. No uremic symptoms. BP is higher.   3/20 - No new overnight  "renal events. Scr is trending lower. 2.44 today. SNa 123. BP is still high.  3/21 - Blood cx with coagulase neg Staph- possible contaminant. No labs today.     REVIEW OF SYSTEMS:    10 point ROS reviewed and is as per HPI/daily summary or otherwise negative    PMH/PSH/SH/FH:   Reviewed and unchanged since admission note    CURRENT MEDICATIONS:   Reviewed from admission to present day    VS:  BP (!) 145/65   Pulse 75   Temp 36 °C (96.8 °F) (Temporal)   Resp 18   Ht 1.702 m (5' 7\")   Wt (!) 170 kg (373 lb 10.9 oz)   SpO2 94%   BMI 58.53 kg/m²     Physical Exam  Vitals and nursing note reviewed.     Constitutional:       General: She is not in acute distress.  HENT:      Head: Normocephalic and atraumatic.      Nose: Nose normal.      Mouth/Throat:      Pharynx: Oropharynx is clear.   Eyes:      Extraocular Movements: Extraocular movements intact.      Conjunctiva/sclera: Conjunctivae normal.      Pupils: Pupils are equal, round, and reactive to light.   Cardiovascular:      Rate and Rhythm: Normal rate and regular rhythm.      Pulses: Normal pulses.      Heart sounds: Normal heart sounds.   Pulmonary:      Effort: Pulmonary effort is normal.      Breath sounds: Normal breath sounds.   Abdominal:      General: Abdomen is flat. Bowel sounds are normal.      Palpations: Abdomen is soft.   Musculoskeletal:         General: Normal range of motion.      Cervical back: Normal range of motion and neck supple.   Skin:     General: Skin is warm.   Neurological:      General: No focal deficit present.   Psychiatric:         Mood and Affect: Mood normal.         Behavior: Behavior normal.         Thought Content: Thought content normal.     Fluids:  In: 120 [P.O.:120]  Out: 5450     LABS:  Recent Labs     03/19/24  0943 03/19/24  1106 03/20/24  0252   SODIUM 121* 121* 123*   POTASSIUM 4.2 4.3 3.7   CHLORIDE 84* 84* 86*   CO2 25 26 26   GLUCOSE 129* 139* 99   BUN 45* 44* 42*   CREATININE 2.77* 2.70* 2.44*   CALCIUM 8.2* " 8.3* 8.3*       IMAGING:   All imaging reviewed from admission to present day    IMPRESSION:  # CARMELO/ATN - likely sec to hypoperfusion  # Hyponatremia  # N/V  # Acidosis  # Atrial fibrillation  # E. Coli bacteremia  # Acute respiratory failure resolved  # Sepsis  # DM  # HTN     PLAN:  - Need daily labs. No labs today  - Continue Coreg  - Continue Abx - renal dose  - Keep MAP >/= to 65  - Monitor UOP  - Daily evaluation for RRT needs  - Dose all meds per eGFR < 15

## 2024-03-21 NOTE — CARE PLAN
The patient is Stable - Low risk of patient condition declining or worsening    Shift Goals  Clinical Goals: Safety, Q2h turns  Patient Goals: Comfort, sleep  Family Goals: jean    Progress made toward(s) clinical / shift goals:    Problem: Knowledge Deficit - Standard  Goal: Patient and family/care givers will demonstrate understanding of plan of care, disease process/condition, diagnostic tests and medications  Outcome: Progressing     Problem: Hemodynamics  Goal: Patient's hemodynamics, fluid balance and neurologic status will be stable or improve  Outcome: Progressing     Problem: Fluid Volume  Goal: Fluid volume balance will be maintained  Outcome: Progressing     Problem: Urinary - Renal Perfusion  Goal: Ability to achieve and maintain adequate renal perfusion and functioning will improve  Outcome: Progressing     Problem: Respiratory  Goal: Patient will achieve/maintain optimum respiratory ventilation and gas exchange  Outcome: Progressing     Problem: Mechanical Ventilation  Goal: Safe management of artificial airway and ventilation  Outcome: Progressing     Problem: Physical Regulation  Goal: Diagnostic test results will improve  Outcome: Progressing  Goal: Signs and symptoms of infection will decrease  Outcome: Progressing     Problem: Pain - Standard  Goal: Alleviation of pain or a reduction in pain to the patient’s comfort goal  Outcome: Progressing     Problem: Skin Integrity  Goal: Skin integrity is maintained or improved  Outcome: Progressing     Problem: Fall Risk  Goal: Patient will remain free from falls  Outcome: Progressing       Patient is not progressing towards the following goals:

## 2024-03-21 NOTE — PROGRESS NOTES
Monitor Summary    Rhythm: Afib    Rate: 60-70    Ectopy: (R) PVC's (R) big (R) trig    Measurements: -/.06/-    ---12 hr Chart Review---

## 2024-03-21 NOTE — DISCHARGE PLANNING
1135  Agency/Facility Name: [facility]   Spoke To: Suha   Outcome: JAYMIE called to for bed availability tomorrow. Per Suha bed is available tomorrow and requesting transport to arrange at 1300.     JED Méndez notified.

## 2024-03-21 NOTE — DISCHARGE PLANNING
@ 1045: Discussed pt during IDT rounds. Per MD, pt is anticipated to be medically cleared tomorrow. Pending medical clearance and bed availability at [facility]. SW asked DPA to f/u with [facility] for bed. Pt will remain on ABX until 03/25/2024.    @ 1225: ALECIA preemptively set up transport with [facility] at 1300 for tomorrow to [facility]. ALECIA met with pt at bedside to discuss tentative transport time. Pt verbalized agreement and singed consent to transport. Per pt, she elected for this SW to not notify son of transport time. Per pt, she will notify her son when she arrives at facility.

## 2024-03-21 NOTE — PROGRESS NOTES
Hospital Medicine Daily Progress Note    Date of Service  3/21/2024    Chief Complaint  Miroslava Matta is a 63 y.o. female admitted 3/16/2024 with nausea vomiting and shortness of breath    Hospital Course    Miroslava Matta is a 63 y.o. female with past medical history of chronic atrial fibrillation on Xarelto, congestive heart failure, diabetes, hyperlipidemia, COPD on 4 L of oxygen, hypertension, RUTH and mood disorder who presented to the hospital on 3/16/2024 with complaint of nausea and vomiting and shortness of breath.  She reported that she had she had 1 episode of vomiting last night and 1 episode of vomiting this morning.  She is currently resident at [facility] and they checked her pulse ox and she found to hypoxia and patient transferred to [facility].  On my evaluation she expressed that she is feeling better but she has been having nausea and vomiting and overall she is not feeling well.  There is no specific aggravating or elevating factors.  She reported associated symptoms of headache and mild abdominal pain.  There is no other associated symptoms or any other acute complaints.  She has been taking her medications as prescribed.     After admission she developed A-fib with RVR.  I ordered bolus for amiodarone and later started on amiodarone infusion.      Interval Problem Update    03/17/24    I evaluated and examined her at the bedside.  She reported that she is feeling better.  Her A-fib with RVR resolved she remains in A-fib she has persistent atrial fibrillation.  Now plan is to discontinue her amiodarone due to persistent A-fib and now its rate controlled.  I am continuing Cardizem with reduced dose due to hypotension and I am continuing digoxin.  Patient requested that she would like to talk to case management regarding her belonging at [facility].  I requested bedside RN to contact case management.  Continue Xarelto.  She found to have bacteremia I switch her  antibiotic to ceftriaxone.  I will repeat blood cultures.  She does not have any abdominal pain or any acute pain on my evaluation.  Lactic acidosis trended down.    03/18/24    I evaluated and examined her at the bedside.  She expressed that she is feeling better.  Her son brought her cell phone and other belongings from [facility].  Renal function has been worsening.  I reviewed ultrasound renal that did not show any acute abnormalities.  I requested consultation with nephrology and discussed Ms. Malone current medical condition with Dr. Bettencourt  Currently she is rate controlled  Oxygen requirement is currently 5 L and she is maintaining oxygen saturation.  Due to her worsening renal function I adjusted her dose of Xarelto from 20 mg to 15 mg.  I discussed plan of care with patient and answered all questions.  3/19: Patient seen and examined, having nausea, no vomiting, cont on IV ancef for her Bacteremia will repeat Bcx  Wean off O2 as tolerated   Regarding her CARMELO nephrology following appreciate rec.   3/20: Patient resting in bed on 5l of oxygen  Regarding her CARMELO nephrology following received a dose of IV lasix   Repeat blood culture so far negative   3/21: Patient resting in bed, afebrile, no nausea or vomiting. CARMELO nephology following case discussed appreciate rec.  Cont on IV cefazolin repeat cultures so far negative     I have discussed this patient's plan of care and discharge plan at IDT rounds today with Case Management, Nursing, Nursing leadership, and other members of the IDT team.    Consultants/Specialty  Nephrology     Code Status  Full Code    Disposition  The patient is not medically cleared for discharge to home or a post-acute facility.      I have placed the appropriate orders for post-discharge needs.    Review of Systems  Review of Systems   Constitutional:  Positive for malaise/fatigue. Negative for chills, fever and weight loss.   HENT:  Negative for hearing loss and tinnitus.    Eyes:  Negative  for blurred vision, double vision, photophobia and pain.   Respiratory:  Negative for cough, sputum production and shortness of breath.    Cardiovascular:  Negative for chest pain, palpitations, orthopnea and leg swelling.   Gastrointestinal:  Negative for abdominal pain, constipation, diarrhea, nausea and vomiting.   Genitourinary:  Negative for dysuria, frequency and urgency.   Musculoskeletal:  Negative for back pain, joint pain, myalgias and neck pain.   Skin:  Negative for rash.   Neurological:  Negative for dizziness, tingling, tremors, sensory change, speech change, focal weakness and headaches.   Psychiatric/Behavioral:  Negative for hallucinations and substance abuse.    All other systems reviewed and are negative.       Physical Exam  Temp:  [36 °C (96.8 °F)-36.6 °C (97.9 °F)] 36.6 °C (97.9 °F)  Pulse:  [65-78] 65  Resp:  [15-20] 15  BP: (112-145)/(59-70) 139/66  SpO2:  [89 %-94 %] 92 %    Physical Exam  Vitals reviewed.   Constitutional:       General: She is not in acute distress.     Appearance: Normal appearance. She is obese. She is ill-appearing.   HENT:      Head: Normocephalic and atraumatic.      Nose: No congestion.      Comments: Oxygen nasal cannula  Eyes:      General:         Right eye: No discharge.         Left eye: No discharge.      Pupils: Pupils are equal, round, and reactive to light.   Cardiovascular:      Rate and Rhythm: Normal rate. Rhythm irregular.      Pulses: Normal pulses.      Heart sounds: Normal heart sounds. No murmur heard.  Pulmonary:      Effort: Pulmonary effort is normal. No respiratory distress.      Breath sounds: Normal breath sounds. No stridor.   Abdominal:      General: Bowel sounds are normal. There is no distension.      Palpations: Abdomen is soft.      Tenderness: There is no abdominal tenderness.   Musculoskeletal:         General: No swelling or tenderness. Normal range of motion.      Cervical back: Normal range of motion. No rigidity.   Skin:      General: Skin is warm.      Capillary Refill: Capillary refill takes less than 2 seconds.      Coloration: Skin is not jaundiced or pale.      Findings: No bruising.   Neurological:      General: No focal deficit present.      Mental Status: She is alert and oriented to person, place, and time.      Cranial Nerves: No cranial nerve deficit.      Comments: She is following commands  Decreased range of motion on lower extremities   Psychiatric:         Mood and Affect: Mood normal.         Behavior: Behavior normal.         Fluids    Intake/Output Summary (Last 24 hours) at 3/21/2024 1409  Last data filed at 3/21/2024 1203  Gross per 24 hour   Intake 120 ml   Output 8551 ml   Net -8431 ml       Laboratory  Recent Labs     03/19/24  0943 03/20/24  0252   WBC 11.2* 9.6   RBC 3.93* 3.80*   HEMOGLOBIN 9.9* 9.7*   HEMATOCRIT 31.5* 30.5*   MCV 80.2* 80.3*   MCH 25.2* 25.5*   MCHC 31.4* 31.8*   RDW 68.6* 67.9*   PLATELETCT 220 214   MPV 9.6 9.5     Recent Labs     03/19/24  1106 03/20/24  0252 03/21/24  1007   SODIUM 121* 123* 129*   POTASSIUM 4.3 3.7 3.7   CHLORIDE 84* 86* 88*   CO2 26 26 31   GLUCOSE 139* 99 147*   BUN 44* 42* 32*   CREATININE 2.70* 2.44* 1.47*   CALCIUM 8.3* 8.3* 8.6                   Imaging  US-RENAL   Final Result      Unremarkable kidneys.  No hydronephrosis.      DX-CHEST-LIMITED (1 VIEW)   Final Result      1.  Mild pulmonary edema.   2.  No pneumonia or pneumothorax.   3.  Stable moderate cardiomegaly.      DX-CHEST-PORTABLE (1 VIEW)   Final Result      1.  Patchy RIGHT lung base infiltrate concerning for developing pneumonia.   2.  Slight increased inflation from prior exam.   3.  No other significant change.              Assessment/Plan  * Acute on chronic respiratory failure (HCC)- (present on admission)  Assessment & Plan  Acute on chronic respiratory failure with hypoxia most likely secondary to pneumonia.  Patient baseline oxygen is 4 L   Currently she is requiring 6 L of oxygen to maintain  oxygen saturation.  Less concern for pulmonary embolism as patient is already on Xarelto.  Continue monitor closely in IMCU.  Continue antibiotics.    CARMELO (acute kidney injury) ([facility])  Assessment & Plan  Renal function has been worsening.  I reviewed ultrasound daily that did not show any acute abnormalities per  I requested consultation with nephrology and I discussed with Dr. Bettencourt regarding patient current medical condition and plan of care  I ordered urine electrolytes.      E coli bacteremia  Assessment & Plan  3/18/2024   She found to have E. coli bacteremia.  Pansensitive  Most likely urinary source.  Patient does not have abdominal pain or right upper quadrant tenderness.  I switch antibiotic to Ancef on March 18, 2024.    Leukocytosis  Assessment & Plan  3/18/2024   White blood cell count is trending down.  Secondary to pneumonia.  Ordered CBC for tomorrow.    Lactic acidosis  Assessment & Plan  Secondary to sepsis.  Now resolved.    Advance care planning- (present on admission)  Assessment & Plan  I discussed CODE STATUS with her and after discussion she would like to be full code.  As per patient wishes I placed full code orders.    Atrial fibrillation with RVR ([facility])- (present on admission)  Assessment & Plan  She found to have atrial fibrillation with rapid ventricular response.  I ordered amiodarone bolus on March 16,024 and started on amiodarone gtt.  She has a persistent atrial fibrillation and rhythm control would be very difficult to achieve.  Now rate controlled.  I am continuing Xarelto now I adjusted the dose of Xarelto to 15 mg instead of 20 mg due to worsening renal functions.  Continue to monitor electrolytes and replace as needed.  Plan is to transfer out to telemetry floor.      Septic shock ([facility])- (present on admission)  Assessment & Plan  This is Septic shock Present on admission  SIRS criteria identified on my evaluation include: Tachycardia, with heart rate greater than 90 BPM,  Tachypnea, with respirations greater than 20 per minute, and Leukocytosis, with WBC greater than 12,000  Clinical indicators of end organ dysfunction include Hypotension with systolic blood pressure less than 90 or MAP less than 65 and Lactic Acid greater than 2  Indicators of septic shock include: Sepsis present and persistent hypotension despite fluid resuscitation   Sources is: Pulmonary  Sepsis protocol initiated  Crystalloid Fluid Administration: Fluid resuscitation ordered per standard protocol - 30 mL/kg per current or ideal body weight  IV antibiotics as appropriate for source of sepsis  Reassessment: I have reassessed the patient's hemodynamic status      She received total of 3 L of IV fluid bolus in the ER and her blood pressure started to improve.    Hypotension now resolved.  I switch her antibiotic from ceftriaxone to Ancef after discussing it with pharmacist.    Diabetes (HCC)- (present on admission)  Assessment & Plan  3/18/2024   Continue insulin sliding scale with hypoglycemia protocol.  I started her on diabetic and cardiac diet.  Continue monitor      Persistent atrial fibrillation (HCC)- (present on admission)  Assessment & Plan  3/18/2024   Continue outpatient anticoagulation with  I am continuing digoxin.  Now overall significantly rate control.  Plan is to transfer to telemetry.  Continue to monitor her colitis and replace as needed.        VTE prophylaxis: xarelto   Greater than 51 minutes spent prepping to see patient (e.g. review of tests) obtaining and/or reviewing separately obtained history. Performing a medically appropriate examination and/ evaluation.  Counseling and educating the patient/family/caregiver.  Ordering medications, tests, or procedures.  Referring and communicating with other health care professionals.  Documenting clinical information in EPIC.  Independently interpreting results and communicating results to patient/family/caregiver.  Care coordination.      I have  performed a physical exam and reviewed and updated ROS and Plan today (3/21/2024). In review of yesterday's note (3/20/2024), there are no changes except as documented above.

## 2024-03-22 VITALS
HEART RATE: 57 BPM | HEIGHT: 67 IN | DIASTOLIC BLOOD PRESSURE: 72 MMHG | WEIGHT: 293 LBS | TEMPERATURE: 97.7 F | RESPIRATION RATE: 17 BRPM | BODY MASS INDEX: 45.99 KG/M2 | OXYGEN SATURATION: 94 % | SYSTOLIC BLOOD PRESSURE: 141 MMHG

## 2024-03-22 LAB
ALBUMIN SERPL BCP-MCNC: 2.6 G/DL (ref 3.2–4.9)
ALBUMIN/GLOB SERPL: 0.9 G/DL
ALP SERPL-CCNC: 67 U/L (ref 30–99)
ALT SERPL-CCNC: <5 U/L (ref 2–50)
ANION GAP SERPL CALC-SCNC: 8 MMOL/L (ref 7–16)
AST SERPL-CCNC: 9 U/L (ref 12–45)
BILIRUB SERPL-MCNC: 0.2 MG/DL (ref 0.1–1.5)
BUN SERPL-MCNC: 28 MG/DL (ref 8–22)
CALCIUM ALBUM COR SERPL-MCNC: 9.5 MG/DL (ref 8.5–10.5)
CALCIUM SERPL-MCNC: 8.4 MG/DL (ref 8.5–10.5)
CHLORIDE SERPL-SCNC: 89 MMOL/L (ref 96–112)
CO2 SERPL-SCNC: 32 MMOL/L (ref 20–33)
CREAT SERPL-MCNC: 1.38 MG/DL (ref 0.5–1.4)
GFR SERPLBLD CREATININE-BSD FMLA CKD-EPI: 43 ML/MIN/1.73 M 2
GLOBULIN SER CALC-MCNC: 2.8 G/DL (ref 1.9–3.5)
GLUCOSE BLD STRIP.AUTO-MCNC: 102 MG/DL (ref 65–99)
GLUCOSE BLD STRIP.AUTO-MCNC: 118 MG/DL (ref 65–99)
GLUCOSE BLD STRIP.AUTO-MCNC: 162 MG/DL (ref 65–99)
GLUCOSE SERPL-MCNC: 115 MG/DL (ref 65–99)
POTASSIUM SERPL-SCNC: 3.5 MMOL/L (ref 3.6–5.5)
PROT SERPL-MCNC: 5.4 G/DL (ref 6–8.2)
SODIUM SERPL-SCNC: 129 MMOL/L (ref 135–145)

## 2024-03-22 PROCEDURE — A9270 NON-COVERED ITEM OR SERVICE: HCPCS | Performed by: INTERNAL MEDICINE

## 2024-03-22 PROCEDURE — 700102 HCHG RX REV CODE 250 W/ 637 OVERRIDE(OP): Performed by: INTERNAL MEDICINE

## 2024-03-22 PROCEDURE — 80053 COMPREHEN METABOLIC PANEL: CPT

## 2024-03-22 PROCEDURE — 82962 GLUCOSE BLOOD TEST: CPT | Mod: 91

## 2024-03-22 PROCEDURE — 700111 HCHG RX REV CODE 636 W/ 250 OVERRIDE (IP): Performed by: INTERNAL MEDICINE

## 2024-03-22 PROCEDURE — 36415 COLL VENOUS BLD VENIPUNCTURE: CPT

## 2024-03-22 PROCEDURE — 700101 HCHG RX REV CODE 250: Performed by: INTERNAL MEDICINE

## 2024-03-22 PROCEDURE — 99239 HOSP IP/OBS DSCHRG MGMT >30: CPT | Performed by: INTERNAL MEDICINE

## 2024-03-22 PROCEDURE — 700111 HCHG RX REV CODE 636 W/ 250 OVERRIDE (IP): Mod: JZ | Performed by: INTERNAL MEDICINE

## 2024-03-22 RX ORDER — OXYCODONE HYDROCHLORIDE 5 MG/1
5 TABLET ORAL EVERY 6 HOURS PRN
Qty: 12 TABLET | Refills: 0 | Status: SHIPPED | OUTPATIENT
Start: 2024-03-22 | End: 2024-03-22

## 2024-03-22 RX ORDER — LEVOFLOXACIN 750 MG/1
750 TABLET, FILM COATED ORAL DAILY
Status: DISCONTINUED | OUTPATIENT
Start: 2024-03-22 | End: 2024-03-22 | Stop reason: HOSPADM

## 2024-03-22 RX ORDER — FUROSEMIDE 20 MG/1
20 TABLET ORAL
Status: DISCONTINUED | OUTPATIENT
Start: 2024-03-23 | End: 2024-03-22 | Stop reason: HOSPADM

## 2024-03-22 RX ORDER — OXYCODONE HYDROCHLORIDE 5 MG/1
5 TABLET ORAL EVERY 6 HOURS PRN
Qty: 12 TABLET | Refills: 0 | Status: SHIPPED | OUTPATIENT
Start: 2024-03-22 | End: 2024-03-25

## 2024-03-22 RX ORDER — LEVOFLOXACIN 750 MG/1
750 TABLET, FILM COATED ORAL DAILY
Status: ACTIVE | DISCHARGE
Start: 2024-03-22 | End: 2024-03-25

## 2024-03-22 RX ORDER — SODIUM CHLORIDE 1 G/1
1 TABLET ORAL
Status: ON HOLD
Start: 2024-03-22 | End: 2024-03-29

## 2024-03-22 RX ORDER — FUROSEMIDE 20 MG/1
20 TABLET ORAL DAILY
Status: SHIPPED
Start: 2024-03-23

## 2024-03-22 RX ORDER — SODIUM CHLORIDE 1 G/1
1 TABLET ORAL
Status: DISCONTINUED | OUTPATIENT
Start: 2024-03-22 | End: 2024-03-22 | Stop reason: HOSPADM

## 2024-03-22 RX ORDER — CARVEDILOL 6.25 MG/1
6.25 TABLET ORAL 2 TIMES DAILY WITH MEALS
Status: ON HOLD
Start: 2024-03-22 | End: 2024-03-29

## 2024-03-22 RX ADMIN — DULOXETINE HYDROCHLORIDE 30 MG: 30 CAPSULE, DELAYED RELEASE ORAL at 06:11

## 2024-03-22 RX ADMIN — CARVEDILOL 6.25 MG: 6.25 TABLET, FILM COATED ORAL at 08:10

## 2024-03-22 RX ADMIN — OXYCODONE 5 MG: 5 TABLET ORAL at 06:10

## 2024-03-22 RX ADMIN — PREGABALIN 25 MG: 25 CAPSULE ORAL at 06:10

## 2024-03-22 RX ADMIN — LEVOFLOXACIN 750 MG: 750 TABLET, FILM COATED ORAL at 11:32

## 2024-03-22 RX ADMIN — PREGABALIN 25 MG: 25 CAPSULE ORAL at 11:32

## 2024-03-22 RX ADMIN — INSULIN GLARGINE-YFGN 10 UNITS: 100 INJECTION, SOLUTION SUBCUTANEOUS at 06:18

## 2024-03-22 RX ADMIN — DILTIAZEM HYDROCHLORIDE 180 MG: 180 CAPSULE, COATED, EXTENDED RELEASE ORAL at 06:11

## 2024-03-22 RX ADMIN — SODIUM CHLORIDE 1 G: 1 TABLET ORAL at 11:32

## 2024-03-22 RX ADMIN — CEFAZOLIN 2 G: 10 INJECTION, POWDER, FOR SOLUTION INTRAVENOUS at 06:11

## 2024-03-22 RX ADMIN — FUROSEMIDE 80 MG: 10 INJECTION INTRAMUSCULAR; INTRAVENOUS at 06:11

## 2024-03-22 ASSESSMENT — FIBROSIS 4 INDEX: FIB4 SCORE: 1.23

## 2024-03-22 ASSESSMENT — PAIN DESCRIPTION - PAIN TYPE: TYPE: ACUTE PAIN

## 2024-03-22 NOTE — PROGRESS NOTES
[facility] -  PROGRESS NOTE               Author: Arron Bettencourt M.D. Date & Time: 3/22/2024  10:05 AM     HPI:  Miroslava Matta is a 63 y.o. female with past medical history of chronic atrial fibrillation on Xarelto, congestive heart failure, diabetes, hyperlipidemia, COPD on 4 L of oxygen, hypertension, RUTH and mood disorder who presented to the hospital with complaint of nausea and vomiting and shortness of breath.  She is a resident at [facility] and they checked her pulse ox and she found to hypoxia and patient transferred to [facility].        During this admission she developed A-fib with RVR.  She was started on  amiodarone infusion and [place] [facility] [place]     [place] was admitted on 3/16 with a Scr of 1.38 which bridget to 2.08 to n ow 2.90 prompting Nephrology consultation. His baseline Scr was normal prior to this admission on 3/7/24 of 0.77 mg/dL. Nephrology was asked to consult for CARMELO.     She did have several episodes of hypotension during the course of her stay with systolics in the mid 70-80s and at one point as low as 50s. Currently her BP has been stable. Her kidney US revealed Unremarkable kidneys. No hydronephrosis. Her ECHO from Feb/24 revealed Grossly normal left ventricular systolic function. The left ventricular ejection fraction is visually estimated to be 50%. Severely dilated right ventricle. Reduced right ventricular systolic function. Mild aortic valve stenosis. Mild aortic insufficiency. Mild to moderate mitral regurgitation. Severe tricuspid regurgitation. Estimated right ventricular systolic pressure is 85 mmHg. Right heart   pressures are consistent with severe pulmonary hypertension.     No F/C/CP/SOB.  No melena, hematochezia, hematemesis.  No HA, visual changes, but had mild abdominal pain    DAILY NEPHROLOGY SUMMARY:  3/19 - There are no labs today. Patient is feeling fine. No uremic symptoms. BP is higher.   3/20 - No new overnight  "renal events. Scr is trending lower. 2.44 today. SNa 123. BP is still high.  3/21 - Blood cx with coagulase neg Staph- possible contaminant. No labs today.   3/22 - Scr is down to 1.38. SNa+ 129.    REVIEW OF SYSTEMS:    10 point ROS reviewed and is as per HPI/daily summary or otherwise negative    PMH/PSH/SH/FH:   Reviewed and unchanged since admission note    CURRENT MEDICATIONS:   Reviewed from admission to present day    VS:  BP (!) 148/69   Pulse 65   Temp 36.5 °C (97.7 °F) (Temporal)   Resp 18   Ht 1.702 m (5' 7\")   Wt (!) 163 kg (358 lb 4 oz)   SpO2 94%   BMI 56.11 kg/m²     Physical Exam  Vitals and nursing note reviewed.     Constitutional:       General: She is not in acute distress.  HENT:      Head: Normocephalic and atraumatic.      Nose: Nose normal.      Mouth/Throat:      Pharynx: Oropharynx is clear.   Eyes:      Extraocular Movements: Extraocular movements intact.      Conjunctiva/sclera: Conjunctivae normal.      Pupils: Pupils are equal, round, and reactive to light.   Cardiovascular:      Rate and Rhythm: Normal rate and regular rhythm.      Pulses: Normal pulses.      Heart sounds: Normal heart sounds.   Pulmonary:      Effort: Pulmonary effort is normal.      Breath sounds: Normal breath sounds.   Abdominal:      General: Abdomen is flat. Bowel sounds are normal.      Palpations: Abdomen is soft.   Musculoskeletal:         General: Normal range of motion.      Cervical back: Normal range of motion and neck supple.   Skin:     General: Skin is warm.   Neurological:      General: No focal deficit present.   Psychiatric:         Mood and Affect: Mood normal.         Behavior: Behavior normal.         Thought Content: Thought content normal.     Fluids:  In: 0   Out: 4361     LABS:  Recent Labs     03/20/24  0252 03/21/24  1007 03/22/24  0049   SODIUM 123* 129* 129*   POTASSIUM 3.7 3.7 3.5*   CHLORIDE 86* 88* 89*   CO2 26 31 32   GLUCOSE 99 147* 115*   BUN 42* 32* 28*   CREATININE 2.44* " 1.47* 1.38   CALCIUM 8.3* 8.6 8.4*       IMAGING:   All imaging reviewed from admission to present day    IMPRESSION:  # CARMELO/ATN - likely sec to hypoperfusion  # Hyponatremia  # N/V  # Acidosis  # Atrial fibrillation  # E. Coli bacteremia  # Acute respiratory failure resolved  # Sepsis  # DM  # HTN     PLAN:  - Continue Coreg  - Switch Furosemide to 20mg PO Qdaily  - Continue Abx - renal dose  - Keep MAP >/= to 65  - Monitor UOP  - Daily evaluation for RRT needs  - Dose all meds per eGFR < 15

## 2024-03-22 NOTE — DISCHARGE SUMMARY
Discharge Summary    CHIEF COMPLAINT ON ADMISSION  Chief Complaint   Patient presents with    Nausea/Vomiting/Diarrhea     BIB EMS from [facility]. Staff worried about recent episodes of N/V. Patient states she had 2 episodes of vomiting. She states it has been mostly food and water.     Shortness of Breath     Patient is on 2L baseline. Patient hypoxic at that level. Needs 6L to maintain SPO2 >90%       Reason for Admission  ems    Admission Date  3/16/2024     CODE STATUS  Full Code    HPI & HOSPITAL COURSE  This is a 63 y.o. female with past medical history of chronic atrial fibrillation on Xarelto, congestive heart failure, diabetes, hyperlipidemia, COPD on 4 L of oxygen, hypertension, RUTH and mood disorder who presented to the hospital on 3/16/2024 with complaint of nausea and vomiting and shortness of breath. During this admission she developed A-fib with RVR. She was started on amiodarone infusion and [place] [facility] [place].   Her A.fib is now rate controlled and she is now on diltiazem  and carvedilol.  Sh also had CARMELO and was seen by nephrology no need for dialysis and her CARMELO improved. She also had some hyponatremia started on salt tabs  She also had bacteremia growing E.coli and ws started on cefazolin , her repeat blood cultures have remained negative. And E.coli is pan sensitive   She has been switched to Levaquin and doing better now.  She will be discharged back SNF today     The patient met 2-midnight criteria for an inpatient stay at the time of discharge.      FOLLOW UP ITEMS POST DISCHARGE  PCP   Nephrology     DISCHARGE DIAGNOSES  Principal Problem:    Acute on chronic respiratory failure (HCC) (POA: Yes)  Active Problems:    Persistent atrial fibrillation (HCC) (POA: Yes)    Diabetes (HCC) (POA: Yes)    Septic shock (HCC) (POA: Yes)    Atrial fibrillation with RVR (HCC) (POA: Yes)    Advance care planning (POA: Yes)    Lactic acidosis (POA: Unknown)    Leukocytosis (POA: Unknown)    E coli bacteremia  (POA: Unknown)    CARMELO (acute kidney injury) ([facility]) (POA: Unknown)  Resolved Problems:    * No resolved hospital problems. *      FOLLOW UP  No future appointments.  [facility]  [address] [address]  [phone]          MEDICATIONS ON DISCHARGE     Medication List        START taking these medications        Instructions   carvedilol 6.25 MG Tabs  Commonly known as: Coreg   Take 1 Tablet by mouth 2 times a day with meals.  Dose: 6.25 mg     levoFLOXacin 750 MG tablet  Commonly known as: Levaquin   Take 1 Tablet by mouth every day for 3 days.  Dose: 750 mg     sodium chloride 1 GM Tabs  Commonly known as: Salt   Take 1 Tablet by mouth 3 times a day with meals for 5 days.  Dose: 1 g            CHANGE how you take these medications        Instructions   furosemide 20 MG Tabs  Start taking on: March 23, 2024  What changed:   medication strength  how much to take  when to take this  Commonly known as: Lasix   Take 1 Tablet by mouth every day.  Dose: 20 mg     oxyCODONE immediate-release 5 MG Tabs  What changed: when to take this  Commonly known as: Roxicodone   Take 1 Tablet by mouth every 6 hours as needed for Severe Pain for up to 3 days.  Dose: 5 mg            CONTINUE taking these medications        Instructions   acetaminophen 500 MG Tabs  Commonly known as: Tylenol   Take 1,000 mg by mouth every 6 hours as needed (PAIN).  Dose: 1,000 mg     ascorbic acid 500 MG Tabs  Commonly known as: Ascorbic Acid   Take 500 mg by mouth every day.  Dose: 500 mg     cyclobenzaprine 10 mg Tabs  Commonly known as: Flexeril   Take 10 mg by mouth 3 times a day as needed for Mild Pain or Muscle Spasms.  Dose: 10 mg     dilTIAZem  MG Cp24  Commonly known as: Cardizem CD   Take 1 Cap by mouth every day.  Dose: 360 mg     DULoxetine 30 MG Cpep  Commonly known as: Cymbalta   Take 30 mg by mouth every day.  Dose: 30 mg     FeroSul 325 (65 Fe) MG tablet  Generic drug: ferrous sulfate    Take 1 Tablet by mouth every morning with breakfast for 30 days.  Dose: 325 mg     FREESTYLE LITE strip  Generic drug: glucose blood   check BG In Vitro three times a day for 90 days     glyBURIDE 5 MG Tabs  Commonly known as: Diabeta   Take 1 Tab by mouth every morning with breakfast.  Dose: 5 mg     insulin glargine 100 UNIT/ML injection PEN  Generic drug: insulin glargine   10 units Subcutaneous daily for 90 days     magnesium oxide 400 MG Tabs tablet  Commonly known as: MAG-OX   Take 1 Tab by mouth 2 Times a Day.  Dose: 400 mg     potassium chloride SA 20 MEQ Tbcr  Commonly known as: Kdur   Take 1 Tab by mouth every day.  Dose: 20 mEq     pregabalin 25 MG Caps  Commonly known as: Lyrica   Take 25 mg by mouth 3 times a day.  Dose: 25 mg     rivaroxaban 20 MG Tabs tablet  Commonly known as: Xarelto   Take 1 Tab by mouth with dinner.  Dose: 20 mg     topiramate 25 MG Tabs  Commonly known as: Topamax   Take 25 mg by mouth 2 times a day.  Dose: 25 mg     vitamin D 1000 Unit (25 mcg) Tabs  Commonly known as: cholecalciferol   Take 1,000 Units by mouth every day.  Dose: 1,000 Units            STOP taking these medications      digoxin 125 MCG Tabs  Commonly known as: Lanoxin     dilTIAZem HCl  MG Tb24     losartan-hydrochlorothiazide 100-25 MG per tablet  Commonly known as: Hyzaar              Allergies  Allergies   Allergen Reactions    Gabapentin Unspecified     Dizziness      Metformin Shortness of Breath and Rash     To face    Codeine Nausea       DIET  Orders Placed This Encounter   Procedures    Diet Order Diet: Cardiac; Second Modifier: (optional): Consistent CHO (Diabetic)     Standing Status:   Standing     Number of Occurrences:   1     Order Specific Question:   Diet:     Answer:   Cardiac [6]     Order Specific Question:   Second Modifier: (optional)     Answer:   Consistent CHO (Diabetic) [4]       ACTIVITY  As tolerated.  Weight bearing as tolerated    LINES, DRAINS, AND WOUNDS  This is an  automated list. Peripheral IVs will be removed prior to discharge.  Peripheral IV 03/16/24 18 G Anterior;Right Forearm (Active)   Site Assessment Clean;Dry;Intact 03/22/24 0745   Dressing Type Transparent 03/22/24 0745   Line Status Saline locked;Scrubbed the hub prior to access;Flushed 03/22/24 0745   Dressing Status Clean;Dry;Intact 03/22/24 0745   Dressing Intervention N/A 03/22/24 0745   Infiltration Grading ([facility]) 0 03/22/24 0745   Phlebitis Scale ([facility] Only) 0 03/22/24 0745       Moisture Associated Skin Damage 02/21/24 Buttock;Groin;Perineum;Other (comment) (Active)   Wound Image    03/17/24 1400   NEXT Weekly Photo (Inpatient Only) 03/24/24 03/17/24 1400   Drainage Amount None 03/21/24 0800   Drainage Description Serosanguineous 03/21/24 0800   Periwound Assessment Clean;Intact;Dry 03/21/24 0800   IAD Cleansing Soap and Water;Foam Cleanser/Washcloth 03/21/24 0800   Periwound Protectant Interdry 03/21/24 0800   IAD Containment Device Purewick 03/21/24 0800       Wound 02/21/24 Pressure Injury Thigh Posterior Bilateral (Active)   Wound Image    03/19/24 1100   Site Assessment Clean;Red;Painful 03/22/24 0745   Periwound Assessment Blanchable erythema;Warm;Red 03/22/24 0745   Margins Attached edges 03/21/24 1935   Closure None 03/21/24 1935   Drainage Amount None 03/21/24 1935   Drainage Description Serosanguineous 03/21/24 0800   Treatments Cleansed;Site care 03/21/24 0800   Offloading/DME Heel float boot 03/21/24 0800   Wound Cleansing Approved Wound Cleanser 03/21/24 0800   Periwound Protectant No-sting Skin Prep 03/21/24 0800   Dressing Status Clean;Dry;Intact 03/21/24 0800   Dressing Changed Changed 03/21/24 0800   Dressing Cleansing/Solutions Not Applicable 03/21/24 0800   Dressing Options Offloading Dressing - Heel 03/21/24 0800   Dressing Change/Treatment Frequency Daily, and As Needed 03/21/24 0800   NEXT Dressing Change/Treatment Date 03/18/24 03/17/24 1400   NEXT Weekly Photo (Inpatient  Only) 03/24/24 03/17/24 1400   Wound Team Following Not following 03/17/24 1400   WOUND NURSE ONLY - Pressure Injury Stage U 02/28/24 1700   Non-staged Wound Description Full thickness 03/17/24 1400   Wound Length (cm) 5.5 cm 02/28/24 1700   Wound Width (cm) 16 cm 02/28/24 1700   Wound Surface Area (cm^2) 88 cm^2 02/28/24 1700   Shape Irregular, linear 02/28/24 1700   Wound Odor None 02/28/24 1700   Pulses DP;PT;Right;1+ 02/21/24 1556   WOUND NURSE ONLY - Time Spent with Patient (mins) 60 03/17/24 1400       Wound 02/21/24 Pressure Injury Heel Plantar;Lateral Right POA Unstageable (Active)   Wound Image   03/18/24 1400   Site Assessment Black 03/21/24 0800   Periwound Assessment Callused;Crusted 03/21/24 0800   Margins Attached edges 03/21/24 0800   Closure Open to air 03/21/24 0800   Drainage Amount None 03/21/24 0800   Treatments Offloading 03/21/24 0800   Offloading/DME Heel float boot 03/21/24 0800   Wound Cleansing Povidone-Iodine 03/21/24 0800   Periwound Protectant Not Applicable 03/21/24 0800   Dressing Status Open to Air 03/21/24 0800   Dressing Changed Reapplied 03/21/24 0800   Dressing Cleansing/Solutions 3% Betadine 03/21/24 0800   Dressing Options Open to Air 03/21/24 0800   Dressing Change/Treatment Frequency Every Shift, and As Needed 03/21/24 0800   NEXT Dressing Change/Treatment Date 03/17/24 03/17/24 1400   NEXT Weekly Photo (Inpatient Only) 03/24/24 03/17/24 1400   Wound Team Following Not following 03/17/24 1400   WOUND NURSE ONLY - Pressure Injury Stage U 03/17/24 1400   Wound Length (cm) 7 cm 03/17/24 1400   Wound Width (cm) 5 cm 03/17/24 1400   Wound Surface Area (cm^2) 35 cm^2 03/17/24 1400   Shape Oval 02/28/24 1700   Wound Odor None 02/28/24 1700       Wound 02/21/24 Full Thickness Wound Leg Posterior;Lower Right (Active)   Wound Image   03/18/24 1400   Site Assessment Red;Sachse 03/21/24 0800   Periwound Assessment Sachse 03/21/24 0800   Margins Attached edges 03/21/24 0800   Closure None  03/21/24 0800   Drainage Amount Scant 03/21/24 0800   Drainage Description Serosanguineous 03/21/24 0800   Treatments Cleansed 03/21/24 0800   Offloading/DME Heel float boot 03/21/24 0800   Wound Cleansing Approved Wound Cleanser 03/21/24 0800   Periwound Protectant No-sting Skin Prep 03/21/24 0800   Dressing Status Clean;Dry;Intact 03/21/24 0800   Dressing Changed Changed 03/21/24 0800   Dressing Cleansing/Solutions Not Applicable 03/21/24 0800   Dressing Options Offloading Dressing - Heel 03/21/24 0800   Dressing Change/Treatment Frequency Daily, and As Needed 03/21/24 0800   NEXT Dressing Change/Treatment Date 03/18/24 03/17/24 1400   NEXT Weekly Photo (Inpatient Only) 03/24/24 03/17/24 1400   Wound Team Following Not following 03/17/24 1400   Non-staged Wound Description Full thickness 02/28/24 1700       Peripheral IV 03/16/24 18 G Anterior;Right Forearm (Active)   Site Assessment Clean;Dry;Intact 03/22/24 0745   Dressing Type Transparent 03/22/24 0745   Line Status Saline locked;Scrubbed the hub prior to access;Flushed 03/22/24 0745   Dressing Status Clean;Dry;Intact 03/22/24 0745   Dressing Intervention N/A 03/22/24 0745   Infiltration Grading ([facility] [place]) 0 03/22/24 0745   Phlebitis Scale ([facility] Only) 0 03/22/24 0745               MENTAL STATUS ON TRANSFER             CONSULTATIONS  Nephrology   Critical care     PROCEDURES  None     LABORATORY  Lab Results   Component Value Date    SODIUM 129 (L) 03/22/2024    POTASSIUM 3.5 (L) 03/22/2024    CHLORIDE 89 (L) 03/22/2024    CO2 32 03/22/2024    GLUCOSE 115 (H) 03/22/2024    BUN 28 (H) 03/22/2024    CREATININE 1.38 03/22/2024        Lab Results   Component Value Date    WBC 9.6 03/20/2024    HEMOGLOBIN 9.7 (L) 03/20/2024    HEMATOCRIT 30.5 (L) 03/20/2024    PLATELETCT 214 03/20/2024        Total time of the discharge process exceeds 35 minutes.

## 2024-03-22 NOTE — PROGRESS NOTES
Monitor Summary  Rhythm: Afib    Rate: 55-73    Ectopy: (O) PVC's    Measurements: -/.11/-    ---12 hr Chart Review---

## 2024-03-22 NOTE — PROGRESS NOTES
Patient picked up by medical transport. COBRA given to the transporters. Report given, all questions answered. All belongings with patient upon leaving with [facility].

## 2024-03-22 NOTE — CARE PLAN
The patient is Stable - Low risk of patient condition declining or worsening    Shift Goals  Clinical Goals: Safety, Q2h turns, Pain management  Patient Goals: Pain control, comfort, rest  Family Goals: ELDER    Progress made toward(s) clinical / shift goals:    Problem: Knowledge Deficit - Standard  Goal: Patient and family/care givers will demonstrate understanding of plan of care, disease process/condition, diagnostic tests and medications  Outcome: Progressing     Problem: Hemodynamics  Goal: Patient's hemodynamics, fluid balance and neurologic status will be stable or improve  Outcome: Progressing     Problem: Fluid Volume  Goal: Fluid volume balance will be maintained  Outcome: Progressing     Problem: Urinary - Renal Perfusion  Goal: Ability to achieve and maintain adequate renal perfusion and functioning will improve  Outcome: Progressing     Problem: Respiratory  Goal: Patient will achieve/maintain optimum respiratory ventilation and gas exchange  Outcome: Progressing     Problem: Mechanical Ventilation  Goal: Safe management of artificial airway and ventilation  Outcome: Progressing     Problem: Physical Regulation  Goal: Diagnostic test results will improve  Outcome: Progressing  Goal: Signs and symptoms of infection will decrease  Outcome: Progressing     Problem: Pain - Standard  Goal: Alleviation of pain or a reduction in pain to the patient’s comfort goal  Outcome: Progressing     Problem: Skin Integrity  Goal: Skin integrity is maintained or improved  Outcome: Progressing     Problem: Fall Risk  Goal: Patient will remain free from falls  Outcome: Progressing       Patient is not progressing towards the following goals:

## 2024-03-22 NOTE — DISCHARGE INSTRUCTIONS
Understanding Sepsis    Sepsis is a life-threatening problem that affects your organs. It can happen if you have a severe infection. It's most often caused by bacteria. It ranges in severity from sepsis to severe sepsis to septic shock. All of these are a medical emergency. They need to be treated right away.    What is Sepsis?    Sepsis is when the body reacts to an infection with severe inflammation. It can be caused by bacteria, fungus, or a virus. Sepsis can cause many kinds of problems in the body. It can lead to severe low blood pressure (shock). It can cause organ failure. This can lead to death if not treated.    Sepsis is most common in:    Adults 65 years and older  Patients in an intensive care unit (ICU)  People who have a central venous line or urinary catheter  People with a blood infection (bacteremia), pneumonia, meningitis, or a urinary tract infection  People with some cancers, diabetes, or long-term kidney or liver disease  People with immune system diseases, such as HIV or AIDS  People who had an organ transplant or bone marrow or stem cell transplant  People taking medicines that affect the immune system  People being treated with chemotherapy, steroid medicines, or radiation  People with severe injuries, including burns    Symptoms of Sepsis    Symptoms of sepsis can include:    Chills and shaking  High fever  Low blood pressure  Fast heartbeat  Fast breathing  Shortness of breath  Severe nausea or uncontrolled vomiting  Confusion  Not able to be awake or aware (coma)  Dizziness  Less urination  Severe pain, including in the back or joints     Diagnosing Sepsis    If your healthcare provider thinks you may have sepsis, you will be admitted to the hospital. You will have tests. You may have blood and urine tests. You may have cultures and other tests to look for the cause of the sepsis. These tests look for bacteria, viruses, and fungus. Other tests may check for problems with your organs. You  may have X-rays or other imaging tests. These may be done to look at your organs to find the source of infection.    Treating Sepsis    All forms of sepsis are a medical emergency. They must be treated in the hospital, often in the intensive care unit (ICU). If you have sepsis, your healthcare provider will give you antibiotics through a thin, flexible tube (IV). This is put into a vein in your arm or other area in your body. You will be given a large amount of fluids through the IV. You may be given nutrition or medicines through your IV.    Your healthcare provider will talk with you about other treatments you may need. These may include an oxygen mask or a ventilator to help you breathe. This may include medicine that raises your blood pressure. You might need dialysis for kidney failure. Treatment may last at least 7 to 10 days. Even with a lot of treatment, sepsis can lead to death.    © 3535-6328 [employer]. All rights reserved. This information is not intended as a substitute for professional medical care. Always follow your healthcare professional's instructions.

## 2024-03-22 NOTE — PROGRESS NOTES
Bedside report received and patient care assumed. Pt is resting in bed, A&O4, with 7/10 right knee pain, medicated per MAR, and is on 6 L NC. Tele box on. All fall precautions are in place, belongings at bedside table.  Pt was updated on POC, no questions or concerns. Pt educated on use of call light for assistance.

## 2024-03-22 NOTE — PROGRESS NOTES
Monitor Summary   Rhythm: Afib   Rate: 59 - 68  Ectopy: Frequent PVC's, Trigeminy, Bigeminy   Measurements: - / 0.09 -

## 2024-03-22 NOTE — DISCHARGE PLANNING
@ 0830: Discussed pt during morning rounds. Per MD, pt is medically cleared to return to [facility]. Pt is set up for 1300 transport via [facility]. ALE[facility] obtained signature from physician.     @ 1045: Discussed pt during IDT rounds. [facility] asked for dc summary. Pt remains medically cleared.    @ 1200: SW received script for pt. Placed in transport packet. [facility] completed transport form. Handed transport packet to Charge RN.

## 2024-03-22 NOTE — PROGRESS NOTES
This RN attempted to call x2 to [facility]. No answer from receiving nurse for report. Will continue to attempt calling to give report.

## 2024-03-23 LAB
BACTERIA BLD CULT: NORMAL
SIGNIFICANT IND 70042: NORMAL
SITE SITE: NORMAL
SOURCE SOURCE: NORMAL

## 2024-03-24 LAB
BACTERIA BLD CULT: NORMAL
BACTERIA BLD CULT: NORMAL
SIGNIFICANT IND 70042: NORMAL
SIGNIFICANT IND 70042: NORMAL
SITE SITE: NORMAL
SITE SITE: NORMAL
SOURCE SOURCE: NORMAL
SOURCE SOURCE: NORMAL

## 2024-03-26 ENCOUNTER — HOSPITAL ENCOUNTER (INPATIENT)
Facility: MEDICAL CENTER | Age: 64
LOS: 3 days | End: 2024-03-29
Attending: STUDENT IN AN ORGANIZED HEALTH CARE EDUCATION/TRAINING PROGRAM | Admitting: STUDENT IN AN ORGANIZED HEALTH CARE EDUCATION/TRAINING PROGRAM
Payer: MEDICARE

## 2024-03-26 ENCOUNTER — APPOINTMENT (OUTPATIENT)
Dept: RADIOLOGY | Facility: MEDICAL CENTER | Age: 64
End: 2024-03-26
Attending: STUDENT IN AN ORGANIZED HEALTH CARE EDUCATION/TRAINING PROGRAM
Payer: MEDICARE

## 2024-03-26 DIAGNOSIS — N17.9 AKI (ACUTE KIDNEY INJURY) (HCC): ICD-10-CM

## 2024-03-26 DIAGNOSIS — I10 ESSENTIAL HYPERTENSION: ICD-10-CM

## 2024-03-26 DIAGNOSIS — J96.01 ACUTE RESPIRATORY FAILURE WITH HYPOXIA (HCC): ICD-10-CM

## 2024-03-26 DIAGNOSIS — I48.19 PERSISTENT ATRIAL FIBRILLATION (HCC): ICD-10-CM

## 2024-03-26 DIAGNOSIS — J81.0 ACUTE PULMONARY EDEMA (HCC): ICD-10-CM

## 2024-03-26 DIAGNOSIS — J96.21 ACUTE ON CHRONIC RESPIRATORY FAILURE WITH HYPOXIA (HCC): ICD-10-CM

## 2024-03-26 DIAGNOSIS — I16.1 HYPERTENSIVE EMERGENCY: ICD-10-CM

## 2024-03-26 DIAGNOSIS — D62 ACUTE ON CHRONIC BLOOD LOSS ANEMIA: ICD-10-CM

## 2024-03-26 LAB
ALBUMIN SERPL BCP-MCNC: 3.3 G/DL (ref 3.2–4.9)
ALBUMIN/GLOB SERPL: 0.8 G/DL
ALP SERPL-CCNC: 78 U/L (ref 30–99)
ALT SERPL-CCNC: <5 U/L (ref 2–50)
ANION GAP SERPL CALC-SCNC: 9 MMOL/L (ref 7–16)
ANISOCYTOSIS BLD QL SMEAR: ABNORMAL
AST SERPL-CCNC: 12 U/L (ref 12–45)
BASOPHILS # BLD AUTO: 0.8 % (ref 0–1.8)
BASOPHILS # BLD: 0.11 K/UL (ref 0–0.12)
BILIRUB SERPL-MCNC: 0.5 MG/DL (ref 0.1–1.5)
BUN SERPL-MCNC: 14 MG/DL (ref 8–22)
CALCIUM ALBUM COR SERPL-MCNC: 9.9 MG/DL (ref 8.5–10.5)
CALCIUM SERPL-MCNC: 9.3 MG/DL (ref 8.5–10.5)
CHLORIDE SERPL-SCNC: 95 MMOL/L (ref 96–112)
CO2 SERPL-SCNC: 32 MMOL/L (ref 20–33)
CREAT SERPL-MCNC: 0.88 MG/DL (ref 0.5–1.4)
EKG IMPRESSION: NORMAL
EOSINOPHIL # BLD AUTO: 0.11 K/UL (ref 0–0.51)
EOSINOPHIL NFR BLD: 0.8 % (ref 0–6.9)
ERYTHROCYTE [DISTWIDTH] IN BLOOD BY AUTOMATED COUNT: 76.5 FL (ref 35.9–50)
FLUAV RNA SPEC QL NAA+PROBE: NEGATIVE
FLUBV RNA SPEC QL NAA+PROBE: NEGATIVE
GFR SERPLBLD CREATININE-BSD FMLA CKD-EPI: 74 ML/MIN/1.73 M 2
GLOBULIN SER CALC-MCNC: 3.9 G/DL (ref 1.9–3.5)
GLUCOSE BLD STRIP.AUTO-MCNC: 156 MG/DL (ref 65–99)
GLUCOSE BLD STRIP.AUTO-MCNC: 158 MG/DL (ref 65–99)
GLUCOSE BLD STRIP.AUTO-MCNC: 180 MG/DL (ref 65–99)
GLUCOSE BLD STRIP.AUTO-MCNC: 85 MG/DL (ref 65–99)
GLUCOSE BLD STRIP.AUTO-MCNC: 93 MG/DL (ref 65–99)
GLUCOSE SERPL-MCNC: 152 MG/DL (ref 65–99)
HCT VFR BLD AUTO: 37.4 % (ref 37–47)
HGB BLD-MCNC: 11 G/DL (ref 12–16)
LYMPHOCYTES # BLD AUTO: 2.13 K/UL (ref 1–4.8)
LYMPHOCYTES NFR BLD: 15.4 % (ref 22–41)
MACROCYTES BLD QL SMEAR: ABNORMAL
MAGNESIUM SERPL-MCNC: 1.8 MG/DL (ref 1.5–2.5)
MANUAL DIFF BLD: NORMAL
MCH RBC QN AUTO: 25.9 PG (ref 27–33)
MCHC RBC AUTO-ENTMCNC: 29.4 G/DL (ref 32.2–35.5)
MCV RBC AUTO: 88.2 FL (ref 81.4–97.8)
METAMYELOCYTES NFR BLD MANUAL: 0.9 %
MICROCYTES BLD QL SMEAR: ABNORMAL
MONOCYTES # BLD AUTO: 0.47 K/UL (ref 0–0.85)
MONOCYTES NFR BLD AUTO: 3.4 % (ref 0–13.4)
MORPHOLOGY BLD-IMP: NORMAL
MYELOCYTES NFR BLD MANUAL: 0.9 %
NEUTROPHILS # BLD AUTO: 10.74 K/UL (ref 1.82–7.42)
NEUTROPHILS NFR BLD: 77.8 % (ref 44–72)
NRBC # BLD AUTO: 0.07 K/UL
NRBC BLD-RTO: 0.5 /100 WBC (ref 0–0.2)
NT-PROBNP SERPL IA-MCNC: 2599 PG/ML (ref 0–125)
PHOSPHATE SERPL-MCNC: 5.5 MG/DL (ref 2.5–4.5)
PLATELET # BLD AUTO: 337 K/UL (ref 164–446)
PLATELET BLD QL SMEAR: NORMAL
PMV BLD AUTO: 8.8 FL (ref 9–12.9)
POIKILOCYTOSIS BLD QL SMEAR: NORMAL
POLYCHROMASIA BLD QL SMEAR: NORMAL
POTASSIUM SERPL-SCNC: 4.5 MMOL/L (ref 3.6–5.5)
PROCALCITONIN SERPL-MCNC: 0.18 NG/ML
PROT SERPL-MCNC: 7.2 G/DL (ref 6–8.2)
RBC # BLD AUTO: 4.24 M/UL (ref 4.2–5.4)
RBC BLD AUTO: PRESENT
RSV RNA SPEC QL NAA+PROBE: NEGATIVE
SARS-COV-2 RNA RESP QL NAA+PROBE: NOTDETECTED
SODIUM SERPL-SCNC: 136 MMOL/L (ref 135–145)
STOMATOCYTES BLD QL SMEAR: NORMAL
TROPONIN T SERPL-MCNC: 18 NG/L (ref 6–19)
WBC # BLD AUTO: 13.8 K/UL (ref 4.8–10.8)

## 2024-03-26 PROCEDURE — 700102 HCHG RX REV CODE 250 W/ 637 OVERRIDE(OP): Performed by: STUDENT IN AN ORGANIZED HEALTH CARE EDUCATION/TRAINING PROGRAM

## 2024-03-26 PROCEDURE — 82962 GLUCOSE BLOOD TEST: CPT | Mod: 91

## 2024-03-26 PROCEDURE — A9270 NON-COVERED ITEM OR SERVICE: HCPCS | Performed by: EMERGENCY MEDICINE

## 2024-03-26 PROCEDURE — 84145 PROCALCITONIN (PCT): CPT

## 2024-03-26 PROCEDURE — 83735 ASSAY OF MAGNESIUM: CPT

## 2024-03-26 PROCEDURE — 700111 HCHG RX REV CODE 636 W/ 250 OVERRIDE (IP): Mod: JZ | Performed by: EMERGENCY MEDICINE

## 2024-03-26 PROCEDURE — 770022 HCHG ROOM/CARE - ICU (200)

## 2024-03-26 PROCEDURE — 94799 UNLISTED PULMONARY SVC/PX: CPT

## 2024-03-26 PROCEDURE — 94660 CPAP INITIATION&MGMT: CPT

## 2024-03-26 PROCEDURE — 99291 CRITICAL CARE FIRST HOUR: CPT

## 2024-03-26 PROCEDURE — 36415 COLL VENOUS BLD VENIPUNCTURE: CPT

## 2024-03-26 PROCEDURE — 71045 X-RAY EXAM CHEST 1 VIEW: CPT

## 2024-03-26 PROCEDURE — 80053 COMPREHEN METABOLIC PANEL: CPT

## 2024-03-26 PROCEDURE — A9270 NON-COVERED ITEM OR SERVICE: HCPCS | Performed by: STUDENT IN AN ORGANIZED HEALTH CARE EDUCATION/TRAINING PROGRAM

## 2024-03-26 PROCEDURE — 51702 INSERT TEMP BLADDER CATH: CPT

## 2024-03-26 PROCEDURE — 85027 COMPLETE CBC AUTOMATED: CPT

## 2024-03-26 PROCEDURE — 84100 ASSAY OF PHOSPHORUS: CPT

## 2024-03-26 PROCEDURE — 700102 HCHG RX REV CODE 250 W/ 637 OVERRIDE(OP): Performed by: EMERGENCY MEDICINE

## 2024-03-26 PROCEDURE — 0241U HCHG SARS-COV-2 COVID-19 NFCT DS RESP RNA 4 TRGT ED POC: CPT

## 2024-03-26 PROCEDURE — 99291 CRITICAL CARE FIRST HOUR: CPT | Performed by: STUDENT IN AN ORGANIZED HEALTH CARE EDUCATION/TRAINING PROGRAM

## 2024-03-26 PROCEDURE — 97167 OT EVAL HIGH COMPLEX 60 MIN: CPT

## 2024-03-26 PROCEDURE — 96375 TX/PRO/DX INJ NEW DRUG ADDON: CPT

## 2024-03-26 PROCEDURE — 97163 PT EVAL HIGH COMPLEX 45 MIN: CPT

## 2024-03-26 PROCEDURE — 93005 ELECTROCARDIOGRAM TRACING: CPT | Performed by: STUDENT IN AN ORGANIZED HEALTH CARE EDUCATION/TRAINING PROGRAM

## 2024-03-26 PROCEDURE — 303105 HCHG CATHETER EXTRA

## 2024-03-26 PROCEDURE — 85007 BL SMEAR W/DIFF WBC COUNT: CPT

## 2024-03-26 PROCEDURE — 84484 ASSAY OF TROPONIN QUANT: CPT

## 2024-03-26 PROCEDURE — 83880 ASSAY OF NATRIURETIC PEPTIDE: CPT

## 2024-03-26 PROCEDURE — 700111 HCHG RX REV CODE 636 W/ 250 OVERRIDE (IP): Performed by: STUDENT IN AN ORGANIZED HEALTH CARE EDUCATION/TRAINING PROGRAM

## 2024-03-26 PROCEDURE — 96365 THER/PROPH/DIAG IV INF INIT: CPT

## 2024-03-26 RX ORDER — TOPIRAMATE 25 MG/1
25 TABLET ORAL 2 TIMES DAILY
Status: DISCONTINUED | OUTPATIENT
Start: 2024-03-26 | End: 2024-03-29 | Stop reason: HOSPADM

## 2024-03-26 RX ORDER — LEVOFLOXACIN 750 MG/1
750 TABLET, FILM COATED ORAL DAILY
Status: SHIPPED | COMMUNITY
End: 2024-03-26

## 2024-03-26 RX ORDER — FERROUS SULFATE 325(65) MG
325 TABLET ORAL
Status: ON HOLD | COMMUNITY
End: 2024-03-29

## 2024-03-26 RX ORDER — NITROGLYCERIN 20 MG/100ML
0-200 INJECTION INTRAVENOUS CONTINUOUS
Status: DISCONTINUED | OUTPATIENT
Start: 2024-03-26 | End: 2024-03-26

## 2024-03-26 RX ORDER — AZITHROMYCIN 500 MG/5ML
500 INJECTION, POWDER, LYOPHILIZED, FOR SOLUTION INTRAVENOUS ONCE
Status: DISCONTINUED | OUTPATIENT
Start: 2024-03-26 | End: 2024-03-26

## 2024-03-26 RX ORDER — NITROGLYCERIN 20 MG/100ML
0-200 INJECTION INTRAVENOUS CONTINUOUS
Status: DISCONTINUED | OUTPATIENT
Start: 2024-03-26 | End: 2024-03-27

## 2024-03-26 RX ORDER — FERROUS SULFATE 325(65) MG
325 TABLET ORAL
Status: DISCONTINUED | OUTPATIENT
Start: 2024-03-26 | End: 2024-03-29

## 2024-03-26 RX ORDER — ACETAMINOPHEN 325 MG/1
650 TABLET ORAL EVERY 6 HOURS PRN
Status: DISCONTINUED | OUTPATIENT
Start: 2024-03-26 | End: 2024-03-29 | Stop reason: HOSPADM

## 2024-03-26 RX ORDER — SODIUM CHLORIDE 1 G/1
1 TABLET ORAL
Status: DISCONTINUED | OUTPATIENT
Start: 2024-03-26 | End: 2024-03-29 | Stop reason: HOSPADM

## 2024-03-26 RX ORDER — POLYETHYLENE GLYCOL 3350 17 G/17G
1 POWDER, FOR SOLUTION ORAL
Status: DISCONTINUED | OUTPATIENT
Start: 2024-03-26 | End: 2024-03-29 | Stop reason: HOSPADM

## 2024-03-26 RX ORDER — MAGNESIUM SULFATE HEPTAHYDRATE 40 MG/ML
2 INJECTION, SOLUTION INTRAVENOUS ONCE
Status: COMPLETED | OUTPATIENT
Start: 2024-03-26 | End: 2024-03-26

## 2024-03-26 RX ORDER — NYSTATIN 100000 [USP'U]/G
POWDER TOPICAL 2 TIMES DAILY
Status: DISCONTINUED | OUTPATIENT
Start: 2024-03-26 | End: 2024-03-29 | Stop reason: HOSPADM

## 2024-03-26 RX ORDER — FUROSEMIDE 10 MG/ML
80 INJECTION INTRAMUSCULAR; INTRAVENOUS ONCE
Status: COMPLETED | OUTPATIENT
Start: 2024-03-26 | End: 2024-03-26

## 2024-03-26 RX ORDER — LISINOPRIL 5 MG/1
5 TABLET ORAL
Status: DISCONTINUED | OUTPATIENT
Start: 2024-03-26 | End: 2024-03-29 | Stop reason: HOSPADM

## 2024-03-26 RX ORDER — DULOXETIN HYDROCHLORIDE 30 MG/1
30 CAPSULE, DELAYED RELEASE ORAL DAILY
Status: DISCONTINUED | OUTPATIENT
Start: 2024-03-26 | End: 2024-03-29 | Stop reason: HOSPADM

## 2024-03-26 RX ORDER — LABETALOL HYDROCHLORIDE 5 MG/ML
10 INJECTION, SOLUTION INTRAVENOUS EVERY 4 HOURS PRN
Status: DISCONTINUED | OUTPATIENT
Start: 2024-03-26 | End: 2024-03-26

## 2024-03-26 RX ORDER — CEFTRIAXONE 2 G/1
2000 INJECTION, POWDER, FOR SOLUTION INTRAMUSCULAR; INTRAVENOUS ONCE
Status: DISCONTINUED | OUTPATIENT
Start: 2024-03-26 | End: 2024-03-26

## 2024-03-26 RX ORDER — FUROSEMIDE 10 MG/ML
40 INJECTION INTRAMUSCULAR; INTRAVENOUS ONCE
Status: DISCONTINUED | OUTPATIENT
Start: 2024-03-26 | End: 2024-03-26

## 2024-03-26 RX ORDER — INSULIN LISPRO 100 [IU]/ML
3-14 INJECTION, SOLUTION INTRAVENOUS; SUBCUTANEOUS
Status: DISCONTINUED | OUTPATIENT
Start: 2024-03-26 | End: 2024-03-29 | Stop reason: HOSPADM

## 2024-03-26 RX ORDER — AMOXICILLIN 250 MG
2 CAPSULE ORAL 2 TIMES DAILY
Status: DISCONTINUED | OUTPATIENT
Start: 2024-03-26 | End: 2024-03-29 | Stop reason: HOSPADM

## 2024-03-26 RX ORDER — HYDRALAZINE HYDROCHLORIDE 20 MG/ML
10 INJECTION INTRAMUSCULAR; INTRAVENOUS EVERY 4 HOURS PRN
Status: DISCONTINUED | OUTPATIENT
Start: 2024-03-26 | End: 2024-03-29 | Stop reason: HOSPADM

## 2024-03-26 RX ORDER — OXYCODONE HYDROCHLORIDE 5 MG/1
5 TABLET ORAL EVERY 6 HOURS PRN
COMMUNITY

## 2024-03-26 RX ORDER — PREGABALIN 25 MG/1
25 CAPSULE ORAL 3 TIMES DAILY
Status: DISCONTINUED | OUTPATIENT
Start: 2024-03-26 | End: 2024-03-29 | Stop reason: HOSPADM

## 2024-03-26 RX ORDER — OXYCODONE HYDROCHLORIDE 5 MG/1
5 TABLET ORAL EVERY 6 HOURS PRN
Status: DISCONTINUED | OUTPATIENT
Start: 2024-03-26 | End: 2024-03-29 | Stop reason: HOSPADM

## 2024-03-26 RX ADMIN — SODIUM CHLORIDE 1 G: 1 TABLET ORAL at 17:36

## 2024-03-26 RX ADMIN — INSULIN GLARGINE-YFGN 10 UNITS: 100 INJECTION, SOLUTION SUBCUTANEOUS at 07:05

## 2024-03-26 RX ADMIN — DOCUSATE SODIUM 50 MG AND SENNOSIDES 8.6 MG 2 TABLET: 8.6; 5 TABLET, FILM COATED ORAL at 17:36

## 2024-03-26 RX ADMIN — INSULIN LISPRO 3 UNITS: 100 INJECTION, SOLUTION INTRAVENOUS; SUBCUTANEOUS at 21:35

## 2024-03-26 RX ADMIN — FERROUS SULFATE TAB 325 MG (65 MG ELEMENTAL FE) 325 MG: 325 (65 FE) TAB at 17:36

## 2024-03-26 RX ADMIN — RIVAROXABAN 20 MG: 20 TABLET, FILM COATED ORAL at 17:36

## 2024-03-26 RX ADMIN — SODIUM CHLORIDE 1 G: 1 TABLET ORAL at 14:09

## 2024-03-26 RX ADMIN — NITROGLYCERIN 20 MCG/MIN: 20 INJECTION INTRAVENOUS at 04:49

## 2024-03-26 RX ADMIN — INSULIN LISPRO 3 UNITS: 100 INJECTION, SOLUTION INTRAVENOUS; SUBCUTANEOUS at 09:42

## 2024-03-26 RX ADMIN — FERROUS SULFATE TAB 325 MG (65 MG ELEMENTAL FE) 325 MG: 325 (65 FE) TAB at 14:09

## 2024-03-26 RX ADMIN — TOPIRAMATE 25 MG: 25 TABLET, FILM COATED ORAL at 17:36

## 2024-03-26 RX ADMIN — PREGABALIN 25 MG: 25 CAPSULE ORAL at 20:01

## 2024-03-26 RX ADMIN — MAGNESIUM SULFATE HEPTAHYDRATE 2 G: 2 INJECTION, SOLUTION INTRAVENOUS at 09:06

## 2024-03-26 RX ADMIN — HYDRALAZINE HYDROCHLORIDE 10 MG: 20 INJECTION, SOLUTION INTRAMUSCULAR; INTRAVENOUS at 10:38

## 2024-03-26 RX ADMIN — FUROSEMIDE 80 MG: 10 INJECTION INTRAMUSCULAR; INTRAVENOUS at 04:42

## 2024-03-26 RX ADMIN — OXYCODONE 5 MG: 5 TABLET ORAL at 10:53

## 2024-03-26 RX ADMIN — PREGABALIN 25 MG: 25 CAPSULE ORAL at 14:09

## 2024-03-26 RX ADMIN — OXYCODONE 5 MG: 5 TABLET ORAL at 21:33

## 2024-03-26 RX ADMIN — NYSTATIN: 100000 POWDER TOPICAL at 17:36

## 2024-03-26 ASSESSMENT — CHA2DS2 SCORE
SEX: FEMALE
VASCULAR DISEASE: NO
AGE 75 OR GREATER: NO
AGE 65 TO 74: NO
DIABETES: YES
HYPERTENSION: YES
CHA2DS2 VASC SCORE: 4
CHF OR LEFT VENTRICULAR DYSFUNCTION: YES
PRIOR STROKE OR TIA OR THROMBOEMBOLISM: NO

## 2024-03-26 ASSESSMENT — ENCOUNTER SYMPTOMS
EYES NEGATIVE: 1
CHILLS: 0
NAUSEA: 0
SORE THROAT: 0
HEADACHES: 0
SHORTNESS OF BREATH: 1
FOCAL WEAKNESS: 0
MUSCULOSKELETAL NEGATIVE: 1
FEVER: 0
SPUTUM PRODUCTION: 0
PALPITATIONS: 0
ABDOMINAL PAIN: 0
VOMITING: 0

## 2024-03-26 ASSESSMENT — PAIN DESCRIPTION - PAIN TYPE
TYPE: ACUTE PAIN

## 2024-03-26 ASSESSMENT — COGNITIVE AND FUNCTIONAL STATUS - GENERAL
MOVING TO AND FROM BED TO CHAIR: A LOT
SUGGESTED CMS G CODE MODIFIER MOBILITY: CL
DAILY ACTIVITIY SCORE: 11
TURNING FROM BACK TO SIDE WHILE IN FLAT BAD: A LOT
PERSONAL GROOMING: A LITTLE
SUGGESTED CMS G CODE MODIFIER DAILY ACTIVITY: CL
TOILETING: TOTAL
HELP NEEDED FOR BATHING: TOTAL
DRESSING REGULAR LOWER BODY CLOTHING: TOTAL
WALKING IN HOSPITAL ROOM: A LOT
STANDING UP FROM CHAIR USING ARMS: A LOT
MOVING FROM LYING ON BACK TO SITTING ON SIDE OF FLAT BED: A LOT
EATING MEALS: A LITTLE
CLIMB 3 TO 5 STEPS WITH RAILING: TOTAL
MOBILITY SCORE: 11
DRESSING REGULAR UPPER BODY CLOTHING: A LOT

## 2024-03-26 ASSESSMENT — COPD QUESTIONNAIRES
HAVE YOU SMOKED AT LEAST 100 CIGARETTES IN YOUR ENTIRE LIFE: NO/DON'T KNOW
DURING THE PAST 4 WEEKS HOW MUCH DID YOU FEEL SHORT OF BREATH: SOME OF THE TIME
DO YOU EVER COUGH UP ANY MUCUS OR PHLEGM?: NO/ONLY WITH OCCASIONAL COLDS OR INFECTIONS
COPD SCREENING SCORE: 4

## 2024-03-26 ASSESSMENT — PULMONARY FUNCTION TESTS
EPAP_CMH2O: 8

## 2024-03-26 ASSESSMENT — FIBROSIS 4 INDEX: FIB4 SCORE: 1.04

## 2024-03-26 ASSESSMENT — GAIT ASSESSMENTS: GAIT LEVEL OF ASSIST: UNABLE TO PARTICIPATE

## 2024-03-26 NOTE — CARE PLAN
Problem: Ventilation  Goal: Ability to achieve and maintain unassisted ventilation or tolerate decreased levels of ventilator support  Description: Target End Date:  4 days     Document on Vent flowsheet    1.  Support and monitor invasive and noninvasive mechanical ventilation  2.  Monitor ventilator weaning response  3.  Perform ventilator associated pneumonia prevention interventions  4.  Manage ventilation therapy by monitoring diagnostic test results  Outcome: Not Met   BIPAP

## 2024-03-26 NOTE — ED PROVIDER NOTES
ED Provider Note    CHIEF COMPLAINT  Chief Complaint   Patient presents with    Shortness of Breath     Pt BIBA from [facility], staff from there checked pt's vital signs 45 minutes ago and was satting 50% on baseline 4lpm via nasal cannula, tried to bump higher but pt still de satting. EMS arrived, placed pt at non -rebreather mask at 8lpm and was satting 91%        EXTERNAL RECORDS REVIEWED  Echocardiogram obtained 2/21/2024 reveals LV ejection fraction 50%.  Severe RV dilation, mild aortic regurg, moderate mitral regurg, severe tricuspid regurg, RV systolic pressure 85 mmHg consistent with severe pulmonary hypertension    HPI/ROS  LIMITATION TO HISTORY   Select: : None  OUTSIDE HISTORIAN(S):  EMS      Miroslava Matta is a 63 y.o. female with past medical history of HFpEF, severe pulmonary hyper tension, morbid obesity, hypertension, atrial fibrillation presenting to the emergency department for hypoxia.  Reportedly was sent in by EMS from [facility], vital signs were obtained and she was found to be hypoxic with an oxygen saturation in the 50s on her baseline oxygen of 4 L nasal cannula.  Oxygen saturation improved to the 90s after EMS initiated nonrebreather mask    In the emergency department now, patient denies any specific complaints.  No increased sputum production, cough, fever, chills.        PAST MEDICAL HISTORY   has a past medical history of Arthritis, Back pain, COPD (chronic obstructive pulmonary disease) ([facility]) (5/16/2018), Diabetes ([facility]), Fall, Fungal rash of torso (5/15/2018), Hypertension, Morbid obesity with BMI of 60.0-69.9, adult ([facility]) (5/17/2018), New onset atrial fibrillation ([facility]) (5/15/2018), Psychiatric problem, and Urinary incontinence.    SURGICAL HISTORY  patient denies any surgical history    FAMILY HISTORY  History reviewed. No pertinent family history.    SOCIAL HISTORY  Social History     Tobacco Use    Smoking status: Former    Smokeless tobacco: Never   Vaping  "Use    Vaping Use: Never used   Substance and Sexual Activity    Alcohol use: Yes    Drug use: No    Sexual activity: Not on file       CURRENT MEDICATIONS  Home Medications       Reviewed by Tyrone Quezada (Pharmacy Tech) on 03/26/24 at 0549  Med List Status: Complete     Medication Last Dose Status   ascorbic acid (ASCORBIC ACID) 500 MG Tab 3/25/2024 Active   carvedilol (COREG) 6.25 MG Tab 3/25/2024 Active   cyclobenzaprine (FLEXERIL) 10 MG Tab 3/23/2024 Active   DILTIAZem CD (CARDIZEM CD) 360 MG CAPSULE SR 24 HR 3/25/2024 Active   DULoxetine (CYMBALTA) 30 MG Cap DR Particles 3/25/2024 Active   ferrous sulfate 325 (65 Fe) MG tablet 3/25/2024 Active   furosemide (LASIX) 20 MG Tab 3/25/2024 Active   glucose blood (FREESTYLE LITE) strip SUPPLIES Active   glyBURIDE (DIABETA) 5 MG Tab 3/25/2024 Active   insulin glargine 100 UNIT/ML Solution Pen-injector injection 3/25/2024 Active   levoFLOXacin (LEVAQUIN) 750 MG tablet 3/25/2024 Active   magnesium oxide (MAG-OX) 400 (241.3 Mg) MG Tab tablet 3/25/2024 Active   oxyCODONE immediate-release (ROXICODONE) 5 MG Tab 3/25/2024 Active   potassium chloride SA (KDUR) 20 MEQ Tab CR 3/25/2024 Active   pregabalin (LYRICA) 25 MG Cap 3/25/2024 Active   rivaroxaban (XARELTO) 20 MG Tab tablet 3/25/2024 Active   sodium chloride (SALT) 1 GM Tab 3/25/2024 Active   topiramate (TOPAMAX) 25 MG Tab 3/25/2024 Active                    ALLERGIES  Allergies   Allergen Reactions    Gabapentin Unspecified     Dizziness      Metformin Shortness of Breath and Rash     To face    Codeine Nausea       PHYSICAL EXAM  VITAL SIGNS: BP (!) 156/70   Pulse (!) 59   Temp 37.7 °C (99.8 °F) (Oral)   Resp 17   Ht 1.702 m (5' 7\")   Wt (!) 163 kg (359 lb 5.6 oz)   SpO2 89%   BMI 56.28 kg/m²    General: Morbidly obese, in acute respiratory distress with increased work of breathing  Neuro: oriented x 3, moving all extremities.   HEENT:   - Head: Normocephalic, atraumatic  - Eyes: PERRL  - Ears/Nose: " normal external nose and ears  - Mouth: moist mucosal membranes  Resp: Clear, diminished, no rales or wheeze.  CV: Regular rate and rhythm  Abd: Soft, non-tender, non-distended  Extremities: 2+ symmetric nonpitting lower extremity edema  Psych: lucid, not agitated          DIAGNOSTIC STUDIES / PROCEDURES    EKG  My independent EKG interpretation:  Results for orders placed or performed during the hospital encounter of 24   EKG (NOW)   Result Value Ref Range    Report       [facility] Emergency Dept.    Test Date:  2024  Pt Name:    SHAQUILLE CANDELARIA                 Department: ER  MRN:        [id number]                      Room:        06  Gender:     Female                       Technician: [id number]  :        1960                   Requested By:TIBURCIO CRAIN  Order #:    [id number]                    Reading MD: Tiburcio Crain    Measurements  Intervals                                Axis  Rate:       91                           P:          0  MS:         0                            QRS:        95  QRSD:       102                          T:          -29  QT:         335  QTc:        413    Interpretive Statements  Atrial fibrillation  Paired ventricular premature complexes  Right axis deviation  Low voltage, precordial leads  Abnormal R-wave progression, early transition  No acute ST or T changes  Electronically Signed On 2024 05:52:19 PDT by Tiburcio Crain         LABS  Results for orders placed or performed during the hospital encounter of 24   CBC WITH DIFFERENTIAL   Result Value Ref Range    WBC 13.8 (H) 4.8 - 10.8 K/uL    RBC 4.24 4.20 - 5.40 M/uL    Hemoglobin 11.0 (L) 12.0 - 16.0 g/dL    Hematocrit 37.4 37.0 - 47.0 %    MCV 88.2 81.4 - 97.8 fL    MCH 25.9 (L) 27.0 - 33.0 pg    MCHC 29.4 (L) 32.2 - 35.5 g/dL    RDW 76.5 (H) 35.9 - 50.0 fL    Platelet Count 337 164 - 446 K/uL    MPV 8.8 (L) 9.0 - 12.9 fL    Neutrophils-Polys 77.80 (H) 44.00 - 72.00 %     Lymphocytes 15.40 (L) 22.00 - 41.00 %    Monocytes 3.40 0.00 - 13.40 %    Eosinophils 0.80 0.00 - 6.90 %    Basophils 0.80 0.00 - 1.80 %    Nucleated RBC 0.50 (H) 0.00 - 0.20 /100 WBC    Neutrophils (Absolute) 10.74 (H) 1.82 - 7.42 K/uL    Lymphs (Absolute) 2.13 1.00 - 4.80 K/uL    Monos (Absolute) 0.47 0.00 - 0.85 K/uL    Eos (Absolute) 0.11 0.00 - 0.51 K/uL    Baso (Absolute) 0.11 0.00 - 0.12 K/uL    NRBC (Absolute) 0.07 K/uL    Anisocytosis 2+ (A)     Macrocytosis 1+     Microcytosis 2+ (A)    COMP METABOLIC PANEL   Result Value Ref Range    Sodium 136 135 - 145 mmol/L    Potassium 4.5 3.6 - 5.5 mmol/L    Chloride 95 (L) 96 - 112 mmol/L    Co2 32 20 - 33 mmol/L    Anion Gap 9.0 7.0 - 16.0    Glucose 152 (H) 65 - 99 mg/dL    Bun 14 8 - 22 mg/dL    Creatinine 0.88 0.50 - 1.40 mg/dL    Calcium 9.3 8.5 - 10.5 mg/dL    Correct Calcium 9.9 8.5 - 10.5 mg/dL    AST(SGOT) 12 12 - 45 U/L    ALT(SGPT) <5 2 - 50 U/L    Alkaline Phosphatase 78 30 - 99 U/L    Total Bilirubin 0.5 0.1 - 1.5 mg/dL    Albumin 3.3 3.2 - 4.9 g/dL    Total Protein 7.2 6.0 - 8.2 g/dL    Globulin 3.9 (H) 1.9 - 3.5 g/dL    A-G Ratio 0.8 g/dL   TROPONIN   Result Value Ref Range    Troponin T 18 6 - 19 ng/L   proBrain Natriuretic Peptide, NT   Result Value Ref Range    NT-proBNP 2599 (H) 0 - 125 pg/mL   PHOSPHORUS   Result Value Ref Range    Phosphorus 5.5 (H) 2.5 - 4.5 mg/dL   MAGNESIUM   Result Value Ref Range    Magnesium 1.8 1.5 - 2.5 mg/dL   PROCALCITONIN   Result Value Ref Range    Procalcitonin 0.18 <0.25 ng/mL   ESTIMATED GFR   Result Value Ref Range    GFR (CKD-EPI) 74 >60 mL/min/1.73 m 2   MORPHOLOGY   Result Value Ref Range    RBC Morphology Present     Polychromia 1+     Poikilocytosis 1+     Stomatocytes 1+    PERIPHERAL SMEAR REVIEW   Result Value Ref Range    Peripheral Smear Review see below    DIFFERENTIAL MANUAL   Result Value Ref Range    Metamyelocytes 0.90 %    Myelocytes 0.90 %    Manual Diff Status PERFORMED    PLATELET ESTIMATE    Result Value Ref Range    Plt Estimation Normal    EKG (NOW)   Result Value Ref Range    Report       [facility] Emergency Dept.    Test Date:  2024  Pt Name:    SHAQUILLE CANDELARIA                 Department: ER  MRN:        [id number]                      Room:       RD 06  Gender:     Female                       Technician: [id number]  :        1960                   Requested By:TIBURCIO CRAIN  Order #:    [id number]                    Reading MD: Tiburcio Crain    Measurements  Intervals                                Axis  Rate:       91                           P:          0  NC:         0                            QRS:        95  QRSD:       102                          T:          -29  QT:         335  QTc:        413    Interpretive Statements  Atrial fibrillation  Paired ventricular premature complexes  Right axis deviation  Low voltage, precordial leads  Abnormal R-wave progression, early transition  No acute ST or T changes  Electronically Signed On 2024 05:52:19 PDT by Tiburcio Crain     POC CoV-2, FLU A/B, RSV by PCR   Result Value Ref Range    POC Influenza A RNA, PCR Negative Negative    POC Influenza B RNA, PCR Negative Negative    POC RSV, by PCR Negative Negative    POC SARS-CoV-2, PCR NotDetected        RADIOLOGY  I have independently interpreted the diagnostic imaging associated with this visit and am waiting the final reading from the radiologist.   My preliminary interpretation is as follows:   -   Radiologist interpretation:   DX-CHEST-PORTABLE (1 VIEW)   Final Result         1.  Pulmonary edema and/or infiltrates are identified, which are stable since the prior exam.   2.  Cardiomegaly   3.  Atherosclerosis              MEDICAL DECISION MAKING    ED Observation Status? No; Patient does not meet criteria for ED Observation.     ED COURSE AND PLAN    Shaquille Candelaria is a 63 y.o. female with past medical history of pulmonary hypertension presenting to the emergency  department for acute onset hypoxic respiratory failure.  On arrival to the emergency department, patient has marked increased work of breathing.   Chest x-ray with bilateral pulmonary edema versus multifocal infiltrates.  Patient had no preceding infectious symptoms, she is afebrile, not septic.  Her white blood cell count is mildly elevated at 13.8 but her procalcitonin is normal.  Initially ordered antibiotics however will hold off at this time.  She was placed on BiPAP, 16/8, 50% with improvement in her work of breathing.  Discussed case with intensivist who recommended nitroglycerin drip.  After trial of nitro drip, patient continues to require positive pressure in part secondary to her habitus.   Admitted by Dr. Sanabria to the intensive care unit    ---Pertinent ED Course---:    2:23 AM I reviewed the patient's old records in Epic, medication list, allergies, past medical history and performed a physical examination.     4:00 AM discussed with Dr. Sanabria intensivist recommending nitro drip  5:45 AM discussed with Dr. Sanabria regarding failure off of BiPAP, will admit to the ICU for            Procedures:  CRITICAL CARE  The very real possibilty of a deterioration of this patient's condition required the highest level of my preparedness for sudden, emergent intervention.  I provided critical care services, which included medication orders, frequent reevaluations of the patient's condition and response to treatment, ordering and reviewing test results, and discussing the case with various consultants.  The critical care time associated with the care of the patient was 55 minutes. Review chart for interventions. This time is exclusive of any other billable procedures.     Hypoxic respiratory failure secondary to pulmonary edema      Point of Care Ultrasound    ED POINT OF CARE ULTRASOUND: LIMITED CARDIAC    Indication for exam: Hypoxic respiratory failure  LVEF: Diminished  Pericardial Effusion:not present  RV  Strain:not present  Pulmonary B Lines:present    Image retained through [facility] as seen below:       Additional interpretation:    This study is a limited ultrasound examination performed and interpreted to evaluate for limited conditions as outlined above. There may be other clinically important information contained in the images that is outside this scope. When clinically warranted, a comprehensive ultrasound through the appropriate department is considered.    ----------------------------------------------------------------------------------  DISCUSSIONS    I have discussed management of the patient with the following physicians and MARIETTA's: Intensivist    Discussion of management with other [facility] or appropriate source(s): ED pharmacist, respiratory therapist    Escalation of care considered, and ultimately not performed: Considered antibiotics as described above    Barriers to care at this time, including but not limited to: Limited physical capacity    Decision tools and prescription drugs considered including, but not limited to:     FINAL IMPRESSION    1. Acute respiratory failure with hypoxia (HCC)    2. Acute pulmonary edema (HCC)    3. Hypertensive emergency        New Prescriptions    No medications on file         DISPOSITION      Admission: The patient will be admitted for further evaluation and treatment. Discussed case with consultants and relayed all necessary information.        This chart was dictated using an electronic voice recognition software. The chart has been reviewed and edited but there is still possibility for dictation errors due to limitation of software.    Tiburcio Crain DO 3/26/2024
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
## 2024-03-26 NOTE — ASSESSMENT & PLAN NOTE
HR has been in 50s-70s, hold diltiazem and carvedilol for now   Titrated off f NTG fairly quickly  Maintain SBP <140-160 given presenting flash edema    PRN's available

## 2024-03-26 NOTE — PROGRESS NOTES
Patient belongings:   -5 rings total- 3 placed in medicine cup, 2 rings still on patients right hand due to inability to get off  -tote bag with frogs on it  -glasses case  -glasses  -eye drops  -blue shirt with dogs on it  -pink leggings  -2 white bras  -brown tshirt  -pink tshirt  -grey sweat pants  -3 pairs of white socks  -phone  -white phone

## 2024-03-26 NOTE — RESPIRATORY CARE
COPD EDUCATION by COPD CLINICAL EDUCATOR  3/26/2024 at 8:19 AM by Dayana Romano RRT     Patient reviewed by COPD education team. Patient does not have a history or diagnosis of COPD and is a non-smoker.  Therefore, patient does not qualify for the COPD program.

## 2024-03-26 NOTE — CARE PLAN
Problem: Pain - Standard  Goal: Alleviation of pain or a reduction in pain to the patient’s comfort goal  Outcome: Progressing  PRN oxy for pain per MAR     The patient is Watcher - Medium risk of patient condition declining or worsening    Shift Goals  Clinical Goals: increased LOC, improved neuro status, improved or stable respiratory status, wean BiPap as able  Patient Goals: difficult to assess  Family Goals: jean    Progress made toward(s) clinical / shift goals:  patient did not tolerate trial off bipap -- continued to desaturate into the low to mid 80s% on 15L oxymask. Bipap resumed. Neuro status has improved. Patient calls appropriately. Worked with PT/OT but was unable to work with SLP due to desaturation.     Patient is not progressing towards the following goals:  N/A

## 2024-03-26 NOTE — ED NOTES
Patient placed by RT on Oxymask @ 10 L, patient's oxygen saturation dropped right away to mid 80's. Placed back on Bipap.

## 2024-03-26 NOTE — ED NOTES
Bedside report received from off going RN/tech: Lilly, assumed care of patient.  POC discussed with patient. Call light within reach, all needs addressed at this time.       Fall risk interventions in place: Move the patient closer to the nurse's station, Patient's personal possessions are with in their safe reach, Place socks on patient, Place fall risk sign on patient's door, and Keep floor surfaces clean and dry (all applicable per [place] Fall risk assessment)   Continuous monitoring: Cardiac Leads, Pulse Ox, or Blood Pressure  IVF/IV medications: Not Applicable   Oxygen: patient placed on Bipap with 100 % oxygen  Bedside sitter: Not Applicable   Isolation: Not Applicable

## 2024-03-26 NOTE — DISCHARGE PLANNING
Complex Case Management    Order received for Complex Case Management. Patient's chart has been reviewed.     Complex case management following? No    No: Discharge planning recommendations are to have floor RN CM initiate discharge planning once needs are identified. Consult will be cancelled. Please re-consult as needed.    NOTE: There may be cases that are NOT assigned to a CCM but will be followed for progression of care by leaders of the Complex Discharge Committee.

## 2024-03-26 NOTE — THERAPY
Physical Therapy   Initial Evaluation     Patient Name: Miroslava Matta  Age:  63 y.o., Sex:  female  Medical Record #: [id number]  Today's Date: 3/26/2024     Precautions  Precautions: Fall Risk  Comments: BiPAP    Assessment    63 y.o. female with complaints of shortness of breath was found to have O2 saturation at 50% at a local nursing home.  She required 8L O2 and a non-rebreather mask to get her sats up to 91% and was sent to the hospital. She was found to be in respiratory failure.  PMHx includes HFpEF, pulmonary HTN, morbid obesity, a fib, DM, and COPD. She was at a SNF since her last hospitalization ~1 week ago, but prior to that she was living alone in an apartment and independent for mobility without an AD.  She now presents with decreased activity tolerance, and decreased strength impairing her mobility.  She will benefit from continued acute PT services to address the above deficits in order to increase her independence to return home alone safely.  Recommend post acute PT services.  Of note, 2 attempts to wean the pt off of BiPAP this morning were unsuccessful.     Plan    Physical Therapy Initial Treatment Plan   Treatment Plan : Bed Mobility, Equipment, Gait Training, Manual Therapy, Neuro Re-Education / Balance, Self Care / Home Evaluation, Therapeutic Activities, Therapeutic Exercise  Treatment Frequency: 4 Times per Week  Duration: Until Therapy Goals Met    DC Equipment Recommendations: Unable to determine at this time  Discharge Recommendations: Recommend post-acute placement for additional physical therapy services prior to discharge home            Objective       03/26/24 1145   Initial Contact Note    Initial Contact Note Order Received and Verified, Physical Therapy Evaluation in Progress with Full Report to Follow.   Precautions   Precautions Fall Risk   Comments BiPAP   Vitals   Pulse Oximetry 90 %   O2 Delivery Device BIPAP   Pain   Pain Scales   (Pt reports burning pain in the R  lateral heel at the site of her wound.)   Prior Living Situation   Housing / Facility 1 Story Apartment / Condo   Steps Into Home 0   Lives with - Patient's Self Care Capacity Alone and Able to Care For Self   Comments Pt came from SNF   Prior Level of Functional Mobility   Bed Mobility Independent   Transfer Status Independent   Ambulation Independent   Ambulation Distance household   Assistive Devices Used None   Cognition    Cognition / Consciousness WDL   Level of Consciousness Alert   Comments Pt sleeping when PT entered the room, but was arousable to verbal and tactile input   Passive ROM Lower Body   Passive ROM Lower Body WDL   Strength Lower Body   Lower Body Strength  X   Gross Strength Generalized Weakness, Equal Bilaterally   Sensation Lower Body   Lower Extremity Sensation   WDL   Balance Assessment   Sitting Balance (Static) Fair   Sitting Balance (Dynamic) Fair -   Standing Balance (Static) Poor -   Standing Balance (Dynamic) Trace +   Weight Shift Sitting Fair   Weight Shift Standing Poor   Bed Mobility    Supine to Sit Moderate Assist   Sit to Supine Moderate Assist   Scooting Minimal Assist   Rolling Moderate Assist to Lt.   Comments HOB elevated, rail   Gait Analysis   Gait Level Of Assist Unable to Participate   Functional Mobility   Sit to Stand Moderate Assist   Bed, Chair, Wheelchair Transfer Unable to Participate   ICU Target Mobility Level   ICU Mobility - Targeted Level Level 3A   Activity Tolerance   Sitting Edge of Bed 5 min   Standing 30 sec  (limited by R heel burning pain)   Edema / Skin Assessment   Edema / Skin  X   Skin Assessment Impaired Skin Integrity Right Lower Extremity  (wound on R lateral heel, dressing on posterior leg)   Short Term Goals    Short Term Goal # 1 Pt will perform supine < > sit Lynette with bed flat, rail in 6 visits in order to perpare for standing mobility.   Short Term Goal # 2 Pt will perform sit < > stand CGA in 6 visits in order to faciliate standing tasks.    Short Term Goal # 3 Pt will perform step pivot transfers /c FWW CGA in 6 visits in order to decrease risk of complications from bed bound status.   Short Term Goal # 4 Pt will ambulate 25' with FWW CGA in 6 visits in order to progress in home mobility.   Education Group   Education Provided Role of Physical Therapist   Role of Physical Therapist Patient Response Patient;Acceptance;Explanation;Verbal Demonstration;Action Demonstration   Physical Therapy Initial Treatment Plan    Treatment Plan  Bed Mobility;Equipment;Gait Training;Manual Therapy;Neuro Re-Education / Balance;Self Care / Home Evaluation;Therapeutic Activities;Therapeutic Exercise   Treatment Frequency 4 Times per Week   Duration Until Therapy Goals Met   Anticipated Discharge Equipment and Recommendations   DC Equipment Recommendations Unable to determine at this time   Discharge Recommendations Recommend post-acute placement for additional physical therapy services prior to discharge home   Interdisciplinary Plan of Care Collaboration   IDT Collaboration with  Nursing   Patient Position at End of Therapy In Bed;Call Light within Reach;Tray Table within Reach   Collaboration Comments RN updated   Session Information   Date / Session Number  3/26 -1(1/4, 4/1)

## 2024-03-26 NOTE — THERAPY
Speech Language Therapy Contact Note    Patient Name: Miroslava Matta  Age:  63 y.o., Sex:  female  Medical Record #: [id number]  Today's Date: 3/26/2024       03/26/24 1034   Treatment Variance   Reason For Missed Therapy Medical - Patient Is Not Medically Stable;Medical - Patient not Able To Participate   Initial Contact Note    Initial Contact Note  Order Received and Verified, Speech Therapy Evaluation in Progress with Full Report to Follow.   Vitals   Vitals Comments Patient back on BiPAP - unable to maintain appropriate sats on trial of Oxymask   Interdisciplinary Plan of Care Collaboration   IDT Collaboration with  Nursing;Respiratory Therapist   Collaboration Comments Discussed with RN and RT - RN reports that patient had coughing episode with sips of thin. Coordinated with RT to see patient when of BiPAP to complete swallow evaluation. Patient unable to maintain sats with trial of 15L Oxymask, back on BiPAP. SLP to re-attempt when patient is medically appropriate to participate in CSE. Of note - patient is known to service as she was followed by SLP during recent admit. During that admission, she refused instrumenation and demonstrated poor learning evidence to SLP education. RN is aware.     - Radha Corea, SLP

## 2024-03-26 NOTE — DIETARY
NUTRITION SERVICES: BMI - Pt with BMI >40 (=Body mass index is 56.28 kg/m².), Class III obesity. Weight loss counseling not appropriate in acute care setting. RECOMMEND - If appropriate at DC please refer to outpatient nutrition services for weight management.      RD available PRN per department policy.

## 2024-03-26 NOTE — ASSESSMENT & PLAN NOTE
Continue Xarelto    She is on carvedilol and diltiazem as an outpatient, will start diltiazem currently and hold carvedilol for now

## 2024-03-26 NOTE — PROGRESS NOTES
Patient arrived to T923   @0610    Temp: 100.2 bladder  HR:97  BP:152/76  O2: BiPAP  Wgt:161.1  kg     Nitro gtt running on arrival @ 10mcg/min    Skin:  BLE dry and flaky  Bilateral feet callused and peeling  Excoriation pannus and skin folds in ABD; redness non blanching  Significant R ankle DTI unstageable   R calf   Wound to R ABD   Bilateral knee scars   R wound to upper ant thigh  Moisture noted to groin but no redness noted  Moisture and redness with hyperpigmentation under bilateral breasts  BUE WDL  Mouth and nose WDL  Ears WDL  Bilateral buttock redness purple blanching  Redness and discoloration to entire mid to lower back   Sacrum redness noted  R posterior thigh wound with drainage  R posterior calf skin tears redness bruising   R heel DTI   L post thigh open blister                                                                Belongings:  Frog bag  Bra x2  Brush  Pants  Glasses with case and visine    Phone  Pants  Shorts  Shirts  5x rings  -2 rings stuck on R hand

## 2024-03-26 NOTE — ED TRIAGE NOTES
Chief Complaint   Patient presents with    Shortness of Breath     Pt BIBA from [facility], staff from there checked pt's vital signs 45 minutes ago and was satting 50% on baseline 4lpm via nasal cannula, tried to bump higher but pt still de satting. EMS arrived, placed pt at non -rebreather mask at 8lpm and was satting 91%    Pt A0x4. GCS 15. Denies chest pain. Pt taking blood thinner medication.   BP (!) 163/123   Pulse 96   Temp 36.9 °C (98.4 °F) (Oral)   Resp (!) 24   SpO2 93%

## 2024-03-26 NOTE — CONSULTS
Critical Care Consultation    Date of consult: 3/26/2024    Referring Physician  Quinn Sanabria M.D.    Reason for Consultation  Acute on chronic respiratory failure with hypoxia    History of Presenting Illness  63 y.o. female with a past medical history of morbid obesity, COPD on 4 L of home oxygen (no PFTs on file), HTN, RUTH, diabetes, HLD, HFrEF (LVEF 50%, RV severely dilated and reduced with severe TR and moderate MR).  She had a recent admission last month from her care facility where she was admitted with an CARMELO, hyponatremia, bacteremia (E. coli) and A-fib RVR.  She was started on Xarelto at that time, treated for sepsis and hyponatremia and was sent back to her care facility.    She presented to the emergency department this morning from [facility] complaining of shortness of breath.  She was found to be 50% on her home 4 L and was brought to the emergency department where she was found to be hypertensive in the ~190s systolic with pulmonary edema.  She was started on BiPAP and then given Lasix and started on nitroglycerin infusion.  After an hour she was unable to be weaned off BiPAP so will be admitted to the ICU.    Code Status  Full Code    Review of Systems  Review of Systems   Constitutional:  Negative for chills, fever and malaise/fatigue.   HENT:  Negative for congestion and sore throat.    Eyes: Negative.    Respiratory:  Positive for shortness of breath. Negative for sputum production.    Cardiovascular:  Negative for chest pain and palpitations.   Gastrointestinal:  Negative for abdominal pain, nausea and vomiting.   Genitourinary: Negative.    Musculoskeletal: Negative.    Skin: Negative.    Neurological:  Negative for focal weakness and headaches.   All other systems reviewed and are negative.      Past Medical History   has a past medical history of Arthritis, Back pain, COPD (chronic obstructive pulmonary disease) ([facility]) (5/16/2018), Diabetes ([facility]), Fall, Fungal rash of torso (5/15/2018),  Hypertension, Morbid obesity with BMI of 60.0-69.9, adult (HCC) (5/17/2018), New onset atrial fibrillation (HCC) (5/15/2018), Psychiatric problem, and Urinary incontinence.    She has no past medical history of Anginal syndrome (HCC), Asthma, At risk for sleep apnea, Blood clotting disorder (HCC), Bronchitis, Cancer (HCC), Cataract, Congestive heart failure (HCC), Dialysis patient (HCC), Disorder of thyroid, Glaucoma, Gynecological disorder, Heart murmur, Heart valve disease, Hemorrhagic disorder (HCC), Indigestion, Jaundice, Myocardial infarct (HCC), Pacemaker, Pneumonia, Renal disorder, Rheumatic fever, Seizure (HCC), or Stroke (HCC).    Surgical History   has no past surgical history on file.    Family History  family history is not on file.    Social History   reports that she has quit smoking. She has never used smokeless tobacco. She reports current alcohol use. She reports that she does not use drugs.    Medications  Home Medications       Reviewed by Tyrone Quezada (Pharmacy Tech) on 03/26/24 at 0572  Med List Status: Complete     Medication Last Dose Status   ascorbic acid (ASCORBIC ACID) 500 MG Tab 3/25/2024 Active   carvedilol (COREG) 6.25 MG Tab 3/25/2024 Active   cyclobenzaprine (FLEXERIL) 10 MG Tab 3/23/2024 Active   DILTIAZem CD (CARDIZEM CD) 360 MG CAPSULE SR 24 HR 3/25/2024 Active   DULoxetine (CYMBALTA) 30 MG Cap DR Particles 3/25/2024 Active   ferrous sulfate 325 (65 Fe) MG tablet 3/25/2024 Active   furosemide (LASIX) 20 MG Tab 3/25/2024 Active   glucose blood (FREESTYLE LITE) strip SUPPLIES Active   glyBURIDE (DIABETA) 5 MG Tab 3/25/2024 Active   insulin glargine 100 UNIT/ML Solution Pen-injector injection 3/25/2024 Active   levoFLOXacin (LEVAQUIN) 750 MG tablet 3/25/2024 Active   magnesium oxide (MAG-OX) 400 (241.3 Mg) MG Tab tablet 3/25/2024 Active   oxyCODONE immediate-release (ROXICODONE) 5 MG Tab 3/25/2024 Active   potassium chloride SA (KDUR) 20 MEQ Tab CR 3/25/2024 Active   pregabalin  (LYRICA) 25 MG Cap 3/25/2024 Active   rivaroxaban (XARELTO) 20 MG Tab tablet 3/25/2024 Active   sodium chloride (SALT) 1 GM Tab 3/25/2024 Active   topiramate (TOPAMAX) 25 MG Tab 3/25/2024 Active                  Current Facility-Administered Medications   Medication Dose Route Frequency Provider Last Rate Last Admin    Respiratory Therapy Consult   Nebulization Continuous RT Tiburcio Crain D.O.        nitroglycerin 50 mg in D5W 250 ml infusion  0-200 mcg/min Intravenous Continuous Quinn Sanabria M.D. 3 mL/hr at 03/26/24 0537 10 mcg/min at 03/26/24 0537    acetaminophen (Tylenol) tablet 650 mg  650 mg Oral Q6HRS PRN Quinn Sanabria M.D.        senna-docusate (Pericolace Or Senokot S) 8.6-50 MG per tablet 2 Tablet  2 Tablet Oral BID Quinn Sanabria M.D.        And    polyethylene glycol/lytes (Miralax) Packet 1 Packet  1 Packet Oral QDAY PRN Quinn Sanabria M.D.        insulin GLARGINE (Lantus,Semglee) injection  10 Units Subcutaneous QAM INSULIN Quinn Sanabria M.D.        And    insulin lispro (HumaLOG,AdmeLOG) injection  3-14 Units Subcutaneous 4X/DAY ACHS Quinn Sanabria M.D.        And    dextrose 10 % BOLUS 25 g  25 g Intravenous Q15 MIN PRN Quinn Sanabria M.D.        hydrALAZINE (Apresoline) injection 10 mg  10 mg Intravenous Q4HRS PRN Quinn Sanabria M.D.        DULoxetine (Cymbalta) capsule 30 mg  30 mg Oral DAILY Quinn Sanabria M.D.        ferrous sulfate tablet 325 mg  325 mg Oral TID WITH MEALS Quinn Sanabria M.D.        oxyCODONE immediate-release (Roxicodone) tablet 5 mg  5 mg Oral Q6HRS PRN Quinn Sanabria M.D.        pregabalin (Lyrica) capsule 25 mg  25 mg Oral TID Quinn Sanabria M.D.        rivaroxaban (Xarelto) tablet 20 mg  20 mg Oral PM MEAL Quinn Sanabria M.D.        sodium chloride (Salt) tablet 1 g  1 g Oral TID WITH MEALS Quinn Welte, M.D.        topiramate (Topamax) tablet 25 mg  25 mg Oral BID Quinn Sanabria M.D.        lisinopril (Prinivil) tablet 5 mg  5 mg Oral Q DAY Quinn Sanabria M.D.            Allergies  Allergies   Allergen Reactions    Gabapentin Unspecified     Dizziness      Metformin Shortness of Breath and Rash     To face    Codeine Nausea       Vital Signs last 24 hours  Temp:  [36.9 °C (98.4 °F)-37.7 °C (99.8 °F)] 37.7 °C (99.8 °F)  Pulse:  [59-96] 59  Resp:  [15-24] 17  BP: (156-209)/() 156/70  SpO2:  [89 %-97 %] 89 %    Physical Exam  Physical Exam  Vitals and nursing note reviewed. Exam conducted with a chaperone present.   Constitutional:       General: She is awake. She is not in acute distress.     Appearance: She is morbidly obese.   HENT:      Head: Normocephalic.      Mouth/Throat:      Mouth: Mucous membranes are moist.   Eyes:      Extraocular Movements: Extraocular movements intact.   Cardiovascular:      Rate and Rhythm: Normal rate and regular rhythm.      Pulses: Normal pulses.   Pulmonary:      Effort: Pulmonary effort is normal. Tachypnea present. No respiratory distress.      Breath sounds: No wheezing, rhonchi or rales.      Comments: On BiPAP  Abdominal:      General: There is no distension.      Palpations: Abdomen is soft.      Tenderness: There is no abdominal tenderness. There is no guarding or rebound.   Musculoskeletal:         General: Normal range of motion.      Cervical back: Normal range of motion and neck supple.      Right lower leg: Edema present.      Left lower leg: Edema present.   Skin:     General: Skin is warm and dry.      Capillary Refill: Capillary refill takes less than 2 seconds.   Neurological:      General: No focal deficit present.      Mental Status: She is alert and oriented to person, place, and time. Mental status is at baseline.         Fluids    Intake/Output Summary (Last 24 hours) at 3/26/2024 0616  Last data filed at 3/26/2024 0542  Gross per 24 hour   Intake --   Output 700 ml   Net -700 ml       Laboratory  Recent Results (from the past 48 hour(s))   CBC WITH DIFFERENTIAL    Collection Time: 03/26/24  2:53 AM   Result Value  Ref Range    WBC 13.8 (H) 4.8 - 10.8 K/uL    RBC 4.24 4.20 - 5.40 M/uL    Hemoglobin 11.0 (L) 12.0 - 16.0 g/dL    Hematocrit 37.4 37.0 - 47.0 %    MCV 88.2 81.4 - 97.8 fL    MCH 25.9 (L) 27.0 - 33.0 pg    MCHC 29.4 (L) 32.2 - 35.5 g/dL    RDW 76.5 (H) 35.9 - 50.0 fL    Platelet Count 337 164 - 446 K/uL    MPV 8.8 (L) 9.0 - 12.9 fL    Neutrophils-Polys 77.80 (H) 44.00 - 72.00 %    Lymphocytes 15.40 (L) 22.00 - 41.00 %    Monocytes 3.40 0.00 - 13.40 %    Eosinophils 0.80 0.00 - 6.90 %    Basophils 0.80 0.00 - 1.80 %    Nucleated RBC 0.50 (H) 0.00 - 0.20 /100 WBC    Neutrophils (Absolute) 10.74 (H) 1.82 - 7.42 K/uL    Lymphs (Absolute) 2.13 1.00 - 4.80 K/uL    Monos (Absolute) 0.47 0.00 - 0.85 K/uL    Eos (Absolute) 0.11 0.00 - 0.51 K/uL    Baso (Absolute) 0.11 0.00 - 0.12 K/uL    NRBC (Absolute) 0.07 K/uL    Anisocytosis 2+ (A)     Macrocytosis 1+     Microcytosis 2+ (A)    COMP METABOLIC PANEL    Collection Time: 03/26/24  2:53 AM   Result Value Ref Range    Sodium 136 135 - 145 mmol/L    Potassium 4.5 3.6 - 5.5 mmol/L    Chloride 95 (L) 96 - 112 mmol/L    Co2 32 20 - 33 mmol/L    Anion Gap 9.0 7.0 - 16.0    Glucose 152 (H) 65 - 99 mg/dL    Bun 14 8 - 22 mg/dL    Creatinine 0.88 0.50 - 1.40 mg/dL    Calcium 9.3 8.5 - 10.5 mg/dL    Correct Calcium 9.9 8.5 - 10.5 mg/dL    AST(SGOT) 12 12 - 45 U/L    ALT(SGPT) <5 2 - 50 U/L    Alkaline Phosphatase 78 30 - 99 U/L    Total Bilirubin 0.5 0.1 - 1.5 mg/dL    Albumin 3.3 3.2 - 4.9 g/dL    Total Protein 7.2 6.0 - 8.2 g/dL    Globulin 3.9 (H) 1.9 - 3.5 g/dL    A-G Ratio 0.8 g/dL   TROPONIN    Collection Time: 03/26/24  2:53 AM   Result Value Ref Range    Troponin T 18 6 - 19 ng/L   proBrain Natriuretic Peptide, NT    Collection Time: 03/26/24  2:53 AM   Result Value Ref Range    NT-proBNP 2599 (H) 0 - 125 pg/mL   PHOSPHORUS    Collection Time: 03/26/24  2:53 AM   Result Value Ref Range    Phosphorus 5.5 (H) 2.5 - 4.5 mg/dL   MAGNESIUM    Collection Time: 03/26/24  2:53 AM    Result Value Ref Range    Magnesium 1.8 1.5 - 2.5 mg/dL   PROCALCITONIN    Collection Time: 24  2:53 AM   Result Value Ref Range    Procalcitonin 0.18 <0.25 ng/mL   ESTIMATED GFR    Collection Time: 24  2:53 AM   Result Value Ref Range    GFR (CKD-EPI) 74 >60 mL/min/1.73 m 2   MORPHOLOGY    Collection Time: 24  2:53 AM   Result Value Ref Range    RBC Morphology Present     Polychromia 1+     Poikilocytosis 1+     Stomatocytes 1+    PERIPHERAL SMEAR REVIEW    Collection Time: 24  2:53 AM   Result Value Ref Range    Peripheral Smear Review see below    DIFFERENTIAL MANUAL    Collection Time: 24  2:53 AM   Result Value Ref Range    Metamyelocytes 0.90 %    Myelocytes 0.90 %    Manual Diff Status PERFORMED    PLATELET ESTIMATE    Collection Time: 24  2:53 AM   Result Value Ref Range    Plt Estimation Normal    EKG (NOW)    Collection Time: 24  2:56 AM   Result Value Ref Range    Report       [facility] Emergency Dept.    Test Date:  2024  Pt Name:    SHAQUILLE CANDELARIA                 Department: ER  MRN:        [id number]                      Room:       [facility]  Gender:     Female                       Technician: [id number]  :        1960                   Requested By:TIBURCIO CRAIN  Order #:    [id number]                    Reading MD: Tiburcio Crain    Measurements  Intervals                                Axis  Rate:       91                           P:          0  SD:         0                            QRS:        95  QRSD:       102                          T:          -29  QT:         335  QTc:        413    Interpretive Statements  Atrial fibrillation  Paired ventricular premature complexes  Right axis deviation  Low voltage, precordial leads  Abnormal R-wave progression, early transition  No acute ST or T changes  Electronically Signed On 2024 05:52:19 PDT by Tiburcio Crain     POC CoV-2, FLU A/B, RSV by PCR    Collection Time: 24   3:20 AM   Result Value Ref Range    POC Influenza A RNA, PCR Negative Negative    POC Influenza B RNA, PCR Negative Negative    POC RSV, by PCR Negative Negative    POC SARS-CoV-2, PCR NotDetected        Imaging  DX-CHEST-PORTABLE (1 VIEW)   Final Result         1.  Pulmonary edema and/or infiltrates are identified, which are stable since the prior exam.   2.  Cardiomegaly   3.  Atherosclerosis          Assessment/Plan  * Acute on chronic respiratory failure with hypoxia (HCC)- (present on admission)  Assessment & Plan  Likely due to fluid overload and flash pulmonary edema  Does not appear to be in COPD exacerbation  Diuresis  Nitroglycerin infusion  Resume home antihypertensives when able  - she is on rate lowering drugs and currently HR 50s, I'm going to start an ACE instead  Wean BiPAP as able    Mood disorder (HCC)- (present on admission)  Assessment & Plan  Continue home topomax    Persistent atrial fibrillation (HCC)- (present on admission)  Assessment & Plan  Continue Xarelto    She is on carvedilol and diltiazem as an outpatient, will start diltiazem currently and hold carvedilol for now    RUTH (obstructive sleep apnea)- (present on admission)  Assessment & Plan  BiPAP nightly    Diabetes (HCC)- (present on admission)  Assessment & Plan  Home glargine  SSI    Essential hypertension- (present on admission)  Assessment & Plan  HR has been in 50s-70s, hold diltiazem and carvedilol for now   Wean nitroglycerin infusion  PRN's available        Discussed patient condition and risk of morbidity and/or mortality with RN, RT, Pharmacy, , Charge nurse / hot rounds, and Patient.      The patient remains critically ill.  Critical care time = 71 minutes in directly providing and coordinating critical care and extensive data review.  No time overlap and excludes procedures.

## 2024-03-26 NOTE — ASSESSMENT & PLAN NOTE
Likely due to fluid overload and flash pulmonary edema  Improved with BPAP, AL reduction and diuresis, now -3L  and normotensive with clear lungs    Spot diurese as needed    Resume home antihypertensives   - she is on rate lowering drugs and currently HR 50s, I'm going to start an ACE instead    Continue nocturnal BPAP as prescribed in outpt (uncertain settings or titration)

## 2024-03-26 NOTE — WOUND TEAM
[facility]  Inpatient Services  Initial Wound and Skin Care Evaluation    Admission Date: 3/26/2024     Last order of IP CONSULT TO WOUND CARE was found on 3/26/2024 from Hospital Encounter on 3/26/2024     HPI, PMH, SH: Reviewed    History reviewed. No pertinent surgical history.  Social History     Tobacco Use    Smoking status: Former    Smokeless tobacco: Never   Substance Use Topics    Alcohol use: Yes     Chief Complaint   Patient presents with    Shortness of Breath     Pt BIBA from [facility], staff from there checked pt's vital signs 45 minutes ago and was satting 50% on baseline 4lpm via nasal cannula, tried to bump higher but pt still de satting. EMS arrived, placed pt at non -rebreather mask at 8lpm and was satting 91%      Diagnosis: Acute on chronic respiratory failure with hypoxia (HCC) [J96.21]    Unit where seen by Wound Team: T923/01     WOUND CONSULT RELATED TO:  BLE, Heels, sacrum, breasts    WOUND TEAM PLAN OF CARE - Frequency of Follow-up:   Nursing to follow dressing orders written for wound care. Contact wound team if area fails to progress, deteriorates or with any questions/concerns if something comes up before next scheduled follow up (See below as to whether wound is following and frequency of wound follow up)  Weekly - right heel   Not following, consult as needed  - If concerns arise    WOUND HISTORY:   Patient unable to give history, but patient admitted with injuries to right heel, BLE, and thighs       WOUND ASSESSMENT/LDA  Wound 02/21/24 Pressure Injury Thigh Posterior Bilateral (Active)   Date First Assessed/Time First Assessed: 02/21/24 0200   Present on Original Admission: Yes  Hand Hygiene Completed: Yes  Primary Wound Type: Pressure Injury  Location: Thigh  Wound Orientation: Posterior  Laterality: Bilateral      Assessments 3/26/2024  1:00 PM   Wound Image     Site Assessment Red;Purple;Pink   Periwound Assessment Scar tissue   Margins Attached edges;Defined  edges   Closure Secondary intention   Drainage Amount Scant   Drainage Description Serosanguineous   Treatments Cleansed;Site care   Wound Cleansing Foam Cleanser/Washcloth   Periwound Protectant Not Applicable   Dressing Status Clean;Dry;Intact   Dressing Changed Changed   Dressing Cleansing/Solutions Not Applicable   Dressing Options Offloading Dressing - Sacral;Petrolatum Gauze (yellow)   Dressing Change/Treatment Frequency Every 48 hrs, and As Needed   NEXT Dressing Change/Treatment Date 03/28/24   NEXT Weekly Photo (Inpatient Only) 04/02/24   Wound Team Following Not following   Pressure Injury Stage Stage 2       Wound 02/21/24 Pressure Injury Heel Plantar;Lateral Right POA Unstageable (Active)   Date First Assessed/Time First Assessed: 02/21/24 0200   Present on Original Admission: Yes  Hand Hygiene Completed: Yes  Primary Wound Type: Pressure Injury  Location: Heel  Wound Orientation: Plantar;Lateral  Laterality: Right  Wound Description (Co...      Assessments 3/26/2024  1:00 PM   Wound Image     Site Assessment Black;Intact;Dry   Periwound Assessment Callused   Margins Attached edges;Defined edges   Closure Open to air   Drainage Amount None   Treatments Site care;Cleansed;Offloading   Offloading/DME Heel float boot   Wound Cleansing Foam Cleanser/Washcloth   Periwound Protectant Not Applicable   Dressing Status Clean;Dry;Intact   Dressing Changed New   Dressing Cleansing/Solutions 3% Betadine   Dressing Options Open to Air   Dressing Change/Treatment Frequency Every Shift, and As Needed   NEXT Dressing Change/Treatment Date 03/26/24   NEXT Weekly Photo (Inpatient Only) 04/02/24   Pressure Injury Stage Unstageable       Wound 02/21/24 Full Thickness Wound Leg Posterior;Lower Right (Active)   Date First Assessed/Time First Assessed: 02/21/24 0200   Present on Original Admission: Yes  Hand Hygiene Completed: Yes  Primary Wound Type: Full Thickness Wound  Location: Leg  Wound Orientation: Posterior;Lower   Laterality: Right      Assessments 3/26/2024  1:00 PM   Wound Image     Site Assessment Red;Purple;Pink   Periwound Assessment Scar tissue   Margins Attached edges;Defined edges   Closure Secondary intention   Drainage Amount Scant   Drainage Description Serosanguineous   Treatments Cleansed;Site care   Wound Cleansing Foam Cleanser/Washcloth   Periwound Protectant Not Applicable   Dressing Status Clean;Dry;Intact   Dressing Changed Changed   Dressing Cleansing/Solutions Not Applicable   Dressing Options Petrolatum Gauze (yellow)   Dressing Change/Treatment Frequency Every 48 hrs, and As Needed   NEXT Dressing Change/Treatment Date 03/28/24   NEXT Weekly Photo (Inpatient Only) 04/02/24   Wound Team Following Not following   Non-staged Wound Description Full thickness       Moisture Associated Skin Damage 02/21/24 Buttock;Groin;Perineum;Other (comment) (Active)   First Observed Date/First Observed Time: 02/21/24 1557   Present on Original Admission: Yes  Laterality: Bilateral  Wound Location : (c) Buttock;Groin;Perineum;Other (comment)      Assessments 3/26/2024  1:00 PM   Wound Image          Vascular:    RENU:   No results found.    Lab Values:    Lab Results   Component Value Date/Time    WBC 13.8 (H) 03/26/2024 02:53 AM    RBC 4.24 03/26/2024 02:53 AM    HEMOGLOBIN 11.0 (L) 03/26/2024 02:53 AM    HEMATOCRIT 37.4 03/26/2024 02:53 AM    CREACTPROT 8.31 (H) 02/21/2024 02:11 AM    SEDRATEWES 97 (H) 02/21/2024 02:20 AM    HBA1C 6.8 (H) 02/21/2024 02:20 AM         Culture Results show:  No results found for this or any previous visit (from the past 720 hour(s)).    Pain Level/Medicated:  None, Tolerated without pain medication       INTERVENTIONS BY WOUND TEAM:  Chart and images reviewed. Discussed with bedside RN. All areas of concern (based on picture review, LDA review and discussion with bedside RN) have been thoroughly assessed. Documentation of areas based on significant findings. This RN in to assess patient.  Performed standard wound care which includes appropriate positioning, dressing removal and non-selective debridement. Pictures and measurements obtained weekly if/when required.    Wound:  right heel  Preparation for Dressing removal: Open to air  Cleansed/Non-selectively Debrided with:  Moist warm washcloth  Primary Dressing:  betadine 3%  Secondary (Outer) Dressing: heel float boot     Wound:  Right calf, BL posterior thighs  Preparation for Dressing removal: Removed without difficulty  Cleansed/Non-selectively Debrided with:  Moist warm washcloth  Sara wound: Cleansed with Moist warm washcloth, Prepped with N/A  Primary Dressing:  xeroform gauze  Secondary (Outer) Dressing: sacral offloading dressings    Advanced Wound Care Discharge Planning  Number of Clinicians necessary to complete wound care: 2 (to turn for post thighs)  Is patient requiring IV pain medications for dressing changes:  No   Length of time for dressing change 30 min. (This does not include chart review, pre-medication time, set up, clean up or time spent charting.)    Interdisciplinary consultation: Patient, Bedside RN (Jolly), Cheyanne (Wound Tech).  Pressure injury and staging reviewed with N/A.    EVALUATION / RATIONALE FOR TREATMENT:     Date:  03/26/24  Wound Status:  Initial evaluation    Patient admitted with injuries.  Patient bilateral posterior thighs with stage 2 pressure injuries, appear clean.  Patient right calf with full thickness wound and surrounding non-blanching scattered purple. Improving from previous admit. xeroform (yellow) applied to provide an occlusive dressing that incorporates bacteriostatic protection.  Patient sacrum and buttocks intact, noted small brown spot to left buttocks. Gluteal cleft is pink and blanching. Offloading dressing to sacrum.   Under breasts, pannus, groin with some MASD noted, ordered antifungal to help assist with any yeast-like component. \  Right heel with unstageable pressure injury, painted  with betadine to keep clean, dry, intact. Offloading in heel float boot           Goals: Steady decrease in wound area and depth weekly.    NURSING PLAN OF CARE ORDERS:  Dressing changes: See Dressing Care orders  Skin care: See Skin Care orders    NUTRITION RECOMMENDATIONS   Wound Team Recommendations:  N/A    DIET ORDERS (From admission to next 24h)       Start     Ordered    03/26/24 0536  Diet NPO Restrict to: Sips with Medications  ALL MEALS        Question:  Diet NPO Restrict to:  Answer:  Sips with Medications    03/26/24 0538                    PREVENTATIVE INTERVENTIONS:    Q shift Kyle - performed per nursing policy  Q shift pressure point assessments - performed per nursing policy    Surface/Positioning  ICU Low Airloss - Currently in Place  Reposition q 2 hours - Currently in Place  Move and Transfer System (MATs) - Currently in Place  TAPs Turning system - Currently in Place    Offloading/Redistribution  Sacral offloading dressing (Silicone dressing) - Currently in Place  Heel offloading dressing (Silicone dressing) - Currently in Place  Heel float boots (Prevalon boot) - Currently in Place      Respiratory  Elastic head band offloading device - Currently in Place     Containment/Moisture Prevention    Cespedes Catheter - Currently in Place  Antifungal treatment - Ordered  Interdry - Currently in Place    Anticipated discharge plans:  TBD        Vac Discharge Needs:  Vac Discharge plan is purely a recommendation from wound team and not a requirement for discharge unless otherwise stated by physician.  Not Applicable Pt not on a wound vac

## 2024-03-26 NOTE — PROGRESS NOTES
Flash pulm edema from HTN    These often turn around rapidly with NO infusion and lasix    Will check back in an hour and see if she is comfortable off BiPAP to see if we can save ICU bed and go with BRANNON Sanabria M.D.

## 2024-03-26 NOTE — ED NOTES
Med rec complete per patient  Allergies reviewed.   Outpatient antibiotics in the last 30 days? No   Anticoagulants taken in the last 14 days? No    Pharmacy patient utilizes: [facility] in [place]    Urmila Moser CPhT

## 2024-03-26 NOTE — PROGRESS NOTES
Attempted to do a bedside swallow and patient coughing with swallowing water. Keeping NPO until SLP eval

## 2024-03-26 NOTE — THERAPY
"Occupational Therapy   Initial Evaluation     Patient Name: Miroslava Matta  Age:  63 y.o., Sex:  female  Medical Record #: [id number]  Today's Date: 3/26/2024     Precautions  Precautions: Fall Risk  Comments: BiPAP    Assessment  Patient is 63 y.o. female admitted from SNF with SOB, O2 sats in the 50s on 4L. She was originally admitted to SNF with FTT as her caregiver at home quit. Pt seen today on bipap, cleared by RN and RT. Additional factors influencing patient status / progress: weakness, fatigue, impaired balance, B foot pain.      Plan    Occupational Therapy Initial Treatment Plan   Treatment Interventions: Self Care / Activities of Daily Living, Adaptive Equipment, Neuro Re-Education / Balance, Therapeutic Exercises, Therapeutic Activity  Treatment Frequency: 3 Times per Week  Duration: Until Therapy Goals Met    DC Equipment Recommendations: Unable to determine at this time  Discharge Recommendations: Recommend post-acute placement for additional occupational therapy services prior to discharge home     Subjective    \"I wanna cut it.\" Referring to her hair as it was extremely matted.     Objective       03/26/24 1128   Prior Living Situation   Comments Pt admitted from SNF where she reports she was working with therapy. Per previous admits, she was living at home with support from her caregiver until her caregiver quit, which is what initiated her initial hospital stay in Feb 2024.   Prior Level of ADL Function   Comments Difficult to hear patient over bipap, but states she was working with therapy at SNF   Precautions   Precautions Fall Risk   Comments bipap, OK by resp and RN to work with patient.   Vitals   Vitals Comments maintained o2 sats >90 throughout   Cognition    Cognition / Consciousness WDL   Level of Consciousness Alert   Comments pleasant and cooperative, difficult to hear over bipap   Active ROM Upper Body   Active ROM Upper Body  WDL   Strength Upper Body   Upper Body Strength  WDL "   Balance Assessment   Sitting Balance (Static) Fair   Sitting Balance (Dynamic) Fair -   Standing Balance (Static) Poor -   Standing Balance (Dynamic) Trace +   Weight Shift Sitting Fair   Weight Shift Standing Poor   Comments w/ B HHA, no ender walker available   Bed Mobility    Supine to Sit Moderate Assist   Sit to Supine Moderate Assist   Scooting Minimal Assist   Rolling Moderate Assist to Rt.   Comments w/ use of bed features   ADL Assessment   Grooming Seated;Maximal Assist  (assist to cut hair off as it was matted on the back of her head at her request; unable to perform other grooming due to bipap straps and mask; had the functional ROM in BUE to achieve grooming seated however)   Upper Body Dressing Minimal Assist   Lower Body Dressing Total Assist   Toileting Total Assist   How much help from another person does the patient currently need...   Putting on and taking off regular lower body clothing? 1   Bathing (including washing, rinsing, and drying)? 1   Toileting, which includes using a toilet, bedpan, or urinal? 1   Putting on and taking off regular upper body clothing? 2   Taking care of personal grooming such as brushing teeth? 3   Eating meals? 3   6 Clicks Daily Activity Score 11   Functional Mobility   Sit to Stand Moderate Assist   Bed, Chair, Wheelchair Transfer Unable to Participate   Mobility EOB>STS>BTB   Comments w/ B HHA, limited by pain in feet; no chair fit for her available.   Short Term Goals   Short Term Goal # 1 pt will demo seated grooming with setup   Short Term Goal # 2 pt will dress UB w/ supv   Short Term Goal # 3 pt will dress LB w/ Lynette and AE prn   Short Term Goal # 4 pt will demo ADL txfs w/ Lynette

## 2024-03-27 LAB
ANION GAP SERPL CALC-SCNC: 7 MMOL/L (ref 7–16)
ANISOCYTOSIS BLD QL SMEAR: ABNORMAL
BASOPHILS # BLD AUTO: 0.5 % (ref 0–1.8)
BASOPHILS # BLD: 0.03 K/UL (ref 0–0.12)
BUN SERPL-MCNC: 11 MG/DL (ref 8–22)
CALCIUM SERPL-MCNC: 9 MG/DL (ref 8.5–10.5)
CHLORIDE SERPL-SCNC: 96 MMOL/L (ref 96–112)
CO2 SERPL-SCNC: 37 MMOL/L (ref 20–33)
COMMENT 1642: NORMAL
CREAT SERPL-MCNC: 0.7 MG/DL (ref 0.5–1.4)
EOSINOPHIL # BLD AUTO: 0.16 K/UL (ref 0–0.51)
EOSINOPHIL NFR BLD: 2.6 % (ref 0–6.9)
ERYTHROCYTE [DISTWIDTH] IN BLOOD BY AUTOMATED COUNT: 75.3 FL (ref 35.9–50)
GFR SERPLBLD CREATININE-BSD FMLA CKD-EPI: 97 ML/MIN/1.73 M 2
GLUCOSE BLD STRIP.AUTO-MCNC: 136 MG/DL (ref 65–99)
GLUCOSE BLD STRIP.AUTO-MCNC: 155 MG/DL (ref 65–99)
GLUCOSE BLD STRIP.AUTO-MCNC: 158 MG/DL (ref 65–99)
GLUCOSE BLD STRIP.AUTO-MCNC: 179 MG/DL (ref 65–99)
GLUCOSE BLD STRIP.AUTO-MCNC: 81 MG/DL (ref 65–99)
GLUCOSE SERPL-MCNC: 90 MG/DL (ref 65–99)
HCT VFR BLD AUTO: 32.4 % (ref 37–47)
HGB BLD-MCNC: 9.6 G/DL (ref 12–16)
HYPOCHROMIA BLD QL SMEAR: ABNORMAL
IMM GRANULOCYTES # BLD AUTO: 0.08 K/UL (ref 0–0.11)
IMM GRANULOCYTES NFR BLD AUTO: 1.3 % (ref 0–0.9)
LACTATE SERPL-SCNC: 1.3 MMOL/L (ref 0.5–2)
LYMPHOCYTES # BLD AUTO: 1.99 K/UL (ref 1–4.8)
LYMPHOCYTES NFR BLD: 31.7 % (ref 22–41)
MCH RBC QN AUTO: 25.8 PG (ref 27–33)
MCHC RBC AUTO-ENTMCNC: 29.6 G/DL (ref 32.2–35.5)
MCV RBC AUTO: 87.1 FL (ref 81.4–97.8)
MICROCYTES BLD QL SMEAR: ABNORMAL
MONOCYTES # BLD AUTO: 0.29 K/UL (ref 0–0.85)
MONOCYTES NFR BLD AUTO: 4.6 % (ref 0–13.4)
MORPHOLOGY BLD-IMP: NORMAL
NEUTROPHILS # BLD AUTO: 3.72 K/UL (ref 1.82–7.42)
NEUTROPHILS NFR BLD: 59.3 % (ref 44–72)
NRBC # BLD AUTO: 0 K/UL
NRBC BLD-RTO: 0 /100 WBC (ref 0–0.2)
PLATELET # BLD AUTO: 297 K/UL (ref 164–446)
PLATELET BLD QL SMEAR: NORMAL
PMV BLD AUTO: 8.9 FL (ref 9–12.9)
POIKILOCYTOSIS BLD QL SMEAR: NORMAL
POTASSIUM SERPL-SCNC: 3.8 MMOL/L (ref 3.6–5.5)
RBC # BLD AUTO: 3.72 M/UL (ref 4.2–5.4)
RBC BLD AUTO: PRESENT
SODIUM SERPL-SCNC: 140 MMOL/L (ref 135–145)
STOMATOCYTES BLD QL SMEAR: NORMAL
WBC # BLD AUTO: 6.3 K/UL (ref 4.8–10.8)

## 2024-03-27 PROCEDURE — 700102 HCHG RX REV CODE 250 W/ 637 OVERRIDE(OP): Performed by: HOSPITALIST

## 2024-03-27 PROCEDURE — 99233 SBSQ HOSP IP/OBS HIGH 50: CPT | Performed by: EMERGENCY MEDICINE

## 2024-03-27 PROCEDURE — 770020 HCHG ROOM/CARE - TELE (206)

## 2024-03-27 PROCEDURE — A9270 NON-COVERED ITEM OR SERVICE: HCPCS | Performed by: HOSPITALIST

## 2024-03-27 PROCEDURE — 82962 GLUCOSE BLOOD TEST: CPT

## 2024-03-27 PROCEDURE — 36415 COLL VENOUS BLD VENIPUNCTURE: CPT

## 2024-03-27 PROCEDURE — 83605 ASSAY OF LACTIC ACID: CPT

## 2024-03-27 PROCEDURE — 700102 HCHG RX REV CODE 250 W/ 637 OVERRIDE(OP): Performed by: STUDENT IN AN ORGANIZED HEALTH CARE EDUCATION/TRAINING PROGRAM

## 2024-03-27 PROCEDURE — 80048 BASIC METABOLIC PNL TOTAL CA: CPT

## 2024-03-27 PROCEDURE — 92610 EVALUATE SWALLOWING FUNCTION: CPT

## 2024-03-27 PROCEDURE — 94640 AIRWAY INHALATION TREATMENT: CPT

## 2024-03-27 PROCEDURE — 85025 COMPLETE CBC W/AUTO DIFF WBC: CPT

## 2024-03-27 PROCEDURE — 99222 1ST HOSP IP/OBS MODERATE 55: CPT | Performed by: HOSPITALIST

## 2024-03-27 PROCEDURE — 94660 CPAP INITIATION&MGMT: CPT

## 2024-03-27 PROCEDURE — A9270 NON-COVERED ITEM OR SERVICE: HCPCS | Performed by: STUDENT IN AN ORGANIZED HEALTH CARE EDUCATION/TRAINING PROGRAM

## 2024-03-27 RX ORDER — FUROSEMIDE 20 MG/1
20 TABLET ORAL DAILY
Status: DISCONTINUED | OUTPATIENT
Start: 2024-03-27 | End: 2024-03-29 | Stop reason: HOSPADM

## 2024-03-27 RX ORDER — DILTIAZEM HYDROCHLORIDE 180 MG/1
180 CAPSULE, COATED, EXTENDED RELEASE ORAL
Status: DISCONTINUED | OUTPATIENT
Start: 2024-03-27 | End: 2024-03-29 | Stop reason: HOSPADM

## 2024-03-27 RX ADMIN — LISINOPRIL 5 MG: 5 TABLET ORAL at 06:13

## 2024-03-27 RX ADMIN — OXYCODONE 5 MG: 5 TABLET ORAL at 22:36

## 2024-03-27 RX ADMIN — DULOXETINE HYDROCHLORIDE 30 MG: 30 CAPSULE, DELAYED RELEASE ORAL at 06:12

## 2024-03-27 RX ADMIN — FERROUS SULFATE TAB 325 MG (65 MG ELEMENTAL FE) 325 MG: 325 (65 FE) TAB at 16:08

## 2024-03-27 RX ADMIN — PREGABALIN 25 MG: 25 CAPSULE ORAL at 22:36

## 2024-03-27 RX ADMIN — SODIUM CHLORIDE 1 G: 1 TABLET ORAL at 16:08

## 2024-03-27 RX ADMIN — RIVAROXABAN 20 MG: 20 TABLET, FILM COATED ORAL at 16:08

## 2024-03-27 RX ADMIN — TOPIRAMATE 25 MG: 25 TABLET, FILM COATED ORAL at 06:13

## 2024-03-27 RX ADMIN — DILTIAZEM HYDROCHLORIDE 180 MG: 180 CAPSULE, COATED, EXTENDED RELEASE ORAL at 17:19

## 2024-03-27 RX ADMIN — NYSTATIN: 100000 POWDER TOPICAL at 16:42

## 2024-03-27 RX ADMIN — SODIUM CHLORIDE 1 G: 1 TABLET ORAL at 11:33

## 2024-03-27 RX ADMIN — INSULIN LISPRO 3 UNITS: 100 INJECTION, SOLUTION INTRAVENOUS; SUBCUTANEOUS at 17:19

## 2024-03-27 RX ADMIN — FERROUS SULFATE TAB 325 MG (65 MG ELEMENTAL FE) 325 MG: 325 (65 FE) TAB at 11:33

## 2024-03-27 RX ADMIN — NYSTATIN: 100000 POWDER TOPICAL at 06:13

## 2024-03-27 RX ADMIN — PREGABALIN 25 MG: 25 CAPSULE ORAL at 16:08

## 2024-03-27 RX ADMIN — DOCUSATE SODIUM 50 MG AND SENNOSIDES 8.6 MG 2 TABLET: 8.6; 5 TABLET, FILM COATED ORAL at 16:08

## 2024-03-27 RX ADMIN — INSULIN LISPRO 3 UNITS: 100 INJECTION, SOLUTION INTRAVENOUS; SUBCUTANEOUS at 22:40

## 2024-03-27 RX ADMIN — FUROSEMIDE 20 MG: 20 TABLET ORAL at 17:19

## 2024-03-27 RX ADMIN — TOPIRAMATE 25 MG: 25 TABLET, FILM COATED ORAL at 16:42

## 2024-03-27 RX ADMIN — PREGABALIN 25 MG: 25 CAPSULE ORAL at 09:28

## 2024-03-27 ASSESSMENT — PAIN DESCRIPTION - PAIN TYPE
TYPE: ACUTE PAIN

## 2024-03-27 ASSESSMENT — ENCOUNTER SYMPTOMS
COUGH: 0
FEVER: 0
FOCAL WEAKNESS: 0
CHILLS: 0
HEADACHES: 0
WEAKNESS: 1
SHORTNESS OF BREATH: 1
PALPITATIONS: 0
DEPRESSION: 0
PSYCHIATRIC NEGATIVE: 1
EYES NEGATIVE: 1
GASTROINTESTINAL NEGATIVE: 1
POLYDIPSIA: 0
MYALGIAS: 1
NAUSEA: 0
NECK PAIN: 0
HEARTBURN: 0
BACK PAIN: 1
VOMITING: 0
DIZZINESS: 0
BRUISES/BLEEDS EASILY: 0

## 2024-03-27 ASSESSMENT — PULMONARY FUNCTION TESTS: EPAP_CMH2O: 8

## 2024-03-27 ASSESSMENT — FIBROSIS 4 INDEX: FIB4 SCORE: 1.18

## 2024-03-27 NOTE — CARE PLAN
The patient is Watcher - Medium risk of patient condition declining or worsening    Problem: Care Map:  Admission Optimal Outcome for the Heart Failure Patient  Goal: Admission:  Optimal Care of the heart failure patient  3/27/2024 1206 by Kinza Strange R.N.  Outcome: Progressing    Problem: Knowledge Deficit - COPD  Goal: Patient/significant other demonstrates understanding of disease process, utilization of the Action Plan, medications and discharge instruction  Outcome: Progressing     Shift Goals  Clinical Goals: stable respiratory status, wean oxygen as able, hemodynamic stability  Patient Goals: comfort/rest, advance diet  Family Goals: unable to assess    Progress made toward(s) clinical / shift goals:  met    Patient is not progressing towards the following goals:

## 2024-03-27 NOTE — ASSESSMENT & PLAN NOTE
Maintain SBP <140-160 due to concern for flash pulmonary edema  Continue home diltiazem 180  Continue home lisinopril  Hold home carvedilol due to bradycardia  Continue as needed antihypertensives for SBP greater than 160

## 2024-03-27 NOTE — PROGRESS NOTES
4 Eyes Skin Assessment Completed by JOSEPH Brown and JOSEPH Zaragoza.    Head WDL  Ears WDL  Nose Redness  Mouth WDL  Neck WDL  Breast/Chest WDL  Shoulder Blades WDL  Spine WDL  (R) Arm/Elbow/Hand WDL  (L) Arm/Elbow/Hand WDL  Abdomen Redness  Groin excoriation, redness  Scrotum/Coccyx/Buttocks Redness and Excoriation  (R) Leg Redness and Bruising, open wounds    (L) Leg Redness and Scab, open wound    (R) Heel/Foot/Toe Non-Blanching- DTI heel    (L) Heel/Foot/Toe -callused          Devices In Places Tele Box, Blood Pressure Cuff, Pulse Ox, SCD's, and HFNC      Interventions In Place Gray Ear Foams, Heel Mepilex, Sacral Mepilex, TAP System, Q2 Turns, Low Air Loss Mattress, and Heels Loaded W/Pillows LAURYN mattress not available at this time    Possible Skin Injury Yes    Pictures Uploaded Into Epic Yes  Wound Consult Placed Yes  RN Wound Prevention Protocol Ordered Yes

## 2024-03-27 NOTE — CONSULTS
Hospital Medicine Consultation    Date of Service  3/27/2024    Referring Physician  Pawan Bravo M.D.    Consulting Physician  Keegan Moore M.D.    Reason for Consultation  Hospital medicine consultation requested the patient admitted with respiratory failure    History of Presenting Illness  63 y.o. female who presented 3/26/2024 with a complex past medical history including chronic morbid obesity with a BMI of 56, COPD on chronic oxygen support at 4 L, chronic respiratory failure, hypertension, obstructive sleep apnea, diabetes type 2, dyslipidemia, HFrEF with ejection fraction of 50%, RV severely dilated and reduced with severe TR and moderate MR, valvular dysfunction, presents from a [facility], complaining of shortness of breath, reportedly the patient was found there at 4 L nasal cannula oxygen saturating only 50%, was brought to the emergency department where the patient was found significantly hypertensive in the 190s systolic blood pressure and pulmonary edema, the patient was subsequently started on BiPAP, noninvasive mechanical ventilation given Lasix as well as nitroglycerin for afterload reduction, blood pressure control, the patient subsequently admitted to the ICU level of care.  The patient feels improved today, her respiratory status has settled, she still has significant amount of weakness and is concerned about her lower extremity wounds  The patient at this time is felt optimized to leave the ICU level of care.  Patient might need additional diuresis as indicated with close follow-up  Her blood pressure has been improving and has been additionally treated  Patient reports that she has a CPAP in the outpatient setting, and might need an upgrade of her device  The patient overall is not a optimal historian    Review of Systems  Review of Systems   Constitutional:  Positive for malaise/fatigue. Negative for chills and fever.   HENT: Negative.     Eyes:  Negative.    Respiratory:  Positive for shortness of breath. Negative for cough.    Cardiovascular:  Positive for leg swelling. Negative for chest pain and palpitations.   Gastrointestinal: Negative.  Negative for heartburn, nausea and vomiting.   Genitourinary: Negative.  Negative for dysuria and frequency.   Musculoskeletal:  Positive for back pain and myalgias. Negative for neck pain.   Skin: Negative.  Negative for itching and rash.   Neurological:  Positive for weakness. Negative for dizziness, focal weakness and headaches.   Endo/Heme/Allergies: Negative.  Negative for polydipsia. Does not bruise/bleed easily.   Psychiatric/Behavioral: Negative.  Negative for depression.        Past Medical History   has a past medical history of Arthritis, Back pain, COPD (chronic obstructive pulmonary disease) ([facility]) (5/16/2018), Diabetes ([facility]), Fall, Fungal rash of torso (5/15/2018), Hypertension, Morbid obesity with BMI of 60.0-69.9, adult ([facility]) (5/17/2018), New onset atrial fibrillation ([facility]) (5/15/2018), Psychiatric problem, and Urinary incontinence.    She has no past medical history of Anginal syndrome ([facility]), Asthma, At risk for sleep apnea, Blood clotting disorder ([facility]), Bronchitis, Cancer ([facility]), Cataract, Congestive heart failure ([facility]), Dialysis patient ([facility]), Disorder of thyroid, Glaucoma, Gynecological disorder, Heart murmur, Heart valve disease, Hemorrhagic disorder ([facility]), Indigestion, Jaundice, Myocardial infarct ([facility]), Pacemaker, Pneumonia, Renal disorder, Rheumatic fever, Seizure ([facility]), or Stroke ([facility]).    Surgical History  No recent surgical history is reported    Family History  No pertinent family history is reported    Social History   reports that she has quit smoking. She has never used smokeless tobacco. She reports current alcohol use. She reports that she does not use drugs.  The patient lives in [place] with family    Medications  Prior to Admission Medications   Prescriptions Last Dose Informant  Patient Reported? Taking?   DILTIAZem CD (CARDIZEM CD) 360 MG CAPSULE SR 24 HR 3/25/2024 at 0700 MAR from Other Facility No Yes   Sig: Take 1 Cap by mouth every day.   DULoxetine (CYMBALTA) 30 MG Cap DR Particles 3/25/2024 at 0700 MAR from Other Facility Yes Yes   Sig: Take 30 mg by mouth every day.   ascorbic acid (ASCORBIC ACID) 500 MG Tab 3/25/2024 at 0700 MAR from Other Facility Yes Yes   Sig: Take 500 mg by mouth every day.   carvedilol (COREG) 6.25 MG Tab 3/25/2024 at 1700 MAR from Other Facility No Yes   Sig: Take 1 Tablet by mouth 2 times a day with meals.   cyclobenzaprine (FLEXERIL) 10 MG Tab 3/23/2024 at DISCONTINUED MAR from Other Facility Yes Yes   Sig: Take 10 mg by mouth 3 times a day as needed for Mild Pain or Muscle Spasms.   ferrous sulfate 325 (65 Fe) MG tablet 3/25/2024 at 1700 MAR from Other Facility Yes Yes   Sig: Take 325 mg by mouth 3 times a day with meals.   furosemide (LASIX) 20 MG Tab 3/25/2024 at 0700 MAR from Other Facility No Yes   Sig: Take 1 Tablet by mouth every day.   glucose blood (FREESTYLE LITE) strip SUPPLIES at SUPPLIES MAR from Other Facility Yes No   Sig: check BG In Vitro three times a day for 90 days   glyBURIDE (DIABETA) 5 MG Tab 3/25/2024 at 0700 MAR from Other Facility No Yes   Sig: Take 1 Tab by mouth every morning with breakfast.   insulin glargine 100 UNIT/ML Solution Pen-injector injection 3/25/2024 at 0700 MAR from Other Facility Yes Yes   Sig: Inject 10 Units under the skin every day.   magnesium oxide (MAG-OX) 400 (241.3 Mg) MG Tab tablet 3/25/2024 at 1900 MAR from Other Facility No Yes   Sig: Take 1 Tab by mouth 2 Times a Day.   oxyCODONE immediate-release (ROXICODONE) 5 MG Tab 3/25/2024 at 2038 MAR from Other Facility Yes Yes   Sig: Take 5 mg by mouth every 6 hours as needed for Severe Pain.   potassium chloride SA (KDUR) 20 MEQ Tab CR 3/25/2024 at 0700 MAR from Other Facility No Yes   Sig: Take 1 Tab by mouth every day.   pregabalin (LYRICA) 25 MG Cap  3/25/2024 at 1900 MAR from Other Facility Yes Yes   Sig: Take 25 mg by mouth 3 times a day.   rivaroxaban (XARELTO) 20 MG Tab tablet 3/25/2024 at 1700 MAR from Other Facility No Yes   Sig: Take 1 Tab by mouth with dinner.   sodium chloride (SALT) 1 GM Tab 3/25/2024 at 1700 MAR from Other Facility No Yes   Sig: Take 1 Tablet by mouth 3 times a day with meals for 5 days.   topiramate (TOPAMAX) 25 MG Tab 3/25/2024 at 1900 MAR from Other Facility Yes Yes   Sig: Take 25 mg by mouth 2 times a day.      Facility-Administered Medications: None       Allergies  Allergies   Allergen Reactions    Gabapentin Unspecified     Dizziness      Metformin Shortness of Breath and Rash     To face    Codeine Nausea       Physical Exam  Pulse:  [] 84  Resp:  [6-51] 20  BP: (111-187)/(56-98) 121/87  SpO2:  [86 %-98 %] 93 %    Physical Exam  Vitals and nursing note reviewed.   Constitutional:       General: She is not in acute distress.     Appearance: She is well-developed. She is obese. She is not toxic-appearing or diaphoretic.   HENT:      Head: Normocephalic and atraumatic.      Nose: Nose normal.   Eyes:      Conjunctiva/sclera: Conjunctivae normal.      Pupils: Pupils are equal, round, and reactive to light.   Neck:      Thyroid: No thyromegaly.      Vascular: No JVD.      Comments: Large neck circumference  Cardiovascular:      Rate and Rhythm: Normal rate and regular rhythm.      Heart sounds: Normal heart sounds.      No friction rub. No gallop.   Pulmonary:      Effort: Pulmonary effort is normal.      Breath sounds: Rhonchi and rales present. No wheezing.   Abdominal:      General: Bowel sounds are normal. There is no distension.      Palpations: Abdomen is soft. There is no mass.      Tenderness: There is no abdominal tenderness. There is no guarding or rebound.      Comments: Protuberant abdomen, nontender   Musculoskeletal:         General: No tenderness. Normal range of motion.      Cervical back: Normal range of  motion and neck supple.      Right lower leg: Edema present.      Left lower leg: Edema present.   Lymphadenopathy:      Cervical: No cervical adenopathy.   Skin:     General: Skin is warm and dry.      Findings: Lesion present.      Comments: Multiple skin lesions, see picture documentation for details   Neurological:      General: No focal deficit present.      Mental Status: She is alert and oriented to person, place, and time. Mental status is at baseline.      Cranial Nerves: No cranial nerve deficit.   Psychiatric:         Behavior: Behavior normal.         Fluids  Date 03/27/24 0700 - 03/28/24 0659   Shift 5183-6508 7897-9506 3275-2484 24 Hour Total   INTAKE   Shift Total       OUTPUT   Urine 275   275   Shift Total 275   275   Weight (kg) 163.4 163.4 163.4 163.4       Laboratory  Recent Labs     03/26/24  0253 03/27/24  0600   WBC 13.8* 6.3   RBC 4.24 3.72*   HEMOGLOBIN 11.0* 9.6*   HEMATOCRIT 37.4 32.4*   MCV 88.2 87.1   MCH 25.9* 25.8*   MCHC 29.4* 29.6*   RDW 76.5* 75.3*   PLATELETCT 337 297   MPV 8.8* 8.9*     Recent Labs     03/26/24  0253 03/27/24  0600   SODIUM 136 140   POTASSIUM 4.5 3.8   CHLORIDE 95* 96   CO2 32 37*   GLUCOSE 152* 90   BUN 14 11   CREATININE 0.88 0.70   CALCIUM 9.3 9.0                     Imaging  DX-CHEST-PORTABLE (1 VIEW)   Final Result         1.  Pulmonary edema and/or infiltrates are identified, which are stable since the prior exam.   2.  Cardiomegaly   3.  Atherosclerosis          Assessment/Plan  * Acute on chronic respiratory failure with hypoxia (HCC)- (present on admission)  Assessment & Plan  Likely due to fluid overload and flash pulmonary edema  Improved with BPAP, AL reduction and diuresis, now -3L  and normotensive with clear lungs  Spot diurese as needed  Resume home antihypertensives   Continue nocturnal BPAP as prescribed in outpt (uncertain settings or titration)    Pulmonary hypertension (HCC)- (present on admission)  Assessment & Plan  Diuresis as  tolerated    Mood disorder (HCC)- (present on admission)  Assessment & Plan  Continue home topamax    RUTH (obstructive sleep apnea)- (present on admission)  Assessment & Plan  BiPAP nightly, improved after more aggressive treatment on admission, the patient reports that she is on a CPAP,  Will need to further identify ongoing needs    Morbid (severe) obesity with alveolar hypoventilation (HCC)- (present on admission)  Assessment & Plan  Patient will need referral to outpatient treatment modalities to hopefully achieve lifestyle modification graduated weight loss    Diabetes (HCC)- (present on admission)  Assessment & Plan  Home glargine  SSI      Essential hypertension- (present on admission)  Assessment & Plan  HR has been in 50s-70s, continue telemonitoring  Titrated off f NTG fairly quickly  Maintain SBP <140-160 given presenting flash edema  Adjust medication management according to parameters, heart rate  PRN's available    Persistent atrial fibrillation (HCC)- (present on admission)  Assessment & Plan  Continue Xarelto for anticoagulation  She is on carvedilol and diltiazem as an outpatient, will start diltiazem currently and hold carvedilol for now  Adequate rate control, monitor    Plan  Adjust blood pressure medication and diuretics as indicated, close monitoring terms of patient's volume status  Continue rate control  Continue anticoagulation therapy  BiPAP treatment with naps and at nighttime  Need to further identify discharge needs in terms of her device  Continue glycemic control  Wound care  Mobilization, PT OT and discharge disposition  Likely will need ongoing skilled nursing facility treatment prior to transfer home  See orders  Patient is has a high medical complexity, complex decision making and is at high risk for complication, morbidity, and mortality.  My total time spent caring for the patient on the day of the encounter was 63 minutes.   This does not include time spent on separately  billable procedures/tests.    Thank you for consulting us, we will follow closely while the patient is hospitalized      Please note that this dictation was created using voice recognition software. I have made every reasonable attempt to correct obvious errors, but I expect that there are errors of grammar and possibly context that I did not discover before finalizing the note.

## 2024-03-27 NOTE — PROGRESS NOTES
"Critical Care Progress Note    Date of admission  3/26/2024    Chief Complaint  \"63 y.o. female with a past medical history of morbid obesity, COPD on 4 L of home oxygen (no PFTs on file), HTN, RUTH, diabetes, HLD, HFrEF (LVEF 50%, RV severely dilated and reduced with severe TR and moderate MR).  She had a recent admission last month from her care facility where she was admitted with an CARMELO, hyponatremia, bacteremia (E. coli) and A-fib RVR.  She was started on Xarelto at that time, treated for sepsis and hyponatremia and was sent back to her care facility.     She presented to the emergency department this morning from [facility] complaining of shortness of breath.  She was found to be 50% on her home 4 L and was brought to the emergency department where she was found to be hypertensive in the ~190s systolic with pulmonary edema.  She was started on BiPAP and then given Lasix and started on nitroglycerin infusion.  After an hour she was unable to be weaned off BiPAP so will be admitted to the ICU.\" [facility] Course  3/26-Admit ICU, BPAP, hypertensive crisis with AHRF and pulmonary edema    Interval Problem Update  Reviewed last 24 hour events:  No events tolerated BPAP overnight    Neuro:     CV:  HR 70-80s   -160s    Resp:   BPAP overnight (70%)  CXR yesterday w pulmonary edema      GI: BM x1    I/O: -2.8L  UOP: 2925    Tmax: AF  Heme: WBC 13.8    Abx:   None  Micro:   NG  PCT negative    Endo: BG WTR, LA + ISS    LDA: PIVsRubina  SUP: NI   VTE: Xarelto  Diet: NPO        Review of Systems  Review of Systems   All other systems reviewed and are negative.       Vital Signs for last 24 hours   Pulse:  [66-88] 81  Resp:  [6-51] 51  BP: (109-187)/(54-98) 152/70  SpO2:  [86 %-98 %] 89 %    Hemodynamic parameters for last 24 hours       Respiratory Information for the last 24 hours       Physical Exam   Physical Exam  Vitals and nursing note reviewed. Exam conducted with a chaperone present. "   Constitutional:       General: She is awake. She is not in acute distress.     Appearance: She is morbidly obese.   HENT:      Head: Normocephalic.      Mouth/Throat:      Mouth: Mucous membranes are moist.   Eyes:      Extraocular Movements: Extraocular movements intact.   Cardiovascular:      Rate and Rhythm: Normal rate and regular rhythm.      Pulses: Normal pulses.   Pulmonary:      Effort: Pulmonary effort is normal. Tachypnea present.      Breath sounds: No wheezing or rales.   Abdominal:      General: There is no distension.      Palpations: Abdomen is soft.      Tenderness: There is no abdominal tenderness. There is no guarding or rebound.   Musculoskeletal:         General: Normal range of motion.      Cervical back: Normal range of motion and neck supple.      Right lower leg: Edema present.      Left lower leg: Edema present.   Skin:     General: Skin is warm and dry.      Capillary Refill: Capillary refill takes less than 2 seconds.   Neurological:      General: No focal deficit present.      Mental Status: She is alert and oriented to person, place, and time. Mental status is at baseline.         Medications  Current Facility-Administered Medications   Medication Dose Route Frequency Provider Last Rate Last Admin    Respiratory Therapy Consult   Nebulization Continuous RT Tiburcio Crain D.O.        acetaminophen (Tylenol) tablet 650 mg  650 mg Oral Q6HRS PRN Quinn Sanabria M.D.        senna-docusate (Pericolace Or Senokot S) 8.6-50 MG per tablet 2 Tablet  2 Tablet Oral BID Quinn Sanabria M.D.   2 Tablet at 03/26/24 1736    And    polyethylene glycol/lytes (Miralax) Packet 1 Packet  1 Packet Oral QDAY PRN Quinn Sanabria M.D.        insulin GLARGINE (Lantus,Semglee) injection  10 Units Subcutaneous QAM INSULIN Quinn Sanabria M.D.   10 Units at 03/26/24 0705    And    insulin lispro (HumaLOG,AdmeLOG) injection  3-14 Units Subcutaneous 4X/DAY ACHS Quinn Sanabria M.D.   3 Units at 03/26/24 2135    And     dextrose 10 % BOLUS 25 g  25 g Intravenous Q15 MIN PRN Quinn Sanabria M.D.        hydrALAZINE (Apresoline) injection 10 mg  10 mg Intravenous Q4HRS PRN Quinn Sanabria M.D.   10 mg at 03/26/24 1038    DULoxetine (Cymbalta) capsule 30 mg  30 mg Oral DAILY Quinn Sanabria M.D.   30 mg at 03/27/24 0612    ferrous sulfate tablet 325 mg  325 mg Oral TID WITH MEALS Quinn Sanabria M.D.   325 mg at 03/27/24 1133    oxyCODONE immediate-release (Roxicodone) tablet 5 mg  5 mg Oral Q6HRS PRN Quinn Sanabria M.D.   5 mg at 03/26/24 2133    pregabalin (Lyrica) capsule 25 mg  25 mg Oral TID Quinn Sanabria M.D.   25 mg at 03/27/24 0928    rivaroxaban (Xarelto) tablet 20 mg  20 mg Oral PM MEAL Quinn Sanabria M.D.   20 mg at 03/26/24 1736    sodium chloride (Salt) tablet 1 g  1 g Oral TID WITH MEALS Quinn Sanabria M.D.   1 g at 03/27/24 1133    topiramate (Topamax) tablet 25 mg  25 mg Oral BID Quinn Sanabria M.D.   25 mg at 03/27/24 0613    lisinopril (Prinivil) tablet 5 mg  5 mg Oral Q DAY Quinn Sanabria M.D.   5 mg at 03/27/24 0613    nystatin (Mycostatin) powder   Topical BID Pawan Bravo M.D.   Given at 03/27/24 0613       Fluids    Intake/Output Summary (Last 24 hours) at 3/27/2024 1145  Last data filed at 3/27/2024 1000  Gross per 24 hour   Intake 60.98 ml   Output 1850 ml   Net -1789.02 ml       Laboratory          Recent Labs     03/26/24  0253 03/27/24  0600   SODIUM 136 140   POTASSIUM 4.5 3.8   CHLORIDE 95* 96   CO2 32 37*   BUN 14 11   CREATININE 0.88 0.70   MAGNESIUM 1.8  --    PHOSPHORUS 5.5*  --    CALCIUM 9.3 9.0     Recent Labs     03/26/24  0253 03/27/24  0600   ALTSGPT <5  --    ASTSGOT 12  --    ALKPHOSPHAT 78  --    TBILIRUBIN 0.5  --    GLUCOSE 152* 90     Recent Labs     03/26/24  0253 03/27/24  0600   WBC 13.8* 6.3   NEUTSPOLYS 77.80* 59.30   LYMPHOCYTES 15.40* 31.70   MONOCYTES 3.40 4.60   EOSINOPHILS 0.80 2.60   BASOPHILS 0.80 0.50   ASTSGOT 12  --    ALTSGPT <5  --    ALKPHOSPHAT 78  --    TBILIRUBIN  0.5  --      Recent Labs     03/26/24  0253 03/27/24  0600   RBC 4.24 3.72*   HEMOGLOBIN 11.0* 9.6*   HEMATOCRIT 37.4 32.4*   PLATELETCT 337 297       Imaging  X-Ray:  I have personally reviewed the images and compared with prior images.    Assessment/Plan  * Acute on chronic respiratory failure with hypoxia (HCC)- (present on admission)  Assessment & Plan  Likely due to fluid overload and flash pulmonary edema  Improved with BPAP, AL reduction and diuresis, now -3L  and normotensive with clear lungs    Spot diurese as needed    Resume home antihypertensives   - she is on rate lowering drugs and currently HR 50s, I'm going to start an ACE instead    Continue nocturnal BPAP as prescribed in outpt (uncertain settings or titration)    Mood disorder (HCC)- (present on admission)  Assessment & Plan  Continue home topamax    RUTH (obstructive sleep apnea)- (present on admission)  Assessment & Plan  BiPAP nightly    Diabetes (HCC)- (present on admission)  Assessment & Plan  Home glargine  SSI    Essential hypertension- (present on admission)  Assessment & Plan  HR has been in 50s-70s, hold diltiazem and carvedilol for now   Titrated off f NTG fairly quickly  Maintain SBP <140-160 given presenting flash edema    PRN's available    Persistent atrial fibrillation (HCC)- (present on admission)  Assessment & Plan  Continue Xarelto    She is on carvedilol and diltiazem as an outpatient, will start diltiazem currently and hold carvedilol for now         I have performed a physical exam and reviewed and updated ROS and Plan today (3/27/2024). In review of yesterday's note (3/26/2024), there are no changes except as documented above.     Discussed patient condition and risk of morbidity and/or mortality with Hospitalist, RN, RT, Therapies, Pharmacy, and Patient

## 2024-03-27 NOTE — ASSESSMENT & PLAN NOTE
Continue to encourage life style modifications  Patient is motivated become ambulatory again  She felt she was making significant progress and [facility] with her PT OT

## 2024-03-27 NOTE — PROGRESS NOTES
Patient transferred off unit to tele 7 with transport via a hospital bed. Tele monitoring and oxygen in place; patient on 10 L oxygen, MD aware. Transported on 15 L of oxygen because patient wanted to eat and was intermittently talking off oxygen. All patients belonging sent with patient. All questions answered.

## 2024-03-27 NOTE — ASSESSMENT & PLAN NOTE
Continue home Xarelto  Continue home diltiazem 180 mg daily  Hold home BB until clinical improvement and improvement in bradycardia  Continuous telemetry monitoring

## 2024-03-27 NOTE — ASSESSMENT & PLAN NOTE
2/21/2024: A1c 6.8  Continue Lantus 10 units  Continue sliding scale insulin-patient has not required in last 24 hours  Hypoglycemia protocol

## 2024-03-27 NOTE — CARE PLAN
The patient is Watcher - Medium risk of patient condition declining or worsening    Shift Goals  Clinical Goals: Hemodynamic stability, stable respiratory status.  Patient Goals: Rest.  Family Goals: ELDER    Progress made toward(s) clinical / shift goals:      Problem: Knowledge Deficit - Standard  Goal: Patient and family/care givers will demonstrate understanding of plan of care, disease process/condition, diagnostic tests and medications  Outcome: Progressing     Problem: Skin Integrity  Goal: Skin integrity is maintained or improved  Outcome: Progressing     Problem: Pain - Standard  Goal: Alleviation of pain or a reduction in pain to the patient’s comfort goal  Outcome: Progressing     Problem: Fall Risk  Goal: Patient will remain free from falls  Outcome: Progressing

## 2024-03-27 NOTE — ASSESSMENT & PLAN NOTE
Secondary to flash pulmonary edema in the setting of chronic pulmonary respiratory failure  Currently HFNC 50 L/min; 70% oxygen  -Due to continued HFNC requirements-Lasix IV 40 mg today  -Strict I's and O's  -Continue to wean HFNC as tolerated  -Continuous pulse ox monitoring  -Continuous telemetry monitoring

## 2024-03-27 NOTE — PROGRESS NOTES
[facility] Patient alert and oriented x4. Plan of care discussed with patient. Patient oriented to floor and surroundings. Patient currently on 15 lpm oxy mask  sating 86%. Contacted RT Trudi for HFNC. HFNC placed 40 lpm Fi02 60%, sating 96%. Patient currently has no pain. Tele box placed. Monitor room notified. 2 rn skin check performed. Patient educated to call for assistance before ambulating. Patient education regarding fall risk. Call light within reach. Fall precautions in place and functional bed alarm in place.

## 2024-03-27 NOTE — PROGRESS NOTES
Called patient report to Stephanie RUIZ on Tele 7. All questions answered. Patient updated with plan of care, waiting for transport.

## 2024-03-27 NOTE — THERAPY
"Speech Language Pathology   Clinical Swallow Evaluation     Patient Name: Miroslava Matta  AGE:  63 y.o., SEX:  female  Medical Record #: [id number]  Date of Service: 3/27/2024      History of Present Illness  62 y/o F admitted 3/26 with acute on chronic respiratory failure after being found hypoxic, satting in the 50s on baseline 4L O2.      CMHx: acute on chronic respiratory failure, RUTH, mood disorder  PMHx: COPD, diabetes, fall, obesity, afib, pulmonary HTN, sepsis, CARMELO    Previously seen by SLP during recent admit in Feb 2023. Patient refused instrumentation and was consuming a RG/TN diet despite possible risk at that time.     CXR 3/26:  \"1.  Pulmonary edema and/or infiltrates are identified, which are stable since the prior exam.  2.  Cardiomegaly  3.  Atherosclerosis\"    General Information:  Vitals  Respiration: 17  Pulse Oximetry: 95 %  O2 (LPM): 10  O2 Delivery Device: Oxymask  Level of Consciousness: Alert, Awake  Patient Behaviors: Calm, cooperative, drowsy  Orientation: Oriented x 4  Follows Directives: Yes      Prior Living Situation & Level of Function:  Housing / Facility: 1 Story Apartment / Condo  Lives with - Patient's Self Care Capacity: Alone and Able to Care For Self  Comments: Patient admitted from SNF; previously living at home with a caregiver, who reportedly quit  Communication: WFL  Swallowing: Previously on RG/TN diet at [facility]; patient declined FEES at that time       Oral Mechanism Evaluation:  Dentition: Fair, Natural dentition   Facial Symmetry: Equal  Facial Sensation: Not tested     Labial Observations: WFL   Lingual Observations: Midline  Motor Speech: WFL            Laryngeal Function:  Secretion Management: Adequate  Voice Quality: Hoarse  Cough: Perceptually WNL  Comments: Repors some \"tightness\" durintg cough, described the pain as \"sore.\" She suepcts it is related to not having PO yesterday      Subjective  Pt agreeable and cooperative with SLP evaluation tasks. \"It's too bad " "I needed to breathe more than see you yesterday.\"      Assessment  Current Method of Nutrition: Oral diet (RG/TN)  Positioning: Semi-Brito's (30-45 degrees)  Bolus Administration: SLP, Patient  O2 (LPM): 10 O2 Delivery Device: Oxymask  Factor(s) Affecting Performance: None  Tracheostomy : No      Swallowing Trials:  Thin Liquid (TN0): WFL  Pureed (PU4): WFL  Easy to Chew (EC7): WFL    Comments:   Pt w/ adequate self-feeding. Appropriate oral bolus stripping and containment. Presumed complete AP transit with effective bolus formation evidenced by complete clearance of bolus from oral cavity. Functional and timely mastication of solid consistencies. No overt s/sx of aspiration noted w/ single and sequential sips of TN0 water including 3 oz test and over ~6 oz of water with rapid, sequential straw sips. No increase in WOB, no cough/clear, no change in vocal quality. One to two swallows appreciated per bolus.       Clinical Impressions  Pt presents without s/sx concerning for oropharyngeal dysphagia this date. Recommend initiation of RG/TN diet with PO medication administration. Discussed signs that could be concerning for dysphagia and aspiration with patient, who verbalized agreement and understanding. Given that pt appears to be at baseline without dysphagia concern, SLP will not follow. Please re-consult SLP with s/sx concerning for aspiration or change in pt status.      Recommendations  Regular solids with thin liquids  Instrumentation: None indicated at this time  Medication: As tolerated  Supervision: Distant supervision - check on patient 2-3 times per meal  Positioning: Fully upright and midline during oral intake  Risk Management : Slow rate of intake, Physical mobility, as tolerated  Oral Care: BID         SLP Treatment Plan  Treatment Plan: None Indicated  SLP Frequency: N/A - Evaluation Only  Estimated Duration: N/A - Evaluation Only      Anticipated Discharge Needs  Discharge Recommendations: Anticipate " "that the patient will have no further speech therapy needs after discharge from the hospital   Therapy Recommendations Upon DC: Not Indicated        Patient / Family Goals  Patient / Family Goal #1: \"Oh, with the straws.\"  Short Term Goals  Short Term Goal # 1: Pt will consume RG/TN diet with no overt s/sx of aspiration or worsening of respiratory status.      Radha Corea, SLP   "

## 2024-03-28 ENCOUNTER — APPOINTMENT (OUTPATIENT)
Dept: RADIOLOGY | Facility: MEDICAL CENTER | Age: 64
End: 2024-03-28
Attending: STUDENT IN AN ORGANIZED HEALTH CARE EDUCATION/TRAINING PROGRAM
Payer: MEDICARE

## 2024-03-28 PROBLEM — E87.70 VOLUME OVERLOAD: Status: ACTIVE | Noted: 2024-03-28

## 2024-03-28 LAB
ALBUMIN SERPL BCP-MCNC: 3 G/DL (ref 3.2–4.9)
ALBUMIN/GLOB SERPL: 0.9 G/DL
ALP SERPL-CCNC: 61 U/L (ref 30–99)
ALT SERPL-CCNC: <5 U/L (ref 2–50)
ANION GAP SERPL CALC-SCNC: 9 MMOL/L (ref 7–16)
AST SERPL-CCNC: 8 U/L (ref 12–45)
BASOPHILS # BLD AUTO: 0.4 % (ref 0–1.8)
BASOPHILS # BLD: 0.03 K/UL (ref 0–0.12)
BILIRUB SERPL-MCNC: 0.4 MG/DL (ref 0.1–1.5)
BUN SERPL-MCNC: 16 MG/DL (ref 8–22)
CALCIUM ALBUM COR SERPL-MCNC: 10 MG/DL (ref 8.5–10.5)
CALCIUM SERPL-MCNC: 9.2 MG/DL (ref 8.5–10.5)
CHLORIDE SERPL-SCNC: 96 MMOL/L (ref 96–112)
CO2 SERPL-SCNC: 34 MMOL/L (ref 20–33)
CREAT SERPL-MCNC: 0.85 MG/DL (ref 0.5–1.4)
EOSINOPHIL # BLD AUTO: 0.17 K/UL (ref 0–0.51)
EOSINOPHIL NFR BLD: 2.2 % (ref 0–6.9)
ERYTHROCYTE [DISTWIDTH] IN BLOOD BY AUTOMATED COUNT: 71.9 FL (ref 35.9–50)
GFR SERPLBLD CREATININE-BSD FMLA CKD-EPI: 77 ML/MIN/1.73 M 2
GLOBULIN SER CALC-MCNC: 3.2 G/DL (ref 1.9–3.5)
GLUCOSE BLD STRIP.AUTO-MCNC: 125 MG/DL (ref 65–99)
GLUCOSE BLD STRIP.AUTO-MCNC: 129 MG/DL (ref 65–99)
GLUCOSE BLD STRIP.AUTO-MCNC: 148 MG/DL (ref 65–99)
GLUCOSE BLD STRIP.AUTO-MCNC: 167 MG/DL (ref 65–99)
GLUCOSE BLD STRIP.AUTO-MCNC: 179 MG/DL (ref 65–99)
GLUCOSE SERPL-MCNC: 138 MG/DL (ref 65–99)
HCT VFR BLD AUTO: 33 % (ref 37–47)
HGB BLD-MCNC: 9.9 G/DL (ref 12–16)
IMM GRANULOCYTES # BLD AUTO: 0.06 K/UL (ref 0–0.11)
IMM GRANULOCYTES NFR BLD AUTO: 0.8 % (ref 0–0.9)
LYMPHOCYTES # BLD AUTO: 2.32 K/UL (ref 1–4.8)
LYMPHOCYTES NFR BLD: 30.4 % (ref 22–41)
MAGNESIUM SERPL-MCNC: 1.7 MG/DL (ref 1.5–2.5)
MCH RBC QN AUTO: 25.7 PG (ref 27–33)
MCHC RBC AUTO-ENTMCNC: 30 G/DL (ref 32.2–35.5)
MCV RBC AUTO: 85.7 FL (ref 81.4–97.8)
MONOCYTES # BLD AUTO: 0.31 K/UL (ref 0–0.85)
MONOCYTES NFR BLD AUTO: 4.1 % (ref 0–13.4)
NEUTROPHILS # BLD AUTO: 4.74 K/UL (ref 1.82–7.42)
NEUTROPHILS NFR BLD: 62.1 % (ref 44–72)
NRBC # BLD AUTO: 0 K/UL
NRBC BLD-RTO: 0 /100 WBC (ref 0–0.2)
NT-PROBNP SERPL IA-MCNC: 1816 PG/ML (ref 0–125)
PHOSPHATE SERPL-MCNC: 5.1 MG/DL (ref 2.5–4.5)
PLATELET # BLD AUTO: 297 K/UL (ref 164–446)
PMV BLD AUTO: 8.7 FL (ref 9–12.9)
POTASSIUM SERPL-SCNC: 3.9 MMOL/L (ref 3.6–5.5)
PROT SERPL-MCNC: 6.2 G/DL (ref 6–8.2)
RBC # BLD AUTO: 3.85 M/UL (ref 4.2–5.4)
SODIUM SERPL-SCNC: 139 MMOL/L (ref 135–145)
WBC # BLD AUTO: 7.6 K/UL (ref 4.8–10.8)

## 2024-03-28 PROCEDURE — 84100 ASSAY OF PHOSPHORUS: CPT

## 2024-03-28 PROCEDURE — A9270 NON-COVERED ITEM OR SERVICE: HCPCS | Performed by: STUDENT IN AN ORGANIZED HEALTH CARE EDUCATION/TRAINING PROGRAM

## 2024-03-28 PROCEDURE — 99233 SBSQ HOSP IP/OBS HIGH 50: CPT | Performed by: STUDENT IN AN ORGANIZED HEALTH CARE EDUCATION/TRAINING PROGRAM

## 2024-03-28 PROCEDURE — 80053 COMPREHEN METABOLIC PANEL: CPT

## 2024-03-28 PROCEDURE — 94660 CPAP INITIATION&MGMT: CPT

## 2024-03-28 PROCEDURE — A9270 NON-COVERED ITEM OR SERVICE: HCPCS | Performed by: HOSPITALIST

## 2024-03-28 PROCEDURE — 83735 ASSAY OF MAGNESIUM: CPT

## 2024-03-28 PROCEDURE — 770020 HCHG ROOM/CARE - TELE (206)

## 2024-03-28 PROCEDURE — 71045 X-RAY EXAM CHEST 1 VIEW: CPT

## 2024-03-28 PROCEDURE — 85025 COMPLETE CBC W/AUTO DIFF WBC: CPT

## 2024-03-28 PROCEDURE — 83880 ASSAY OF NATRIURETIC PEPTIDE: CPT

## 2024-03-28 PROCEDURE — 700102 HCHG RX REV CODE 250 W/ 637 OVERRIDE(OP): Performed by: HOSPITALIST

## 2024-03-28 PROCEDURE — 94640 AIRWAY INHALATION TREATMENT: CPT

## 2024-03-28 PROCEDURE — 700111 HCHG RX REV CODE 636 W/ 250 OVERRIDE (IP): Mod: JZ | Performed by: STUDENT IN AN ORGANIZED HEALTH CARE EDUCATION/TRAINING PROGRAM

## 2024-03-28 PROCEDURE — 36415 COLL VENOUS BLD VENIPUNCTURE: CPT

## 2024-03-28 PROCEDURE — 700102 HCHG RX REV CODE 250 W/ 637 OVERRIDE(OP): Performed by: STUDENT IN AN ORGANIZED HEALTH CARE EDUCATION/TRAINING PROGRAM

## 2024-03-28 PROCEDURE — 82962 GLUCOSE BLOOD TEST: CPT | Mod: 91

## 2024-03-28 RX ORDER — FUROSEMIDE 10 MG/ML
40 INJECTION INTRAMUSCULAR; INTRAVENOUS ONCE
Status: COMPLETED | OUTPATIENT
Start: 2024-03-28 | End: 2024-03-28

## 2024-03-28 RX ADMIN — INSULIN LISPRO 3 UNITS: 100 INJECTION, SOLUTION INTRAVENOUS; SUBCUTANEOUS at 17:06

## 2024-03-28 RX ADMIN — INSULIN GLARGINE-YFGN 10 UNITS: 100 INJECTION, SOLUTION SUBCUTANEOUS at 06:52

## 2024-03-28 RX ADMIN — PREGABALIN 25 MG: 25 CAPSULE ORAL at 15:11

## 2024-03-28 RX ADMIN — PREGABALIN 25 MG: 25 CAPSULE ORAL at 07:44

## 2024-03-28 RX ADMIN — TOPIRAMATE 25 MG: 25 TABLET, FILM COATED ORAL at 17:06

## 2024-03-28 RX ADMIN — FERROUS SULFATE TAB 325 MG (65 MG ELEMENTAL FE) 325 MG: 325 (65 FE) TAB at 12:32

## 2024-03-28 RX ADMIN — FERROUS SULFATE TAB 325 MG (65 MG ELEMENTAL FE) 325 MG: 325 (65 FE) TAB at 17:06

## 2024-03-28 RX ADMIN — TOPIRAMATE 25 MG: 25 TABLET, FILM COATED ORAL at 05:09

## 2024-03-28 RX ADMIN — DOCUSATE SODIUM 50 MG AND SENNOSIDES 8.6 MG 2 TABLET: 8.6; 5 TABLET, FILM COATED ORAL at 17:07

## 2024-03-28 RX ADMIN — PREGABALIN 25 MG: 25 CAPSULE ORAL at 21:53

## 2024-03-28 RX ADMIN — SODIUM CHLORIDE 1 G: 1 TABLET ORAL at 17:06

## 2024-03-28 RX ADMIN — OXYCODONE 5 MG: 5 TABLET ORAL at 23:10

## 2024-03-28 RX ADMIN — SODIUM CHLORIDE 1 G: 1 TABLET ORAL at 12:32

## 2024-03-28 RX ADMIN — DULOXETINE HYDROCHLORIDE 30 MG: 30 CAPSULE, DELAYED RELEASE ORAL at 05:10

## 2024-03-28 RX ADMIN — RIVAROXABAN 20 MG: 20 TABLET, FILM COATED ORAL at 17:06

## 2024-03-28 RX ADMIN — DILTIAZEM HYDROCHLORIDE 180 MG: 180 CAPSULE, COATED, EXTENDED RELEASE ORAL at 05:10

## 2024-03-28 RX ADMIN — FUROSEMIDE 20 MG: 20 TABLET ORAL at 05:09

## 2024-03-28 RX ADMIN — LISINOPRIL 5 MG: 5 TABLET ORAL at 05:09

## 2024-03-28 RX ADMIN — ACETAMINOPHEN 650 MG: 325 TABLET, FILM COATED ORAL at 02:09

## 2024-03-28 RX ADMIN — DOCUSATE SODIUM 50 MG AND SENNOSIDES 8.6 MG 2 TABLET: 8.6; 5 TABLET, FILM COATED ORAL at 05:11

## 2024-03-28 RX ADMIN — NYSTATIN: 100000 POWDER TOPICAL at 17:07

## 2024-03-28 RX ADMIN — FERROUS SULFATE TAB 325 MG (65 MG ELEMENTAL FE) 325 MG: 325 (65 FE) TAB at 07:43

## 2024-03-28 RX ADMIN — SODIUM CHLORIDE 1 G: 1 TABLET ORAL at 07:43

## 2024-03-28 RX ADMIN — FUROSEMIDE 40 MG: 10 INJECTION INTRAMUSCULAR; INTRAVENOUS at 17:06

## 2024-03-28 RX ADMIN — OXYCODONE 5 MG: 5 TABLET ORAL at 15:11

## 2024-03-28 RX ADMIN — INSULIN LISPRO 3 UNITS: 100 INJECTION, SOLUTION INTRAVENOUS; SUBCUTANEOUS at 21:54

## 2024-03-28 RX ADMIN — NYSTATIN: 100000 POWDER TOPICAL at 05:22

## 2024-03-28 ASSESSMENT — COGNITIVE AND FUNCTIONAL STATUS - GENERAL
SUGGESTED CMS G CODE MODIFIER DAILY ACTIVITY: CL
MOVING FROM LYING ON BACK TO SITTING ON SIDE OF FLAT BED: A LOT
HELP NEEDED FOR BATHING: TOTAL
EATING MEALS: A LITTLE
MOBILITY SCORE: 11
DRESSING REGULAR UPPER BODY CLOTHING: A LOT
SUGGESTED CMS G CODE MODIFIER MOBILITY: CL
TURNING FROM BACK TO SIDE WHILE IN FLAT BAD: A LOT
CLIMB 3 TO 5 STEPS WITH RAILING: TOTAL
PERSONAL GROOMING: A LITTLE
DRESSING REGULAR LOWER BODY CLOTHING: TOTAL
WALKING IN HOSPITAL ROOM: A LOT
DAILY ACTIVITIY SCORE: 11
TOILETING: TOTAL
STANDING UP FROM CHAIR USING ARMS: A LOT
MOVING TO AND FROM BED TO CHAIR: A LOT

## 2024-03-28 ASSESSMENT — FIBROSIS 4 INDEX: FIB4 SCORE: 0.79

## 2024-03-28 ASSESSMENT — PAIN DESCRIPTION - PAIN TYPE
TYPE: ACUTE PAIN

## 2024-03-28 ASSESSMENT — ENCOUNTER SYMPTOMS
SHORTNESS OF BREATH: 1
WEAKNESS: 1
COUGH: 1
FEVER: 0
NAUSEA: 0

## 2024-03-28 NOTE — PROGRESS NOTES
Received bedside report from RN, pt care assumed, VSS, pt assessment complete. Pt AAOx4, with no of pain at this time. Pt currently on 50 LPM 70 FiO2 via HFNC sating 94%, however desaturates to 60% with any exertion but requires quickly. Plan of care discussed with pt and verbalizes no questions. Pt denies any additional needs at this time. Bed locked/in lowest position, bed alarm in place, pt educated on fall risk and verbalized understanding, call light within reach, hourly rounding initiated.

## 2024-03-28 NOTE — CARE PLAN
The patient is Watcher - Medium risk of patient condition declining or worsening    Shift Goals  Clinical Goals: Ween O2, VSS, wound care, Q2 turns  Patient Goals: Rest, go home  Family Goals: unable to assess    Progress made toward(s) clinical / shift goals:    Problem: Knowledge Deficit - Standard  Goal: Patient and family/care givers will demonstrate understanding of plan of care, disease process/condition, diagnostic tests and medications  Outcome: Progressing     Problem: Skin Integrity  Goal: Skin integrity is maintained or improved  3/28/2024 0420 by Vanesa Duarte R.N.  Outcome: Progressing  3/28/2024 0419 by Vanesa Duarte R.N.  Outcome: Progressing     Problem: Pain - Standard  Goal: Alleviation of pain or a reduction in pain to the patient’s comfort goal  3/28/2024 0421 by Vanesa Duarte, R.N.  Outcome: Progressing  3/28/2024 0420 by Vanesa Duarte, R.N.  Outcome: Progressing  3/28/2024 0419 by Vanesa Duarte, R.N.  Outcome: Progressing     Problem: Fall Risk  Goal: Patient will remain free from falls  Outcome: Progressing       Patient is not progressing towards the following goals:

## 2024-03-28 NOTE — HOSPITAL COURSE
Ms. Malone is a 63-year-old female with a PMHx morbid obesity, COPD on baseline oxygen 4 L/min, HTN, RUTH, DMT2, HDL, HFrEF with EF 50%.  Admitted 3/26 for acute on chronic hypoxic respiratory failure.    Patient initially presented to the ICU for continued BiPAP needs.  BiPAP was discontinued and patient was transition to the telemetry floor.  For a brief period of time patient was on 10 to 15 L/min OxyMask.  She was desaturating into the 60s and 70s.  She was then placed on high flow nasal cannula 50 L/min.    Patient continues to desaturate and into the 70s with minimal exertion and talking.  Patient was referred for long-term acute care facility.  She has [facility]  She will discharge there to continue with physical therapy, Occupational Therapy and rehabilitation in the setting of the need for high flow oxygen.

## 2024-03-28 NOTE — DISCHARGE PLANNING
Case Management Discharge Planning    Admission Date: 3/26/2024  GMLOS: 3  ALOS: 2    6-Clicks ADL Score: 11  6-Clicks Mobility Score: 11  PT and/or OT Eval ordered: Yes  Post-acute Referrals Ordered: Yes  Post-acute Choice Obtained: Yes  Has referral(s) been sent to post-acute provider:  Yes      Anticipated Discharge Dispo: Discharge Disposition: Disch to a long term care facility (64)     DME Needed: No    Action(s) Taken: Updated Provider/Nurse on Discharge Plan, Choice obtained, and Referral(s) sent    1415, RN CM obtained choice to [facility] and fax to [facility].    Escalations Completed: None    Medically Clear: No    Next Steps: CM will continue to assist Pt with discharge needs.      Barriers to Discharge: Medical clearance and Pending Placement    Is the patient up for discharge tomorrow: No

## 2024-03-28 NOTE — CARE PLAN
The patient is Watcher - Medium risk of patient condition declining or worsening    Shift Goals  Clinical Goals: wean o2, wound care, q2 turns  Patient Goals: comfort  Family Goals: ELDER    Progress made toward(s) clinical / shift goals:    Problem: Skin Integrity  Goal: Skin integrity is maintained or improved  Description: Target End Date:  Prior to discharge or change in level of care    Document interventions on Skin Risk/Kyle flowsheet groups and corresponding LDA    1.  Assess and monitor skin integrity, appearance and/or temperature  2.  Assess risk factors for impaired skin integrity and/or pressures ulcers  3.  Implement precautions to protect skin integrity in collaboration with interdisciplinary team  4.  Implement pressure ulcer prevention protocol if at risk for skin breakdown  5.  Confirm wound care consult if at risk for skin breakdown  6.  Ensure patient use of pressure relieving devices  (Low air loss bed, waffle overlay, heel protectors, ROHO cushion, etc)  Outcome: Progressing       Patient is not progressing towards the following goals:      Problem: Respiratory  Goal: Patient will achieve/maintain optimum respiratory ventilation and gas exchange  Description: Target End Date:  Prior to discharge or change in level of care    Document on Assessment flowsheet    1.  Assess and monitor rate, rhythm, depth and effort of respiration  2.  Breath sounds assessed qshift and/or as needed  3.  Assess O2 saturation, administer/titrate oxygen as ordered  4.  Position patient for maximum ventilatory efficiency  5.  Turn, cough, and deep breath with splinting to improve effectiveness  6.  Collaborate with RT to administer medication/treatments per order  7.  Encourage use of incentive spirometer and encourage patient to cough after use and utilize splinting techniques if applicable  8.  Airway suctioning  9.  Monitor sputum production for changes in color, consistency and frequency  10. Perform frequent oral  hygiene  11. Alternate physical activity with rest periods  Outcome: Not Met  Note: Pt is still requiring HFNC. 50 lpm Fi02 70%. Unable to decrease o2 at this time.

## 2024-03-28 NOTE — PROGRESS NOTES
Hospital Medicine Daily Progress Note    Date of Service  3/28/2024    Chief Complaint  Miroslava Matta is a 63 y.o. female admitted 3/26/2024 with shortness of breath    Hospital Course  Ms. Malone is a 63-year-old female with a PMHx morbid obesity, COPD on baseline oxygen 4 L/min, HTN, RUTH, DMT2, HDL, HFrEF with EF 50%.  Admitted 3/26 for acute on chronic hypoxic respiratory failure.    Patient initially presented to the ICU for continued BiPAP needs.  BiPAP was discontinued and patient was transition to the telemetry floor.  For a brief period of time patient was on 10 to 15 L/min OxyMask.  She was desaturating into the 60s and 70s.  She was then placed on high flow nasal cannula 50 L/min.    Patient continues to desaturate and into the 70s with minimal exertion and talking.    Interval Problem Update  3/28: Transferred to telemetry from ICU on 15 L/min OxyMask.  She was quickly transitioned to high flow nasal cannula for continued desaturations into the 60s and 70s.  Overnight oxygen weaning was unsuccessful due to continued desaturations.  Today, patient is resting comfortably in bed on high flow nasal cannula.  Remainder vitals are unremarkable.  Hb stable at 9.9.  WBC stable at 7.6.  HCO3 elevated at 34.    I have discussed this patient's plan of care and discharge plan at IDT rounds today with Case Management, Nursing, Nursing leadership, and other members of the IDT team.    Consultants/Specialty  critical care    Code Status  Full Code    Disposition  Medically Cleared  I have placed the appropriate orders for post-discharge needs.    Review of Systems  Review of Systems   Constitutional:  Negative for fever.   Respiratory:  Positive for cough and shortness of breath.    Cardiovascular:  Negative for chest pain.   Gastrointestinal:  Negative for nausea.   Neurological:  Positive for weakness.        Physical Exam  Temp:  [36 °C (96.8 °F)-36.9 °C (98.5 °F)] 36.9 °C (98.5 °F)  Pulse:  [74-98] 81  Resp:   [18-20] 18  BP: (115-155)/(62-97) 148/62  SpO2:  [91 %-99 %] 96 %    Physical Exam  Vitals and nursing note reviewed.   Constitutional:       General: She is not in acute distress.     Appearance: Normal appearance. She is obese. She is ill-appearing.   Cardiovascular:      Rate and Rhythm: Normal rate and regular rhythm.      Heart sounds: Murmur heard.      Comments: Diminished heart sounds due to body habitus  Pulmonary:      Effort: Pulmonary effort is normal. No respiratory distress.      Breath sounds: Normal breath sounds. No wheezing.      Comments: Diminished breath sounds due to body habitus; faint crackles appreciated in middle lung fields bilaterally  Abdominal:      General: Abdomen is flat. Bowel sounds are normal. There is no distension.      Palpations: Abdomen is soft.      Tenderness: There is no abdominal tenderness.   Musculoskeletal:         General: Swelling present. Normal range of motion.      Comments: +1 pitting edema bilaterally lower extremities   Skin:     General: Skin is warm and dry.   Neurological:      Mental Status: She is alert and oriented to person, place, and time.   Psychiatric:         Mood and Affect: Mood normal.         Behavior: Behavior normal.         Fluids    Intake/Output Summary (Last 24 hours) at 3/28/2024 1634  Last data filed at 3/28/2024 1445  Gross per 24 hour   Intake 1490 ml   Output 2700 ml   Net -1210 ml       Laboratory  Recent Labs     03/26/24 0253 03/27/24  0600 03/28/24  0132   WBC 13.8* 6.3 7.6   RBC 4.24 3.72* 3.85*   HEMOGLOBIN 11.0* 9.6* 9.9*   HEMATOCRIT 37.4 32.4* 33.0*   MCV 88.2 87.1 85.7   MCH 25.9* 25.8* 25.7*   MCHC 29.4* 29.6* 30.0*   RDW 76.5* 75.3* 71.9*   PLATELETCT 337 297 297   MPV 8.8* 8.9* 8.7*     Recent Labs     03/26/24  0253 03/27/24  0600 03/28/24  0132   SODIUM 136 140 139   POTASSIUM 4.5 3.8 3.9   CHLORIDE 95* 96 96   CO2 32 37* 34*   GLUCOSE 152* 90 138*   BUN 14 11 16   CREATININE 0.88 0.70 0.85   CALCIUM 9.3 9.0 9.2                    Imaging  DX-CHEST-PORTABLE (1 VIEW)   Final Result      1. Improving mixed interstitial and alveolar opacities in the lungs, but incompletely resolved.   2. Stable cardiomegaly.      DX-CHEST-PORTABLE (1 VIEW)   Final Result         1.  Pulmonary edema and/or infiltrates are identified, which are stable since the prior exam.   2.  Cardiomegaly   3.  Atherosclerosis           Assessment/Plan  * Acute on chronic respiratory failure with hypoxia (HCC)- (present on admission)  Assessment & Plan  Secondary to flash pulmonary edema in the setting of chronic pulmonary respiratory failure  Currently HFNC 50 L/min; 70% oxygen  -Due to continued HFNC requirements-Lasix IV 40 mg today  -Strict I's and O's  -Continue to wean HFNC as tolerated  -Continuous pulse ox monitoring  -Continuous telemetry monitoring     Volume overload  Assessment & Plan  Echo 2/21/2024:  EF 50%. Severely dilated right ventricle. Reduced right ventricular systolic function.  Close monitoring of blood pressures in the setting of right-sided heart failure as these patients are preload dependent.  -IV Lasix 40 mg today  -Strict I's and O's  -Currently down 6.7 L from admission    Pulmonary hypertension (HCC)- (present on admission)  Assessment & Plan  Diuresis as tolerated    Mood disorder (HCC)- (present on admission)  Assessment & Plan  Continue home topamax    RUTH (obstructive sleep apnea)- (present on admission)  Assessment & Plan  Can trial BiPAP at night after patient is weaned off HFNC    Morbid (severe) obesity with alveolar hypoventilation (HCC)- (present on admission)  Assessment & Plan  Continue to encourage life style modifications  Patient is motivated become ambulatory again  She felt she was making significant progress and [facility] with her PT OT    Diabetes (HCC)- (present on admission)  Assessment & Plan  2/21/2024: A1c 6.8  Continue Lantus 10 units  Continue sliding scale insulin-patient has not required in last 24  hours  Hypoglycemia protocol    Essential hypertension- (present on admission)  Assessment & Plan  Maintain SBP <140-160 due to concern for flash pulmonary edema  Continue home diltiazem 180  Continue home lisinopril  Hold home carvedilol due to bradycardia  Continue as needed antihypertensives for SBP greater than 160    Persistent atrial fibrillation (HCC)- (present on admission)  Assessment & Plan  Continue home Xarelto  Continue home diltiazem 180 mg daily  Hold home BB until clinical improvement and improvement in bradycardia  Continuous telemetry monitoring          VTE prophylaxis:    therapeutic anticoagulation with xarelto 20 mg daily witih meals      I have performed a physical exam and reviewed and updated ROS and Plan today (3/28/2024). In review of yesterday's note (3/27/2024), there are no changes except as documented above.

## 2024-03-28 NOTE — DOCUMENTATION QUERY
[facility]                                                                       Query Response Note      PATIENT:               SHAQUILLE CANDELARIA  ACCT #:                  [id number]  MRN:                     [id number]  :                      1960  ADMIT DATE:       3/26/2024 2:33 AM  DISCH DATE:          RESPONDING  PROVIDER #:        [id number]           QUERY TEXT:    Please clarify in documentation the relationship, if any, between hypertensive crisis and HFrEF and the acuity of HFrEF based on the clinical indicators documented.    The patient's Clinical Indicators include:  ED provider note - Final impression: Hypertensive emergency  3/27 PN-   hypertensive crisis with AHRF and pulmonary edema... HFrEF (LVEF 50%, RV severely dilated and reduced)    Treatment - IV Lasix; IV Hydralazine; IV Nitroglycerine    Risk factors - Hypertensive crisis/emergency, HFrEF (LVEF 50%, RV severely dilated and reduced), flash pulmonary edema    Thank you,  Terri Mckeon BSN  Clinical   Connect via [employer]  Options provided:   -- Condition acute on chronic HFrEF and RV failure is due to or associated with hypertensive crisis   -- Condition hypertensive crisis is not due to or associated with HFrEF   -- Other explanation, (please specify the other explanation)   -- Unable to determine      Query created by: Terri Mckeon on 3/27/2024 5:58 PM    RESPONSE TEXT:    Condition acute on chronic HFrEF and RV failure is due to or associated with hypertensive crisis          Electronically signed by:  CARMEN DE LOS SANTOS MD 3/28/2024 4:38 PM

## 2024-03-28 NOTE — THERAPY
Physical Therapy Contact Note    PT treatment attempted, RN reported patient not appropriate for PT due to poor oxygenation on HFNC. Will re attempt as able and appropriate.     Natasha Pruitt, PT, DPT  [phone]     26-Sep-2018 02-Oct-2018 15:57 04-Oct-2018 20:54 26-Sep-2018 16:35

## 2024-03-28 NOTE — PROGRESS NOTES
Received bedside report from RN. Patient is A&0x4. Physical assessment completed. Patients belongings and call light are within reach. Fall prevention precautions are in place and teaching is reinforced. Plan of care is ongoing.

## 2024-03-28 NOTE — PROGRESS NOTES
Monitor Summary  Rhythm: AFib  Rate: 75-85  Ectopy: (R) PVC  Measurements: -/0.08/-  ---12 hr Chart Review---

## 2024-03-28 NOTE — ASSESSMENT & PLAN NOTE
Echo 2/21/2024:  EF 50%. Severely dilated right ventricle. Reduced right ventricular systolic function.  Close monitoring of blood pressures in the setting of right-sided heart failure as these patients are preload dependent.  -IV Lasix 40 mg today  -Strict I's and O's  -Currently down 6.7 L from admission

## 2024-03-29 VITALS
WEIGHT: 293 LBS | HEIGHT: 67 IN | TEMPERATURE: 97.7 F | DIASTOLIC BLOOD PRESSURE: 70 MMHG | BODY MASS INDEX: 45.99 KG/M2 | RESPIRATION RATE: 18 BRPM | OXYGEN SATURATION: 96 % | HEART RATE: 74 BPM | SYSTOLIC BLOOD PRESSURE: 110 MMHG

## 2024-03-29 LAB
ANION GAP SERPL CALC-SCNC: 8 MMOL/L (ref 7–16)
BUN SERPL-MCNC: 16 MG/DL (ref 8–22)
CALCIUM SERPL-MCNC: 9 MG/DL (ref 8.5–10.5)
CHLORIDE SERPL-SCNC: 96 MMOL/L (ref 96–112)
CO2 SERPL-SCNC: 37 MMOL/L (ref 20–33)
CREAT SERPL-MCNC: 0.78 MG/DL (ref 0.5–1.4)
ERYTHROCYTE [DISTWIDTH] IN BLOOD BY AUTOMATED COUNT: 72.4 FL (ref 35.9–50)
GFR SERPLBLD CREATININE-BSD FMLA CKD-EPI: 86 ML/MIN/1.73 M 2
GLUCOSE BLD STRIP.AUTO-MCNC: 113 MG/DL (ref 65–99)
GLUCOSE BLD STRIP.AUTO-MCNC: 143 MG/DL (ref 65–99)
GLUCOSE SERPL-MCNC: 106 MG/DL (ref 65–99)
HCT VFR BLD AUTO: 32.3 % (ref 37–47)
HGB BLD-MCNC: 9.7 G/DL (ref 12–16)
MAGNESIUM SERPL-MCNC: 1.6 MG/DL (ref 1.5–2.5)
MCH RBC QN AUTO: 25.7 PG (ref 27–33)
MCHC RBC AUTO-ENTMCNC: 30 G/DL (ref 32.2–35.5)
MCV RBC AUTO: 85.4 FL (ref 81.4–97.8)
PLATELET # BLD AUTO: 298 K/UL (ref 164–446)
PMV BLD AUTO: 9 FL (ref 9–12.9)
POTASSIUM SERPL-SCNC: 3.7 MMOL/L (ref 3.6–5.5)
RBC # BLD AUTO: 3.78 M/UL (ref 4.2–5.4)
SODIUM SERPL-SCNC: 141 MMOL/L (ref 135–145)
WBC # BLD AUTO: 7.2 K/UL (ref 4.8–10.8)

## 2024-03-29 PROCEDURE — A9270 NON-COVERED ITEM OR SERVICE: HCPCS | Performed by: HOSPITALIST

## 2024-03-29 PROCEDURE — 94640 AIRWAY INHALATION TREATMENT: CPT

## 2024-03-29 PROCEDURE — 700102 HCHG RX REV CODE 250 W/ 637 OVERRIDE(OP): Performed by: STUDENT IN AN ORGANIZED HEALTH CARE EDUCATION/TRAINING PROGRAM

## 2024-03-29 PROCEDURE — 85027 COMPLETE CBC AUTOMATED: CPT

## 2024-03-29 PROCEDURE — 82962 GLUCOSE BLOOD TEST: CPT

## 2024-03-29 PROCEDURE — 83735 ASSAY OF MAGNESIUM: CPT

## 2024-03-29 PROCEDURE — A9270 NON-COVERED ITEM OR SERVICE: HCPCS | Performed by: STUDENT IN AN ORGANIZED HEALTH CARE EDUCATION/TRAINING PROGRAM

## 2024-03-29 PROCEDURE — 99239 HOSP IP/OBS DSCHRG MGMT >30: CPT | Performed by: STUDENT IN AN ORGANIZED HEALTH CARE EDUCATION/TRAINING PROGRAM

## 2024-03-29 PROCEDURE — 700102 HCHG RX REV CODE 250 W/ 637 OVERRIDE(OP): Performed by: HOSPITALIST

## 2024-03-29 PROCEDURE — 94660 CPAP INITIATION&MGMT: CPT

## 2024-03-29 PROCEDURE — 36415 COLL VENOUS BLD VENIPUNCTURE: CPT

## 2024-03-29 PROCEDURE — 80048 BASIC METABOLIC PNL TOTAL CA: CPT

## 2024-03-29 RX ORDER — LISINOPRIL 5 MG/1
5 TABLET ORAL DAILY
Qty: 30 TABLET | Refills: 3 | Status: SHIPPED | OUTPATIENT
Start: 2024-03-30

## 2024-03-29 RX ORDER — FERROUS SULFATE 325(65) MG
325 TABLET ORAL
Status: DISCONTINUED | OUTPATIENT
Start: 2024-03-30 | End: 2024-03-29 | Stop reason: HOSPADM

## 2024-03-29 RX ORDER — CARVEDILOL 6.25 MG/1
6.25 TABLET ORAL 2 TIMES DAILY WITH MEALS
Qty: 60 TABLET | Refills: 3 | Status: SHIPPED | OUTPATIENT
Start: 2024-03-29

## 2024-03-29 RX ORDER — FERROUS SULFATE 325(65) MG
325 TABLET ORAL
Qty: 30 TABLET | Refills: 3 | Status: SHIPPED | OUTPATIENT
Start: 2024-03-30

## 2024-03-29 RX ORDER — DILTIAZEM HYDROCHLORIDE 180 MG/1
180 CAPSULE, COATED, EXTENDED RELEASE ORAL DAILY
Qty: 30 CAPSULE | Refills: 3 | Status: SHIPPED | OUTPATIENT
Start: 2024-03-30

## 2024-03-29 RX ADMIN — DULOXETINE HYDROCHLORIDE 30 MG: 30 CAPSULE, DELAYED RELEASE ORAL at 04:27

## 2024-03-29 RX ADMIN — FERROUS SULFATE TAB 325 MG (65 MG ELEMENTAL FE) 325 MG: 325 (65 FE) TAB at 08:36

## 2024-03-29 RX ADMIN — FUROSEMIDE 20 MG: 20 TABLET ORAL at 04:27

## 2024-03-29 RX ADMIN — DILTIAZEM HYDROCHLORIDE 180 MG: 180 CAPSULE, COATED, EXTENDED RELEASE ORAL at 04:27

## 2024-03-29 RX ADMIN — SODIUM CHLORIDE 1 G: 1 TABLET ORAL at 08:37

## 2024-03-29 RX ADMIN — LISINOPRIL 5 MG: 5 TABLET ORAL at 04:27

## 2024-03-29 RX ADMIN — INSULIN GLARGINE-YFGN 10 UNITS: 100 INJECTION, SOLUTION SUBCUTANEOUS at 08:37

## 2024-03-29 RX ADMIN — NYSTATIN: 100000 POWDER TOPICAL at 04:41

## 2024-03-29 RX ADMIN — TOPIRAMATE 25 MG: 25 TABLET, FILM COATED ORAL at 04:27

## 2024-03-29 RX ADMIN — PREGABALIN 25 MG: 25 CAPSULE ORAL at 08:37

## 2024-03-29 RX ADMIN — OXYCODONE 5 MG: 5 TABLET ORAL at 09:42

## 2024-03-29 RX ADMIN — DOCUSATE SODIUM 50 MG AND SENNOSIDES 8.6 MG 2 TABLET: 8.6; 5 TABLET, FILM COATED ORAL at 04:26

## 2024-03-29 RX ADMIN — SODIUM CHLORIDE 1 G: 1 TABLET ORAL at 11:57

## 2024-03-29 ASSESSMENT — PAIN DESCRIPTION - PAIN TYPE
TYPE: ACUTE PAIN

## 2024-03-29 ASSESSMENT — FIBROSIS 4 INDEX: FIB4 SCORE: 0.78

## 2024-03-29 NOTE — DISCHARGE SUMMARY
Discharge Summary    CHIEF COMPLAINT ON ADMISSION  Chief Complaint   Patient presents with    Shortness of Breath     Pt BIBA from [facility], staff from there checked pt's vital signs 45 minutes ago and was satting 50% on baseline 4lpm via nasal cannula, tried to bump higher but pt still de satting. EMS arrived, placed pt at non -rebreather mask at 8lpm and was satting 91%        Reason for Admission  ems    Admission Date  3/26/2024     CODE STATUS  Full Code    HPI & HOSPITAL COURSE    Ms. Malone is a 63-year-old female with a PMHx morbid obesity, COPD on baseline oxygen 4 L/min, HTN, RUTH, DMT2, HDL, HFrEF with EF 50%.  Admitted 3/26 for acute on chronic hypoxic respiratory failure.    Patient initially presented to the ICU for continued BiPAP needs.  BiPAP was discontinued and patient was transition to the telemetry floor.  For a brief period of time patient was on 10 to 15 L/min OxyMask.  She was desaturating into the 60s and 70s.  She was then placed on high flow nasal cannula 50 L/min.    Patient continues to desaturate and into the 70s with minimal exertion and talking.  Patient was referred for long-term acute care facility.  She has been accepted to [facility].  She will discharge there to continue with physical therapy, Occupational Therapy and rehabilitation in the setting of the need for high flow oxygen.    Therefore, she is discharged in fair and stable condition to a long-term acute care hospital.    The patient met 2-midnight criteria for an inpatient stay at the time of discharge.    Discharge date: 3/29/2024    FOLLOW UP ITEMS POST DISCHARGE  Follow up with Pulmonary medicine  Follow up with PCP 1 week     DISCHARGE DIAGNOSES  Principal Problem:    Acute on chronic respiratory failure with hypoxia (HCC) (POA: Yes)  Active Problems:    Persistent atrial fibrillation (HCC) (POA: Yes)    Essential hypertension (POA: Yes)    Diabetes (HCC) (POA: Yes)    Morbid (severe) obesity with alveolar hypoventilation  (HCC) (POA: Yes)    RUTH (obstructive sleep apnea) (POA: Yes)    Mood disorder (HCC) (POA: Yes)    Pulmonary hypertension (HCC) (POA: Yes)    Volume overload (POA: Unknown)  Resolved Problems:    * No resolved hospital problems. *      FOLLOW UP  No future appointments.  No follow-up provider specified.    MEDICATIONS ON DISCHARGE     Medication List        START taking these medications        Instructions   dilTIAZem  MG Cp24  Start taking on: March 30, 2024  Commonly known as: Cardizem CD   Take 1 Capsule by mouth every day.  Dose: 180 mg     lisinopril 5 MG Tabs  Start taking on: March 30, 2024  Commonly known as: Prinivil   Take 1 Tablet by mouth every day.  Dose: 5 mg            CHANGE how you take these medications        Instructions   ferrous sulfate 325 (65 Fe) MG tablet  Start taking on: March 30, 2024  What changed: when to take this   Take 1 Tablet by mouth every morning with breakfast.  Dose: 325 mg            CONTINUE taking these medications        Instructions   ascorbic acid 500 MG Tabs  Commonly known as: Ascorbic Acid   Take 500 mg by mouth every day.  Dose: 500 mg     carvedilol 6.25 MG Tabs  Commonly known as: Coreg   Take 1 Tablet by mouth 2 times a day with meals.  Dose: 6.25 mg     cyclobenzaprine 10 mg Tabs  Commonly known as: Flexeril   Take 10 mg by mouth 3 times a day as needed for Mild Pain or Muscle Spasms.  Dose: 10 mg     DULoxetine 30 MG Cpep  Commonly known as: Cymbalta   Take 30 mg by mouth every day.  Dose: 30 mg     FREESTYLE LITE strip  Generic drug: glucose blood   check BG In Vitro three times a day for 90 days     furosemide 20 MG Tabs  Commonly known as: Lasix   Take 1 Tablet by mouth every day.  Dose: 20 mg     glyBURIDE 5 MG Tabs  Commonly known as: Diabeta   Take 1 Tab by mouth every morning with breakfast.  Dose: 5 mg     insulin glargine 100 UNIT/ML injection PEN  Generic drug: insulin glargine   Inject 10 Units under the skin every day.  Dose: 10 Units      magnesium oxide 400 MG Tabs tablet  Commonly known as: MAG-OX   Take 1 Tab by mouth 2 Times a Day.  Dose: 400 mg     oxyCODONE immediate-release 5 MG Tabs  Commonly known as: Roxicodone   Take 5 mg by mouth every 6 hours as needed for Severe Pain.  Dose: 5 mg     potassium chloride SA 20 MEQ Tbcr  Commonly known as: Kdur   Take 1 Tab by mouth every day.  Dose: 20 mEq     pregabalin 25 MG Caps  Commonly known as: Lyrica   Take 25 mg by mouth 3 times a day.  Dose: 25 mg     rivaroxaban 20 MG Tabs tablet  Commonly known as: Xarelto   Take 1 Tab by mouth with dinner.  Dose: 20 mg     topiramate 25 MG Tabs  Commonly known as: Topamax   Take 25 mg by mouth 2 times a day.  Dose: 25 mg            STOP taking these medications      dilTIAZem  MG Cp24  Commonly known as: Cardizem CD     sodium chloride 1 GM Tabs  Commonly known as: Salt              Allergies  Allergies   Allergen Reactions    Gabapentin Unspecified     Dizziness      Metformin Shortness of Breath and Rash     To face    Codeine Nausea       DIET  Orders Placed This Encounter   Procedures    Diet Order Diet: Consistent CHO (Diabetic)     Standing Status:   Standing     Number of Occurrences:   1     Order Specific Question:   Diet:     Answer:   Consistent CHO (Diabetic) [4]       ACTIVITY  As tolerated and directed by skilled nursing.  Weight bearing as tolerated    LINES, DRAINS, AND WOUNDS  This is an automated list. Peripheral IVs will be removed prior to discharge.  Peripheral IV 03/26/24 Left;Posterior Hand (Active)   Site Assessment Clean;Dry;Intact 03/29/24 0900   Dressing Type Transparent 03/29/24 0900   Line Status Scrubbed the hub prior to access;Flushed;Saline locked 03/29/24 0900   Dressing Status Dry;Intact 03/29/24 0900   Dressing Intervention N/A 03/29/24 0900   Dressing Change Due 03/30/24 03/27/24 0800   NEXT Primary Tubing Change  03/30/24 03/26/24 2000   Infiltration Grading ([facility]) 0 03/29/24 0900   Phlebitis Scale ([facility]  Only) 0 03/29/24 0900       Moisture Associated Skin Damage 02/21/24 Buttock;Groin;Perineum;Other (comment) (Active)   Wound Image   03/26/24 1300   NEXT Weekly Photo (Inpatient Only) 03/24/24 03/17/24 1400   Drainage Amount None 03/28/24 2000   Drainage Description Serosanguineous 03/21/24 0800   Periwound Assessment Clean;Intact;Dry 03/21/24 0800   IAD Cleansing Soap and Water;Foam Cleanser/Washcloth 03/28/24 0756   Periwound Protectant Antifungal Therapy 03/29/24 0912   IAD Containment Device Purewick 03/28/24 2000       Wound 02/21/24 Pressure Injury Thigh Posterior Bilateral (Active)   Wound Image   03/26/24 1300   Site Assessment Pink;Healing ridge;Granulation tissue 03/28/24 0756   Periwound Assessment Pink 03/28/24 0756   Margins Attached edges;Defined edges 03/28/24 2000   Closure None 03/28/24 0756   Drainage Amount Small 03/28/24 0756   Drainage Description Serosanguineous 03/28/24 0756   Treatments Offloading 03/28/24 0756   Offloading/DME Heel float boot 03/21/24 0800   Wound Cleansing Approved Wound Cleanser 03/28/24 0756   Periwound Protectant Not Applicable 03/26/24 1300   Dressing Status Clean;Intact;Dry 03/29/24 0912   Dressing Changed Observed 03/29/24 0912   Dressing Cleansing/Solutions Normal Saline 03/28/24 0756   Dressing Options Offloading Dressing - Sacral;Petroleum Gauze (clear) 03/28/24 0756   Dressing Change/Treatment Frequency Every 48 hrs, and As Needed 03/28/24 2000   NEXT Dressing Change/Treatment Date 03/31/24 03/28/24 0756   NEXT Weekly Photo (Inpatient Only) 04/02/24 03/26/24 1300   Wound Team Following Not following 03/26/24 1300   WOUND NURSE ONLY - Pressure Injury Stage 2 03/26/24 1300   Non-staged Wound Description Full thickness 03/17/24 1400   Wound Length (cm) 5.5 cm 02/28/24 1700   Wound Width (cm) 16 cm 02/28/24 1700   Wound Surface Area (cm^2) 88 cm^2 02/28/24 1700   Shape Irregular, linear 02/28/24 1700   Wound Odor None 02/28/24 1700   Pulses DP;PT;Right;1+ 02/21/24  1556   WOUND NURSE ONLY - Time Spent with Patient (mins) 60 03/17/24 1400       Wound 02/21/24 Pressure Injury Heel Plantar;Lateral Right POA Unstageable (Active)   Wound Image   03/28/24 0900   Site Assessment Black;Dry 03/28/24 0756   Periwound Assessment Callused 03/28/24 0756   Margins Defined edges 03/29/24 0400   Closure Open to air 03/29/24 0400   Drainage Amount None 03/29/24 0400   Treatments Offloading;Site care;Cleansed 03/29/24 0400   Offloading/DME Heel float boot 03/29/24 0400   Wound Cleansing Foam Cleanser/Washcloth 03/29/24 0400   Periwound Protectant Not Applicable 03/26/24 1300   Dressing Status Open to Air 03/29/24 0400   Dressing Changed Changed 03/29/24 0400   Dressing Cleansing/Solutions 3% Betadine 03/29/24 0400   Dressing Options Open to Air 03/29/24 0400   Dressing Change/Treatment Frequency Every Shift, and As Needed 03/29/24 0400   NEXT Dressing Change/Treatment Date 03/29/24 03/29/24 0400   NEXT Weekly Photo (Inpatient Only) 04/02/24 03/29/24 0400   Wound Team Following Not following 03/17/24 1400   WOUND NURSE ONLY - Pressure Injury Stage U 03/26/24 1300   Wound Length (cm) 7 cm 03/17/24 1400   Wound Width (cm) 5 cm 03/17/24 1400   Wound Surface Area (cm^2) 35 cm^2 03/17/24 1400   Shape Oval 02/28/24 1700   Wound Odor None 02/28/24 1700       Wound 02/21/24 Full Thickness Wound Leg Posterior;Lower Right (Active)   Wound Image   03/28/24 0859   Site Assessment Pink;Granulation tissue;Healing ridge 03/28/24 0756   Periwound Assessment Pink 03/28/24 0756   Margins Attached edges;Defined edges 03/28/24 2000   Closure Secondary intention 03/27/24 0800   Drainage Amount Small 03/28/24 0756   Drainage Description Serosanguineous 03/28/24 0756   Treatments Offloading;Site care;Cleansed 03/28/24 0756   Offloading/DME Heel float boot 03/21/24 0800   Wound Cleansing Approved Wound Cleanser 03/28/24 0756   Periwound Protectant Not Applicable 03/26/24 1300   Dressing Status Clean;Dry;Intact 03/29/24  0912   Dressing Changed Observed 03/29/24 0912   Dressing Cleansing/Solutions Normal Saline 03/28/24 0756   Dressing Options Offloading Dressing - Sacral 03/28/24 0756   Dressing Change/Treatment Frequency Every 48 hrs, and As Needed 03/28/24 2000   NEXT Dressing Change/Treatment Date 03/28/24 03/26/24 1300   NEXT Weekly Photo (Inpatient Only) 04/02/24 03/26/24 1300   Wound Team Following Not following 03/26/24 1300   Non-staged Wound Description Full thickness 03/26/24 1300       Peripheral IV 03/26/24 Left;Posterior Hand (Active)   Site Assessment Clean;Dry;Intact 03/29/24 0900   Dressing Type Transparent 03/29/24 0900   Line Status Scrubbed the hub prior to access;Flushed;Saline locked 03/29/24 0900   Dressing Status Dry;Intact 03/29/24 0900   Dressing Intervention N/A 03/29/24 0900   Dressing Change Due 03/30/24 03/27/24 0800   NEXT Primary Tubing Change  03/30/24 03/26/24 2000   [facility]) 0 03/29/24 0900   Phlebitis Scale [facility]) 0 03/29/24 0900               MENTAL STATUS ON TRANSFER  Patient is at her baseline mental status - A&O x4        CONSULTATIONS  Pulmonary  Critical care  Nephrology     PROCEDURES  None     LABORATORY  Lab Results   Component Value Date    SODIUM 141 03/29/2024    POTASSIUM 3.7 03/29/2024    CHLORIDE 96 03/29/2024    CO2 37 (H) 03/29/2024    GLUCOSE 106 (H) 03/29/2024    BUN 16 03/29/2024    CREATININE 0.78 03/29/2024        Lab Results   Component Value Date    WBC 7.2 03/29/2024    HEMOGLOBIN 9.7 (L) 03/29/2024    HEMATOCRIT 32.3 (L) 03/29/2024    PLATELETCT 298 03/29/2024        Total time of the discharge process exceeds 45 minutes.

## 2024-03-29 NOTE — THERAPY
Physical Therapy Contact Note    Patient Name: Miroslava Matta  Age:  63 y.o., Sex:  female  Medical Record #: [id number]  Today's Date: 3/29/2024    Pt still requiring HHFNC 50L and 60% FiO2. Will continue to hold and re-attempt as appropriate.    Erick Foss PT, DPT

## 2024-03-29 NOTE — PROGRESS NOTES
Report given to Willian RUIZ at [facility]. Pt schedule for transport at 1500.

## 2024-03-29 NOTE — DISCHARGE PLANNING
Case Management Discharge Planning    Admission Date: 3/26/2024  GMLOS: 3.9  ALOS: 3    6-Clicks ADL Score: 11  6-Clicks Mobility Score: 11  PT and/or OT Eval ordered: Yes  Post-acute Referrals Ordered: Yes  Post-acute Choice Obtained: Yes  Has referral(s) been sent to post-acute provider:  Yes      Anticipated Discharge Dispo: Discharge Disposition: Disch to a long term care facility (64)    DME Needed: No    Action(s) Taken: Updated Provider/Nurse on Discharge Plan    1330, Pt was visited at room and she is in agreement to transfer to [facility]. [facility] transfer packet signed. Pt was informed will transport via [facility] at 1500 today. IDT made aware through Voalte.    1345, Pt's son Mynor informed through telephone and is in agreement to this.     [facility] transfer packet given to bedside RN.        Escalations Completed: None    Medically Clear: Yes    Next Steps: CM will continue to assist Pt with discharge needs.      Barriers to Discharge: None

## 2024-03-29 NOTE — PROGRESS NOTES
Received report from night shift RN. Patient is resting on 50 LPM FiO2 60% at 93%. Complaint of 6/10 generalized pain and vaginal irritation. Barrier paste applied. Pt is alert and orientated x4. Bed is in lower locked position. Fall risk precautions in place. Call light and belongings within reach.

## 2024-03-29 NOTE — PROGRESS NOTES
Monitor Summary  Rhythm: Afib  Rate:   Ectopy: (R) PVC, (F) PVC  Measurements: -/0.10/-  ---12 hr Chart Review---

## 2024-03-29 NOTE — PROGRESS NOTES
Received bedside report from RN. Patient is A&Ox4. Physical assessment completed. Plan of care is ongoing

## 2024-03-29 NOTE — DISCHARGE PLANNING
DC Transport Scheduled    Transport Company Scheduled:  [facility]  Spoke with Payal at [facility] to schedule transport.    Scheduled Date: 3/29/2024  Scheduled Time: 1500    [facility]ination[facility]   Destination address: [address] [place] [address] [address] [place] [address]    Notified care team of scheduled transport via Voalte.     If there are any changes needed to the DC transportation scheduled, please contact [facility] at ext. [phone] between the hours of [phone] Mon-Fri. If outside those hours, contact the ED Case Manager at ext. [phone].

## 2024-04-11 NOTE — DISCHARGE PLANNING
RNCM received call from Nara at [facility] [phone] requesting where patient was discharged to. RNCM informed patient was discharged to [facility].

## 2024-06-03 ENCOUNTER — TELEPHONE (OUTPATIENT)
Dept: SLEEP MEDICINE | Facility: MEDICAL CENTER | Age: 64
End: 2024-06-03
Payer: MEDICARE

## 2024-06-03 ENCOUNTER — HOSPITAL ENCOUNTER (INPATIENT)
Facility: MEDICAL CENTER | Age: 64
LOS: 3 days | DRG: 291 | End: 2024-06-06
Attending: INTERNAL MEDICINE | Admitting: INTERNAL MEDICINE
Payer: MEDICARE

## 2024-06-03 ENCOUNTER — HOSPITAL ENCOUNTER (OUTPATIENT)
Dept: RADIOLOGY | Facility: MEDICAL CENTER | Age: 64
End: 2024-06-03

## 2024-06-03 DIAGNOSIS — I48.91 ATRIAL FIBRILLATION WITH RVR (HCC): ICD-10-CM

## 2024-06-03 DIAGNOSIS — B36.9 FUNGAL RASH OF TORSO: ICD-10-CM

## 2024-06-03 DIAGNOSIS — B96.20 E COLI BACTEREMIA: ICD-10-CM

## 2024-06-03 DIAGNOSIS — N17.9 AKI (ACUTE KIDNEY INJURY) (HCC): ICD-10-CM

## 2024-06-03 DIAGNOSIS — I27.20 MODERATE TO SEVERE PULMONARY HYPERTENSION (HCC): ICD-10-CM

## 2024-06-03 DIAGNOSIS — E87.20 LACTIC ACIDOSIS: ICD-10-CM

## 2024-06-03 DIAGNOSIS — J96.21 ACUTE ON CHRONIC RESPIRATORY FAILURE WITH HYPOXIA AND HYPERCAPNIA (HCC): ICD-10-CM

## 2024-06-03 DIAGNOSIS — I51.7 CARDIOMEGALY: ICD-10-CM

## 2024-06-03 DIAGNOSIS — J96.22 ACUTE ON CHRONIC RESPIRATORY FAILURE WITH HYPOXIA AND HYPERCAPNIA (HCC): ICD-10-CM

## 2024-06-03 DIAGNOSIS — A41.9 SEPTIC SHOCK (HCC): ICD-10-CM

## 2024-06-03 DIAGNOSIS — Z71.89 ADVANCE CARE PLANNING: ICD-10-CM

## 2024-06-03 DIAGNOSIS — R65.21 SEPTIC SHOCK (HCC): ICD-10-CM

## 2024-06-03 DIAGNOSIS — R79.89 ELEVATED TROPONIN: ICD-10-CM

## 2024-06-03 DIAGNOSIS — I48.19 PERSISTENT ATRIAL FIBRILLATION (HCC): ICD-10-CM

## 2024-06-03 DIAGNOSIS — I27.20 PULMONARY HYPERTENSION (HCC): ICD-10-CM

## 2024-06-03 DIAGNOSIS — J44.9 CHRONIC OBSTRUCTIVE PULMONARY DISEASE, UNSPECIFIED COPD TYPE (HCC): ICD-10-CM

## 2024-06-03 DIAGNOSIS — E66.01 MORBID OBESITY WITH BMI OF 50.0-59.9, ADULT (HCC): ICD-10-CM

## 2024-06-03 DIAGNOSIS — D72.829 LEUKOCYTOSIS, UNSPECIFIED TYPE: ICD-10-CM

## 2024-06-03 DIAGNOSIS — I10 ESSENTIAL HYPERTENSION: ICD-10-CM

## 2024-06-03 DIAGNOSIS — F39 MOOD DISORDER (HCC): ICD-10-CM

## 2024-06-03 DIAGNOSIS — E66.2 MORBID (SEVERE) OBESITY WITH ALVEOLAR HYPOVENTILATION (HCC): ICD-10-CM

## 2024-06-03 DIAGNOSIS — J96.21 ACUTE ON CHRONIC RESPIRATORY FAILURE WITH HYPOXIA (HCC): ICD-10-CM

## 2024-06-03 DIAGNOSIS — G47.33 OSA (OBSTRUCTIVE SLEEP APNEA): ICD-10-CM

## 2024-06-03 DIAGNOSIS — R78.81 E COLI BACTEREMIA: ICD-10-CM

## 2024-06-03 DIAGNOSIS — J96.01 ACUTE RESPIRATORY FAILURE WITH HYPOXIA (HCC): ICD-10-CM

## 2024-06-03 DIAGNOSIS — E11.65 TYPE 2 DIABETES MELLITUS WITH HYPERGLYCEMIA, WITHOUT LONG-TERM CURRENT USE OF INSULIN (HCC): ICD-10-CM

## 2024-06-03 DIAGNOSIS — D62 ACUTE ON CHRONIC BLOOD LOSS ANEMIA: ICD-10-CM

## 2024-06-03 PROBLEM — J96.20 RESPIRATORY FAILURE, ACUTE AND CHRONIC (HCC): Status: ACTIVE | Noted: 2024-06-03

## 2024-06-03 LAB
ALBUMIN SERPL BCP-MCNC: 3.6 G/DL (ref 3.2–4.9)
ALBUMIN/GLOB SERPL: 1.1 G/DL
ALP SERPL-CCNC: 169 U/L (ref 30–99)
ALT SERPL-CCNC: 45 U/L (ref 2–50)
ANION GAP SERPL CALC-SCNC: 13 MMOL/L (ref 7–16)
APPEARANCE UR: CLEAR
AST SERPL-CCNC: 28 U/L (ref 12–45)
BACTERIA #/AREA URNS HPF: ABNORMAL /HPF
BASE EXCESS BLDA CALC-SCNC: 2 MMOL/L (ref -4–3)
BASOPHILS # BLD AUTO: 0.5 % (ref 0–1.8)
BASOPHILS # BLD: 0.03 K/UL (ref 0–0.12)
BILIRUB SERPL-MCNC: 0.7 MG/DL (ref 0.1–1.5)
BILIRUB UR QL STRIP.AUTO: NEGATIVE
BODY TEMPERATURE: ABNORMAL DEGREES
BUN SERPL-MCNC: 30 MG/DL (ref 8–22)
CALCIUM ALBUM COR SERPL-MCNC: 8.8 MG/DL (ref 8.5–10.5)
CALCIUM SERPL-MCNC: 8.5 MG/DL (ref 8.4–10.2)
CENTIMETERS OF WATER PRESSURE ICMH: 16 CMH20
CHLORIDE SERPL-SCNC: 101 MMOL/L (ref 96–112)
CO2 BLDA-SCNC: 34 MMOL/L (ref 20–33)
CO2 SERPL-SCNC: 27 MMOL/L (ref 20–33)
COLOR UR: YELLOW
CREAT SERPL-MCNC: 1.22 MG/DL (ref 0.5–1.4)
DELSYS IDSYS: ABNORMAL
EKG IMPRESSION: NORMAL
EOSINOPHIL # BLD AUTO: 0.02 K/UL (ref 0–0.51)
EOSINOPHIL NFR BLD: 0.3 % (ref 0–6.9)
EPI CELLS #/AREA URNS HPF: ABNORMAL /HPF
ERYTHROCYTE [DISTWIDTH] IN BLOOD BY AUTOMATED COUNT: 81 FL (ref 35.9–50)
FLUAV RNA SPEC QL NAA+PROBE: NEGATIVE
FLUBV RNA SPEC QL NAA+PROBE: NEGATIVE
GFR SERPLBLD CREATININE-BSD FMLA CKD-EPI: 50 ML/MIN/1.73 M 2
GLOBULIN SER CALC-MCNC: 3.4 G/DL (ref 1.9–3.5)
GLUCOSE BLD STRIP.AUTO-MCNC: 181 MG/DL (ref 65–99)
GLUCOSE SERPL-MCNC: 175 MG/DL (ref 65–99)
GLUCOSE UR STRIP.AUTO-MCNC: NEGATIVE MG/DL
HCO3 BLDA-SCNC: 31.5 MMOL/L (ref 17–25)
HCT VFR BLD AUTO: 44.3 % (ref 37–47)
HGB BLD-MCNC: 12.9 G/DL (ref 12–16)
HOROWITZ INDEX BLDA+IHG-RTO: 150 MM[HG]
IMM GRANULOCYTES # BLD AUTO: 0.1 K/UL (ref 0–0.11)
IMM GRANULOCYTES NFR BLD AUTO: 1.6 % (ref 0–0.9)
INR PPP: 1.3 (ref 0.87–1.13)
KETONES UR STRIP.AUTO-MCNC: NEGATIVE MG/DL
LACTATE BLD-SCNC: 0.5 MMOL/L (ref 0.5–2)
LACTATE SERPL-SCNC: 1 MMOL/L (ref 0.5–2)
LEUKOCYTE ESTERASE UR QL STRIP.AUTO: ABNORMAL
LYMPHOCYTES # BLD AUTO: 0.96 K/UL (ref 1–4.8)
LYMPHOCYTES NFR BLD: 15.7 % (ref 22–41)
MAGNESIUM SERPL-MCNC: 2.4 MG/DL (ref 1.5–2.5)
MCH RBC QN AUTO: 29.1 PG (ref 27–33)
MCHC RBC AUTO-ENTMCNC: 29.1 G/DL (ref 32.2–35.5)
MCV RBC AUTO: 100 FL (ref 81.4–97.8)
MICRO URNS: ABNORMAL
MONOCYTES # BLD AUTO: 0.08 K/UL (ref 0–0.85)
MONOCYTES NFR BLD AUTO: 1.3 % (ref 0–13.4)
NEUTROPHILS # BLD AUTO: 4.94 K/UL (ref 1.82–7.42)
NEUTROPHILS NFR BLD: 80.6 % (ref 44–72)
NITRITE UR QL STRIP.AUTO: NEGATIVE
NRBC # BLD AUTO: 0.04 K/UL
NRBC BLD-RTO: 0.7 /100 WBC (ref 0–0.2)
NT-PROBNP SERPL IA-MCNC: 8874 PG/ML (ref 0–125)
O2/TOTAL GAS SETTING VFR VENT: 50 %
PCO2 BLDA: 71 MMHG (ref 26–37)
PH BLDA: 7.25 [PH] (ref 7.4–7.5)
PH TEMP ADJ BLDA: 7.25 [PH] (ref 7.4–7.5)
PH UR STRIP.AUTO: 6 [PH] (ref 5–8)
PHOSPHATE SERPL-MCNC: 4.5 MG/DL (ref 2.5–4.5)
PLATELET # BLD AUTO: 187 K/UL (ref 164–446)
PMV BLD AUTO: 10.5 FL (ref 9–12.9)
PO2 BLDA: 75 MMHG (ref 64–87)
PO2 TEMP ADJ BLDA: 74 MMHG (ref 64–87)
POTASSIUM SERPL-SCNC: 5.1 MMOL/L (ref 3.6–5.5)
PROCALCITONIN SERPL-MCNC: 0.3 NG/ML
PROT SERPL-MCNC: 7 G/DL (ref 6–8.2)
PROT UR QL STRIP: 100 MG/DL
PROTHROMBIN TIME: 16.8 SEC (ref 12–14.6)
RBC # BLD AUTO: 4.43 M/UL (ref 4.2–5.4)
RBC # URNS HPF: ABNORMAL /HPF
RBC UR QL AUTO: NEGATIVE
RSV RNA SPEC QL NAA+PROBE: NEGATIVE
SAO2 % BLDA: 92 % (ref 93–99)
SARS-COV-2 RNA RESP QL NAA+PROBE: NOTDETECTED
SODIUM SERPL-SCNC: 141 MMOL/L (ref 135–145)
SP GR UR STRIP.AUTO: 1.02
SPECIMEN DRAWN FROM PATIENT: ABNORMAL
SPECIMEN SOURCE: NORMAL
TROPONIN T SERPL-MCNC: 17 NG/L (ref 6–19)
WBC # BLD AUTO: 6.1 K/UL (ref 4.8–10.8)
WBC #/AREA URNS HPF: ABNORMAL /HPF

## 2024-06-03 PROCEDURE — 87633 RESP VIRUS 12-25 TARGETS: CPT

## 2024-06-03 PROCEDURE — 81001 URINALYSIS AUTO W/SCOPE: CPT

## 2024-06-03 PROCEDURE — 84484 ASSAY OF TROPONIN QUANT: CPT

## 2024-06-03 PROCEDURE — 99291 CRITICAL CARE FIRST HOUR: CPT | Performed by: INTERNAL MEDICINE

## 2024-06-03 PROCEDURE — 99292 CRITICAL CARE ADDL 30 MIN: CPT | Performed by: INTERNAL MEDICINE

## 2024-06-03 PROCEDURE — 93005 ELECTROCARDIOGRAM TRACING: CPT | Performed by: INTERNAL MEDICINE

## 2024-06-03 PROCEDURE — 84145 PROCALCITONIN (PCT): CPT

## 2024-06-03 PROCEDURE — 770022 HCHG ROOM/CARE - ICU (200)

## 2024-06-03 PROCEDURE — 700102 HCHG RX REV CODE 250 W/ 637 OVERRIDE(OP): Performed by: INTERNAL MEDICINE

## 2024-06-03 PROCEDURE — 85025 COMPLETE CBC W/AUTO DIFF WBC: CPT

## 2024-06-03 PROCEDURE — 87798 DETECT AGENT NOS DNA AMP: CPT | Mod: 91

## 2024-06-03 PROCEDURE — 87581 M.PNEUMON DNA AMP PROBE: CPT

## 2024-06-03 PROCEDURE — 80053 COMPREHEN METABOLIC PANEL: CPT

## 2024-06-03 PROCEDURE — 700111 HCHG RX REV CODE 636 W/ 250 OVERRIDE (IP): Mod: JZ | Performed by: INTERNAL MEDICINE

## 2024-06-03 PROCEDURE — 87486 CHLMYD PNEUM DNA AMP PROBE: CPT

## 2024-06-03 PROCEDURE — 94760 N-INVAS EAR/PLS OXIMETRY 1: CPT

## 2024-06-03 PROCEDURE — 94660 CPAP INITIATION&MGMT: CPT

## 2024-06-03 PROCEDURE — 83735 ASSAY OF MAGNESIUM: CPT

## 2024-06-03 PROCEDURE — 87040 BLOOD CULTURE FOR BACTERIA: CPT | Mod: 91

## 2024-06-03 PROCEDURE — 83605 ASSAY OF LACTIC ACID: CPT | Mod: 91

## 2024-06-03 PROCEDURE — 0241U HCHG SARS-COV-2 COVID-19 NFCT DS RESP RNA 4 TRGT MIC: CPT

## 2024-06-03 PROCEDURE — 94799 UNLISTED PULMONARY SVC/PX: CPT

## 2024-06-03 PROCEDURE — 82803 BLOOD GASES ANY COMBINATION: CPT

## 2024-06-03 PROCEDURE — 85610 PROTHROMBIN TIME: CPT

## 2024-06-03 PROCEDURE — 93010 ELECTROCARDIOGRAM REPORT: CPT | Performed by: STUDENT IN AN ORGANIZED HEALTH CARE EDUCATION/TRAINING PROGRAM

## 2024-06-03 PROCEDURE — 84100 ASSAY OF PHOSPHORUS: CPT

## 2024-06-03 PROCEDURE — 83880 ASSAY OF NATRIURETIC PEPTIDE: CPT

## 2024-06-03 PROCEDURE — A9270 NON-COVERED ITEM OR SERVICE: HCPCS | Performed by: INTERNAL MEDICINE

## 2024-06-03 PROCEDURE — 82962 GLUCOSE BLOOD TEST: CPT

## 2024-06-03 RX ORDER — CARVEDILOL 6.25 MG/1
6.25 TABLET ORAL 2 TIMES DAILY WITH MEALS
Status: DISCONTINUED | OUTPATIENT
Start: 2024-06-03 | End: 2024-06-03

## 2024-06-03 RX ORDER — AMINO ACIDS/PROTEIN HYDROLYS 15G-100/30
30 LIQUID (ML) ORAL DAILY
Status: ON HOLD | COMMUNITY
End: 2024-06-06

## 2024-06-03 RX ORDER — SODIUM CHLORIDE 1 G/1
1 TABLET ORAL 3 TIMES DAILY
Status: ON HOLD | COMMUNITY
End: 2024-06-06

## 2024-06-03 RX ORDER — CYCLOBENZAPRINE HCL 10 MG
10 TABLET ORAL 3 TIMES DAILY PRN
Status: DISCONTINUED | OUTPATIENT
Start: 2024-06-03 | End: 2024-06-06 | Stop reason: HOSPADM

## 2024-06-03 RX ORDER — SENNOSIDES A AND B 8.6 MG/1
17.2 TABLET, FILM COATED ORAL DAILY
Status: ON HOLD | COMMUNITY
End: 2024-06-06

## 2024-06-03 RX ORDER — TOPIRAMATE 25 MG/1
25 TABLET ORAL 2 TIMES DAILY
Status: DISCONTINUED | OUTPATIENT
Start: 2024-06-03 | End: 2024-06-06 | Stop reason: HOSPADM

## 2024-06-03 RX ORDER — FUROSEMIDE 10 MG/ML
40 INJECTION INTRAMUSCULAR; INTRAVENOUS
Status: DISCONTINUED | OUTPATIENT
Start: 2024-06-03 | End: 2024-06-06 | Stop reason: HOSPADM

## 2024-06-03 RX ORDER — PREGABALIN 25 MG/1
75 CAPSULE ORAL 3 TIMES DAILY
Status: DISCONTINUED | OUTPATIENT
Start: 2024-06-03 | End: 2024-06-06 | Stop reason: HOSPADM

## 2024-06-03 RX ORDER — ACETAMINOPHEN 325 MG/1
650 TABLET ORAL 3 TIMES DAILY PRN
COMMUNITY

## 2024-06-03 RX ORDER — DIGOXIN 125 MCG
125 TABLET ORAL DAILY
Status: DISCONTINUED | OUTPATIENT
Start: 2024-06-04 | End: 2024-06-06 | Stop reason: HOSPADM

## 2024-06-03 RX ORDER — CALCIUM CARBONATE 500 MG/1
1000 TABLET, CHEWABLE ORAL 3 TIMES DAILY PRN
COMMUNITY

## 2024-06-03 RX ORDER — DIGOXIN 125 MCG
125 TABLET ORAL DAILY
COMMUNITY

## 2024-06-03 RX ORDER — IPRATROPIUM BROMIDE AND ALBUTEROL SULFATE 2.5; .5 MG/3ML; MG/3ML
3 SOLUTION RESPIRATORY (INHALATION)
Status: DISCONTINUED | OUTPATIENT
Start: 2024-06-03 | End: 2024-06-06 | Stop reason: HOSPADM

## 2024-06-03 RX ORDER — ENEMA 19; 7 G/133ML; G/133ML
1 ENEMA RECTAL
Status: ON HOLD | COMMUNITY
End: 2024-06-06

## 2024-06-03 RX ORDER — BISACODYL 10 MG
10 SUPPOSITORY, RECTAL RECTAL
Status: ON HOLD | COMMUNITY
End: 2024-06-06

## 2024-06-03 RX ORDER — HYDRALAZINE HYDROCHLORIDE 20 MG/ML
10 INJECTION INTRAMUSCULAR; INTRAVENOUS EVERY 6 HOURS PRN
Status: DISCONTINUED | OUTPATIENT
Start: 2024-06-03 | End: 2024-06-06 | Stop reason: HOSPADM

## 2024-06-03 RX ORDER — METOPROLOL TARTRATE 100 MG/1
100 TABLET ORAL 2 TIMES DAILY
Status: ON HOLD | COMMUNITY
End: 2024-06-06

## 2024-06-03 RX ORDER — UMECLIDINIUM 62.5 UG/1
1 AEROSOL, POWDER ORAL DAILY
COMMUNITY

## 2024-06-03 RX ORDER — DULOXETIN HYDROCHLORIDE 30 MG/1
60 CAPSULE, DELAYED RELEASE ORAL DAILY
Status: DISCONTINUED | OUTPATIENT
Start: 2024-06-04 | End: 2024-06-06 | Stop reason: HOSPADM

## 2024-06-03 RX ORDER — DILTIAZEM HYDROCHLORIDE 180 MG/1
180 CAPSULE, COATED, EXTENDED RELEASE ORAL DAILY
Status: DISCONTINUED | OUTPATIENT
Start: 2024-06-03 | End: 2024-06-03

## 2024-06-03 RX ADMIN — FUROSEMIDE 40 MG: 10 INJECTION, SOLUTION INTRAMUSCULAR; INTRAVENOUS at 20:14

## 2024-06-03 RX ADMIN — PREGABALIN 75 MG: 25 CAPSULE ORAL at 20:14

## 2024-06-03 RX ADMIN — TOPIRAMATE 25 MG: 25 TABLET, FILM COATED ORAL at 20:14

## 2024-06-03 RX ADMIN — INSULIN HUMAN 2 UNITS: 100 INJECTION, SOLUTION PARENTERAL at 18:01

## 2024-06-03 ASSESSMENT — COGNITIVE AND FUNCTIONAL STATUS - GENERAL
TOILETING: TOTAL
HELP NEEDED FOR BATHING: TOTAL
MOBILITY SCORE: 9
CLIMB 3 TO 5 STEPS WITH RAILING: TOTAL
DRESSING REGULAR LOWER BODY CLOTHING: A LOT
WALKING IN HOSPITAL ROOM: TOTAL
MOVING TO AND FROM BED TO CHAIR: A LOT
EATING MEALS: TOTAL
TURNING FROM BACK TO SIDE WHILE IN FLAT BAD: A LOT
DAILY ACTIVITIY SCORE: 7
STANDING UP FROM CHAIR USING ARMS: TOTAL
MOVING FROM LYING ON BACK TO SITTING ON SIDE OF FLAT BED: A LOT
SUGGESTED CMS G CODE MODIFIER MOBILITY: CM
SUGGESTED CMS G CODE MODIFIER DAILY ACTIVITY: CM
PERSONAL GROOMING: TOTAL
DRESSING REGULAR UPPER BODY CLOTHING: TOTAL

## 2024-06-03 ASSESSMENT — PAIN DESCRIPTION - PAIN TYPE: TYPE: CHRONIC PAIN

## 2024-06-03 ASSESSMENT — PULMONARY FUNCTION TESTS
EPAP_CMH2O: 8

## 2024-06-03 ASSESSMENT — ENCOUNTER SYMPTOMS
FEVER: 0
CHILLS: 0
WEIGHT LOSS: 0
SHORTNESS OF BREATH: 1

## 2024-06-03 ASSESSMENT — CHA2DS2 SCORE
CHF OR LEFT VENTRICULAR DYSFUNCTION: NO
SEX: FEMALE
DIABETES: YES
AGE 75 OR GREATER: NO
AGE 65 TO 74: NO
CHA2DS2 VASC SCORE: 4
PRIOR STROKE OR TIA OR THROMBOEMBOLISM: NO
HYPERTENSION: YES
VASCULAR DISEASE: YES

## 2024-06-03 ASSESSMENT — FIBROSIS 4 INDEX: FIB4 SCORE: 1.56

## 2024-06-03 NOTE — PROGRESS NOTES
[facility] DIRECT ADMIT PROGRESS NOTE     Transferring facility: [facility]     Transferring provider: Ramiro Mckeon MD     Chief complaint: SOB     Pertinent history & patient course:      63F COPD home O2, CHF, DM2, HTN, p/w SOB, bilateral infiltrates     7.28/71/46 on BIPAP 18/12, 50%  7.25/75/67 2 hrs later     Afebrile  WBC 7.4   Hb 12.5  Cr 1.3  K 4.8  CO2 31  BNP 58229     No wheezing     Rocephin/Azithromycin     Pertinent imaging & lab results: see above     Consultants called prior to transfer and pertinent input from consultants: see above     Code Status: Full code per transferring provider, I personally verified with the transferring provider patient's code status and the transferring provider has confirmed this with the patient.     Reason for Transfer: HLOC     Further work up or recommendations requested prior to transfer: n/a     Patient accepted for transfer: Yes  Accepting [facility] Facility: [facility] Nursing to notify the admitting provider when patient arrives to the unit.     Consultants to be called upon arrival: none  Admission status: Inpatient.   Floor requested: SMICU     The admitting provider is the point of contact for questions or concerns regarding the patient's care.      This note was generated using voice recognition software which has a chance of producing errors of grammar and content.  I have made every reasonable attempt to find and correct any errors, but it should be expected that some may not be found prior to finalization of this note.  __________  Quinn Chaudhry MD  Pulmonary and Critical Care Medicine  [facility]

## 2024-06-03 NOTE — RESPIRATORY CARE
Adult Ventilation Update    Total Vent Days:               Placed on  BIPAP 16/8 50% no distress noted.

## 2024-06-03 NOTE — TELEPHONE ENCOUNTER
[facility] DIRECT ADMIT PROGRESS NOTE    Transferring facility: [facility]    Transferring provider: Ramiro Mckeon MD    Chief complaint: SOB    Pertinent history & patient course:     63F COPD home O2, CHF, DM2, HTN, p/w SOB, bilateral infiltrates    7.28/71/46 on BIPAP 18/12, 50%  7.25/75/67 2 hrs later    Afebrile  WBC 7.4   Hb 12.5  Cr 1.3  K 4.8  CO2 31  BNP 87057    No wheezing    Rocephin/Azithromycin    Pertinent imaging & lab results: see above    Consultants called prior to transfer and pertinent input from consultants: see above    Code Status: Full code per transferring provider, I personally verified with the transferring provider patient's code status and the transferring provider has confirmed this with the patient.    Reason for Transfer: HLOC    Further work up or recommendations requested prior to transfer: n/a    Patient accepted for transfer: Yes  Accepting [facility] Facility: [facility] Nursing to notify the admitting provider when patient arrives to the unit.    Consultants to be called upon arrival: none  Admission status: Inpatient.   Floor requested: SMICU    The admitting provider is the point of contact for questions or concerns regarding the patient's care.     This note was generated using voice recognition software which has a chance of producing errors of grammar and content.  I have made every reasonable attempt to find and correct any errors, but it should be expected that some may not be found prior to finalization of this note.  __________  uQinn Chaudhry MD  Pulmonary and Critical Care Medicine  [facility]

## 2024-06-03 NOTE — PROGRESS NOTES
4 Eyes Skin Assessment Completed by JOSEPH Hughes and JOSEPH Almaraz.    Head WDL  Ears WDL  Nose WDL  Mouth WDL  Neck Redness  Breast/Chest WDL  Shoulder Blades Redness and Blanching  Spine Redness and Blanching  Bruising/Discoloration  (R) Arm/Elbow/Hand WDL  (L) Arm/Elbow/Hand WDL  Abdomen Scar  Groin Redness and Excoriation  Scrotum/Coccyx/Buttocks Redness and Blanching  (R) Leg Scar  (L) Leg Scar  (R) Heel/Foot/Toe Redness, Blanching, and Discoloration  (L) Heel/Foot/Toe Redness, Blanching, and Discoloration        Devices In Places ECG, Blood Pressure Cuff, Pulse Ox, Cespedes, SCD's, and Bipap    Interventions In Place Bipap Protecta-Gel, TAP System, Pillows, Low Air Loss Mattress, and Heels Loaded W/Pillows    Possible Skin Injury Yes  Pictures Uploaded Into Epic Yes  Wound Consult Placed N/A  RN Wound Prevention Protocol Ordered No

## 2024-06-04 ENCOUNTER — APPOINTMENT (OUTPATIENT)
Dept: CARDIOLOGY | Facility: MEDICAL CENTER | Age: 64
DRG: 291 | End: 2024-06-04
Attending: INTERNAL MEDICINE
Payer: MEDICARE

## 2024-06-04 PROBLEM — E87.5 HYPERKALEMIA: Status: ACTIVE | Noted: 2024-06-04

## 2024-06-04 LAB
ALBUMIN SERPL BCP-MCNC: 3.4 G/DL (ref 3.2–4.9)
ALBUMIN/GLOB SERPL: 1.1 G/DL
ALP SERPL-CCNC: 144 U/L (ref 30–99)
ALT SERPL-CCNC: 36 U/L (ref 2–50)
ANION GAP SERPL CALC-SCNC: 8 MMOL/L (ref 7–16)
ANION GAP SERPL CALC-SCNC: 9 MMOL/L (ref 7–16)
ARTERIAL PATENCY WRIST A: ABNORMAL
AST SERPL-CCNC: 23 U/L (ref 12–45)
B PARAP IS1001 DNA NPH QL NAA+NON-PROBE: NOT DETECTED
B PERT.PT PRMT NPH QL NAA+NON-PROBE: NOT DETECTED
BASE EXCESS BLDA CALC-SCNC: 7 MMOL/L (ref -4–3)
BASOPHILS # BLD AUTO: 0.2 % (ref 0–1.8)
BASOPHILS # BLD: 0.01 K/UL (ref 0–0.12)
BILIRUB SERPL-MCNC: 0.7 MG/DL (ref 0.1–1.5)
BODY TEMPERATURE: ABNORMAL DEGREES
BUN SERPL-MCNC: 30 MG/DL (ref 8–22)
BUN SERPL-MCNC: 31 MG/DL (ref 8–22)
C PNEUM DNA NPH QL NAA+NON-PROBE: NOT DETECTED
CALCIUM ALBUM COR SERPL-MCNC: 9.3 MG/DL (ref 8.5–10.5)
CALCIUM SERPL-MCNC: 8.8 MG/DL (ref 8.4–10.2)
CALCIUM SERPL-MCNC: 8.8 MG/DL (ref 8.4–10.2)
CHLORIDE SERPL-SCNC: 100 MMOL/L (ref 96–112)
CHLORIDE SERPL-SCNC: 101 MMOL/L (ref 96–112)
CO2 BLDA-SCNC: 35 MMOL/L (ref 20–33)
CO2 SERPL-SCNC: 30 MMOL/L (ref 20–33)
CO2 SERPL-SCNC: 35 MMOL/L (ref 20–33)
CREAT SERPL-MCNC: 1.12 MG/DL (ref 0.5–1.4)
CREAT SERPL-MCNC: 1.23 MG/DL (ref 0.5–1.4)
DELSYS IDSYS: ABNORMAL
DIGOXIN SERPL-MCNC: 0.9 NG/ML (ref 0.8–2)
EKG IMPRESSION: NORMAL
EOSINOPHIL # BLD AUTO: 0 K/UL (ref 0–0.51)
EOSINOPHIL NFR BLD: 0 % (ref 0–6.9)
ERYTHROCYTE [DISTWIDTH] IN BLOOD BY AUTOMATED COUNT: 80 FL (ref 35.9–50)
FLUAV RNA NPH QL NAA+NON-PROBE: NOT DETECTED
FLUBV RNA NPH QL NAA+NON-PROBE: NOT DETECTED
GFR SERPLBLD CREATININE-BSD FMLA CKD-EPI: 49 ML/MIN/1.73 M 2
GFR SERPLBLD CREATININE-BSD FMLA CKD-EPI: 55 ML/MIN/1.73 M 2
GLOBULIN SER CALC-MCNC: 3.1 G/DL (ref 1.9–3.5)
GLUCOSE BLD STRIP.AUTO-MCNC: 108 MG/DL (ref 65–99)
GLUCOSE BLD STRIP.AUTO-MCNC: 110 MG/DL (ref 65–99)
GLUCOSE BLD STRIP.AUTO-MCNC: 125 MG/DL (ref 65–99)
GLUCOSE BLD STRIP.AUTO-MCNC: 128 MG/DL (ref 65–99)
GLUCOSE BLD STRIP.AUTO-MCNC: 161 MG/DL (ref 65–99)
GLUCOSE BLD STRIP.AUTO-MCNC: 95 MG/DL (ref 65–99)
GLUCOSE SERPL-MCNC: 141 MG/DL (ref 65–99)
GLUCOSE SERPL-MCNC: 94 MG/DL (ref 65–99)
HADV DNA NPH QL NAA+NON-PROBE: NOT DETECTED
HCO3 BLDA-SCNC: 33.7 MMOL/L (ref 17–25)
HCOV 229E RNA NPH QL NAA+NON-PROBE: NOT DETECTED
HCOV HKU1 RNA NPH QL NAA+NON-PROBE: NOT DETECTED
HCOV NL63 RNA NPH QL NAA+NON-PROBE: NOT DETECTED
HCOV OC43 RNA NPH QL NAA+NON-PROBE: NOT DETECTED
HCT VFR BLD AUTO: 42 % (ref 37–47)
HGB BLD-MCNC: 12.3 G/DL (ref 12–16)
HMPV RNA NPH QL NAA+NON-PROBE: NOT DETECTED
HOROWITZ INDEX BLDA+IHG-RTO: 146 MM[HG]
HPIV1 RNA NPH QL NAA+NON-PROBE: NOT DETECTED
HPIV2 RNA NPH QL NAA+NON-PROBE: NOT DETECTED
HPIV3 RNA NPH QL NAA+NON-PROBE: NOT DETECTED
HPIV4 RNA NPH QL NAA+NON-PROBE: NOT DETECTED
IMM GRANULOCYTES # BLD AUTO: 0.03 K/UL (ref 0–0.11)
IMM GRANULOCYTES NFR BLD AUTO: 0.5 % (ref 0–0.9)
LACTATE BLD-SCNC: 0.9 MMOL/L (ref 0.5–2)
LV EJECT FRACT  99904: 64
LV EJECT FRACT MOD 2C 99903: 59.72
LV EJECT FRACT MOD 4C 99902: 69.93
LV EJECT FRACT MOD BP 99901: 64.62
LYMPHOCYTES # BLD AUTO: 1.82 K/UL (ref 1–4.8)
LYMPHOCYTES NFR BLD: 28.8 % (ref 22–41)
M PNEUMO DNA NPH QL NAA+NON-PROBE: NOT DETECTED
MAGNESIUM SERPL-MCNC: 2.2 MG/DL (ref 1.5–2.5)
MCH RBC QN AUTO: 29.5 PG (ref 27–33)
MCHC RBC AUTO-ENTMCNC: 29.3 G/DL (ref 32.2–35.5)
MCV RBC AUTO: 100.7 FL (ref 81.4–97.8)
MONOCYTES # BLD AUTO: 0.4 K/UL (ref 0–0.85)
MONOCYTES NFR BLD AUTO: 6.3 % (ref 0–13.4)
NEUTROPHILS # BLD AUTO: 4.05 K/UL (ref 1.82–7.42)
NEUTROPHILS NFR BLD: 64.2 % (ref 44–72)
NRBC # BLD AUTO: 0.02 K/UL
NRBC BLD-RTO: 0.3 /100 WBC (ref 0–0.2)
O2/TOTAL GAS SETTING VFR VENT: 50 %
PCO2 BLDA: 57.2 MMHG (ref 26–37)
PH BLDA: 7.38 [PH] (ref 7.4–7.5)
PH TEMP ADJ BLDA: 7.38 [PH] (ref 7.4–7.5)
PHOSPHATE SERPL-MCNC: 4.9 MG/DL (ref 2.5–4.5)
PLATELET # BLD AUTO: 190 K/UL (ref 164–446)
PMV BLD AUTO: 11.2 FL (ref 9–12.9)
PO2 BLDA: 73 MMHG (ref 64–87)
PO2 TEMP ADJ BLDA: 72 MMHG (ref 64–87)
POTASSIUM SERPL-SCNC: 3.9 MMOL/L (ref 3.6–5.5)
POTASSIUM SERPL-SCNC: 5.6 MMOL/L (ref 3.6–5.5)
PROT SERPL-MCNC: 6.5 G/DL (ref 6–8.2)
RBC # BLD AUTO: 4.17 M/UL (ref 4.2–5.4)
RSV RNA NPH QL NAA+NON-PROBE: NOT DETECTED
RV+EV RNA NPH QL NAA+NON-PROBE: NOT DETECTED
SAO2 % BLDA: 94 % (ref 93–99)
SARS-COV-2 RNA NPH QL NAA+NON-PROBE: NOTDETECTED
SODIUM SERPL-SCNC: 140 MMOL/L (ref 135–145)
SODIUM SERPL-SCNC: 143 MMOL/L (ref 135–145)
SPECIMEN DRAWN FROM PATIENT: ABNORMAL
WBC # BLD AUTO: 6.3 K/UL (ref 4.8–10.8)

## 2024-06-04 PROCEDURE — 93005 ELECTROCARDIOGRAM TRACING: CPT | Performed by: INTERNAL MEDICINE

## 2024-06-04 PROCEDURE — 94660 CPAP INITIATION&MGMT: CPT

## 2024-06-04 PROCEDURE — 82962 GLUCOSE BLOOD TEST: CPT | Mod: 91

## 2024-06-04 PROCEDURE — 93306 TTE W/DOPPLER COMPLETE: CPT

## 2024-06-04 PROCEDURE — 82803 BLOOD GASES ANY COMBINATION: CPT

## 2024-06-04 PROCEDURE — 99291 CRITICAL CARE FIRST HOUR: CPT | Performed by: INTERNAL MEDICINE

## 2024-06-04 PROCEDURE — 80048 BASIC METABOLIC PNL TOTAL CA: CPT

## 2024-06-04 PROCEDURE — A9270 NON-COVERED ITEM OR SERVICE: HCPCS | Performed by: INTERNAL MEDICINE

## 2024-06-04 PROCEDURE — 94640 AIRWAY INHALATION TREATMENT: CPT

## 2024-06-04 PROCEDURE — 84100 ASSAY OF PHOSPHORUS: CPT

## 2024-06-04 PROCEDURE — 93010 ELECTROCARDIOGRAM REPORT: CPT | Performed by: INTERNAL MEDICINE

## 2024-06-04 PROCEDURE — 83605 ASSAY OF LACTIC ACID: CPT

## 2024-06-04 PROCEDURE — 80162 ASSAY OF DIGOXIN TOTAL: CPT

## 2024-06-04 PROCEDURE — 85025 COMPLETE CBC W/AUTO DIFF WBC: CPT

## 2024-06-04 PROCEDURE — 700102 HCHG RX REV CODE 250 W/ 637 OVERRIDE(OP): Performed by: INTERNAL MEDICINE

## 2024-06-04 PROCEDURE — 80053 COMPREHEN METABOLIC PANEL: CPT

## 2024-06-04 PROCEDURE — 83735 ASSAY OF MAGNESIUM: CPT

## 2024-06-04 PROCEDURE — 94760 N-INVAS EAR/PLS OXIMETRY 1: CPT

## 2024-06-04 PROCEDURE — 93306 TTE W/DOPPLER COMPLETE: CPT | Mod: 26 | Performed by: INTERNAL MEDICINE

## 2024-06-04 PROCEDURE — 700111 HCHG RX REV CODE 636 W/ 250 OVERRIDE (IP): Mod: JZ | Performed by: INTERNAL MEDICINE

## 2024-06-04 PROCEDURE — 770022 HCHG ROOM/CARE - ICU (200)

## 2024-06-04 PROCEDURE — 700105 HCHG RX REV CODE 258: Performed by: INTERNAL MEDICINE

## 2024-06-04 RX ADMIN — TIOTROPIUM BROMIDE INHALATION SPRAY 5 MCG: 3.12 SPRAY, METERED RESPIRATORY (INHALATION) at 07:32

## 2024-06-04 RX ADMIN — RIVAROXABAN 20 MG: 20 TABLET, FILM COATED ORAL at 17:39

## 2024-06-04 RX ADMIN — SODIUM ZIRCONIUM CYCLOSILICATE 5 G: 5 POWDER, FOR SUSPENSION ORAL at 09:44

## 2024-06-04 RX ADMIN — INSULIN HUMAN 8 UNITS: 100 INJECTION, SOLUTION PARENTERAL at 09:37

## 2024-06-04 RX ADMIN — DIGOXIN 125 MCG: 0.12 TABLET ORAL at 05:13

## 2024-06-04 RX ADMIN — DEXTROSE MONOHYDRATE 25 G: 100 INJECTION, SOLUTION INTRAVENOUS at 09:33

## 2024-06-04 RX ADMIN — DULOXETINE HYDROCHLORIDE 60 MG: 30 CAPSULE, DELAYED RELEASE ORAL at 05:13

## 2024-06-04 RX ADMIN — INSULIN HUMAN 2 UNITS: 100 INJECTION, SOLUTION PARENTERAL at 00:14

## 2024-06-04 RX ADMIN — TOPIRAMATE 25 MG: 25 TABLET, FILM COATED ORAL at 05:14

## 2024-06-04 RX ADMIN — PREGABALIN 75 MG: 25 CAPSULE ORAL at 17:39

## 2024-06-04 RX ADMIN — TOPIRAMATE 25 MG: 25 TABLET, FILM COATED ORAL at 17:39

## 2024-06-04 RX ADMIN — FUROSEMIDE 40 MG: 10 INJECTION, SOLUTION INTRAMUSCULAR; INTRAVENOUS at 17:44

## 2024-06-04 RX ADMIN — PREGABALIN 75 MG: 25 CAPSULE ORAL at 05:13

## 2024-06-04 RX ADMIN — SODIUM ZIRCONIUM CYCLOSILICATE 5 G: 5 POWDER, FOR SUSPENSION ORAL at 12:59

## 2024-06-04 RX ADMIN — PREGABALIN 75 MG: 25 CAPSULE ORAL at 12:57

## 2024-06-04 RX ADMIN — FUROSEMIDE 40 MG: 10 INJECTION, SOLUTION INTRAMUSCULAR; INTRAVENOUS at 05:14

## 2024-06-04 ASSESSMENT — ENCOUNTER SYMPTOMS
WEIGHT LOSS: 0
FEVER: 0
CHILLS: 0
SHORTNESS OF BREATH: 1

## 2024-06-04 ASSESSMENT — PAIN DESCRIPTION - PAIN TYPE
TYPE: ACUTE PAIN

## 2024-06-04 ASSESSMENT — PULMONARY FUNCTION TESTS
EPAP_CMH2O: 8

## 2024-06-04 NOTE — ASSESSMENT & PLAN NOTE
Chronic: RUTH/OHS/COPD/pHTN with 3-4L O2 baseline  Acute: Pulm edema 2/2 decompensated HFpEF/pHTN  Initially CXR at OSH read as multifocal PNA; symptoms sudden in onset, no sick contacts, CXR reviewed personally, nonspecific lower lobe opacities and pulm vascular congestion; wbc normal, afebrile, covid/flu/rsv negative. Clinically wet/overloaded. BNP 3x prior level. Procal minimally elevated  Treating as decompensated biventricular failure with HFpEF/RHF, improving with aggressive diuresis  BIPAP needs improving with diuresis -> cont HFNC day/BIPAP QHS  Resume MTP, cont lasix 40mg IV BID, telemetry  Cont diuretics as BP and renal function permit  Cont to hold off on further IVF/abx  Repeat TTE reviewed

## 2024-06-04 NOTE — CARE PLAN
The patient is Watcher - Medium risk of patient condition declining or worsening    Shift Goals  Clinical Goals: monitor vitals, labs, and medications  Patient Goals: water  Family Goals: jean    Progress made toward(s) clinical / shift goals:  Patient to be wean off oxygen today, will try to get patient to side of bed with assistance and advance diet.  Patient has been educated on fluid restriction of 1200cc.    Patient is not progressing towards the following goals:      Problem: Knowledge Deficit - Standard  Goal: Patient and family/care givers will demonstrate understanding of plan of care, disease process/condition, diagnostic tests and medications  Outcome: Progressing     Problem: Pain - Standard  Goal: Alleviation of pain or a reduction in pain to the patient’s comfort goal  Outcome: Met     Problem: Hemodynamics  Goal: Patient's hemodynamics, fluid balance and neurologic status will be stable or improve  Outcome: Progressing     Problem: Fluid Volume  Goal: Fluid volume balance will be maintained  Outcome: Progressing     Problem: Urinary - Renal Perfusion  Goal: Ability to achieve and maintain adequate renal perfusion and functioning will improve  Outcome: Progressing     Problem: Respiratory  Goal: Patient will achieve/maintain optimum respiratory ventilation and gas exchange  Outcome: Progressing     Problem: Skin Integrity  Goal: Skin integrity is maintained or improved  Outcome: Progressing     Problem: Fall Risk  Goal: Patient will remain free from falls  Outcome: Progressing

## 2024-06-04 NOTE — DIETARY
NUTRITION SERVICES: BMI - Pt with BMI >40 (=Body mass index is 56.63 kg/m².), Class III obesity. Weight loss counseling not appropriate in acute care setting. RECOMMEND - If appropriate at DC please refer to outpatient nutrition services for weight management.

## 2024-06-04 NOTE — PROGRESS NOTES
12-hour chart check complete.    Monitor Summary  Rhythm: afib  Rate: 7-100  Ectopy: pvcs  Measurements: -/0.10/-

## 2024-06-04 NOTE — ASSESSMENT & PLAN NOTE
Due to cardiorenal syndrome, improving with IV diuretics  [facility] dose all meds  Cont floyd for strict Ios  Avoid nephrotoxins  Trend CMP

## 2024-06-04 NOTE — ASSESSMENT & PLAN NOTE
Improving, restart 1/2 home MTP dose  Cont to hold ACEi due to CARMELO  Cont to hold home dilt  Cont PRN hydralazine

## 2024-06-04 NOTE — PROGRESS NOTES
12-hour chart check complete.    Monitor Summary  Rhythm: afib  Rate: 56-74  Ectopy: N/A  Measurements: -/.10/-

## 2024-06-04 NOTE — CARE PLAN
Problem: Ventilation  Goal: Ability to achieve and maintain unassisted ventilation or tolerate decreased levels of ventilator support  Description: Target End Date:  4 days     Document on Vent flowsheet    1.  Support and monitor invasive and noninvasive mechanical ventilation  2.  Monitor ventilator weaning response  3.  Perform ventilator associated pneumonia prevention interventions  4.  Manage ventilation therapy by monitoring diagnostic test results  Outcome: Progressing     Pt remained on BiPAP 24/8 50%, No BiPAP settings changed this shift.

## 2024-06-04 NOTE — ED NOTES
Medication history updated per MAR from [facility].     Only June was send so other medications used in the past 30 days could not be documented.    No antibiotics on MAR for June 2024.    Patient is on xarelto 20mg once daily dose in the morning. Last dose 6/3/24.    Sarath Hill, PharmD, BCPS

## 2024-06-04 NOTE — PROGRESS NOTES
"Critical Care Progress Note    Date of admission  6/3/2024    Chief Complaint  63 y.o. female admitted 6/3/2024 with Acute on chronic hypoxemic and hypercapnic respiratory failure requiring rescue BiPAP     Hospital Course  63 y.o. female who presented 6/3/2024 with a history of morbid obesity Body mass index is 56.63 kg/m²., COPD on 4 LPM baseline, RUTH, hypertension, HFrEF EF 50%, RV severely dilated with an reduced with severe TR and moderate MR, systemic hypertension, type 2 diabetes on metformin, brought in from rehab facility for sudden onset of hypoxia at 8 AM this morning.  Per report, she was eating breakfast, became suddenly hypoxic saturating down to high 70s on 6 LPM, baseline 4 LPM. Son Mynor says she was in her \"usual\" state of health just yesterday.     On arrival to outside hospital ER she was in respiratory distress, placed on BiPAP 18/12 50%, ABG showed acute on chronic hypercapnic respiratory failure 7.28/71/46, 2 hours later ABG no improvement 7.25/75/67.  According to outside records she was normotensive on arrival. Labs notable for WBC 7.4, hemoglobin 12.5, creatinine 1.3, K4.8, CO2 31, BNP 11,000.  Flu/COVID/RSV swab negative. CXR personally reviewed, demonstrating cardiomegaly, pulmonary vascular congestion, bilateral lower lobe opacities, cannot rule out underlying infiltrate. She was started on rocephin and azithromycin.     On arrival to [facility], she was hypertensive, SBP in 190s.  Awake and following commands but slowed fluent speech and nonlinear thoughts on BiPAP. Repeat ABG 7.25/71/75. Settings increased to BIPAP 16/8 rate 20.     She has a history of multiple admissions at our facility for respiratory failure, most recently in 3/2024 for hypertensive emergency with resultant flash pulmonary edema, prior to that was hospitalized for E. coli bacteremia and A-fib with RVR.  Following her most recent hospitalization she was discharged to LTAC.     Last TTE  2/2024: LVEF 50% predicted, severely " dilated RV with reduced RV SF, estimated RVSP 85 mmHg.  No significant valvulopathy    Interval Problem Update  Chart review from the past 24 hours includes imaging, laboratory studies, vital signs and notes available.  Pertinent data for today's visit includes    Remained on BIPAP overnight  No acute events   Hyperkalemic this AM- ECG shows septal ST depressions, stable since prior.  No peaked T waves.  QRS/intervals stable.    Cardiac: AF, rates 50-70s, SBP 160s, dig level 0.9, trop wnl. BNP 8874   Pulm: BIPAP 18/12, FiO2 50%. 7.37/57/73  Neuro: somnolent, arouses to voice, oriented to self/place/purpose  Heme: stable, no leukocytosis  Renal/volume status: net -3.4L. GFR 50, Cr 1.23, baseline 0.8. UA few bact, small LE  ID:  AF, procal minimally elevated, WBC wnl, holding off on abx for now with no clear source   GI/endo: sips with meds  Labs/Imaging: K noted- giving insulin/d50/lokelma, repeating ECG/BMP  Lines: floyd    Review of Systems  Review of Systems   Constitutional:  Negative for chills, fever and weight loss.   Respiratory:  Positive for shortness of breath.    Cardiovascular:  Negative for chest pain.      Vital Signs for last 24 hours   Temp:  [35.7 °C (96.3 °F)-36.9 °C (98.4 °F)] 36.5 °C (97.7 °F)  Pulse:  [56-80] 74  Resp:  [11-30] 20  BP: (115-190)/(54-97) 146/91  SpO2:  [89 %-99 %] 92 %    Physical Exam   Physical Exam  Vitals and nursing note reviewed. Exam conducted with a chaperone present.   Constitutional:       General: She is awake. She is not in acute distress.     Appearance: She is morbidly obese.   HENT:      Head: Normocephalic.      Mouth/Throat:      Mouth: Mucous membranes are moist.   Eyes:      Extraocular Movements: Extraocular movements intact.   Cardiovascular:      Rate and Rhythm: Normal rate and regular rhythm.      Pulses: Normal pulses.   Pulmonary:      Effort: Pulmonary effort is normal. Tachypnea present. No respiratory distress.      Breath sounds: No wheezing, rhonchi  or rales.      Comments: On BiPAP  Abdominal:      General: There is no distension.      Palpations: Abdomen is soft.      Tenderness: There is no abdominal tenderness. There is no guarding or rebound.   Musculoskeletal:         General: Normal range of motion.      Cervical back: Normal range of motion and neck supple.      Right lower leg: Edema present.      Left lower leg: Edema present.   Skin:     General: Skin is warm and dry.      Capillary Refill: Capillary refill takes less than 2 seconds.   Neurological:      General: No focal deficit present.      Mental Status: She is lethargic and disoriented.      Cranial Nerves: No facial asymmetry.      Motor: No seizure activity.   Psychiatric:         Attention and Perception: She is inattentive.       Medications  Current Facility-Administered Medications   Medication Dose Route Frequency Provider Last Rate Last Admin    sodium zirconium cyclosilicate (Lokelma) packet 5 g  5 g Oral TID Quinn Chaudhry M.D.   5 g at 06/04/24 0944    cyclobenzaprine (Flexeril) tablet 10 mg  10 mg Oral TID PRN Quinn Chaudhry M.D.        DULoxetine (Cymbalta) capsule 60 mg  60 mg Oral DAILY Quinn Chaudhry M.D.   60 mg at 06/04/24 0513    pregabalin (Lyrica) capsule 75 mg  75 mg Oral TID Quinn Chaudhry M.D.   75 mg at 06/04/24 0513    rivaroxaban (Xarelto) tablet 20 mg  20 mg Oral PM MEAL Quinn Chaudhry M.D.        topiramate (Topamax) tablet 25 mg  25 mg Oral BID Quinn Chaudhry M.D.   25 mg at 06/04/24 0514    Respiratory Therapy Consult   Nebulization Continuous RT Quinn Chaudhry M.D.        insulin regular (HumuLIN R,NovoLIN R) injection  2-9 Units Subcutaneous Q6HRS Quinn Chaudhry M.D.   2 Units at 06/04/24 0014    And    dextrose 10 % BOLUS 25 g  25 g Intravenous Q15 MIN PRN Quinn Chaudhry M.D.        ipratropium-albuterol (DUONEB) nebulizer solution  3 mL Nebulization Q4H PRN (RT) Quinn Chaudhry M.D.        digoxin (Lanoxin) tablet 125 mcg  125 mcg  Oral DAILY Quinn Chaudhry M.D.   125 mcg at 06/04/24 0513    furosemide (Lasix) injection 40 mg  40 mg Intravenous BID DIURETIC Quinn Chaudhry M.D.   40 mg at 06/04/24 0514    hydrALAZINE (Apresoline) injection 10 mg  10 mg Intravenous Q6HRS PRN Quinn Chaudhry M.D.        MD Alert...ICU Electrolyte Replacement per Pharmacy   Other PHARMACY TO DOSE Quinn Chaudhry M.D.         Fluids    Intake/Output Summary (Last 24 hours) at 6/4/2024 1118  Last data filed at 6/4/2024 0800  Gross per 24 hour   Intake 60 ml   Output 3925 ml   Net -3865 ml     Laboratory  Recent Labs     06/03/24  1652 06/04/24  0916   ISTATAPH 7.254* 7.379*   ISTATAPCO2 71.0* 57.2*   ISTATAPO2 75 73   ISTATATCO2 34* 35*   MOWALPQ7STF 92* 94   ISTATARTHCO3 31.5* 33.7*   ISTATARTBE 2 7*   ISTATTEMP 98.5 F 36.8 C   ISTATFIO2 50 50   ISTATSPEC Arterial Arterial   ISTATAPHTC 7.255* 7.382*   [id number] 74 72         Recent Labs     06/03/24 1745 06/04/24  0330   SODIUM 141 140   POTASSIUM 5.1 5.6*   CHLORIDE 101 101   CO2 27 30   BUN 30* 30*   CREATININE 1.22 1.23   MAGNESIUM 2.4 2.2   PHOSPHORUS 4.5 4.9*   CALCIUM 8.5 8.8     Recent Labs     06/03/24 1745 06/04/24  0330   ALTSGPT 45 36   ASTSGOT 28 23   ALKPHOSPHAT 169* 144*   TBILIRUBIN 0.7 0.7   GLUCOSE 175* 141*     Recent Labs     06/03/24  1745 06/04/24  0330   WBC 6.1 6.3   NEUTSPOLYS 80.60* 64.20   LYMPHOCYTES 15.70* 28.80   MONOCYTES 1.30 6.30   EOSINOPHILS 0.30 0.00   BASOPHILS 0.50 0.20   ASTSGOT 28 23   ALTSGPT 45 36   ALKPHOSPHAT 169* 144*   TBILIRUBIN 0.7 0.7     Recent Labs     06/03/24 1745 06/04/24  0330   RBC 4.43 4.17*   HEMOGLOBIN 12.9 12.3   HEMATOCRIT 44.3 42.0   PLATELETCT 187 190   PROTHROMBTM 16.8*  --    INR 1.30*  --      Imaging  Echo:   pending    Assessment/Plan    * Acute on chronic respiratory failure with hypoxia and hypercapnia (HCC)- (present on admission)  Assessment & Plan  Chronic: RUTH/OHS/COPD/pHTN with 3-4L O2 baseline  Acute: Pulm edema 2/2  decompensated HFpEF/pHTN  Initially CXR at OSH read as multifocal PNA; symptoms sudden in onset, no sick contacts, CXR reviewed personally, nonspecific lower lobe opacities and pulm vascular congestion; wbc normal, afebrile, covid/flu/rsv negative. Clinically wet/overloaded. BNP 3x prior level. Procal minimally elevated  Treating as decompensated biventricular failure with HFpEF/RHF  BIPAP needs improving with diuresis -> start HFNC day/BIPAP QHS  Holding MTP, cont lasix 40mg IV BID, telemetry  Hold off on further IVF/abx  F/u repeat TTE    Moderate to severe pulmonary hypertension (HCC)- (present on admission)  Assessment & Plan  Last TTE  2/2024: LVEF 50% predicted, severely dilated RV with reduced RVSF, estimated RVSP 85 mmHg.  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  Plan of care as outlined above.    Hyperkalemia- (present on admission)  Assessment & Plan  ECG shows septal ST depressions, stable since prior.  No peaked T waves.  QRS/intervals stable.  giving insulin/d50/lokelma  repeating ECG/BMP    CARMELO (acute kidney injury) (HCC)- (present on admission)  Assessment & Plan  Due to cardiorenal syndrome  IV diuretics  Renally dose all meds  Cespedes for strict Ios  Avoid nephrotoxins  Trend CMP    Morbid (severe) obesity with alveolar hypoventilation (HCC)- (present on admission)  Assessment & Plan  Chronic CO2 retention with acute worsening requiring rescue BIPAP  Plan of care as outlined above.    Morbid obesity with BMI of 50.0-59.9, adult (HCC)- (present on admission)  Assessment & Plan  Body mass index is 56.63 kg/m².  Will dose-adjust medications appropriately based on larger volume of drug distribution.    COPD (chronic obstructive pulmonary disease) (HCC)- (present on admission)  Assessment & Plan  Per report yet pt denies  Distant < 10 pack year smoking hx  No emphysema on CT from 2018  No prior PFTs  DC incruse  3L at  baseline  Duonebs PRN    Diabetes (HCC)- (present on admission)  Assessment & Plan  RISS  Last A1c 6.8%    Essential hypertension- (present on admission)  Assessment & Plan  Hold home MTP due to current rate control and decompensated HF  Cont to hold ACEi due to CARMELO  Cont PRN hydralazine    Persistent atrial fibrillation (HCC)- (present on admission)  Assessment & Plan  Cont Xarelto  Cont digoxin, level therapeutic, rates controlled cont to hold MTP       VTE:  NOAC  Ulcer: Not Indicated  Lines: Cespedes Catheter  Ongoing indication addressed    I have performed a physical exam and reviewed and updated ROS and Plan today (6/4/2024). In review of yesterday's note (6/3/2024), there are no changes except as documented above.     Discussed patient condition and risk of morbidity and/or mortality with Family, RN, RT, Pharmacy, and Patient    The patient remains critically ill.  Critical care time = 55 minutes in directly providing and coordinating critical care and extensive data review.  No time overlap and excludes procedures.    This note was generated using voice recognition software which has a chance of producing errors of grammar and content.  I have made every reasonable attempt to find and correct any errors, but it should be expected that some may not be found prior to finalization of this note.  __________  Quinn Chaudhry MD  Pulmonary and Critical Care Medicine  [facility]

## 2024-06-04 NOTE — RESPIRATORY CARE
Respiratory Therapy Update                       Cough: Strong;Non Productive (06/04/24 0930)      ABGs on bipap 16/8 50% then changed over to HHFNC 30 LPM & 50% FI02 per Dr Chaudhry        Latest Reference Range & Units 06/04/24 09:16   Body Temp degrees 36.8 C   DelSys  NIV   O2 Therapy % 50   Specimen  Arterial    Test  N/A   Ph 7.400 - 7.500  7.379 (L)   Pco2 26.0 - 37.0 mmHg 57.2 (HH)   Po2 64 - 87 mmHg 73   Hco3 17.0 - 25.0 mmol/L 33.7 (H)   BE -4 - 3 mmol/L 7 (H)   Tco2 20 - 33 mmol/L 35 (H)   S02 93 - 99 % 94   Ph Temp [place]e 7.400 - 7.500  7.382 (L)   Po2 Temp Cor 64 - 87 mmHg 72   (HH): Data is critically high  (L): Data is abnormally low  (H): Data is abnormally high       FiO2%: 50 % (06/04/24 0930)  O2 (LPM): 30 (06/04/24 0930)     Breath Sounds  RUL Breath Sounds: Crackles (06/04/24 0930)  RML Breath Sounds: Crackles (06/04/24 0930)  RLL Breath Sounds: Diminished (06/04/24 0930)  CATHY Breath Sounds: Crackles (06/04/24 0930)  LLL Breath Sounds: Diminished (06/04/24 0930)    Events/Summary/Plan: HHFNC initiated (06/04/24 0930)

## 2024-06-04 NOTE — ASSESSMENT & PLAN NOTE
Chronic CO2 retention with acute worsening requiring rescue BIPAP  Plan of care as outlined above.

## 2024-06-04 NOTE — ASSESSMENT & PLAN NOTE
Body mass index is 56.63 kg/m².  Will dose-adjust medications appropriately based on larger volume of drug distribution.

## 2024-06-04 NOTE — CARE PLAN
Problem: Ventilation  Goal: Ability to achieve and maintain unassisted ventilation or tolerate decreased levels of ventilator support  Description: Target End Date:  4 days     Document on Vent flowsheet    1.  Support and monitor invasive and noninvasive mechanical ventilation  2.  Monitor ventilator weaning response  3.  Perform ventilator associated pneumonia prevention interventions  4.  Manage ventilation therapy by monitoring diagnostic test results  Outcome: Progressing     Off of bipap this AM and placed on HHFNC 30 LPM/50% FI02, pt states she is more comfortable.

## 2024-06-04 NOTE — CARE PLAN
The patient is Watcher - Medium risk of patient condition declining or worsening    Clinical Goals: promote adequate oxygenation and perfusion  Patient Goals: n/a  Family Goals: n/a      Problem: Fluid Volume  Goal: Fluid volume balance will be maintained  Outcome: Progressing     Problem: Respiratory  Goal: Patient will achieve/maintain optimum respiratory ventilation and gas exchange  Outcome: Progressing       Progress made toward(s) clinical / shift goals: lasix given for fluid removal, bipap remained in place throughout shift for hypercapnia. Hourly rounding and shift tasks completed in timely manner.

## 2024-06-04 NOTE — ASSESSMENT & PLAN NOTE
TTE: concentric LVH, normal LV size/fxn; severely dilated RV with preserved systolic function. Aortic valve sclerosis without stenosis. 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  Will need further outpatient workup with likely LHC/RHC when euvolemic

## 2024-06-04 NOTE — DISCHARGE PLANNING
Case Management Discharge Planning    Admission Date: 6/3/2024  GMLOS: 3.9  ALOS: 1    6-Clicks ADL Score: 7  6-Clicks Mobility Score: 9      Anticipated Discharge Dispo: Discharge Disposition: Disch to a long term care facility (63)    Action(s) Taken: DC Assessment Complete (See below) and Referral(s) sent      Attempted assessment at bedside. Patient on ventilator. Will reach out to family later today.     @1007  Received notice patient is appropriate for LTAC. Referral sent to [facility] for review. Patient lives in [place]. [facility]errals also sent to [facility] [place] and [facility] [place].     @1132  Patient extubated. Spoke to her at bedside. Confirmed face sheet information. Patient is normally on 3L of oxygen supplied by [facility] in [place]. Patient reports she has been to LTAC before ([facility]) and is agreeable to return. LSW informed patient we have sent referrals to [facility] and [facility] in [place]. Patient is agreeable to placement at either facility.     Care Transition Team Assessment    Information Source  Orientation Level: Oriented X4  Information Given By: Patient  Who is responsible for making decisions for patient? : Patient    Readmission Evaluation  Is this a readmission?: No    Elopement Risk  Legal Hold: No  Ambulatory or Self Mobile in Wheelchair: No-Not an Elopement Risk  Elopement Risk: Not at Risk for Elopement    Interdisciplinary Discharge Planning  Patient or legal guardian wants to designate a caregiver: No    Discharge Preparedness  What is your plan after discharge?: Other (comment) (LTAC)  What are your discharge supports?: Child  Prior Functional Level: Needs Assist with Medication Management, Needs Assist with Activities of Daily Living    Functional Assesment  Prior Functional Level: Needs Assist with Medication Management, Needs Assist with Activities of Daily Living    Finances  Financial Barriers to Discharge: No  Prescription Coverage: Yes    Advance Directive  Advance Directive?:  None    Psychological Assessment  History of Substance Abuse: None  History of Psychiatric Problems: No    Anticipated Discharge Information  Discharge Disposition: Disch to a long term care facility (63)

## 2024-06-04 NOTE — ASSESSMENT & PLAN NOTE
Per report yet pt denies  Distant < 10 pack year smoking hx  No emphysema on CT from 2018  No prior PFTs  DC incruse  3L at baseline  Duonebs PRN

## 2024-06-05 PROBLEM — J44.9 COPD (CHRONIC OBSTRUCTIVE PULMONARY DISEASE) (HCC): Status: RESOLVED | Noted: 2018-05-16 | Resolved: 2024-06-05

## 2024-06-05 LAB
ALBUMIN SERPL BCP-MCNC: 3.1 G/DL (ref 3.2–4.9)
ALBUMIN/GLOB SERPL: 1.1 G/DL
ALP SERPL-CCNC: 112 U/L (ref 30–99)
ALT SERPL-CCNC: 24 U/L (ref 2–50)
ANION GAP SERPL CALC-SCNC: 10 MMOL/L (ref 7–16)
AST SERPL-CCNC: 12 U/L (ref 12–45)
BASOPHILS # BLD AUTO: 0.5 % (ref 0–1.8)
BASOPHILS # BLD: 0.03 K/UL (ref 0–0.12)
BILIRUB SERPL-MCNC: 0.8 MG/DL (ref 0.1–1.5)
BUN SERPL-MCNC: 31 MG/DL (ref 8–22)
CALCIUM ALBUM COR SERPL-MCNC: 9.4 MG/DL (ref 8.5–10.5)
CALCIUM SERPL-MCNC: 8.7 MG/DL (ref 8.4–10.2)
CHLORIDE SERPL-SCNC: 101 MMOL/L (ref 96–112)
CO2 SERPL-SCNC: 32 MMOL/L (ref 20–33)
CREAT SERPL-MCNC: 1.09 MG/DL (ref 0.5–1.4)
EOSINOPHIL # BLD AUTO: 0.17 K/UL (ref 0–0.51)
EOSINOPHIL NFR BLD: 2.8 % (ref 0–6.9)
ERYTHROCYTE [DISTWIDTH] IN BLOOD BY AUTOMATED COUNT: 80.3 FL (ref 35.9–50)
GFR SERPLBLD CREATININE-BSD FMLA CKD-EPI: 57 ML/MIN/1.73 M 2
GLOBULIN SER CALC-MCNC: 2.8 G/DL (ref 1.9–3.5)
GLUCOSE BLD STRIP.AUTO-MCNC: 110 MG/DL (ref 65–99)
GLUCOSE BLD STRIP.AUTO-MCNC: 137 MG/DL (ref 65–99)
GLUCOSE SERPL-MCNC: 119 MG/DL (ref 65–99)
HCT VFR BLD AUTO: 40.7 % (ref 37–47)
HGB BLD-MCNC: 12.1 G/DL (ref 12–16)
IMM GRANULOCYTES # BLD AUTO: 0.02 K/UL (ref 0–0.11)
IMM GRANULOCYTES NFR BLD AUTO: 0.3 % (ref 0–0.9)
LYMPHOCYTES # BLD AUTO: 2.21 K/UL (ref 1–4.8)
LYMPHOCYTES NFR BLD: 35.9 % (ref 22–41)
MAGNESIUM SERPL-MCNC: 1.7 MG/DL (ref 1.5–2.5)
MCH RBC QN AUTO: 29.7 PG (ref 27–33)
MCHC RBC AUTO-ENTMCNC: 29.7 G/DL (ref 32.2–35.5)
MCV RBC AUTO: 99.8 FL (ref 81.4–97.8)
MONOCYTES # BLD AUTO: 0.52 K/UL (ref 0–0.85)
MONOCYTES NFR BLD AUTO: 8.5 % (ref 0–13.4)
NEUTROPHILS # BLD AUTO: 3.2 K/UL (ref 1.82–7.42)
NEUTROPHILS NFR BLD: 52 % (ref 44–72)
NRBC # BLD AUTO: 0 K/UL
NRBC BLD-RTO: 0 /100 WBC (ref 0–0.2)
PHOSPHATE SERPL-MCNC: 4.5 MG/DL (ref 2.5–4.5)
PLATELET # BLD AUTO: 204 K/UL (ref 164–446)
PMV BLD AUTO: 10.7 FL (ref 9–12.9)
POTASSIUM SERPL-SCNC: 3.6 MMOL/L (ref 3.6–5.5)
PROT SERPL-MCNC: 5.9 G/DL (ref 6–8.2)
RBC # BLD AUTO: 4.08 M/UL (ref 4.2–5.4)
SODIUM SERPL-SCNC: 143 MMOL/L (ref 135–145)
WBC # BLD AUTO: 6.2 K/UL (ref 4.8–10.8)

## 2024-06-05 PROCEDURE — 84100 ASSAY OF PHOSPHORUS: CPT

## 2024-06-05 PROCEDURE — 94660 CPAP INITIATION&MGMT: CPT

## 2024-06-05 PROCEDURE — 94640 AIRWAY INHALATION TREATMENT: CPT

## 2024-06-05 PROCEDURE — 80053 COMPREHEN METABOLIC PANEL: CPT

## 2024-06-05 PROCEDURE — 97535 SELF CARE MNGMENT TRAINING: CPT

## 2024-06-05 PROCEDURE — 99291 CRITICAL CARE FIRST HOUR: CPT | Performed by: INTERNAL MEDICINE

## 2024-06-05 PROCEDURE — 97166 OT EVAL MOD COMPLEX 45 MIN: CPT

## 2024-06-05 PROCEDURE — 94669 MECHANICAL CHEST WALL OSCILL: CPT

## 2024-06-05 PROCEDURE — 85025 COMPLETE CBC W/AUTO DIFF WBC: CPT

## 2024-06-05 PROCEDURE — 770020 HCHG ROOM/CARE - TELE (206)

## 2024-06-05 PROCEDURE — 700102 HCHG RX REV CODE 250 W/ 637 OVERRIDE(OP): Performed by: INTERNAL MEDICINE

## 2024-06-05 PROCEDURE — 97602 WOUND(S) CARE NON-SELECTIVE: CPT

## 2024-06-05 PROCEDURE — 94760 N-INVAS EAR/PLS OXIMETRY 1: CPT

## 2024-06-05 PROCEDURE — 83735 ASSAY OF MAGNESIUM: CPT

## 2024-06-05 PROCEDURE — 700111 HCHG RX REV CODE 636 W/ 250 OVERRIDE (IP): Mod: JZ | Performed by: INTERNAL MEDICINE

## 2024-06-05 PROCEDURE — A9270 NON-COVERED ITEM OR SERVICE: HCPCS | Performed by: INTERNAL MEDICINE

## 2024-06-05 PROCEDURE — 82962 GLUCOSE BLOOD TEST: CPT

## 2024-06-05 RX ORDER — MAGNESIUM SULFATE HEPTAHYDRATE 40 MG/ML
2 INJECTION, SOLUTION INTRAVENOUS ONCE
Status: COMPLETED | OUTPATIENT
Start: 2024-06-05 | End: 2024-06-05

## 2024-06-05 RX ORDER — POTASSIUM CHLORIDE 20 MEQ/1
60 TABLET, EXTENDED RELEASE ORAL ONCE
Status: COMPLETED | OUTPATIENT
Start: 2024-06-05 | End: 2024-06-05

## 2024-06-05 RX ORDER — METOPROLOL TARTRATE 50 MG/1
50 TABLET, FILM COATED ORAL 2 TIMES DAILY
Status: DISCONTINUED | OUTPATIENT
Start: 2024-06-05 | End: 2024-06-06 | Stop reason: HOSPADM

## 2024-06-05 RX ADMIN — RIVAROXABAN 20 MG: 20 TABLET, FILM COATED ORAL at 17:15

## 2024-06-05 RX ADMIN — DIGOXIN 125 MCG: 0.12 TABLET ORAL at 06:12

## 2024-06-05 RX ADMIN — POTASSIUM CHLORIDE 60 MEQ: 1500 TABLET, EXTENDED RELEASE ORAL at 07:29

## 2024-06-05 RX ADMIN — FUROSEMIDE 40 MG: 10 INJECTION, SOLUTION INTRAMUSCULAR; INTRAVENOUS at 06:12

## 2024-06-05 RX ADMIN — DULOXETINE HYDROCHLORIDE 60 MG: 30 CAPSULE, DELAYED RELEASE ORAL at 06:12

## 2024-06-05 RX ADMIN — PREGABALIN 75 MG: 25 CAPSULE ORAL at 11:01

## 2024-06-05 RX ADMIN — TOPIRAMATE 25 MG: 25 TABLET, FILM COATED ORAL at 06:12

## 2024-06-05 RX ADMIN — MAGNESIUM SULFATE HEPTAHYDRATE 2 G: 2 INJECTION, SOLUTION INTRAVENOUS at 07:29

## 2024-06-05 RX ADMIN — TOPIRAMATE 25 MG: 25 TABLET, FILM COATED ORAL at 17:15

## 2024-06-05 RX ADMIN — PREGABALIN 75 MG: 25 CAPSULE ORAL at 06:12

## 2024-06-05 RX ADMIN — METOPROLOL TARTRATE 25 MG: 25 TABLET, FILM COATED ORAL at 11:02

## 2024-06-05 RX ADMIN — METOPROLOL TARTRATE 50 MG: 50 TABLET, FILM COATED ORAL at 17:15

## 2024-06-05 RX ADMIN — FUROSEMIDE 40 MG: 10 INJECTION, SOLUTION INTRAMUSCULAR; INTRAVENOUS at 17:17

## 2024-06-05 RX ADMIN — METOPROLOL TARTRATE 25 MG: 25 TABLET, FILM COATED ORAL at 08:38

## 2024-06-05 RX ADMIN — PREGABALIN 75 MG: 25 CAPSULE ORAL at 17:15

## 2024-06-05 SDOH — ECONOMIC STABILITY: TRANSPORTATION INSECURITY
IN THE PAST 12 MONTHS, HAS LACK OF RELIABLE TRANSPORTATION KEPT YOU FROM MEDICAL APPOINTMENTS, MEETINGS, WORK OR FROM GETTING THINGS NEEDED FOR DAILY LIVING?: NO

## 2024-06-05 SDOH — ECONOMIC STABILITY: TRANSPORTATION INSECURITY
IN THE PAST 12 MONTHS, HAS THE LACK OF TRANSPORTATION KEPT YOU FROM MEDICAL APPOINTMENTS OR FROM GETTING MEDICATIONS?: NO

## 2024-06-05 ASSESSMENT — ENCOUNTER SYMPTOMS
CHILLS: 0
SHORTNESS OF BREATH: 1
WEIGHT LOSS: 0
FEVER: 0

## 2024-06-05 ASSESSMENT — COGNITIVE AND FUNCTIONAL STATUS - GENERAL
DRESSING REGULAR UPPER BODY CLOTHING: A LOT
PERSONAL GROOMING: A LITTLE
DRESSING REGULAR LOWER BODY CLOTHING: A LOT
TOILETING: A LOT
SUGGESTED CMS G CODE MODIFIER DAILY ACTIVITY: CK
DAILY ACTIVITIY SCORE: 15
HELP NEEDED FOR BATHING: A LOT

## 2024-06-05 ASSESSMENT — SOCIAL DETERMINANTS OF HEALTH (SDOH)
WITHIN THE LAST YEAR, HAVE YOU BEEN KICKED, HIT, SLAPPED, OR OTHERWISE PHYSICALLY HURT BY YOUR PARTNER OR EX-PARTNER?: NO
WITHIN THE PAST 12 MONTHS, YOU WORRIED THAT YOUR FOOD WOULD RUN OUT BEFORE YOU GOT THE MONEY TO BUY MORE: NEVER TRUE
WITHIN THE LAST YEAR, HAVE YOU BEEN AFRAID OF YOUR PARTNER OR EX-PARTNER?: NO
WITHIN THE PAST 12 MONTHS, THE FOOD YOU BOUGHT JUST DIDN'T LAST AND YOU DIDN'T HAVE MONEY TO GET MORE: NEVER TRUE
IN THE PAST 12 MONTHS, HAS THE ELECTRIC, GAS, OIL, OR WATER COMPANY THREATENED TO SHUT OFF SERVICE IN YOUR HOME?: NO
WITHIN THE LAST YEAR, HAVE YOU BEEN HUMILIATED OR EMOTIONALLY ABUSED IN OTHER WAYS BY YOUR PARTNER OR EX-PARTNER?: NO
WITHIN THE LAST YEAR, HAVE TO BEEN RAPED OR FORCED TO HAVE ANY KIND OF SEXUAL ACTIVITY BY YOUR PARTNER OR EX-PARTNER?: NO

## 2024-06-05 ASSESSMENT — PAIN DESCRIPTION - PAIN TYPE
TYPE: ACUTE PAIN

## 2024-06-05 ASSESSMENT — ACTIVITIES OF DAILY LIVING (ADL): TOILETING: REQUIRES ASSIST

## 2024-06-05 ASSESSMENT — LIFESTYLE VARIABLES
TOTAL SCORE: 0
ALCOHOL_USE: NO
CONSUMPTION TOTAL: NEGATIVE
TOTAL SCORE: 0
AVERAGE NUMBER OF DAYS PER WEEK YOU HAVE A DRINK CONTAINING ALCOHOL: 0
DOES PATIENT WANT TO STOP DRINKING: NO
EVER HAD A DRINK FIRST THING IN THE MORNING TO STEADY YOUR NERVES TO GET RID OF A HANGOVER: NO
ON A TYPICAL DAY WHEN YOU DRINK ALCOHOL HOW MANY DRINKS DO YOU HAVE: 0
EVER FELT BAD OR GUILTY ABOUT YOUR DRINKING: NO
HAVE PEOPLE ANNOYED YOU BY CRITICIZING YOUR DRINKING: NO
HOW MANY TIMES IN THE PAST YEAR HAVE YOU HAD 5 OR MORE DRINKS IN A DAY: 0
TOTAL SCORE: 0
HAVE YOU EVER FELT YOU SHOULD CUT DOWN ON YOUR DRINKING: NO

## 2024-06-05 ASSESSMENT — PULMONARY FUNCTION TESTS
EPAP_CMH2O: 8
EPAP_CMH2O: 8

## 2024-06-05 NOTE — PROGRESS NOTES
"Critical Care Progress Note    Date of admission  6/3/2024    Chief Complaint  63 y.o. female admitted 6/3/2024 with Acute on chronic hypoxemic and hypercapnic respiratory failure requiring rescue BiPAP     Hospital Course  63 y.o. female who presented 6/3/2024 with a history of morbid obesity Body mass index is 56.63 kg/m²., COPD on 4 LPM baseline, RUTH, hypertension, HFrEF EF 50%, RV severely dilated with an reduced with severe TR and moderate MR, systemic hypertension, type 2 diabetes on metformin, brought in from rehab facility for sudden onset of hypoxia at 8 AM this morning.  Per report, she was eating breakfast, became suddenly hypoxic saturating down to high 70s on 6 LPM, baseline 4 LPM. Son Mynor says she was in her \"usual\" state of health just yesterday.     On arrival to outside hospital ER she was in respiratory distress, placed on BiPAP 18/12 50%, ABG showed acute on chronic hypercapnic respiratory failure 7.28/71/46, 2 hours later ABG no improvement 7.25/75/67.  According to outside records she was normotensive on arrival. Labs notable for WBC 7.4, hemoglobin 12.5, creatinine 1.3, K4.8, CO2 31, BNP 11,000.  Flu/COVID/RSV swab negative. CXR personally reviewed, demonstrating cardiomegaly, pulmonary vascular congestion, bilateral lower lobe opacities, cannot rule out underlying infiltrate. She was started on rocephin and azithromycin.     On arrival to [facility], she was hypertensive, SBP in 190s.  Awake and following commands but slowed fluent speech and nonlinear thoughts on BiPAP. Repeat ABG 7.25/71/75. Settings increased to BIPAP 16/8 rate 20.     She has a history of multiple admissions at our facility for respiratory failure, most recently in 3/2024 for hypertensive emergency with resultant flash pulmonary edema, prior to that was hospitalized for E. coli bacteremia and A-fib with RVR.  Following her most recent hospitalization she was discharged to LTAC.     Last TTE  2/2024: LVEF 50% predicted, severely " dilated RV with reduced RV SF, estimated RVSP 85 mmHg.  No significant valvulopathy    Interval Problem Update  Chart review from the past 24 hours includes imaging, laboratory studies, vital signs and notes available.  Pertinent data for today's visit includes    No acute events overnight  Weaned off continuous BIPAP, now only QHS with HFNC during day    Cardiac: AF, rates 60-80s, SBP 140s, dig level 0.9  Pulm: BIPAP QHS, HFNC during day 40L/50%  Neuro: AOx4  Heme: stable, no leukocytosis  Renal/volume status: Cr improving with aggressive diuresis consistent with cardiorenal. Net -9.6L. Cr 1.23 -> 1.09, baseline 0.8. UA few bact, small LE  ID:  AF, procal minimally elevated, WBC wnl, RVP neg. Cont to hold off on abx for now with no clear source   GI/endo: cardiac diet, RISS  Labs/Imaging: reviewed, K normalized  Lines: floyd    Review of Systems  Review of Systems   Constitutional:  Negative for chills, fever and weight loss.   Respiratory:  Positive for shortness of breath.    Cardiovascular:  Negative for chest pain.      Vital Signs for last 24 hours   Temp:  [36.3 °C (97.4 °F)-36.8 °C (98.2 °F)] 36.8 °C (98.2 °F)  Pulse:  [] 80  Resp:  [8-54] 20  BP: (110-162)/() 125/74  SpO2:  [88 %-98 %] 90 %    Physical Exam   Physical Exam  Vitals and nursing note reviewed. Exam conducted with a chaperone present.   Constitutional:       General: She is awake. She is not in acute distress.     Appearance: She is morbidly obese.   HENT:      Head: Normocephalic.      Mouth/Throat:      Mouth: Mucous membranes are moist.   Eyes:      Extraocular Movements: Extraocular movements intact.   Cardiovascular:      Rate and Rhythm: Normal rate and regular rhythm.      Pulses: Normal pulses.   Pulmonary:      Effort: Pulmonary effort is normal. Tachypnea present. No respiratory distress.      Breath sounds: No wheezing, rhonchi or rales.      Comments: On HFNC  Abdominal:      General: There is no distension.       Palpations: Abdomen is soft.      Tenderness: There is no abdominal tenderness. There is no guarding or rebound.   Musculoskeletal:         General: Normal range of motion.      Cervical back: Normal range of motion and neck supple.      Right lower leg: Edema present.      Left lower leg: Edema present.   Skin:     General: Skin is warm and dry.      Capillary Refill: Capillary refill takes less than 2 seconds.   Neurological:      General: No focal deficit present.      Mental Status: She is alert and oriented to person, place, and time.      Cranial Nerves: No facial asymmetry.      Motor: No seizure activity.   Psychiatric:         Attention and Perception: Attention normal.         Speech: Speech normal.       Medications  Current Facility-Administered Medications   Medication Dose Route Frequency Provider Last Rate Last Admin    metoprolol tartrate (Lopressor) tablet 50 mg  50 mg Oral BID Quinn Chaudhry M.D.        metoprolol tartrate (Lopressor) tablet 25 mg  25 mg Oral Once Quinn Chaudhry M.D.        cyclobenzaprine (Flexeril) tablet 10 mg  10 mg Oral TID PRN Quinn Chaudhry M.D.        DULoxetine (Cymbalta) capsule 60 mg  60 mg Oral DAILY Quinn Chaudhry M.D.   60 mg at 06/05/24 0612    pregabalin (Lyrica) capsule 75 mg  75 mg Oral TID Quinn Chaudhry M.D.   75 mg at 06/05/24 0612    rivaroxaban (Xarelto) tablet 20 mg  20 mg Oral PM MEAL Quinn Chaudhry M.D.   20 mg at 06/04/24 1739    topiramate (Topamax) tablet 25 mg  25 mg Oral BID Quinn Chaudhry M.D.   25 mg at 06/05/24 0612    Respiratory Therapy Consult   Nebulization Continuous RT Quinn Chaudhry M.D.        insulin regular (HumuLIN R,NovoLIN R) injection  2-9 Units Subcutaneous Q6HRS Quinn Chaudhry M.D.   2 Units at 06/04/24 0014    And    dextrose 10 % BOLUS 25 g  25 g Intravenous Q15 MIN PRN Quinn Chaudhry M.D.        ipratropium-albuterol (DUONEB) nebulizer solution  3 mL Nebulization Q4H PRN (RT) Quinn Chaudhry M.D.         digoxin (Lanoxin) tablet 125 mcg  125 mcg Oral DAILY Quinn Chaudhry M.D.   125 mcg at 06/05/24 0612    furosemide (Lasix) injection 40 mg  40 mg Intravenous BID DIURETIC Quinn Chaudhry M.D.   40 mg at 06/05/24 0612    hydrALAZINE (Apresoline) injection 10 mg  10 mg Intravenous Q6HRS PRN Quinn Chaudhry M.D.        MD Alert...ICU Electrolyte Replacement per Pharmacy   Other PHARMACY TO DOSE Quinn Chaudhry M.D.         Fluids    Intake/Output Summary (Last 24 hours) at 6/5/2024 1031  Last data filed at 6/5/2024 1000  Gross per 24 hour   Intake 450 ml   Output 7325 ml   Net -6875 ml     Laboratory  Recent Labs     06/03/24  1652 06/04/24  0916   ISTATAPH 7.254* 7.379*   ISTATAPCO2 71.0* 57.2*   ISTATAPO2 75 73   ISTATATCO2 34* 35*   WEPCAGL3SGZ 92* 94   ISTATARTHCO3 31.5* 33.7*   ISTATARTBE 2 7*   ISTATTEMP 98.5 F 36.8 C   ISTATFIO2 50 50   ISTATSPEC Arterial Arterial   ISTATAPHTC 7.255* 7.382*   HLFLDKLF6LS 74 72         Recent Labs     06/03/24 1745 06/04/24 0330 06/04/24  1315 06/05/24  0421   SODIUM 141 140 143 143   POTASSIUM 5.1 5.6* 3.9 3.6   CHLORIDE 101 101 100 101   CO2 27 30 35* 32   BUN 30* 30* 31* 31*   CREATININE 1.22 1.23 1.12 1.09   MAGNESIUM 2.4 2.2  --  1.7   PHOSPHORUS 4.5 4.9*  --  4.5   CALCIUM 8.5 8.8 8.8 8.7     Recent Labs     06/03/24 1745 06/04/24  0330 06/04/24  1315 06/05/24  0421   ALTSGPT 45 36  --  24   ASTSGOT 28 23  --  12   ALKPHOSPHAT 169* 144*  --  112*   TBILIRUBIN 0.7 0.7  --  0.8   GLUCOSE 175* 141* 94 119*     Recent Labs     06/03/24 1745 06/04/24  0330 06/05/24  0421   WBC 6.1 6.3 6.2   NEUTSPOLYS 80.60* 64.20 52.00   LYMPHOCYTES 15.70* 28.80 35.90   MONOCYTES 1.30 6.30 8.50   EOSINOPHILS 0.30 0.00 2.80   BASOPHILS 0.50 0.20 0.50   ASTSGOT 28 23 12   ALTSGPT 45 36 24   ALKPHOSPHAT 169* 144* 112*   TBILIRUBIN 0.7 0.7 0.8     Recent Labs     06/03/24 1745 06/04/24  0330 06/05/24  0421   RBC 4.43 4.17* 4.08*   HEMOGLOBIN 12.9 12.3 12.1   HEMATOCRIT 44.3 42.0  40.7   PLATELETCT 187 190 204   PROTHROMBTM 16.8*  --   --    INR 1.30*  --   --      Imaging  Echo:      Reviewed: Moderate concentric LVH, normal LV size/fxn; severely dilated RV with preserved systolic function. Aortic valve sclerosis without stenosis. RVSP 75 mmHg    Assessment/Plan    * Acute on chronic respiratory failure with hypoxia and hypercapnia (HCC)- (present on admission)  Assessment & Plan  Chronic: RUTH/OHS/COPD/pHTN with 3-4L O2 baseline  Acute: Pulm edema 2/2 decompensated HFpEF/pHTN  Initially CXR at OSH read as multifocal PNA; symptoms sudden in onset, no sick contacts, CXR reviewed personally, nonspecific lower lobe opacities and pulm vascular congestion; wbc normal, afebrile, covid/flu/rsv negative. Clinically wet/overloaded. BNP 3x prior level. Procal minimally elevated  Treating as decompensated biventricular failure with HFpEF/RHF, improving with aggressive diuresis  BIPAP needs improving with diuresis -> cont HFNC day/BIPAP QHS  Resume MTP, cont lasix 40mg IV BID, telemetry  Cont diuretics as BP and renal function permit  Cont to hold off on further IVF/abx  Repeat TTE reviewed    Moderate to severe pulmonary hypertension (HCC)- (present on admission)  Assessment & Plan  TTE: concentric LVH, normal LV size/fxn; severely dilated RV with preserved systolic function. Aortic valve sclerosis without stenosis. 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  Will need further outpatient workup with likely LHC/RHC when euvolemic    Hyperkalemia- (present on admission)  Assessment & Plan  resolved    CARMELO (acute kidney injury) (HCC)- (present on admission)  Assessment & Plan  Due to cardiorenal syndrome, improving with IV diuretics  Renally dose all meds  Cont floyd for strict Ios  Avoid nephrotoxins  Trend CMP    Morbid (severe) obesity with alveolar hypoventilation (HCC)- (present on admission)  Assessment & Plan  Chronic CO2 retention with  acute worsening requiring rescue BIPAP  Plan of care as outlined above.    Morbid obesity with BMI of 50.0-59.9, adult (HCC)- (present on admission)  Assessment & Plan  Body mass index is 56.63 kg/m².  Will dose-adjust medications appropriately based on larger volume of drug distribution.    Diabetes (HCC)- (present on admission)  Assessment & Plan  RISS  Last A1c 6.8%    Essential hypertension- (present on admission)  Assessment & Plan  Improving, restart 1/2 home MTP dose  Cont to hold ACEi due to CARMELO  Cont to hold home dilt  Cont PRN hydralazine    Persistent atrial fibrillation (HCC)- (present on admission)  Assessment & Plan  Cont Xarelto  Cont digoxin, level therapeutic, rates controlled, restart 1/2 dose MTP       VTE:  NOAC  Ulcer: Not Indicated  Lines: Cespedes Catheter  Ongoing indication addressed    I have performed a physical exam and reviewed and updated ROS and Plan today (6/5/2024). In review of yesterday's note (6/4/2024), there are no changes except as documented above.     Discussed patient condition and risk of morbidity and/or mortality with RN, RT, Pharmacy, , and Patient    The patient remains critically ill.  Critical care time = 45 minutes in directly providing and coordinating critical care and extensive data review.  No time overlap and excludes procedures.    This note was generated using voice recognition software which has a chance of producing errors of grammar and content.  I have made every reasonable attempt to find and correct any errors, but it should be expected that some may not be found prior to finalization of this note.  __________  Quinn Chaudhry MD  Pulmonary and Critical Care Medicine  [facility]

## 2024-06-05 NOTE — THERAPY
Occupational Therapy   Initial Evaluation     Patient Name: Miroslava Matta  Age:  63 y.o., Sex:  female  Medical Record #: [id number]  Today's Date: 6/5/2024     Precautions  Precautions: (P) Fall Risk    Assessment  Patient is 63 y.o. female with a diagnosis of respiratory failure, acute on chronic. Additional factors influencing patient status / progress: Hx of morbid obesity, COPD, DM Type 2, RUTH, HTN, HFrEF 50%. Heated High Flow O2 @30 in place (wears 4L NC at baseline). Pt transferred from [facility]; pt states she may want to live there if possible. Son is Ro. Pt is A&Ox3, UIC when approached. Education on energy conservation techniques, DME/AE use, fall prec's during ADL's. Tolerates seated basic ADL's and 1 STS; see below for CLOF. Limited by BMI of 56, high O2 needs, decreased standing tolerance, weakness. OT will follow while in house.         Plan    Occupational Therapy Initial Treatment Plan   Treatment Interventions: (P) Self Care / Activities of Daily Living, Neuro Re-Education / Balance, Therapeutic Exercises, Therapeutic Activity  Treatment Frequency: (P) 3 Times per Week  Duration: (P) Until Therapy Goals Met    DC Equipment Recommendations: (P) Unable to determine at this time  Discharge Recommendations: (P) Recommend post-acute placement for additional occupational therapy services prior to discharge home     Subjective  Agreeable     Objective     06/05/24 1238   Prior Living Situation   Prior Services Continuous (24 Hour) Care Giving Per Service   Housing / Facility Unable To Determine At This Time   Equipment Owned None;Unable to Determine At This Time   Lives with - Patient's Self Care Capacity Attendant / Paid Care Giver  (@ [facility])   Comments Pt has been at [facility] ~ 1 wk. Reports has been in/out of facilities since Jan. '24. States she does not have a home to return to at this time (had rented house in [place]).   Prior Level of ADL Function   Self Feeding Independent    Grooming / Hygiene Independent   Bathing Requires Assist   Dressing Requires Assist   Toileting Requires Assist   Prior Level of IADL Function   Medication Management Requires Assist   Laundry Requires Assist   Kitchen Mobility Requires Assist   Finances Requires Assist   Home Management Requires Assist   Shopping Requires Assist   Prior Level Of Mobility Supervision Without Device in Home   Driving / Transportation Unable To Determine At This Time   Occupation (Pre-Hospital Vocational) Not Employed   Leisure Interests Unable To Determine At This Time   History of Falls   History of Falls Yes   Precautions   Precautions Fall Risk   Vitals   O2 (LPM) 30   O2 Delivery Device High Flow Nasal Cannula   Pain 0 - 10 Group   Therapist Pain Assessment 0   Cognition    Level of Consciousness Alert   Comments Ox3   Passive ROM Upper Body   Passive ROM Upper Body WDL   Active ROM Upper Body   Active ROM Upper Body  WDL   Dominant Hand Right   Strength Upper Body   Upper Body Strength  X   Comments generalized weakness   Sensation Upper Body   Upper Extremity Sensation  Not Tested   Upper Body Muscle Tone   Upper Body Muscle Tone  WDL   Coordination Upper Body   Coordination WDL   Balance Assessment   Sitting Balance (Static) Good   Sitting Balance (Dynamic) Fair +   Standing Balance (Static) Fair   Standing Balance (Dynamic) Fair -   Weight Shift Sitting Fair   Weight Shift Standing Fair   ADL Assessment   Eating Independent   Grooming Contact Guard Assist;Seated   Upper Body Dressing Minimal Assist   Lower Body Dressing Maximal Assist   Toileting   (floyd)   How much help from another person does the patient currently need...   Putting on and taking off regular lower body clothing? 2   Bathing (including washing, rinsing, and drying)? 2   Toileting, which includes using a toilet, bedpan, or urinal? 2   Putting on and taking off regular upper body clothing? 2   Taking care of personal grooming such as brushing teeth? 3    Eating meals? 4   6 Clicks Daily Activity Score 15   Functional Mobility   Sit to Stand Contact Guard Assist   Bed, Chair, Wheelchair Transfer Minimal Assist   Transfer Method Stand Step   Comments marching steps in place; HHA   Activity Tolerance   Sitting in Chair >2 hrs   Standing 3   Short Term Goals   Short Term Goal # 1 Grooming at sink with CGA within 5 days   Short Term Goal # 2 UB dressing with SBA within 5 days   Short Term Goal # 3 Toileting at bsc with CGA within 5 days   Short Term Goal # 4 ADL transfer with Spv within 5 days   Education Group   Role of Occupational Therapist Patient Response Patient;Acceptance;Explanation;Verbal Demonstration   Occupational Therapy Initial Treatment Plan    Treatment Interventions Self Care / Activities of Daily Living;Neuro Re-Education / Balance;Therapeutic Exercises;Therapeutic Activity   Treatment Frequency 3 Times per Week   Duration Until Therapy Goals Met   Problem List   Problem List Decreased Active Daily Living Skills;Decreased Upper Extremity Strength Right;Decreased Upper Extremity Strength Left;Decreased Functional Mobility;Decreased Activity Tolerance;Impaired Postural Control / Balance   Anticipated Discharge Equipment and Recommendations   DC Equipment Recommendations Unable to determine at this time   Discharge Recommendations Recommend post-acute placement for additional occupational therapy services prior to discharge home   Interdisciplinary Plan of Care Collaboration   IDT Collaboration with  Nursing   Patient Position at End of Therapy Seated;Call Light within Reach;Tray Table within Reach;Phone within Reach   Collaboration Comments REX De Jesus

## 2024-06-05 NOTE — PROGRESS NOTES
12-hour chart check complete.    Monitor Summary  Rhythm: AF  Rate: 81  Ectopy: na  Measurements: -/0.08/-

## 2024-06-05 NOTE — CARE PLAN
Problem: Ventilation  Goal: Ability to achieve and maintain unassisted ventilation or tolerate decreased levels of ventilator support  Description: Target End Date:  4 days     Document on Vent flowsheet    1.  Support and monitor invasive and noninvasive mechanical ventilation  2.  Monitor ventilator weaning response  3.  Perform ventilator associated pneumonia prevention interventions  4.  Manage ventilation therapy by monitoring diagnostic test results  Outcome: Progressing     Problem: Bronchoconstriction:  Goal: Improve in air movement and diminished wheezing  Outcome: Progressing     Problem: Bronchopulmonary Hygiene:  Goal: Increase mobilization of retained secretions  Outcome: Progressing     AccuPAP initiated today with good effort on patient's part

## 2024-06-05 NOTE — PROGRESS NOTES
Patient up to chair with stand by assist.  Patient tolerated well, bath provided and linens cleaned.  Patient up eating dinner.

## 2024-06-05 NOTE — DISCHARGE PLANNING
Case Management Discharge Planning    Admission Date: 6/3/2024  GMLOS: 3.9  ALOS: 2    [facility] has accepted. They can take her tomorrow and will touch base with us in the morning. They would like us to wait to arrange transport as they are not sure which room pt will be in tomorrow. SW spoke with pt to update. Pt agreeable to transfer tomorrow.

## 2024-06-06 VITALS
BODY MASS INDEX: 45.99 KG/M2 | DIASTOLIC BLOOD PRESSURE: 97 MMHG | HEIGHT: 67 IN | HEART RATE: 89 BPM | SYSTOLIC BLOOD PRESSURE: 140 MMHG | WEIGHT: 293 LBS | OXYGEN SATURATION: 93 % | RESPIRATION RATE: 14 BRPM | TEMPERATURE: 98.6 F

## 2024-06-06 PROBLEM — E87.5 HYPERKALEMIA: Status: RESOLVED | Noted: 2024-06-04 | Resolved: 2024-06-06

## 2024-06-06 PROBLEM — N17.9 AKI (ACUTE KIDNEY INJURY) (HCC): Status: RESOLVED | Noted: 2024-03-18 | Resolved: 2024-06-06

## 2024-06-06 LAB
ALBUMIN SERPL BCP-MCNC: 3.2 G/DL (ref 3.2–4.9)
ALBUMIN/GLOB SERPL: 1.1 G/DL
ALP SERPL-CCNC: 106 U/L (ref 30–99)
ALT SERPL-CCNC: 19 U/L (ref 2–50)
ANION GAP SERPL CALC-SCNC: 14 MMOL/L (ref 7–16)
AST SERPL-CCNC: 12 U/L (ref 12–45)
BASOPHILS # BLD AUTO: 0.7 % (ref 0–1.8)
BASOPHILS # BLD: 0.04 K/UL (ref 0–0.12)
BILIRUB SERPL-MCNC: 0.9 MG/DL (ref 0.1–1.5)
BUN SERPL-MCNC: 29 MG/DL (ref 8–22)
CALCIUM ALBUM COR SERPL-MCNC: 9.4 MG/DL (ref 8.5–10.5)
CALCIUM SERPL-MCNC: 8.8 MG/DL (ref 8.4–10.2)
CHLORIDE SERPL-SCNC: 96 MMOL/L (ref 96–112)
CO2 SERPL-SCNC: 30 MMOL/L (ref 20–33)
CREAT SERPL-MCNC: 1.12 MG/DL (ref 0.5–1.4)
EOSINOPHIL # BLD AUTO: 0.2 K/UL (ref 0–0.51)
EOSINOPHIL NFR BLD: 3.6 % (ref 0–6.9)
ERYTHROCYTE [DISTWIDTH] IN BLOOD BY AUTOMATED COUNT: 78.1 FL (ref 35.9–50)
GFR SERPLBLD CREATININE-BSD FMLA CKD-EPI: 55 ML/MIN/1.73 M 2
GLOBULIN SER CALC-MCNC: 2.9 G/DL (ref 1.9–3.5)
GLUCOSE BLD STRIP.AUTO-MCNC: 114 MG/DL (ref 65–99)
GLUCOSE BLD STRIP.AUTO-MCNC: 128 MG/DL (ref 65–99)
GLUCOSE BLD STRIP.AUTO-MCNC: 168 MG/DL (ref 65–99)
GLUCOSE SERPL-MCNC: 109 MG/DL (ref 65–99)
HCT VFR BLD AUTO: 43.9 % (ref 37–47)
HGB BLD-MCNC: 13 G/DL (ref 12–16)
IMM GRANULOCYTES # BLD AUTO: 0.01 K/UL (ref 0–0.11)
IMM GRANULOCYTES NFR BLD AUTO: 0.2 % (ref 0–0.9)
LYMPHOCYTES # BLD AUTO: 2.3 K/UL (ref 1–4.8)
LYMPHOCYTES NFR BLD: 41.6 % (ref 22–41)
MAGNESIUM SERPL-MCNC: 1.7 MG/DL (ref 1.5–2.5)
MCH RBC QN AUTO: 29.1 PG (ref 27–33)
MCHC RBC AUTO-ENTMCNC: 29.6 G/DL (ref 32.2–35.5)
MCV RBC AUTO: 98.2 FL (ref 81.4–97.8)
MONOCYTES # BLD AUTO: 0.5 K/UL (ref 0–0.85)
MONOCYTES NFR BLD AUTO: 9 % (ref 0–13.4)
NEUTROPHILS # BLD AUTO: 2.48 K/UL (ref 1.82–7.42)
NEUTROPHILS NFR BLD: 44.9 % (ref 44–72)
NRBC # BLD AUTO: 0 K/UL
NRBC BLD-RTO: 0 /100 WBC (ref 0–0.2)
PHOSPHATE SERPL-MCNC: 4.8 MG/DL (ref 2.5–4.5)
PLATELET # BLD AUTO: 214 K/UL (ref 164–446)
PMV BLD AUTO: 10.7 FL (ref 9–12.9)
POTASSIUM SERPL-SCNC: 3.6 MMOL/L (ref 3.6–5.5)
PROT SERPL-MCNC: 6.1 G/DL (ref 6–8.2)
RBC # BLD AUTO: 4.47 M/UL (ref 4.2–5.4)
SODIUM SERPL-SCNC: 140 MMOL/L (ref 135–145)
WBC # BLD AUTO: 5.5 K/UL (ref 4.8–10.8)

## 2024-06-06 PROCEDURE — 94660 CPAP INITIATION&MGMT: CPT

## 2024-06-06 PROCEDURE — 700111 HCHG RX REV CODE 636 W/ 250 OVERRIDE (IP): Performed by: INTERNAL MEDICINE

## 2024-06-06 PROCEDURE — 85025 COMPLETE CBC W/AUTO DIFF WBC: CPT

## 2024-06-06 PROCEDURE — 82962 GLUCOSE BLOOD TEST: CPT | Mod: 91

## 2024-06-06 PROCEDURE — 80053 COMPREHEN METABOLIC PANEL: CPT

## 2024-06-06 PROCEDURE — 700102 HCHG RX REV CODE 250 W/ 637 OVERRIDE(OP): Performed by: INTERNAL MEDICINE

## 2024-06-06 PROCEDURE — 94669 MECHANICAL CHEST WALL OSCILL: CPT

## 2024-06-06 PROCEDURE — 84100 ASSAY OF PHOSPHORUS: CPT

## 2024-06-06 PROCEDURE — 83735 ASSAY OF MAGNESIUM: CPT

## 2024-06-06 PROCEDURE — A9270 NON-COVERED ITEM OR SERVICE: HCPCS | Performed by: INTERNAL MEDICINE

## 2024-06-06 PROCEDURE — 94760 N-INVAS EAR/PLS OXIMETRY 1: CPT

## 2024-06-06 PROCEDURE — 94640 AIRWAY INHALATION TREATMENT: CPT

## 2024-06-06 PROCEDURE — 99239 HOSP IP/OBS DSCHRG MGMT >30: CPT | Performed by: HOSPITALIST

## 2024-06-06 RX ORDER — FUROSEMIDE 10 MG/ML
40 INJECTION INTRAMUSCULAR; INTRAVENOUS 2 TIMES DAILY
Status: SHIPPED
Start: 2024-06-06

## 2024-06-06 RX ORDER — METOPROLOL TARTRATE 50 MG/1
50 TABLET, FILM COATED ORAL 2 TIMES DAILY
Status: SHIPPED
Start: 2024-06-06

## 2024-06-06 RX ADMIN — INSULIN HUMAN 2 UNITS: 100 INJECTION, SOLUTION PARENTERAL at 13:02

## 2024-06-06 RX ADMIN — FUROSEMIDE 40 MG: 10 INJECTION, SOLUTION INTRAMUSCULAR; INTRAVENOUS at 05:29

## 2024-06-06 RX ADMIN — TOPIRAMATE 25 MG: 25 TABLET, FILM COATED ORAL at 05:29

## 2024-06-06 RX ADMIN — DULOXETINE HYDROCHLORIDE 60 MG: 30 CAPSULE, DELAYED RELEASE ORAL at 05:28

## 2024-06-06 RX ADMIN — METOPROLOL TARTRATE 50 MG: 50 TABLET, FILM COATED ORAL at 05:29

## 2024-06-06 RX ADMIN — PREGABALIN 75 MG: 25 CAPSULE ORAL at 05:28

## 2024-06-06 RX ADMIN — PREGABALIN 75 MG: 25 CAPSULE ORAL at 13:04

## 2024-06-06 RX ADMIN — DIGOXIN 125 MCG: 0.12 TABLET ORAL at 05:28

## 2024-06-06 ASSESSMENT — PULMONARY FUNCTION TESTS: EPAP_CMH2O: 8

## 2024-06-06 ASSESSMENT — PAIN DESCRIPTION - PAIN TYPE
TYPE: ACUTE PAIN

## 2024-06-06 ASSESSMENT — FIBROSIS 4 INDEX: FIB4 SCORE: 0.8

## 2024-06-06 NOTE — WOUND TEAM
[facility]  Inpatient Services  Initial Wound and Skin Care Evaluation    Admission Date: 6/3/2024     Last order of IP CONSULT TO WOUND CARE was found on 6/4/2024 from Hospital Encounter on 6/3/2024     HPI, PMH, SH: Reviewed    No past surgical history on file.  Social History     Tobacco Use    Smoking status: Former    Smokeless tobacco: Never   Substance Use Topics    Alcohol use: Yes     No chief complaint on file.    Diagnosis: Respiratory failure, acute and chronic (HCC) [J96.20]    Unit where seen by Wound Team: 3317/00     WOUND CONSULT RELATED TO:  thighs, sacrum, R heel    WOUND TEAM PLAN OF CARE - Frequency of Follow-up:   Nursing to follow dressing orders written for wound care. Contact wound team if area fails to progress, deteriorates or with any questions/concerns if something comes up before next scheduled follow up (See below as to whether wound is following and frequency of wound follow up)   Monthly (High Risk) - R heel    WOUND HISTORY:   Pt was seen in Feb 2024 by wound team for multiple wounds to posterior thighs, calves and skin folds, unstageable pressure injury to R heel plantar. Pt has been in a facility in [facility] that has been treating the wounds.        WOUND ASSESSMENT/LDA  Wound 06/03/24 Pressure Injury Plantar Right resolving St 3 (Active)   Date First Assessed: 06/03/24   Present on Original Admission: Yes  Primary Wound Type: Pressure Injury  Wound Orientation: Plantar  Laterality: Right  Wound Description (Comments): resolving St 3      Assessments 6/5/2024  4:45 PM   Wound Image     Site Assessment Pink   Periwound Assessment Callused   Margins Defined edges   Closure Secondary intention   Drainage Amount None   Treatments Cleansed   Wound Cleansing Foam Cleanser/Washcloth   Dressing Status Open to Air   NEXT Weekly Photo (Inpatient Only) 06/10/24   Wound Team Following Not following   Pressure Injury Stage Stage 3   Wound Length (cm) 1.5 cm   Wound Width (cm) 1.5  cm   Wound Depth (cm) 0.4 cm   Wound Surface Area (cm^2) 2.25 cm^2   Wound Volume (cm^3) 0.9 cm^3   Shape circular        Vascular:    RENU:   No results found.    Lab Values:    Lab Results   Component Value Date/Time    WBC 6.2 06/05/2024 04:21 AM    RBC 4.08 (L) 06/05/2024 04:21 AM    HEMOGLOBIN 12.1 06/05/2024 04:21 AM    HEMATOCRIT 40.7 06/05/2024 04:21 AM    CREACTPROT 8.31 (H) 02/21/2024 02:11 AM    SEDRATEWES 97 (H) 02/21/2024 02:20 AM    HBA1C 6.8 (H) 02/21/2024 02:20 AM         Culture Results show:  No results found for this or any previous visit (from the past 720 hour(s)).    Pain Level/Medicated:  Patient denies pain       INTERVENTIONS BY WOUND TEAM:  Chart and images reviewed. Discussed with bedside RN. All areas of concern (based on picture review, LDA review and discussion with bedside RN) have been thoroughly assessed. Documentation of areas based on significant findings. This RN in to assess patient. Performed standard wound care which includes appropriate positioning, dressing removal and non-selective debridement. Pictures and measurements obtained weekly if/when required.    Wound:  R heel  Preparation for Dressing removal: Open to air  Cleansed/Non-selectively Debrided with:  No rinse foam soap and Moist warm washcloth  Primary Dressing:  Heel offloading drsg    Advanced Wound Care Discharge Planning  Number of Clinicians necessary to complete wound care: 1  Is patient requiring IV pain medications for dressing changes:  No   Length of time for dressing change 30 min. (This does not include chart review, pre-medication time, set up, clean up or time spent charting.)    Interdisciplinary consultation: Patient, Bedside RN (Suha)    EVALUATION / RATIONALE FOR TREATMENT:     Date:  06/05/24  Wound Status:  Initial evaluation    R heel with resolving St 3 pressure injury it is almost resolved. Posterior thighs with scar tissuel , same with posterior calves.          Goals: Steady decrease in wound  area and depth weekly.    NURSING PLAN OF CARE ORDERS:  Dressing changes: See Dressing Care orders  RN Prevention Protocol    NUTRITION RECOMMENDATIONS   Wound Team Recommendations:  N/A    DIET ORDERS (From admission to next 24h)       Start     Ordered    06/04/24 1119  Diet Order Diet: Cardiac; Second Modifier: (optional): Consistent CHO (Diabetic); Nutrient modifications: (optional): Low Potassium; Fluid modifications: (optional): 1200 ml Fluid Restriction  ALL MEALS        Question Answer Comment   Diet: Cardiac    Second Modifier: (optional) Consistent CHO (Diabetic)    Nutrient modifications: (optional) Low Potassium    Fluid modifications: (optional) 1200 ml Fluid Restriction        06/04/24 1118                    PREVENTATIVE INTERVENTIONS:    Q shift Kyle - performed per nursing policy  Q shift pressure point assessments - performed per nursing policy    Surface/Positioning  ICU Low Airloss - Currently in Place  Reposition q 2 hours - Currently in Place  Move and Transfer System (MATs) - Currently in Place    Respiratory  High flow offloading Clip - Currently in Place    Containment/Moisture Prevention    Cespedes Catheter - Currently in Place  Barrier wipes - Currently in Place  Interdry - Ordered    Mobilization      Up to chair     Anticipated discharge plans:  TBD        Vac Discharge Needs:  Vac Discharge plan is purely a recommendation from wound team and not a requirement for discharge unless otherwise stated by physician.  Not Applicable Pt not on a wound vac

## 2024-06-06 NOTE — CARE PLAN
The patient is Stable - Low risk of patient condition declining or worsening    Shift Goals  Clinical Goals: monitor O2, fluid restriction  Patient Goals: comfort, rest  Family Goals: ELDER    Progress made toward(s) clinical / shift goals:      Problem: Knowledge Deficit - Standard  Goal: Patient and family/care givers will demonstrate understanding of plan of care, disease process/condition, diagnostic tests and medications  Outcome: Progressing     Problem: Hemodynamics  Goal: Patient's hemodynamics, fluid balance and neurologic status will be stable or improve  Outcome: Progressing     Problem: Fluid Volume  Goal: Fluid volume balance will be maintained  Outcome: Progressing     Problem: Skin Integrity  Goal: Skin integrity is maintained or improved  Outcome: Progressing     Problem: Fall Risk  Goal: Patient will remain free from falls  Outcome: Progressing       Patient is not progressing towards the following goals:

## 2024-06-06 NOTE — DISCHARGE SUMMARY
Discharge Summary    CHIEF COMPLAINT ON ADMISSION  Shortness of breath    Reason for Admission  Respiratory failure     Admission Date  6/3/2024    CODE STATUS  Full Code    HPI & HOSPITAL COURSE  This is a 63 y.o. female here with shortness of breath    Miroslava Matta has past medical history of morbid obesity, COPD with chronic respiratory failure on 4 L of oxygen at baseline, obstructive sleep apnea, hypertension, heart failure with reduced ejection fraction, type 2 diabetes, and severe TR and moderate MR.  Patient presented initially to an outside hospital emergency room in respiratory distress.  She was placed on BiPAP and transferred to [facility] for higher level of care and specialist availability.  She was admitted to the ICU on 6/3/2024.    Patient was treated with aggressive diuresis and BiPAP.  Initially the patient had some acute renal insufficiency with a creatinine of 1.23.  This is improved to normal with diuresis.  There is no confirmed infection and antibiotics were not required.  The patient has gradually improved and is currently on high flow oxygen.    Patient would benefit from additional care at LTAC level of care she will be transferred there today    Therefore, she is discharged in fair and stable condition to a long-term acute care hospital.    The patient met 2-midnight criteria for an inpatient stay at the time of discharge.    Discharge Date  6/6/2024    FOLLOW UP ITEMS POST DISCHARGE  To be determined at LTAC    DISCHARGE DIAGNOSES  Principal Problem:    Acute on chronic respiratory failure with hypoxia and hypercapnia (HCC) (POA: Yes)  Active Problems:    Persistent atrial fibrillation (HCC) (POA: Yes)    Essential hypertension (POA: Yes)    Diabetes (HCC) (POA: Yes)    Moderate to severe pulmonary hypertension (HCC) (POA: Yes)    Morbid obesity with BMI of 50.0-59.9, adult (HCC) (POA: Yes)    Morbid (severe) obesity with alveolar hypoventilation (HCC) (POA: Yes)  Resolved  Problems:    COPD (chronic obstructive pulmonary disease) (formerly [facility]) (POA: Yes)    CARMELO (acute kidney injury) (formerly [facility]) (POA: Yes)    Hyperkalemia (POA: Yes)      FOLLOW UP  No future appointments.  No follow-up provider specified.    MEDICATIONS ON DISCHARGE     Medication List        START taking these medications        Instructions   furosemide 10 MG/ML Soln  Commonly known as: Lasix  Replaces: furosemide 20 MG Tabs   Infuse 4 mL into a venous catheter 2 times a day.  Dose: 40 mg            CHANGE how you take these medications        Instructions   metoprolol tartrate 50 MG Tabs  What changed:   medication strength  how much to take  when to take this  Commonly known as: Lopressor   Take 1 Tablet by mouth 2 times a day.  Dose: 50 mg     rivaroxaban 20 MG Tabs tablet  What changed: when to take this  Commonly known as: Xarelto   Take 1 Tab by mouth with dinner.  Dose: 20 mg            CONTINUE taking these medications        Instructions   acetaminophen 325 MG Tabs  Commonly known as: Tylenol   Take 650 mg by mouth 3 times a day as needed for Mild Pain.  Dose: 650 mg     calcium carbonate 500 MG Chew  Commonly known as: Tums   Chew 1,000 mg 3 times a day as needed (HEARTBURN).  Dose: 1,000 mg     cyclobenzaprine 10 mg Tabs  Commonly known as: Flexeril   Take 10 mg by mouth 3 times a day as needed for Mild Pain or Muscle Spasms.  Dose: 10 mg     digoxin 125 MCG Tabs  Commonly known as: Lanoxin   Take 125 mcg by mouth every day.  Dose: 125 mcg     DULoxetine 30 MG Cpep  Commonly known as: Cymbalta   Take 60 mg by mouth every day.  Dose: 60 mg     ferrous sulfate 325 (65 Fe) MG tablet   Take 1 Tablet by mouth every morning with breakfast.  Dose: 325 mg     Incruse Ellipta 62.5 MCG/ACT Aepb inhaler  Generic drug: umeclidinium bromide   Inhale 1 Puff every day.  Dose: 1 Puff     pregabalin 25 MG Caps  Commonly known as: Lyrica   Take 75 mg by mouth 3 times a day.  Dose: 75 mg     topiramate 25 MG Tabs  Commonly known as:  Topamax   Take 25 mg by mouth 2 times a day.  Dose: 25 mg            STOP taking these medications      ascorbic acid 500 MG Tabs  Commonly known as: Ascorbic Acid     bisacodyl 10 MG Supp  Commonly known as: Dulcolax     carvedilol 6.25 MG Tabs  Commonly known as: Coreg     dilTIAZem  MG Cp24  Commonly known as: Cardizem CD     FREESTYLE LITE strip  Generic drug: glucose blood     furosemide 20 MG Tabs  Commonly known as: Lasix  Replaced by: furosemide 10 MG/ML Soln     glyBURIDE 5 MG Tabs  Commonly known as: Diabeta     insulin glargine 100 UNIT/ML injection PEN  Generic drug: insulin glargine     lisinopril 5 MG Tabs  Commonly known as: Prinivil     magnesium hydroxide 400 MG/5ML Susp  Commonly known as: Milk Of Magnesia     magnesium oxide 400 MG Tabs tablet  Commonly known as: MAG-OX     multivitamin Tabs     Pro-Stat Liqd     sennosides 8.6 MG Tabs  Commonly known as: Senokot     sodium chloride 1 GM Tabs  Commonly known as: Salt     sodium phosphate 7-19 GM/118ML Enem              Allergies  Allergies   Allergen Reactions    Gabapentin Unspecified     Dizziness      Metformin Shortness of Breath and Rash     To face    Codeine Nausea       DIET  Orders Placed This Encounter   Procedures    Diet Order Diet: Cardiac; Second Modifier: (optional): Consistent CHO (Diabetic); Nutrient modifications: (optional): Low Potassium; Fluid modifications: (optional): 1200 ml Fluid Restriction     Standing Status:   Standing     Number of Occurrences:   1     Order Specific Question:   Diet:     Answer:   Cardiac [6]     Order Specific Question:   Second Modifier: (optional)     Answer:   Consistent CHO (Diabetic) [4]     Order Specific Question:   Nutrient modifications: (optional)     Answer:   Low Potassium [11]     Order Specific Question:   Fluid modifications: (optional)     Answer:   1200 ml Fluid Restriction [8]       ACTIVITY  As tolerated and directed by rehab.  Weight bearing as  tolerated    CONSULTATIONS  Critical care    PROCEDURES    Echocardiogram 6/4/2024:  CONCLUSIONS  Moderate concentric left ventricular hypertrophy. Normal left   ventricular size and systolic function.  Severely dilated right ventricle with preserved systolic function.  Enlarged right atrium.  The left atrium is normal in size.  Mild mitral regurgitation.  Aortic valve sclerosis without stenosis.  Mild tricuspid regurgitation.  Right ventricular systolic pressure is estimated to be 75 mmHg;   severely elevated.  No pericardial effusion.     Compared to the images of the prior study 2/21/24, now right   ventricular systolic function appears preserved.    LABORATORY  Lab Results   Component Value Date    SODIUM 140 06/06/2024    POTASSIUM 3.6 06/06/2024    CHLORIDE 96 06/06/2024    CO2 30 06/06/2024    GLUCOSE 109 (H) 06/06/2024    BUN 29 (H) 06/06/2024    CREATININE 1.12 06/06/2024        Lab Results   Component Value Date    WBC 5.5 06/06/2024    HEMOGLOBIN 13.0 06/06/2024    HEMATOCRIT 43.9 06/06/2024    PLATELETCT 214 06/06/2024        Total time of the discharge process exceeds 38 minutes.

## 2024-06-06 NOTE — DISCHARGE PLANNING
Case Management Discharge Planning    Admission Date: 6/3/2024  GMLOS: 3.9  ALOS: 3    6-Clicks ADL Score: 15  6-Clicks Mobility Score: 9    Anticipated Discharge Dispo: Discharge Disposition: Disch to a long term care facility (63)    Action(s) Taken: Updated Provider/Nurse on Discharge Plan    Escalations Completed: Provider and Ride Line    Medically Clear: Yes    Next Steps: RNCM faxed PCS form and put in order for [facility]. RNCM waiting for confirmation for pickup     Barriers to Discharge: No barriers just waiting on transport time.

## 2024-06-06 NOTE — PROGRESS NOTES
Report called over to FALGUNI RN.  1330- Del here for pt . Pt belongings with pt, including glasses, phone, purse. Pt to discharge with everett and CHICA hand IV's.

## 2024-06-06 NOTE — PROGRESS NOTES
12-hour chart check complete.    Monitor Summary  Rhythm: A fib  Rate: 60s  Ectopy: rPVC/rCoup  Measurements: -/.06/-

## 2024-06-06 NOTE — DISCHARGE PLANNING
DC Transport Scheduled    Transport Company Scheduled:  [facility]  Spoke with Nura at [facility] to schedule transport.    Scheduled Date: 6/6/2024  Scheduled Time: 1300    Destination: [facility]   Destination address: [address] [place] [id number]    Notified care team of scheduled transport via Vo[employer]te.     If there are any changes needed to the DC transportation scheduled, please contact [facility] Line at ext. [phone] between the hours of 6805-0979 Mon-Fri. If outside those hours, contact the ED Case Manager at ext. [phone].

## 2024-08-15 NOTE — CARE PLAN
Problem: Humidified High Flow Nasal Cannula  Goal: Maintain adequate oxygenation dependent on patient condition  Description: Target End Date:  resolve prior to discharge or when underlying condition is resolved/stabilized    1.  Implement humidified high flow oxygen therapy  2.  Titrate high flow oxygen to maintain appropriate SpO2  Outcome: Progressing       Respiratory Update    Treatment modality: hhfnc 40L/60%  Frequency: q4    Pt tolerating current treatments well with no adverse reactions.     BIPAP 12/8RR16@50%   "[facility]    CC: Dysphagia    HPI 74 y.o. female who presents due to a food bolus after eating steak and vegetables. She said she was eating steak at 4 PM and started having a difficult time swallowing. She reports that the food did not pass with continued swallowing or trials of water. She has had to regurgitate up the water and spit into an emesis bag. She endorses to a history of a food bolus in 2021 with following EGD showing a Schatzki ring. She reports no issues since her last EGD.     Past Medical History  ILD  Sjoegren's syndrome    Physical Examination  /60 (BP Location: Left arm)   Pulse 78   Temp 98.1 °F (36.7 °C) (Oral)   Resp 18   Ht 5' 9" (1.753 m)   Wt 91.6 kg (201 lb 15.1 oz)   LMP 01/01/1976   SpO2 96%   BMI 29.82 kg/m²   General appearance: alert, cooperative, no distress  Abdomen: soft, non-tender; no organomegaly    Prior Endoscopy  EGD (6/2021): food impaction in lower third of esophagus; tortuous distal esophagus with possible ring, however traversed without difficulty. Granular mucosa negative for dysplasia at [facility]    EGD (8/2021): Schatzki ring in lower third of esophagus with mucosal rent noted after dilation with passage of endoscope with hemoclip placement. Large hiatal hernia    Assessment:   Mrs. Bharti Woodard is a 74 year old female with:  Food impaction likely secondary to known Schatzki ring (acute illness requiring hospitalization)    Plan:  - Emergent EGD for removal of food bolus  - Continue NPO status at this time  - Further recommendations to follow procedure    Crissy Hdz MD  [facility] Fellow      "